# Patient Record
Sex: MALE | Race: BLACK OR AFRICAN AMERICAN | NOT HISPANIC OR LATINO | Employment: OTHER | URBAN - METROPOLITAN AREA
[De-identification: names, ages, dates, MRNs, and addresses within clinical notes are randomized per-mention and may not be internally consistent; named-entity substitution may affect disease eponyms.]

---

## 2017-06-30 ENCOUNTER — HISTORICAL (OUTPATIENT)
Dept: INTERNAL MEDICINE | Facility: CLINIC | Age: 72
End: 2017-06-30

## 2017-10-09 ENCOUNTER — HISTORICAL (OUTPATIENT)
Dept: WOUND CARE | Facility: HOSPITAL | Age: 72
End: 2017-10-09

## 2017-12-19 ENCOUNTER — HISTORICAL (OUTPATIENT)
Dept: INTERNAL MEDICINE | Facility: CLINIC | Age: 72
End: 2017-12-19

## 2017-12-19 LAB
ABS NEUT (OLG): 3.37 X10(3)/MCL (ref 2.1–9.2)
ALBUMIN SERPL-MCNC: 3.6 GM/DL (ref 3.4–5)
ALBUMIN/GLOB SERPL: 1 RATIO (ref 1–2)
ALP SERPL-CCNC: 125 UNIT/L (ref 45–117)
ALT SERPL-CCNC: 37 UNIT/L (ref 12–78)
AST SERPL-CCNC: 27 UNIT/L (ref 15–37)
BASOPHILS # BLD AUTO: 0.07 X10(3)/MCL
BASOPHILS NFR BLD AUTO: 1 % (ref 0–1)
BILIRUB SERPL-MCNC: 0.4 MG/DL (ref 0.2–1)
BILIRUBIN DIRECT+TOT PNL SERPL-MCNC: 0.1 MG/DL
BILIRUBIN DIRECT+TOT PNL SERPL-MCNC: 0.3 MG/DL
BUN SERPL-MCNC: 34 MG/DL (ref 7–18)
CALCIUM SERPL-MCNC: 9.1 MG/DL (ref 8.5–10.1)
CHLORIDE SERPL-SCNC: 105 MMOL/L (ref 98–107)
CHOLEST SERPL-MCNC: 111 MG/DL
CHOLEST/HDLC SERPL: 3.5 {RATIO} (ref 0–5)
CO2 SERPL-SCNC: 28 MMOL/L (ref 21–32)
CREAT SERPL-MCNC: 1.7 MG/DL (ref 0.6–1.3)
EOSINOPHIL # BLD AUTO: 0.51 X10(3)/MCL
EOSINOPHIL NFR BLD AUTO: 7 % (ref 0–5)
ERYTHROCYTE [DISTWIDTH] IN BLOOD BY AUTOMATED COUNT: 12.8 % (ref 11.5–14.5)
EST. AVERAGE GLUCOSE BLD GHB EST-MCNC: 134 MG/DL
GLOBULIN SER-MCNC: 3.4 GM/ML (ref 2.3–3.5)
GLUCOSE SERPL-MCNC: 114 MG/DL (ref 74–106)
HBA1C MFR BLD: 6.3 % (ref 4.2–6.3)
HCT VFR BLD AUTO: 36.5 % (ref 40–51)
HDLC SERPL-MCNC: 32 MG/DL
HGB BLD-MCNC: 12.6 GM/DL (ref 13.5–17.5)
IMM GRANULOCYTES # BLD AUTO: 0.01 10*3/UL
IMM GRANULOCYTES NFR BLD AUTO: 0 %
LDLC SERPL CALC-MCNC: 64 MG/DL (ref 0–130)
LYMPHOCYTES # BLD AUTO: 2.43 X10(3)/MCL
LYMPHOCYTES NFR BLD AUTO: 34 % (ref 15–40)
MCH RBC QN AUTO: 31.4 PG (ref 26–34)
MCHC RBC AUTO-ENTMCNC: 34.5 GM/DL (ref 31–37)
MCV RBC AUTO: 91 FL (ref 80–100)
MONOCYTES # BLD AUTO: 0.68 X10(3)/MCL
MONOCYTES NFR BLD AUTO: 10 % (ref 4–12)
NEUTROPHILS # BLD AUTO: 3.37 X10(3)/MCL
NEUTROPHILS NFR BLD AUTO: 48 X10(3)/MCL
PLATELET # BLD AUTO: 257 X10(3)/MCL (ref 130–400)
PMV BLD AUTO: 9.4 FL (ref 7.4–10.4)
POTASSIUM SERPL-SCNC: 3.6 MMOL/L (ref 3.5–5.1)
PROT SERPL-MCNC: 7 GM/DL (ref 6.4–8.2)
RBC # BLD AUTO: 4.01 X10(6)/MCL (ref 4.5–5.9)
SODIUM SERPL-SCNC: 141 MMOL/L (ref 136–145)
TRIGL SERPL-MCNC: 77 MG/DL
TSH SERPL-ACNC: 0.79 MIU/L (ref 0.36–3.74)
VLDLC SERPL CALC-MCNC: 15 MG/DL
WBC # SPEC AUTO: 7.1 X10(3)/MCL (ref 4.5–11)

## 2018-06-20 ENCOUNTER — HISTORICAL (OUTPATIENT)
Dept: INTERNAL MEDICINE | Facility: CLINIC | Age: 73
End: 2018-06-20

## 2018-06-20 LAB
ABS NEUT (OLG): 4.11 X10(3)/MCL (ref 2.1–9.2)
ALBUMIN SERPL-MCNC: 3.6 GM/DL (ref 3.4–5)
ALBUMIN/GLOB SERPL: 1 RATIO (ref 1–2)
ALP SERPL-CCNC: 107 UNIT/L (ref 45–117)
ALT SERPL-CCNC: 23 UNIT/L (ref 12–78)
AST SERPL-CCNC: 24 UNIT/L (ref 15–37)
BASOPHILS # BLD AUTO: 0.08 X10(3)/MCL
BASOPHILS NFR BLD AUTO: 1 %
BILIRUB SERPL-MCNC: 0.4 MG/DL (ref 0.2–1)
BILIRUBIN DIRECT+TOT PNL SERPL-MCNC: 0.2 MG/DL
BILIRUBIN DIRECT+TOT PNL SERPL-MCNC: 0.2 MG/DL
BUN SERPL-MCNC: 28 MG/DL (ref 7–18)
CALCIUM SERPL-MCNC: 9.3 MG/DL (ref 8.5–10.1)
CHLORIDE SERPL-SCNC: 106 MMOL/L (ref 98–107)
CHOLEST SERPL-MCNC: 120 MG/DL
CHOLEST/HDLC SERPL: 3.6 {RATIO} (ref 0–5)
CO2 SERPL-SCNC: 25 MMOL/L (ref 21–32)
CREAT SERPL-MCNC: 1.6 MG/DL (ref 0.6–1.3)
EOSINOPHIL # BLD AUTO: 0.45 10*3/UL
EOSINOPHIL NFR BLD AUTO: 5 %
ERYTHROCYTE [DISTWIDTH] IN BLOOD BY AUTOMATED COUNT: 13 % (ref 11.5–14.5)
EST. AVERAGE GLUCOSE BLD GHB EST-MCNC: 143 MG/DL
GLOBULIN SER-MCNC: 3.7 GM/ML (ref 2.3–3.5)
GLUCOSE SERPL-MCNC: 111 MG/DL (ref 74–106)
HBA1C MFR BLD: 6.6 % (ref 4.2–6.3)
HCT VFR BLD AUTO: 35.9 % (ref 40–51)
HDLC SERPL-MCNC: 33 MG/DL
HGB BLD-MCNC: 12.2 GM/DL (ref 13.5–17.5)
IMM GRANULOCYTES # BLD AUTO: 0.01 10*3/UL
IMM GRANULOCYTES NFR BLD AUTO: 0 %
LDLC SERPL CALC-MCNC: 70 MG/DL (ref 0–130)
LYMPHOCYTES # BLD AUTO: 2.85 X10(3)/MCL
LYMPHOCYTES NFR BLD AUTO: 34 % (ref 13–40)
MCH RBC QN AUTO: 31.4 PG (ref 26–34)
MCHC RBC AUTO-ENTMCNC: 34 GM/DL (ref 31–37)
MCV RBC AUTO: 92.3 FL (ref 80–100)
MONOCYTES # BLD AUTO: 0.78 X10(3)/MCL
MONOCYTES NFR BLD AUTO: 9 % (ref 4–12)
NEUTROPHILS # BLD AUTO: 4.11 X10(3)/MCL
NEUTROPHILS NFR BLD AUTO: 50 X10(3)/MCL
PLATELET # BLD AUTO: 257 X10(3)/MCL (ref 130–400)
PMV BLD AUTO: 9.7 FL (ref 7.4–10.4)
POTASSIUM SERPL-SCNC: 3.8 MMOL/L (ref 3.5–5.1)
PROT SERPL-MCNC: 7.3 GM/DL (ref 6.4–8.2)
PSA SERPL-MCNC: 4.5 NG/ML
RBC # BLD AUTO: 3.89 X10(6)/MCL (ref 4.5–5.9)
SODIUM SERPL-SCNC: 141 MMOL/L (ref 136–145)
TRIGL SERPL-MCNC: 85 MG/DL
TSH SERPL-ACNC: 0.65 MIU/L (ref 0.36–3.74)
VLDLC SERPL CALC-MCNC: 17 MG/DL
WBC # SPEC AUTO: 8.3 X10(3)/MCL (ref 4.5–11)

## 2018-07-06 ENCOUNTER — HISTORICAL (OUTPATIENT)
Dept: ADMINISTRATIVE | Facility: HOSPITAL | Age: 73
End: 2018-07-06

## 2018-09-10 ENCOUNTER — HISTORICAL (OUTPATIENT)
Dept: CARDIOLOGY | Facility: HOSPITAL | Age: 73
End: 2018-09-10

## 2018-11-28 ENCOUNTER — HISTORICAL (OUTPATIENT)
Dept: RADIOLOGY | Facility: HOSPITAL | Age: 73
End: 2018-11-28

## 2018-12-24 ENCOUNTER — HISTORICAL (OUTPATIENT)
Dept: INTERNAL MEDICINE | Facility: CLINIC | Age: 73
End: 2018-12-24

## 2018-12-24 LAB
ABS NEUT (OLG): 4.76 X10(3)/MCL (ref 2.1–9.2)
ALBUMIN SERPL-MCNC: 3.4 GM/DL (ref 3.4–5)
ALBUMIN/GLOB SERPL: 1 RATIO (ref 1–2)
ALP SERPL-CCNC: 119 UNIT/L (ref 45–117)
ALT SERPL-CCNC: 20 UNIT/L (ref 12–78)
AST SERPL-CCNC: 29 UNIT/L (ref 15–37)
BASOPHILS # BLD AUTO: 0.08 X10(3)/MCL
BASOPHILS NFR BLD AUTO: 1 %
BILIRUB SERPL-MCNC: 0.3 MG/DL (ref 0.2–1)
BILIRUBIN DIRECT+TOT PNL SERPL-MCNC: 0.1 MG/DL
BILIRUBIN DIRECT+TOT PNL SERPL-MCNC: 0.2 MG/DL
BUN SERPL-MCNC: 30 MG/DL (ref 7–18)
CALCIUM SERPL-MCNC: 9.2 MG/DL (ref 8.5–10.1)
CHLORIDE SERPL-SCNC: 105 MMOL/L (ref 98–107)
CHOLEST SERPL-MCNC: 117 MG/DL
CHOLEST/HDLC SERPL: 3.4 {RATIO} (ref 0–5)
CO2 SERPL-SCNC: 29 MMOL/L (ref 21–32)
CREAT SERPL-MCNC: 1.6 MG/DL (ref 0.6–1.3)
CREAT UR-MCNC: 163 MG/DL
EOSINOPHIL # BLD AUTO: 0.54 X10(3)/MCL
EOSINOPHIL NFR BLD AUTO: 6 %
ERYTHROCYTE [DISTWIDTH] IN BLOOD BY AUTOMATED COUNT: 13.1 % (ref 11.5–14.5)
EST. AVERAGE GLUCOSE BLD GHB EST-MCNC: 123 MG/DL
GLOBULIN SER-MCNC: 3.8 GM/ML (ref 2.3–3.5)
GLUCOSE SERPL-MCNC: 106 MG/DL (ref 74–106)
HBA1C MFR BLD: 5.9 % (ref 4.2–6.3)
HCT VFR BLD AUTO: 34.2 % (ref 40–51)
HDLC SERPL-MCNC: 34 MG/DL
HGB BLD-MCNC: 11.4 GM/DL (ref 13.5–17.5)
IMM GRANULOCYTES # BLD AUTO: 0.02 10*3/UL
IMM GRANULOCYTES NFR BLD AUTO: 0 %
LDLC SERPL CALC-MCNC: 68 MG/DL (ref 0–130)
LYMPHOCYTES # BLD AUTO: 2.39 X10(3)/MCL
LYMPHOCYTES NFR BLD AUTO: 28 % (ref 13–40)
MCH RBC QN AUTO: 30.4 PG (ref 26–34)
MCHC RBC AUTO-ENTMCNC: 33.3 GM/DL (ref 31–37)
MCV RBC AUTO: 91.2 FL (ref 80–100)
MICROALBUMIN UR-MCNC: 9.2 MG/L (ref 0–19)
MICROALBUMIN/CREAT RATIO PNL UR: 5.6 MCG/MG CR (ref 0–29)
MONOCYTES # BLD AUTO: 0.74 X10(3)/MCL
MONOCYTES NFR BLD AUTO: 9 % (ref 4–12)
NEUTROPHILS # BLD AUTO: 4.76 X10(3)/MCL
NEUTROPHILS NFR BLD AUTO: 56 X10(3)/MCL
PLATELET # BLD AUTO: 270 X10(3)/MCL (ref 130–400)
PMV BLD AUTO: 9.1 FL (ref 7.4–10.4)
POTASSIUM SERPL-SCNC: 3.9 MMOL/L (ref 3.5–5.1)
PROT SERPL-MCNC: 7.2 GM/DL (ref 6.4–8.2)
RBC # BLD AUTO: 3.75 X10(6)/MCL (ref 4.5–5.9)
SODIUM SERPL-SCNC: 139 MMOL/L (ref 136–145)
TRIGL SERPL-MCNC: 77 MG/DL
TSH SERPL-ACNC: 0.66 MIU/L (ref 0.36–3.74)
VLDLC SERPL CALC-MCNC: 15 MG/DL
WBC # SPEC AUTO: 8.5 X10(3)/MCL (ref 4.5–11)

## 2019-05-21 ENCOUNTER — HISTORICAL (OUTPATIENT)
Dept: ADMINISTRATIVE | Facility: HOSPITAL | Age: 74
End: 2019-05-21

## 2019-05-28 ENCOUNTER — HISTORICAL (OUTPATIENT)
Dept: SURGERY | Facility: HOSPITAL | Age: 74
End: 2019-05-28

## 2019-07-05 ENCOUNTER — HISTORICAL (OUTPATIENT)
Dept: INTERNAL MEDICINE | Facility: CLINIC | Age: 74
End: 2019-07-05

## 2019-09-27 ENCOUNTER — HISTORICAL (OUTPATIENT)
Dept: RADIOLOGY | Facility: HOSPITAL | Age: 74
End: 2019-09-27

## 2019-11-11 ENCOUNTER — HISTORICAL (OUTPATIENT)
Dept: RADIOLOGY | Facility: HOSPITAL | Age: 74
End: 2019-11-11

## 2020-01-06 ENCOUNTER — HISTORICAL (OUTPATIENT)
Dept: INTERNAL MEDICINE | Facility: CLINIC | Age: 75
End: 2020-01-06

## 2020-07-06 ENCOUNTER — HISTORICAL (OUTPATIENT)
Dept: INTERNAL MEDICINE | Facility: CLINIC | Age: 75
End: 2020-07-06

## 2020-08-07 ENCOUNTER — HISTORICAL (OUTPATIENT)
Dept: ADMINISTRATIVE | Facility: HOSPITAL | Age: 75
End: 2020-08-07

## 2020-08-13 ENCOUNTER — HISTORICAL (OUTPATIENT)
Dept: RADIOLOGY | Facility: HOSPITAL | Age: 75
End: 2020-08-13

## 2020-09-11 ENCOUNTER — HISTORICAL (OUTPATIENT)
Dept: GASTROENTEROLOGY | Facility: CLINIC | Age: 75
End: 2020-09-11

## 2020-09-16 ENCOUNTER — HISTORICAL (OUTPATIENT)
Dept: ENDOSCOPY | Facility: HOSPITAL | Age: 75
End: 2020-09-16

## 2020-09-25 ENCOUNTER — HISTORICAL (OUTPATIENT)
Dept: ADMINISTRATIVE | Facility: HOSPITAL | Age: 75
End: 2020-09-25

## 2020-11-09 ENCOUNTER — HISTORICAL (OUTPATIENT)
Dept: RADIOLOGY | Facility: HOSPITAL | Age: 75
End: 2020-11-09

## 2020-11-11 ENCOUNTER — HISTORICAL (OUTPATIENT)
Dept: ADMINISTRATIVE | Facility: HOSPITAL | Age: 75
End: 2020-11-11

## 2020-11-13 ENCOUNTER — HISTORICAL (OUTPATIENT)
Dept: SURGERY | Facility: HOSPITAL | Age: 75
End: 2020-11-13

## 2020-12-03 ENCOUNTER — HISTORICAL (OUTPATIENT)
Dept: INFUSION THERAPY | Facility: HOSPITAL | Age: 75
End: 2020-12-03

## 2020-12-11 ENCOUNTER — HISTORICAL (OUTPATIENT)
Dept: INFUSION THERAPY | Facility: HOSPITAL | Age: 75
End: 2020-12-11

## 2020-12-14 ENCOUNTER — HISTORICAL (OUTPATIENT)
Dept: HEMATOLOGY/ONCOLOGY | Facility: CLINIC | Age: 75
End: 2020-12-14

## 2021-01-07 ENCOUNTER — HISTORICAL (OUTPATIENT)
Dept: HEMATOLOGY/ONCOLOGY | Facility: CLINIC | Age: 76
End: 2021-01-07

## 2021-02-04 ENCOUNTER — HISTORICAL (OUTPATIENT)
Dept: INFUSION THERAPY | Facility: HOSPITAL | Age: 76
End: 2021-02-04

## 2021-02-26 ENCOUNTER — HISTORICAL (OUTPATIENT)
Dept: RADIOLOGY | Facility: HOSPITAL | Age: 76
End: 2021-02-26

## 2021-04-15 ENCOUNTER — HISTORICAL (OUTPATIENT)
Dept: INFUSION THERAPY | Facility: HOSPITAL | Age: 76
End: 2021-04-15

## 2021-04-15 ENCOUNTER — HISTORICAL (OUTPATIENT)
Dept: RADIOLOGY | Facility: HOSPITAL | Age: 76
End: 2021-04-15

## 2021-05-06 ENCOUNTER — HISTORICAL (OUTPATIENT)
Dept: ADMINISTRATIVE | Facility: HOSPITAL | Age: 76
End: 2021-05-06

## 2021-06-03 ENCOUNTER — HISTORICAL (OUTPATIENT)
Dept: RADIOLOGY | Facility: HOSPITAL | Age: 76
End: 2021-06-03

## 2021-06-04 ENCOUNTER — HISTORICAL (OUTPATIENT)
Dept: RADIOLOGY | Facility: HOSPITAL | Age: 76
End: 2021-06-04

## 2021-06-08 ENCOUNTER — HISTORICAL (OUTPATIENT)
Dept: RADIOLOGY | Facility: HOSPITAL | Age: 76
End: 2021-06-08

## 2021-06-11 ENCOUNTER — HISTORICAL (OUTPATIENT)
Dept: HEMATOLOGY/ONCOLOGY | Facility: CLINIC | Age: 76
End: 2021-06-11

## 2021-06-14 ENCOUNTER — HISTORICAL (OUTPATIENT)
Dept: ADMINISTRATIVE | Facility: HOSPITAL | Age: 76
End: 2021-06-14

## 2021-08-03 ENCOUNTER — HISTORICAL (OUTPATIENT)
Dept: CARDIOLOGY | Facility: HOSPITAL | Age: 76
End: 2021-08-03

## 2021-08-06 ENCOUNTER — HISTORICAL (OUTPATIENT)
Dept: INFUSION THERAPY | Facility: HOSPITAL | Age: 76
End: 2021-08-06

## 2021-08-09 ENCOUNTER — HISTORICAL (OUTPATIENT)
Dept: INTERNAL MEDICINE | Facility: CLINIC | Age: 76
End: 2021-08-09

## 2021-09-20 ENCOUNTER — HISTORICAL (OUTPATIENT)
Dept: ADMINISTRATIVE | Facility: HOSPITAL | Age: 76
End: 2021-09-20

## 2021-10-01 ENCOUNTER — HISTORICAL (OUTPATIENT)
Dept: INFUSION THERAPY | Facility: HOSPITAL | Age: 76
End: 2021-10-01

## 2021-12-21 ENCOUNTER — HISTORICAL (OUTPATIENT)
Dept: INFUSION THERAPY | Facility: HOSPITAL | Age: 76
End: 2021-12-21

## 2022-01-25 ENCOUNTER — HISTORICAL (OUTPATIENT)
Dept: INTERNAL MEDICINE | Facility: CLINIC | Age: 77
End: 2022-01-25

## 2022-02-09 ENCOUNTER — HISTORICAL (OUTPATIENT)
Dept: INFUSION THERAPY | Facility: HOSPITAL | Age: 77
End: 2022-02-09

## 2022-02-23 ENCOUNTER — HISTORICAL (OUTPATIENT)
Dept: INTERNAL MEDICINE | Facility: CLINIC | Age: 77
End: 2022-02-23

## 2022-02-23 LAB
ABS NEUT (OLG): 4.52 (ref 2.1–9.2)
ALBUMIN SERPL-MCNC: 3.3 G/DL (ref 3.4–4.8)
ALBUMIN/GLOB SERPL: 0.8 {RATIO} (ref 1.1–2)
ALP SERPL-CCNC: 119 U/L (ref 40–150)
ALT SERPL-CCNC: 22 U/L (ref 0–55)
AST SERPL-CCNC: 24 U/L (ref 5–34)
BASOPHILS # BLD AUTO: 0.1 10*3/UL (ref 0–0.2)
BASOPHILS NFR BLD AUTO: 1 %
BILIRUB SERPL-MCNC: 0.4 MG/DL
BILIRUBIN DIRECT+TOT PNL SERPL-MCNC: 0.2 (ref 0–0.5)
BILIRUBIN DIRECT+TOT PNL SERPL-MCNC: 0.2 (ref 0–0.8)
BUN SERPL-MCNC: 13.3 MG/DL (ref 8.4–25.7)
CALCIUM SERPL-MCNC: 9.8 MG/DL (ref 8.7–10.5)
CHLORIDE SERPL-SCNC: 98 MMOL/L (ref 98–107)
CHOLEST SERPL-MCNC: 117 MG/DL
CHOLEST/HDLC SERPL: 4 {RATIO} (ref 0–5)
CO2 SERPL-SCNC: 29 MMOL/L (ref 23–31)
CREAT SERPL-MCNC: 1.25 MG/DL (ref 0.73–1.18)
DEPRECATED CALCIDIOL+CALCIFEROL SERPL-MC: 45.7 NG/ML (ref 30–80)
EOSINOPHIL # BLD AUTO: 0.5 10*3/UL (ref 0–0.9)
EOSINOPHIL NFR BLD AUTO: 5 %
ERYTHROCYTE [DISTWIDTH] IN BLOOD BY AUTOMATED COUNT: 13.7 % (ref 11.5–14.5)
EST. AVERAGE GLUCOSE BLD GHB EST-MCNC: 134.1 MG/DL
FLAG2 (OHS): 70
FLAG3 (OHS): 80
FLAGS (OHS): 70
GLOBULIN SER-MCNC: 4.3 G/DL (ref 2.4–3.5)
GLUCOSE SERPL-MCNC: 142 MG/DL (ref 82–115)
HBA1C MFR BLD: 6.3 %
HCT VFR BLD AUTO: 39.8 % (ref 40–51)
HDLC SERPL-MCNC: 29 MG/DL (ref 35–60)
HEMOLYSIS INTERF INDEX SERPL-ACNC: 5
HGB BLD-MCNC: 13.2 G/DL (ref 13.5–17.5)
ICTERIC INTERF INDEX SERPL-ACNC: 0
IMM GRANULOCYTES # BLD AUTO: 0.02 10*3/UL
IMM GRANULOCYTES NFR BLD AUTO: 0 %
LDLC SERPL CALC-MCNC: 68 MG/DL (ref 50–140)
LIPEMIC INTERF INDEX SERPL-ACNC: 2
LOW EVENT # SUSPECT FLAG (OHS): 70
LYMPHOCYTES # BLD AUTO: 3.6 10*3/UL (ref 0.6–4.6)
LYMPHOCYTES NFR BLD AUTO: 38 %
MANUAL DIFF? (OHS): NO
MCH RBC QN AUTO: 31.9 PG (ref 26–34)
MCHC RBC AUTO-ENTMCNC: 33.2 G/DL (ref 31–37)
MCV RBC AUTO: 96.1 FL (ref 80–100)
MO BLASTS SUSPECT FLAG (OHS): 60
MONOCYTES # BLD AUTO: 0.8 10*3/UL (ref 0.1–1.3)
MONOCYTES NFR BLD AUTO: 8 %
NEUTROPHILS # BLD AUTO: 4.52 10*3/UL (ref 2.1–9.2)
NEUTROPHILS NFR BLD AUTO: 48 %
NRBC BLD AUTO-RTO: 0 % (ref 0–0.2)
PLATELET # BLD AUTO: 304 10*3/UL (ref 130–400)
PLATELET CLUMPS SUSPECT FLAG (OHS): 30
PMV BLD AUTO: 9.6 FL (ref 7.4–10.4)
POTASSIUM SERPL-SCNC: 3.8 MMOL/L (ref 3.5–5.1)
PROT SERPL-MCNC: 7.6 G/DL (ref 5.8–7.6)
RBC # BLD AUTO: 4.14 10*6/UL (ref 4.5–5.9)
SODIUM SERPL-SCNC: 137 MMOL/L (ref 136–145)
TRIGL SERPL-MCNC: 98 MG/DL (ref 34–140)
TSH SERPL-ACNC: 1.1 M[IU]/L (ref 0.35–4.94)
VLDLC SERPL CALC-MCNC: 20 MG/DL
WBC # SPEC AUTO: 9.5 10*3/UL (ref 4.5–11)

## 2022-04-06 ENCOUNTER — HISTORICAL (OUTPATIENT)
Dept: INFUSION THERAPY | Facility: HOSPITAL | Age: 77
End: 2022-04-06

## 2022-04-06 LAB
ABS NEUT (OLG): 6.16 (ref 2.1–9.2)
ALBUMIN SERPL-MCNC: 3.1 G/DL (ref 3.4–4.8)
ALBUMIN/GLOB SERPL: 0.7 {RATIO} (ref 1.1–2)
ALP SERPL-CCNC: 99 U/L (ref 40–150)
ALT SERPL-CCNC: 17 U/L (ref 0–55)
AST SERPL-CCNC: 25 U/L (ref 5–34)
BASOPHILS # BLD AUTO: 0.1 10*3/UL (ref 0–0.2)
BASOPHILS NFR BLD AUTO: 1 %
BILIRUB SERPL-MCNC: 0.3 MG/DL
BILIRUBIN DIRECT+TOT PNL SERPL-MCNC: 0.1 (ref 0–0.5)
BILIRUBIN DIRECT+TOT PNL SERPL-MCNC: 0.2 (ref 0–0.8)
BUN SERPL-MCNC: 22.9 MG/DL (ref 8.4–25.7)
CALCIUM SERPL-MCNC: 9.7 MG/DL (ref 8.7–10.5)
CEA SERPL-MCNC: 1.74 NG/ML (ref 0–3)
CHLORIDE SERPL-SCNC: 103 MMOL/L (ref 98–107)
CO2 SERPL-SCNC: 23 MMOL/L (ref 23–31)
CREAT SERPL-MCNC: 1.48 MG/DL (ref 0.73–1.18)
EOSINOPHIL # BLD AUTO: 0.7 10*3/UL (ref 0–0.9)
EOSINOPHIL NFR BLD AUTO: 7 %
ERYTHROCYTE [DISTWIDTH] IN BLOOD BY AUTOMATED COUNT: 13.4 % (ref 11.5–14.5)
FLAG2 (OHS): 70
FLAG3 (OHS): 80
FLAGS (OHS): 70
GLOBULIN SER-MCNC: 4.4 G/DL (ref 2.4–3.5)
GLUCOSE SERPL-MCNC: 158 MG/DL (ref 82–115)
HCT VFR BLD AUTO: 37.9 % (ref 40–51)
HEMOLYSIS INTERF INDEX SERPL-ACNC: 38
HGB BLD-MCNC: 12.4 G/DL (ref 13.5–17.5)
ICTERIC INTERF INDEX SERPL-ACNC: 0
IMM GRANULOCYTES # BLD AUTO: 0.03 10*3/UL
IMM GRANULOCYTES NFR BLD AUTO: 0 %
LIPEMIC INTERF INDEX SERPL-ACNC: 5
LOW EVENT # SUSPECT FLAG (OHS): 70
LYMPHOCYTES # BLD AUTO: 2.4 10*3/UL (ref 0.6–4.6)
LYMPHOCYTES NFR BLD AUTO: 24 %
MANUAL DIFF? (OHS): NO
MCH RBC QN AUTO: 30.8 PG (ref 26–34)
MCHC RBC AUTO-ENTMCNC: 32.7 G/DL (ref 31–37)
MCV RBC AUTO: 94 FL (ref 80–100)
MO BLASTS SUSPECT FLAG (OHS): 40
MONOCYTES # BLD AUTO: 0.7 10*3/UL (ref 0.1–1.3)
MONOCYTES NFR BLD AUTO: 7 %
NEUTROPHILS # BLD AUTO: 6.16 10*3/UL (ref 2.1–9.2)
NEUTROPHILS NFR BLD AUTO: 61 %
NRBC BLD AUTO-RTO: 0 % (ref 0–0.2)
PLATELET # BLD AUTO: 342 10*3/UL (ref 130–400)
PLATELET CLUMPS SUSPECT FLAG (OHS): 10
PMV BLD AUTO: 9.8 FL (ref 7.4–10.4)
POTASSIUM SERPL-SCNC: 3.5 MMOL/L (ref 3.5–5.1)
PROT SERPL-MCNC: 7.5 G/DL (ref 5.8–7.6)
RBC # BLD AUTO: 4.03 10*6/UL (ref 4.5–5.9)
SODIUM SERPL-SCNC: 135 MMOL/L (ref 136–145)
WBC # SPEC AUTO: 10.1 10*3/UL (ref 4.5–11)

## 2022-04-07 ENCOUNTER — HISTORICAL (OUTPATIENT)
Dept: ADMINISTRATIVE | Facility: HOSPITAL | Age: 77
End: 2022-04-07
Payer: MEDICARE

## 2022-04-23 VITALS
DIASTOLIC BLOOD PRESSURE: 76 MMHG | WEIGHT: 176.56 LBS | SYSTOLIC BLOOD PRESSURE: 130 MMHG | OXYGEN SATURATION: 98 % | HEIGHT: 70 IN | BODY MASS INDEX: 25.28 KG/M2

## 2022-05-01 NOTE — HISTORICAL [facility] CERNER
This is a historical note converted from Tara. Formatting and pictures may have been removed.  Please reference Tara for original formatting and attached multimedia. 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 Mr. Hamilton has returned home from the [facility] now being seen by home health twice a week. His wife manages his medications and ostomy bag. He reports tolerating a regular diet with his ostomy bag requiring changing twice a day. His ostomy bag collects semi formed brown stool without blood or pain. He denies F/C, N/V, CP, SOB.?He and his wife would like to get a prescription for Ensure. They are aware of his cancer staging and need for further work up and possible chemotherapy.  ?   11/13/2020:  NPO since midnight. No recent hospitalizations or acute changes since last clinic visit. Patient does not have preference as to side. He does not think he has had central lines in neck before. He does report having blockages bilaterally that were removed in the past.  ?   Review of Systems  10 point ROS negative unless otherwise noted above  Physical Exam  ??  Gen: NAD, alert  HEENT: NC/AT  Cardio: RRR, no murmurs rubs or gallops  Pulm: CTABL, symmetric chest rise  Abdomen: Soft, ND/NT, Ostomy is pink and well perfused. Ostomy bag contains semi formed brown stool.  Neuro: CN II-XII grossly intact  ?   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 His?ostomy is well maintained with adequate output?and he is aware of the need for further work up of metastatic disease and Mediport placement for chemotherapy.  ?  -?Mediport Placement?today?  ?  Adin Ibarra MD  [facility] Surgery, PGY-1   Pt known to me and now here for his port placement for chemo.  Pt is appropriately prepared for Mediport.

## 2022-05-01 NOTE — HISTORICAL [facility] CERNER
This is a historical note converted from [employer]. Formatting and pictures may have been removed.  Please reference [employer]ner for original formatting and attached multimedia. History of Present Illness  Riccardo Hamilton is a 75 year old male who was referred for colonoscopy to biopsy a cecal mass. The patient states that for the last 4 months he has had 37 lbs of unintentional weight loss, he was feeling more fatigued and had dark stools. The work up resulted in a CT which noted the mass on 8/13/20.. He denies symptoms of dysphagia, nausea, vomiting, or reflux. He averages 1-2 bowel movements per day which are well formed. He denies abdominal pain, constipation, diarrhea or bright red blood in the stool. He states the dark stools have since resolved. His last colonoscopy was ~10 years ago which he notes was negative. He has no family history of colon cancer. He denies smoking, drinking or drug use.  Review of Systems  negative except as per HPI  Physical Exam  Gen: NAD  Cardio: RR  Pulm: normal effort  Abd: S, NT  MSK: moving extremities  Neuro: alert  Assessment/Plan  Orders:  Discharge, 09/16/20 7:49:00 CDT, Home, When Yanelis score is 100% of pre-op score.  Vital Signs, 09/16/20 7:49:00 CDT, q5min, during procedure, then release from procedure room when post-sedation Yanelis is 80% of pre-op.  Vital Signs, 09/16/20 7:49:00 CDT, q15min for 1 hr, Stop date 09/16/20 8:59:00 CDT, For PACU  75 year old male with cecal mass  -consent obtained  -patient will proceed with colonoscopy   Problem List/Past Medical History  Ongoing  Allergic rhinitis  Anemia(  Confirmed  )  Aphakia(  Confirmed  )  Asthma  Cataract(  Confirmed  )  Chronic renal impairment(  Confirmed  )  Diabetic retinopathy(  Confirmed  )  DMII (diabetes mellitus, type 2)(  Confirmed  )  HTN (hypertension)  HTN - Hypertension  Hypercholesterolemia  Hyperlipidemia(  Confirmed  )  Ptosis of eyelid(  Confirmed  )  Syncope  Visual field defect(   Confirmed  )  Historical  Diabetes  Procedure/Surgical History  Cataract Extraction Phacoemulsification (Left) (05/28/2019)  Extracapsular cataract removal with insertion of intraocular lens prosthesis (1 stage procedure), manual or mechanical technique (eg, irrigation and aspiration or phacoemulsification) (05/28/2019)  IOL Placement (Left) (05/28/2019)  Replacement of Left Lens with Synthetic Substitute, Percutaneous Approach (05/28/2019)  Carotid endarterectomy (02/12/2014)  Carotid endarterectomy (01/06/2014)  blockage  Bypass graft, with other than vein, transcervical retropharyngeal carotid-carotid, performed in conjunction with endovascular repair of descending thoracic aorta, by neck incision  Extracapsular cataract removal with insertion of intraocular lens prosthesis (1 stage procedure), manual or mechanical technique (eg, irrigation and aspiration or phacoemulsification)  Phacoemulsification of cataract with intraocular lens implantation   Medications  Inpatient  No active inpatient medications  Home  Advair Diskus 250 mcg-50 mcg inhalation powder, 1 puff(s), INH, BID, 11 refills  amLODIPine 10 mg oral tablet, 10 mg= 1 tab(s), Oral, Daily,? ?Not taking: Last Dose Date/Time unknown  aspirin 81 mg oral tablet, CHEWABLE, 81 mg= 1 tab(s), Oral, Daily  atorvastatin 40 mg oral tablet, 40 mg= 1 tab(s), Oral, Daily, 3 refills  cetirizine 10 mg oral tablet, 10 mg= 1 tab(s), Oral, Daily, 3 refills  fluticasone 50 mcg/inh nasal spray, 50 mcg= 1 spray(s), Nasal, BID, 3 refills  hydrALAZINE 25 mg oral tablet, 25 mg= 1 tab(s), Oral, BID, 3 refills  hydrochlorothiazide 25 mg oral tablet, 25 mg= 1 tab(s), Oral, Daily, 3 refills  losartan 100 mg oral tablet, 100 mg= 1 tab(s), Oral, Daily,? ?Not taking: Last Dose Date/Time unknown  losartan 50 mg oral tablet, 50 mg= 1 tab(s), Oral, Daily, 3 refills  metoprolol succinate 50 mg oral tablet, extended release, 50 mg= 1 tab(s), Oral, Daily, 3 refills  Norvasc 2.5 mg oral  tablet, 2.5 mg= 1 tab(s), Oral, Daily, 3 refills  One A Day Men 50 Plus oral tablet, 1 tab(s), Oral, Daily  Allergies  No Known Allergies  Social History  Abuse/Neglect  No, No, Yes, 09/14/2020  Alcohol - Denies Alcohol Use, 10/02/2014  Never, 07/06/2016  Employment/School  Retired, 07/06/2018  Retired, Highest education level: None. Operates hazardous equipment: No., 07/06/2016  Exercise  Exercise duration: 30. Exercise frequency: Daily. Exercise type: Walking., 01/20/2020  Exercise frequency: 5-6 times/week. Self assessment: Fair condition. Exercise type: Walking., 04/06/2015  Home/Environment  Lives with Spouse., 07/06/2018  Home equipment: blood pressure cuff., 01/09/2017  Lives with Spouse. Living situation: Home/Independent. Alcohol abuse in household: No. Substance abuse in household: No. Smoker in household: No. Injuries/Abuse/Neglect in household: No. Feels unsafe at home: No. Safe place to go: Yes. Agency(s)/Others notified: No. Family/Friends available for support: Yes. Concern for family members at home: No. Major illness in household: No. Financial concerns: No. TV/Computer concerns: No., 07/06/2016  Nutrition/Health  Regular, Wants to lose weight: No. Sleeping concerns: No. Feels highly stressed: No., 04/06/2015  Sexual  Gender Identity Identifies as male., 08/01/2019  Spiritual/Cultural  Caodaism, 01/20/2020  Substance Use - Denies Substance Abuse, 10/02/2014  Never, 07/06/2016  Tobacco - Denies Tobacco Use, 10/02/2014  Former smoker, quit more than 30 days ago, N/A, 09/14/2020  Family History  Cancer: Sister.  Mother: History is unknown  Sister: History is unknown  Sister: History is unknown  Immunizations  Vaccine Date Status   pneumococcal 13-valent conjugate vaccine 09/04/2020 Given   influenza virus vaccine, inactivated 09/12/2019 Recorded   influenza virus vaccine, inactivated 10/02/2017 Recorded   pneumococcal 23-polyvalent vaccine 10/02/2015 Recorded   zoster vaccine live 09/22/2015 Recorded        He reports a 3 month history of weight loss of 30 lbs.? He has been having regular stools but for past 2 months stools have been dark.? Denies any abdominal pain.? Appetite is fair but states he was never a big eater.? As work-up for weight loss was done, CT scan was performed that showed large cecal mass.? Last hgb was 8 g/dL.

## 2022-05-01 NOTE — HISTORICAL [facility] CERNER
This is a historical note converted from [employer]. Formatting and pictures may have been removed.  Please reference [employer]laura for original formatting and attached multimedia. Chief Complaint  Test results. Right hand and wriet pain and swelling.  History of Present Illness  72 yo bm with htn, chol, sinus, cri, diabetes  Review of Systems  neg cv, neg pulm, neg gi  Physical Exam  Vitals & Measurements  T:?36.5? ?C (Oral)? HR:?54(Peripheral)? RR:?18? BP:?105/62?  HT:?175?cm? HT:?175?cm? WT:?76.5?kg? WT:?76.5?kg? BMI:?24.98?  aax4 nad  mirta eomi  cv rrr s1s2 no mgr  lungs cta no cr or whz  abd nt soft  ext no edema  neuro intact  Assessment/Plan  1.?Allergic rhinitis  ? stable  Ordered:  CBC w/ Auto Diff, Routine collect, *Est. 01/06/19 3:00:00 CST, Blood, Order for future visit, *Est. Stop date 01/06/19 3:00:00 CST, Lab Collect, Allergic rhinitis  DMII (diabetes mellitus, type 2)(  Confirmed  )  Anemia(  Confirmed  )  Hypercholesterolemia, 07/0...  Clinic Follow up, *Est. 01/06/19 3:00:00 CST, Order for future visit, Allergic rhinitis  DMII (diabetes mellitus, type 2)(  Confirmed  )  Anemia(  Confirmed  )  Hypercholesterolemia, [facility]  Comprehensive Metabolic Panel, Routine collect, *Est. 01/06/19 3:00:00 CST, Blood, Order for future visit, *Est. Stop date 01/06/19 3:00:00 CST, Lab Collect, Allergic rhinitis  DMII (diabetes mellitus, type 2)(  Confirmed  )  Anemia(  Confirmed  )  Hypercholesterolemia, 07/0...  Hemoglobin A1C [facility], Routine collect, *Est. 01/06/19 3:00:00 CST, Blood, Order for future visit, *Est. Stop date 01/06/19 3:00:00 CST, Lab Collect, Allergic rhinitis  DMII (diabetes mellitus, type 2)(  Confirmed  )  Anemia(  Confirmed  )  Hypercholesterolemia, 07/0...  Lipid Panel, Routine collect, *Est. 01/06/19 3:00:00 CST, Blood, Order for future visit, *Est. Stop date 01/06/19 3:00:00 CST, Lab Collect, Allergic rhinitis  DMII (diabetes mellitus, type 2)(  Confirmed  )   Anemia(  Confirmed  )  Hypercholesterolemia, 07/0...  Microalbum/Creatinine Ratio Urine (Microalb/Creat), Routine collect, Urine, Order for future visit, *Est. 01/06/19 3:00:00 CST, *Est. Stop date 01/06/19 3:00:00 CST, Nurse collect, Allergic rhinitis  DMII (diabetes mellitus, type 2)(  Confirmed  )  Anemia(  Confirmed  )  Hypercholesterolemia  Office/Outpatient Visit Level 4 Established [id number] PC, Allergic rhinitis  DMII (diabetes mellitus, type 2)(  Confirmed  )  Anemia(  Confirmed  )  Hypercholesterolemia, [facility], 07/06/18 10:14:00 CDT  Thyroid Stimulating Hormone, Routine collect, *Est. 01/06/19 3:00:00 CST, Blood, Order for future visit, *Est. Stop date 01/06/19 3:00:00 CST, Lab Collect, Allergic rhinitis  DMII (diabetes mellitus, type 2)(  Confirmed  )  Anemia(  Confirmed  )  Hypercholesterolemia, 07/0...  ?  2.?DMII (diabetes mellitus, type 2)(  Confirmed  )  ? follow low glucose diet  Ordered:  CBC w/ Auto Diff, Routine collect, *Est. 01/06/19 3:00:00 CST, Blood, Order for future visit, *Est. Stop date 01/06/19 3:00:00 CST, Lab Collect, Allergic rhinitis  DMII (diabetes mellitus, type 2)(  Confirmed  )  Anemia(  Confirmed  )  Hypercholesterolemia, 07/0...  Clinic Follow up, *Est. 01/06/19 3:00:00 CST, Order for future visit, Allergic rhinitis  DMII (diabetes mellitus, type 2)(  Confirmed  )  Anemia(  Confirmed  )  Hypercholesterolemia, [facility]  Comprehensive Metabolic Panel, Routine collect, *Est. 01/06/19 3:00:00 CST, Blood, Order for future visit, *Est. Stop date 01/06/19 3:00:00 CST, Lab Collect, Allergic rhinitis  DMII (diabetes mellitus, type 2)(  Confirmed  )  Anemia(  Confirmed  )  Hypercholesterolemia, 07/0...  Hemoglobin A1C [facility], Routine collect, *Est. 01/06/19 3:00:00 CST, Blood, Order for future visit, *Est. Stop date 01/06/19 3:00:00 CST, Lab Collect, Allergic rhinitis  DMII (diabetes mellitus, type 2)(  Confirmed  )  Anemia(   Confirmed  )  Hypercholesterolemia, 07/0...  Lipid Panel, Routine collect, *Est. 01/06/19 3:00:00 CST, Blood, Order for future visit, *Est. Stop date 01/06/19 3:00:00 CST, Lab Collect, Allergic rhinitis  DMII (diabetes mellitus, type 2)(  Confirmed  )  Anemia(  Confirmed  )  Hypercholesterolemia, 07/0...  Microalbum/Creatinine Ratio Urine (Microalb/Creat), Routine collect, Urine, Order for future visit, *Est. 01/06/19 3:00:00 CST, *Est. Stop date 01/06/19 3:00:00 CST, Nurse collect, Allergic rhinitis  DMII (diabetes mellitus, type 2)(  Confirmed  )  Anemia(  Confirmed  )  Hypercholesterolemia  Office/Outpatient Visit Level 4 Established [id number] PC, Allergic rhinitis  DMII (diabetes mellitus, type 2)(  Confirmed  )  Anemia(  Confirmed  )  Hypercholesterolemia, [facility], 07/06/18 10:14:00 CDT  Thyroid Stimulating Hormone, Routine collect, *Est. 01/06/19 3:00:00 CST, Blood, Order for future visit, *Est. Stop date 01/06/19 3:00:00 CST, Lab Collect, Allergic rhinitis  DMII (diabetes mellitus, type 2)(  Confirmed  )  Anemia(  Confirmed  )  Hypercholesterolemia, 07/0...  ?  3.?Anemia(  Confirmed  )  ? chronic  Ordered:  CBC w/ Auto Diff, Routine collect, *Est. 01/06/19 3:00:00 CST, Blood, Order for future visit, *Est. Stop date 01/06/19 3:00:00 CST, Lab Collect, Allergic rhinitis  DMII (diabetes mellitus, type 2)(  Confirmed  )  Anemia(  Confirmed  )  Hypercholesterolemia, 07/0...  Clinic Follow up, *Est. 01/06/19 3:00:00 CST, Order for future visit, Allergic rhinitis  DMII (diabetes mellitus, type 2)(  Confirmed  )  Anemia(  Confirmed  )  Hypercholesterolemia, [facility]  Comprehensive Metabolic Panel, Routine collect, *Est. 01/06/19 3:00:00 CST, Blood, Order for future visit, *Est. Stop date 01/06/19 3:00:00 CST, Lab Collect, Allergic rhinitis  DMII (diabetes mellitus, type 2)(  Confirmed  )  Anemia(  Confirmed  )  Hypercholesterolemia, 07/0...  Hemoglobin A1C  [facility], Routine collect, *Est. 01/06/19 3:00:00 CST, Blood, Order for future visit, *Est. Stop date 01/06/19 3:00:00 CST, Lab Collect, Allergic rhinitis  DMII (diabetes mellitus, type 2)(  Confirmed  )  Anemia(  Confirmed  )  Hypercholesterolemia, 07/0...  Lipid Panel, Routine collect, *Est. 01/06/19 3:00:00 CST, Blood, Order for future visit, *Est. Stop date 01/06/19 3:00:00 CST, Lab Collect, Allergic rhinitis  DMII (diabetes mellitus, type 2)(  Confirmed  )  Anemia(  Confirmed  )  Hypercholesterolemia, 07/0...  Microalbum/Creatinine Ratio Urine (Microalb/Creat), Routine collect, Urine, Order for future visit, *Est. 01/06/19 3:00:00 CST, *Est. Stop date 01/06/19 3:00:00 CST, Nurse collect, Allergic rhinitis  DMII (diabetes mellitus, type 2)(  Confirmed  )  Anemia(  Confirmed  )  Hypercholesterolemia  Office/Outpatient Visit Level 4 Established [id number] PC, Allergic rhinitis  DMII (diabetes mellitus, type 2)(  Confirmed  )  Anemia(  Confirmed  )  Hypercholesterolemia, [facility], 07/06/18 10:14:00 CDT  Thyroid Stimulating Hormone, Routine collect, *Est. 01/06/19 3:00:00 CST, Blood, Order for future visit, *Est. Stop date 01/06/19 3:00:00 CST, Lab Collect, Allergic rhinitis  DMII (diabetes mellitus, type 2)(  Confirmed  )  Anemia(  Confirmed  )  Hypercholesterolemia, 07/0...  ?  4.?Hypercholesterolemia  ? flp  Ordered:  CBC w/ Auto Diff, Routine collect, *Est. 01/06/19 3:00:00 CST, Blood, Order for future visit, *Est. Stop date 01/06/19 3:00:00 CST, Lab Collect, Allergic rhinitis  DMII (diabetes mellitus, type 2)(  Confirmed  )  Anemia(  Confirmed  )  Hypercholesterolemia, 07/0...  Clinic Follow up, *Est. 01/06/19 3:00:00 CST, Order for future visit, Allergic rhinitis  DMII (diabetes mellitus, type 2)(  Confirmed  )  Anemia(  Confirmed  )  Hypercholesterolemia, [facility]  Comprehensive Metabolic Panel, Routine collect, *Est. 01/06/19 3:00:00 CST, Blood, Order for  future visit, *Est. Stop date 01/06/19 3:00:00 CST, Lab Collect, Allergic rhinitis  DMII (diabetes mellitus, type 2)(  Confirmed  )  Anemia(  Confirmed  )  Hypercholesterolemia, 07/0...  Hemoglobin A1C [facility], Routine collect, *Est. 01/06/19 3:00:00 CST, Blood, Order for future visit, *Est. Stop date 01/06/19 3:00:00 CST, Lab Collect, Allergic rhinitis  DMII (diabetes mellitus, type 2)(  Confirmed  )  Anemia(  Confirmed  )  Hypercholesterolemia, 07/0...  Lipid Panel, Routine collect, *Est. 01/06/19 3:00:00 CST, Blood, Order for future visit, *Est. Stop date 01/06/19 3:00:00 CST, Lab Collect, Allergic rhinitis  DMII (diabetes mellitus, type 2)(  Confirmed  )  Anemia(  Confirmed  )  Hypercholesterolemia, 07/0...  Microalbum/Creatinine Ratio Urine (Microalb/Creat), Routine collect, Urine, Order for future visit, *Est. 01/06/19 3:00:00 CST, *Est. Stop date 01/06/19 3:00:00 CST, Nurse collect, Allergic rhinitis  DMII (diabetes mellitus, type 2)(  Confirmed  )  Anemia(  Confirmed  )  Hypercholesterolemia  Office/Outpatient Visit Level 4 Established [id number] PC, Allergic rhinitis  DMII (diabetes mellitus, type 2)(  Confirmed  )  Anemia(  Confirmed  )  Hypercholesterolemia, [facility], 07/06/18 10:14:00 CDT  Thyroid Stimulating Hormone, Routine collect, *Est. 01/06/19 3:00:00 CST, Blood, Order for future visit, *Est. Stop date 01/06/19 3:00:00 CST, Lab Collect, Allergic rhinitis  DMII (diabetes mellitus, type 2)(  Confirmed  )  Anemia(  Confirmed  )  Hypercholesterolemia, 07/0...  ?   Problem List/Past Medical History  Ongoing  Allergic rhinitis  Anemia(  Confirmed  )  Aphakia(  Confirmed  )  Asthma  Cataract(  Confirmed  )  Chronic renal impairment(  Confirmed  )  Diabetic retinopathy(  Confirmed  )  DMII (diabetes mellitus, type 2)(  Confirmed  )  HTN (hypertension)  HTN - Hypertension  Hypercholesterolemia  Hyperlipidemia(  Confirmed  )  Ptosis of eyelid(  Confirmed   )  Visual field defect(  Confirmed  )  Historical  Diabetes  Procedure/Surgical History  Carotid endarterectomy (02/12/2014), Carotid endarterectomy (01/06/2014), blockage, Bypass graft, with other than vein, transcervical retropharyngeal carotid-carotid, performed in conjunction with endovascular repair of descending thoracic aorta, by neck incision., Extracapsular cataract removal with insertion of intraocular lens prosthesis (1 stage procedure), manual or mechanical technique (eg, irrigation and aspiration or phacoemulsification)., Phacoemulsification of cataract with intraocular lens implantation.  Medications  Advair Diskus 250 mcg-50 mcg inhalation powder, 1 puff(s), INH, BID, 11 refills  aspirin 81 mg oral tablet, CHEWABLE, 81 mg= 1 tab(s), Oral, Daily  atorvastatin 40 mg oral tablet, 40 mg= 1 tab(s), Oral, Daily, 3 refills  cetirizine 10 mg oral tablet, 10 mg= 1 tab(s), Oral, Daily, 3 refills  fluticasone 50 mcg/inh nasal spray, 50 mcg= 1 spray(s), Nasal, BID, 3 refills  hydrALAZINE 25 mg oral tablet, 75 mg= 3 tab(s), Oral, Daily, 3 refills  hydrALAZINE 25 mg oral tablet, 75 mg= 3 tab(s), Oral, Daily, 3 refills,? ?Not taking: duplicate  hydrochlorothiazide 25 mg oral tablet, 25 mg= 1 tab(s), Oral, Daily, 3 refills  losartan 100 mg oral tablet, 100 mg= 1 tab(s), Oral, Daily, 3 refills  metoprolol succinate 50 mg oral tablet, extended release, 50 mg= 1 tab(s), Oral, Daily, 3 refills  Norvasc 10 mg oral tablet, 10 mg= 1 tab(s), Oral, Daily, 3 refills  One A Day Men 50 Plus oral tablet, 1 tab(s), Oral, Daily  Allergies  No Known Allergies  Social History  Alcohol - Denies Alcohol Use, 10/02/2014  Never, 07/06/2016  Employment/School  Retired, 07/06/2018  Retired, Highest education level: None. Operates hazardous equipment: No., 07/06/2016  Exercise  Exercise frequency: 5-6 times/week. Self assessment: Fair condition. Exercise type: Walking., 04/06/2015  Home/Environment  Lives with Spouse., 07/06/2018  Home  equipment: blood pressure cuff., 01/09/2017  Lives with Spouse. Living situation: Home/Independent. Alcohol abuse in household: No. Substance abuse in household: No. Smoker in household: No. Injuries/Abuse/Neglect in household: No. Feels unsafe at home: No. Safe place to go: Yes. Agency(s)/Others notified: No. Family/Friends available for support: Yes. Concern for family members at home: No. Major illness in household: No. Financial concerns: No. TV/Computer concerns: No., 07/06/2016  Nutrition/Health  Regular, Wants to lose weight: No. Sleeping concerns: No. Feels highly stressed: No., 04/06/2015  Substance Abuse - Denies Substance Abuse, 10/02/2014  Never, 07/06/2018  Never, 07/06/2016  Tobacco - Denies Tobacco Use, 10/02/2014  Never smoker Use:., 07/06/2018  Never smoker, 07/06/2016  Family History  Cancer: Sister.  Immunizations  Vaccine Date Status   influenza virus vaccine, inactivated 10/02/2017 Recorded   pneumococcal 23-polyvalent vaccine 10/02/2015 Recorded   Health Maintenance  Health Maintenance  ???Pending?(in the next year)  ??? ??OverDue  ??? ? ? ?Aspirin Therapy for CVD Prevention due??10/02/15??and every 1??year(s)  ??? ? ? ?Diabetes Maintenance-Urine Dipstick due??05/28/16??and every 1??year(s)  ??? ? ? ?Smoking Cessation due??11/12/16??and every 1??year(s)  ??? ??Due?  ??? ? ? ?Colorectal Screening due??06/30/18??and every 1??year(s)  ??? ? ? ?Alcohol Misuse Screening due??07/06/18??and every 1??year(s)  ??? ? ? ?Diabetes Maintenance-Microalbumin due??07/06/18??Variable frequency  ??? ? ? ?Diabetes Maintenance-Foot Exam due??07/06/18??Variable frequency  ??? ? ? ?Diabetes Maintenance-Medication Prescribed due??07/06/18??and every 1??year(s)  ??? ? ? ?Functional Assessment due??07/06/18??and every 1??year(s)  ??? ? ? ?Hypertension Management-Education due??07/06/18??and every 1??year(s)  ??? ? ? ?Tetanus Vaccine due??07/06/18??and every 10??year(s)  ??? ? ? ?Zoster Vaccine due??07/06/18??and every  100??year(s)  ??? ??Due In Future?  ??? ? ? ?Influenza Vaccine not due until??10/02/18??and every 1??year(s)  ??? ? ? ?Diabetes Maintenance-HgbA1c not due until??12/20/18??and every 6??month(s)  ??? ? ? ?Hypertension Management-Blood Pressure not due until??01/06/19??and every 6??month(s)  ??? ? ? ?Diabetes Maintenance-Eye Exam not due until??05/14/19??and every 1??year(s)  ??? ? ? ?Diabetes Maintenance-Fasting Lipid Profile not due until??06/20/19??and every 1??year(s)  ??? ? ? ?Diabetes Maintenance-Serum Creatinine not due until??06/20/19??and every 1??year(s)  ??? ? ? ?Hypertension Management-BMP not due until??06/20/19??and every 1??year(s)  ??? ? ? ?Lipid Screening not due until??06/20/19??and every 1??year(s)  ???Satisfied?(in the past 1 year)  ??? ??Satisfied?  ??? ? ? ?Blood Pressure Screening on??07/06/18.??Satisfied by Jess Howe LPN  ??? ? ? ?Body Mass Index Check on??07/06/18.??Satisfied by Jess Howe LPN  ??? ? ? ?Coronary Artery Disease Maintenance-Lipid Lowering Therapy on??05/22/18.??Satisfied by Beckie Flores MD  ??? ? ? ?Depression Screening on??07/06/18.??Satisfied by Jess Howe LPN Yolie  ??? ? ? ?Diabetes Maintenance-Eye Exam on??05/14/18.??Satisfied by Randell Valencia  ??? ? ? ?Diabetes Maintenance-Fasting Lipid Profile on??06/20/18.??Satisfied by Mart Watts Jr.  ??? ? ? ?Diabetes Maintenance-HgbA1c on??06/20/18.??Satisfied by Mart Watts Jr.  ??? ? ? ?Diabetes Maintenance-Serum Creatinine on??06/20/18.??Satisfied by Mart Watts Jr.  ??? ? ? ?Fall Risk Assessment on??07/06/18.??Satisfied by Jess Howe LPN  ??? ? ? ?Hypertension Management-Blood Pressure on??07/06/18.??Satisfied by Jess Howe LPN  ??? ? ? ?Hypertension Management-BMP on??06/20/18.??Satisfied by Mart Watts Jr.  ??? ? ? ?Influenza Vaccine on??10/02/17.??Satisfied by Javy Hayden LPN  ??? ? ? ?Lipid Screening  on??06/20/18.??Satisfied by Mart Watts Jr.  ??? ? ? ?Obesity Screening on??07/06/18.??Satisfied by Jess Howe LPN  ??? ? ? ?Tobacco Use Screening on??07/06/18.??Satisfied by Jess Howe LPN  ?  ?

## 2022-05-01 NOTE — HISTORICAL [facility] CERNER
This is a historical note converted from Tara. Formatting and pictures may have been removed.  Please reference Tara for original formatting and attached multimedia. Chief Complaint  Adenocarcinoma of the colon  History of Present Illness  Problem List:  pT3 pN2a M0, stage IIIB?adenocarcinoma of cecum diagnosed 2020  ?  Current Treatment:  Surveillance  ?  Treatment History:  status post right hemicolectomy (10/04/2020)  Adjuvant capecitabine started 01/11/2021. Stopped after 1st cycle d/t intolerance  ?  ?  Past medical history: Hypertension.? NIDDM.? CKD.? History of retropharyngeal carotid-carotid bypass (2014).? Bronchial asthma.? Cataract.? Diabetic neuropathy.? Hypertension.? Dyslipidemia.? Ptosis.? Visual field defect.? Cataract surgery.? Carotid endarterectomy (01/06/2014; 02/12/2014).? Intraocular lens, (05/28/2019).  Social history:?. ?Lives in [place], [place]. ?Has 4 children.? Retired.? Used to ride horses.? Small?third a pack of cigarettes daily?for 10 years; quit 50 years back.? No history of alcohol or illicit drug abuse.  Family history:?Sister experienced breast cancer at age 58 years.? No family history of colorectal cancer.  Health maintenance:?Screening colonoscopy 10 years back, apparently?unremarkable.? Prior to that,?history of colon polyps.  ?  ?   History of present illness:  75-year-old gentleman referred by surgery with adenocarcinoma of cecum.  ?   For a full, detailed history, please see Dr. Shetty note dated 3/12/2021.  ?   Interval History  6/11/2021: Patient presents for follow up on surveillance. He presents with his wife and has no complaints. He reports eating 3 meals a day now. CMP stable. CEA stable at 2.47. CBC stable with improved H&H, now normal. Reviewed recent CT scan results with them and gave them a copy. Explained surveillance plans to them. Instructed him to stop Eliquis as he was instructed to take for 3 months, and it has been 3 months. Will have  him follow up in 3 months with labs. He is amenable to this plan.  Review of Systems  A complete 12-point?ROS was performed in full with pertinent positives as described in interval history. Remainder of ROS done in full and are negative.  Physical Exam  Vitals & Measurements  T:?36.4? ?C (Oral)? HR:?66(Peripheral)? RR:?18? BP:?155/64?  HT:?180?cm? WT:?69.3?kg? BMI:?21.48?  General: ?Alert and oriented, No acute distress.??  Appearance: Well-groomed; wearing face mask.  HEENT: ?Normocephalic, Oral mucosa is moist.?Pupils are equal, round and reactive to light, Extraocular movements are intact, Normal conjunctiva.?Glasses.  Neck: ?Supple, Non-tender, No lymphadenopathy, No thyromegaly.??  Respiratory: ?Lungs are clear to auscultation, Respirations are non-labored, Breath sounds are equal, Symmetrical chest wall expansion.??  Cardiovascular: ?Normal rate, Regular rhythm, No edema.??  Breast:??Breast exam not performed on todays visit.??  Gastrointestinal: Rounded,?Soft, Non-tender, Non-distended, Normal bowel sounds.??Ostomy with beefy red stoma.  Musculoskeletal: ?Normal strength.??  Integumentary: ?Warm, dry, intact.??  Neurologic: ?Alert, Oriented, No focal deficits, Cranial Nerves II-XII are grossly intact.??  Cognition and Speech: ?Oriented, Speech clear and coherent.??  Psychiatric: ?Cooperative, Appropriate mood & affect. ?  ECOG Performance Scale: 2 - Capable of all self-care but unable to carry out any work activities. Up and about greater than 50 percent of waking hours.?  Assessment/Plan  1.?Adenocarcinoma of cecum?C18.0  #Adenocarcinoma of cecum, 10 cm, grade 2-3, moderately to poorly differentiated, lymphovascular invasion positive,  pT3, 6/20 lymph nodes positive, status post right hemicolectomy (10/04/2020)  -Presentation: Hematochezia and anemia  -CT A/P with contrast (08/13/2020): No metastasis  -Colonoscopy (09/16/2020): Large adenocarcinoma of cecum  -Open right hemicolectomy (09/30/2020)  -CT chest  with contrast (11/09/2020): No metastasis  -CEA level 7.8, elevated (09/16/2020) (preop)  -->pT3 pN2a M0, stage IIIB,?adenocarcinoma of cecum; 6 lymph nodes positive  -12/11/2020: CT A/P with contrast (restaging prior to adjuvant chemotherapy) (comparison: 10/04/2020): Borderline enlarged right midabdominal mesenteric lymph node is indeterminate  -Adjuvant capecitabine started 01/11/2021 (1800 mg p.o. twice daily)  -Second cycle was due to start 02/01/2021 (but he had to be hospitalized with nausea, vomiting, dehydration,?poor oral intake, protein calorie malnutrition, ANGELINE, diarrhea, etc.?)  -Hospitalized 02/01/2021-02/02/2021: ANGELINE, postural dizziness with near syncope, orthostasis, vomiting, protein calorie malnutrition; 3-day history of nausea and vomiting; unable to keep anything down; weak, lightheaded, orthostatic, near syncope; had just finished first cycle of adjuvant chemotherapy?(capecitabine)  -Hospitalized 02/09/2021-02/22/2021:?ANGELINE, dehydration,?high output ostomy, protein calorie malnutrition, weakness, fatigue, nausea, vomiting;?high output from ileostomy controlled with Lomotil and?Imodium; ANGELINE resolved  -6/3/2021: Surveillance CTs: No new suspicious findings in the chest, abdomen or pelvis.?  ?  ?   #Pulmonary embolism, incidental diagnosis (02/26/2021):  -02/26/2021:?CT chest with contrast: New?subacute/chronic?small PE segmental right lower lung lobe, new since 11/09/2020  ?  ?   #Right lower extremity DVT, proximal:  -03/03/2021: Bilateral lower extremity venous Doppler: Right femoral DVT, nearly occlusive to occlusive (age indeterminate); right popliteal DVT, partially occlusive (age indeterminate)  -Eliquis started 03/03/2021 (10 mg p.o. twice daily for 7 days, then 5 mg twice daily); continue for 3 months  ?  ?   #No evidence of Santos syndrome  -MSI-H by PCR  MLH1: Loss of expression in a subset of tumor cells  MSH2: Preserved (intact) expression in tumor cells  MSH6: Preserved (intact)  expression in tumor cells  PMS2: Loss of expression in a subset of tumor cells  (dMMR)  -V600 BRAF mutation negative  -IHC: MLH1 preserved (intact) expression in tumor cells (no deficiency of mismatch repair protein MLH1)  -->No evidence of Santos syndrome  ?  ?  #Chronic normocytic anemia, starting around 06/2017, subsequently worsening  PRBC x1 (10/04/2020)  PRBC x1 (10/02/2020)  PRBC x1 (09/30/2020)  08/07/2020: Serum iron 27, low; TIBC 175, low; transferrin saturation 15.4%, borderline; ferritin 295.1, normal  -Feraheme 510 mg IV x2 (12/03/2020, 12/11/2020)?(for relative iron deficiency)  (01/07/2021: Transferrin saturation 33%, up from 18%; ferritin level 1162.37, up from 594.74; hemoglobin 11.9, up from 10.9)  ?  ?  Plan:  -Adjuvant capecitabine started 01/11/2021  (No benefit for addition of oxaliplatin in patients 70 years and older)  -Did not tolerate capecitabine  -Hospitalized twice?with high output ileostomy, nausea, vomiting, ANGELINE  -Capecitabine discontinued after first cycle  ?  -Continue surveillance?appropriate for?stage III colon cancer  -Check CBC, CMP, CEA level at this time  -Surveillance CT C/A/P with contrast?in June 21  -Surveillance colonoscopy 1 year (09/2021) after surgery (09/2020)  (However, already performed in 02/19/2021)  -Next surveillance colonoscopy in 3 years?(02/2024)  ?  -Incidental diagnosis of?a subacute/chronic small PE?in a segmental right lower lung lobe?pulmonary artery?(CT chest: 02/06/2021),?new since 11/2020?  -Completely asymptomatic  -Found to have right lower extremity?proximal DVT,?age?indeterminate, on venous Doppler study (03/03/2021)  -Eliquis started 03/03/2021?(to continue for 3 months)  ?  No evidence of disease recurrence at this time. Continue surveillance.  Follow up in 3 months (F2F) with CBC, CMP, CEA.  Surveillance CT C/A/P with contrast?in?December 21; ordered today.  Next surveillance colonoscopy in 3 years?(02/2024)  ?  Surveillance:  History and  physical every 3-6 months for 2 years?(09/2020-09/2022), then  every 6 months for a total of 5 years?(09/2022-09/2025);  ?  CEA every 3-6 months for 2 years, then  every 6 months for a total of 5 years;  ?  chest/abdominal/pelvic CT scan every 6-12 months (category 2B for frequency less than 12 months) for a total of 5 years?(09/2020-09/2025)?  (CT should be with IV and oral contrast; if either CT of abdomen/pelvis is inadequate, or if patient has a contraindication to CT with IV contrast, then consider abdominal/pelvic MRI with MRI contrast plus a noncontrast chest CT);  PET CT scan is not recommended;  ?  colonoscopy in 1 year after surgery except if no preoperative colonoscopy due to obstructing lesion, then colonoscopy in 3-6 months:  if advanced adenoma then repeat in 1 year,  if no advanced adenoma then repeat in 3 years, and then  every 5 years.  Ordered:  ?   Problem List/Past Medical History  Ongoing  Adenocarcinoma of cecum  Adenocarcinoma of colon  Allergic rhinitis  Anemia(  Confirmed  )  Aphakia(  Confirmed  )  Asthma  Cataract(  Confirmed  )  Chronic renal impairment(  Confirmed  )  Colon adenocarcinoma  HTN (hypertension)  HTN - Hypertension  Hypercholesterolemia  Hyperlipidemia(  Confirmed  )  Ileostomy in place  Impaired mobility  Ptosis of eyelid(  Confirmed  )  Syncope  Visual field defect(  Confirmed  )  Historical  Diabetes  Diabetic retinopathy(  Confirmed  )  DMII (diabetes mellitus, type 2)(  Confirmed  )  Procedure/Surgical History  Biopsy Gastrointestinal (02/19/2021)  Colonoscopy (None) (02/19/2021)  Excision of Large Intestine, Via Natural or Artificial Opening Endoscopic, Diagnostic (02/19/2021)  Inspection of Lower Intestinal Tract, Via Natural or Artificial Opening Endoscopic (02/19/2021)  Small Bowel Endoscopy, Diagnostic (02/19/2021)  Catheter Insertion Mediport (None) (11/13/2020)  Insertion of Totally Implantable Vascular Access Device into Chest Subcutaneous  Tissue and Fascia, Open Approach (11/13/2020)  Insertion of tunneled centrally inserted central venous access device, with subcutaneous port; age 5 years or older (11/13/2020)  Port, indwelling (implantable) (11/13/2020)  Bypass Ileum to Cutaneous, Open Approach (10/04/2020)  Exploration Laparotomy (10/04/2020)  Inspection of Peritoneum, Open Approach (10/04/2020)  Colectomy (Right) (09/30/2020)  Resection of Right Large Intestine, Open Approach (09/30/2020)  Biopsy Gastrointestinal (None) (09/16/2020)  Colonoscopy (None) (09/16/2020)  Colonoscopy, flexible; with biopsy, single or multiple (09/16/2020)  Colonoscopy, flexible; with removal of tumor(s), polyp(s), or other lesion(s) by snare technique (09/16/2020)  Excision of Ascending Colon, Via Natural or Artificial Opening Endoscopic, Diagnostic (09/16/2020)  Excision of Ascending Colon, Via Natural or Artificial Opening Endoscopic, Diagnostic (09/16/2020)  Polypectomy (None) (09/16/2020)  Cataract Extraction Phacoemulsification (Left) (05/28/2019)  Extracapsular cataract removal with insertion of intraocular lens prosthesis (1 stage procedure), manual or mechanical technique (eg, irrigation and aspiration or phacoemulsification) (05/28/2019)  IOL Placement (Left) (05/28/2019)  Replacement of Left Lens with Synthetic Substitute, Percutaneous Approach (05/28/2019)  Carotid endarterectomy (02/12/2014)  Carotid endarterectomy (01/06/2014)  blockage  Bypass graft, with other than vein, transcervical retropharyngeal carotid-carotid, performed in conjunction with endovascular repair of descending thoracic aorta, by neck incision  Extracapsular cataract removal with insertion of intraocular lens prosthesis (1 stage procedure), manual or mechanical technique (eg, irrigation and aspiration or phacoemulsification)  Phacoemulsification of cataract with intraocular lens implantation   Medications  acetaminophen-oxycodone 325 mg-5 mg oral tablet, 1 tab(s), Oral, q4hr  Advair  Diskus 250 mcg-50 mcg inhalation powder, 1 puff(s), INH, BID, 11 refills  apixaban 5 mg oral tablet, 5 mg= 1 tab(s), Oral, BID, 6 refills  aspirin 81 mg oral tablet, CHEWABLE, 81 mg= 1 tab(s), Oral, Daily,? ?Not taking  atorvastatin 40 mg oral tablet, 40 mg= 1 tab(s), Oral, Daily, 3 refills  cetirizine 10 mg oral tablet, 10 mg= 1 tab(s), Oral, Daily, 3 refills  dextromethorphan-promethazine 15 mg-6.25 mg/5 mL oral syrup, 5 mL, Oral, q6hr,? ?Not taking  Eliquis 5 mg oral tablet, 10 mg= 2 tab(s), Oral, BID,? ?Not taking: duplicate  Ensure supplements, See Instructions, 3 refills,? ?Not taking: due to diarrhea  fluticasone 50 mcg/inh nasal spray, 50 mcg= 1 spray(s), Nasal, BID, 3 refills  gabapentin 100 mg oral capsule, 200 mg= 2 cap(s), Oral, QID  hydrochlorothiazide 25 mg oral tablet, 25 mg= 1 tab(s), Oral, Daily,? ?Not taking  Lomotil 2.5 mg-0.025 mg oral tablet, 1 tab(s), Oral, QID, 3 refills,? ?Not taking  loperamide 2 mg oral capsule, 4 mg= 2 cap(s), Oral, QIDACHS, 1 refills,? ?Not taking  magnesium oxide 400 mg oral tablet, 400 mg= 1 tab(s), Oral, Daily  metoprolol succinate 50 mg oral tablet, extended release, 50 mg= 1 tab(s), Oral, Daily,? ?Not taking  One A Day Men 50 Plus oral tablet, 1 tab(s), Oral, Daily  Pantoprazole 40 mg ORAL EC-Tablet, 40 mg= 1 tab(s), Oral, Daily, 6 refills  Phenergan 12.5 mg Tab, 12.5 mg= 1 tab(s), Oral, q4hr, PRN  potassium chloride 20 mEq oral TABLET extended release, 20 mEq= 1 tab(s), Oral, Daily,? ?Not taking  Questran 4 g/9 g oral powder for reconstitution, 1 packet(s), Oral, TID, 3 refills  triamcinolone 0.1% topical cream, TOP, BID,? ?Not taking  Vancomycin 125mg Caps, 125 mg= 1 cap(s), Oral, q6hr,? ?Not Taking, Completed Rx  Xeloda 150 mg oral tablet, 300 mg= 2 tab(s), Oral, BID,? ?Not taking  Xeloda 500 mg oral tablet, 1500 mg= 3 tab(s), Oral, BID,? ?Not taking  Allergies  No Known Allergies  Social History  Abuse/Neglect  No, No, Yes, 06/11/2021  Alcohol - Denies Alcohol  Use, 10/02/2014  Past, 06/11/2021  Employment/School  Retired, 07/06/2018  Retired, Highest education level: None. Operates hazardous equipment: No., 07/06/2016  Exercise  Exercise duration: 30. Exercise frequency: Daily. Exercise type: Walking., 01/20/2020  Exercise frequency: 5-6 times/week. Self assessment: Fair condition. Exercise type: Walking., 04/06/2015  Home/Environment  Lives with Spouse., 07/06/2018  Home equipment: blood pressure cuff., 01/09/2017  Lives with Spouse. Living situation: Home/Independent. Alcohol abuse in household: No. Substance abuse in household: No. Smoker in household: No. Injuries/Abuse/Neglect in household: No. Feels unsafe at home: No. Safe place to go: Yes. Agency(s)/Others notified: No. Family/Friends available for support: Yes. Concern for family members at home: No. Major illness in household: No. Financial concerns: No. TV/Computer concerns: No., 07/06/2016  Nutrition/Health  Regular, Wants to lose weight: No. Sleeping concerns: No. Feels highly stressed: No., 04/06/2015  Sexual  Gender Identity Identifies as male., 08/01/2019  Spiritual/Cultural  Orthodoxy, 01/20/2020  Substance Use - Denies Substance Abuse, 10/02/2014  Never, 06/11/2021  Tobacco - Denies Tobacco Use, 10/02/2014  Never (less than 100 in lifetime), N/A, 06/11/2021  Family History  Cancer: Sister.  Mother: History is unknown  Sister: History is unknown  Sister: History is unknown  Immunizations  Vaccine Date Status   COVID-19 mRNA, LNP-S, PF - Moderna 02/26/2021 Recorded   COVID-19 mRNA, LNP-S, PF - Moderna 01/12/2021 Recorded   pneumococcal 13-valent conjugate vaccine 09/04/2020 Given   influenza virus vaccine, inactivated 09/12/2019 Recorded   influenza virus vaccine, inactivated 09/24/2018 Recorded   influenza virus vaccine, inactivated 10/02/2017 Recorded   influenza virus vaccine, inactivated 10/06/2016 Recorded   pneumococcal 23-polyvalent vaccine 10/02/2015 Recorded   influenza virus vaccine, inactivated  09/22/2015 Recorded   zoster vaccine live 09/22/2015 Recorded   Health Maintenance  Health Maintenance  ???Pending?(in the next year)  ??? ??OverDue  ??? ? ? ?Influenza Vaccine due??10/01/20??and every 1??day(s)  ??? ??Due?  ??? ? ? ?Hypertension Management-Education due??06/11/21??and every 1??year(s)  ??? ? ? ?Medicare Annual Wellness Exam due??06/11/21??and every 1??year(s)  ??? ? ? ?Zoster Vaccine due??06/11/21??Unknown Frequency  ??? ??Refused?  ??? ? ? ?Tetanus Vaccine due??06/11/21??and every 10??year(s)  ??? ??Due In Future?  ??? ? ? ?Obesity Screening not due until??01/01/22??and every 1??year(s)  ??? ? ? ?Advance Directive not due until??01/02/22??and every 1??year(s)  ??? ? ? ?Cognitive Screening not due until??01/02/22??and every 1??year(s)  ??? ? ? ?Fall Risk Assessment not due until??01/02/22??and every 1??year(s)  ??? ? ? ?Functional Assessment not due until??01/02/22??and every 1??year(s)  ??? ? ? ?Aspirin Therapy for CVD Prevention not due until??02/22/22??and every 1??year(s)  ??? ? ? ?ADL Screening not due until??03/04/22??and every 1??year(s)  ???Satisfied?(in the past 1 year)  ??? ??Satisfied?  ??? ? ? ?ADL Screening on??03/04/21.??Satisfied by Javy Hayden LPN  ??? ? ? ?Advance Directive on??06/11/21.??Satisfied by Lizeth Horn LPN  ??? ? ? ?Aspirin Therapy for CVD Prevention on??02/22/21.??Satisfied by Todd Mathew DO  ??? ? ? ?Blood Pressure Screening on??06/11/21.??Satisfied by Lizeth Horn LPN  ??? ? ? ?Body Mass Index Check on??06/11/21.??Satisfied by Lizeth Horn LPN  ??? ? ? ?Cognitive Screening on??03/04/21.??Satisfied by Javy Hayden LPN  ??? ? ? ?Colorectal Screening on??08/12/20.??Satisfied by Delores Burt  ??? ? ? ?Coronary Artery Disease Maintenance-Lipid Lowering Therapy on??05/25/21.??Satisfied by Beckie Flores MD  ??? ? ? ?Depression Screening on??06/11/21.??Satisfied by Lizeth Horn LPN  ??? ? ? ?Diabetes Maintenance-Foot Exam  on??03/04/21.??Satisfied by Beckie Flores MD  ??? ? ? ?Diabetes Screening on??06/11/21.??Satisfied by Tamra Ahn  ??? ? ? ?Fall Risk Assessment on??06/11/21.??Satisfied by Lizeth Horn LPN  ??? ? ? ?Functional Assessment on??06/11/21.??Satisfied by Lizeth Horn LPN  ??? ? ? ?Hypertension Management-Blood Pressure on??06/11/21.??Satisfied by Lizeth Horn LPN  ??? ? ? ?Influenza Vaccine on??02/08/21.??Satisfied by Kenneth Prater RN  ??? ? ? ?Lipid Screening on??08/07/20.??Satisfied by Freda Barkley  ??? ? ? ?Obesity Screening on??06/11/21.??Satisfied by Lizeth Horn LPN  ??? ? ? ?Pneumococcal Vaccine on??09/04/20.??Satisfied by Jess Howe LPN  ??? ??Refused?  ??? ? ? ?Tetanus Vaccine on??11/04/20.??Recorded by Jess Howe LPN??Reason: Patient Refuses  ??? ? ? ?Zoster Vaccine on??11/04/20.??Recorded by Jess Howe LPN??Reason: Patient Refuses  ?  Lab Results  Test Name Test Result Date/Time Comments   Sodium Lvl 138 mmol/L 06/11/2021 06:51 CDT    Potassium Lvl 3.7 mmol/L 06/11/2021 06:51 CDT    Chloride 110 mmol/L (High) 06/11/2021 06:51 CDT    CO2 20 mmol/L (Low) 06/11/2021 06:51 CDT    Calcium Lvl 9.9 mg/dL 06/11/2021 06:51 CDT    Glucose Lvl 112 mg/dL 06/11/2021 06:51 CDT    BUN 14.2 mg/dL 06/11/2021 06:51 CDT    Creatinine 1.23 mg/dL (High) 06/11/2021 06:51 CDT    eGFR-AA 73 (Low) 06/11/2021 06:51 CDT    eGFR-IVAN 60 mL/min/1.73 m2 (Low) 06/11/2021 06:51 CDT    Bili Total 0.3 mg/dL 06/11/2021 06:51 CDT    Bili Direct 0.1 mg/dL 06/11/2021 06:51 CDT    Bili Indirect 0.20 mg/dL 06/11/2021 06:51 CDT    AST 42 unit/L (High) 06/11/2021 06:51 CDT    ALT 41 unit/L 06/11/2021 06:51 CDT    Alk Phos 137 unit/L 06/11/2021 06:51 CDT    Total Protein 7.0 gm/dL 06/11/2021 06:51 CDT    Albumin Lvl 3.4 gm/dL 06/11/2021 06:51 CDT    Globulin 3.6 gm/dL (High) 06/11/2021 06:51 CDT    A/G Ratio 0.9 ratio (Low) 06/11/2021 06:51 CDT    CEA 2.47 ng/mL 06/11/2021 06:51 CDT 95.4% of healthy,  non-smoking subjects may be expected to have CEA titers of less than 3.0 ng/mL   WBC 7.0 x10(3)/mcL 06/11/2021 06:51 CDT    RBC 4.14 x10(6)/mcL (Low) 06/11/2021 06:51 CDT    Hgb 13.5 gm/dL 06/11/2021 06:51 CDT    Hct 40.3 % 06/11/2021 06:51 CDT    Platelet 217 x10(3)/mcL 06/11/2021 06:51 CDT    MCV 97.3 fL 06/11/2021 06:51 CDT    MCH 32.6 pg 06/11/2021 06:51 CDT    MCHC 33.5 gm/dL 06/11/2021 06:51 CDT    RDW 12.2 % 06/11/2021 06:51 CDT    MPV 9.5 fL 06/11/2021 06:51 CDT    Abs Neut 3.92 x10(3)/[facility] 06/11/2021 06:51 CDT    Neutro Auto 56 % 06/11/2021 06:51 CDT    Lymph Auto 31 % 06/11/2021 06:51 CDT    Mono Auto 9 % 06/11/2021 06:51 CDT    Eos Auto 3 % 06/11/2021 06:51 CDT    Abs Eos 0.2 x10(3)/[facility] 06/11/2021 06:51 CDT    Basophil Auto 1 % 06/11/2021 06:51 CDT    Abs Neutro 3.92 x10(3)/[facility] 06/11/2021 06:51 CDT    Abs Lymph 2.2 x10(3)/[facility] 06/11/2021 06:51 CDT    Abs Mono 0.6 x10(3)/[facility] 06/11/2021 06:51 CDT    Abs Baso 0.1 x10(3)/[facility] 06/11/2021 06:51 CDT    NRBC% 0.0 % 06/11/2021 06:51 CDT    IG% 0 % 06/11/2021 06:51 CDT    IG# 0.010 06/11/2021 06:51 CDT

## 2022-05-01 NOTE — HISTORICAL [facility] CERNER
This is a historical note converted from Tara. Formatting and pictures may have been removed.  Please reference Tara for original formatting and attached multimedia. Chief Complaint  ileostomy appliance leakage  History of Present Illness  76 Y/O male referred to ostomy clinic for eval and management of complications with Ileostomy. Patient underwent surgery for R?colon and terminal ileum resection with creation of Ileostomy. Patient was followed by inpatient ostomy nurse, discharged to care of  for management of ostomy. Patient reports he was discharged from  yesterday. He has no supplies and presents with Duct tape to periwound with macerated skin.  Patient is unable to afford 20% co-pay for ostomy supplies. States he is currently getting some supplies from [facility]  Review of Systems  Except as stated in HPI, all other 10 body systems normal  ?  Physical Exam  Vitals & Measurements  T:?36.7? ?C (Oral)? HR:?79(Peripheral)? RR:?19? BP:?155/66?  BMI:?20.83?  Constitutional:? VSS; afebrile.? Well-nourished; in no acute distress.? Non-toxic appearing.  Cardiology:? Regular rhythm and rate.? .?  Respiratory:? Breathing even, quiet, and unlabored.? No coughing.  Skin:?see ostomy note  Psychiatric:?? Calm, cooperative.? Mood normal, affect normal.? No symptoms of depression or anxiety.? Responses appropriate.?  Co-factors impeding wound healing:? knowledge deficits, and financial constraints.??  ?  ?   Ostomy location: RLQ  Stoma size: 2 roiund  Stoma Condition: viable, functioning. red, moist  Skin around stoma- Maceration with weeping skin extending approx 5  to L side.  Pouch Type: 2 peice drainable [place]  ?   Pouch system function: Patient reports changed appliance today and?used last barrier. States he used 5 barriers over weekend.?Wafer softening noted from 6-9oclock from effluent.?Barrier cut to large resulting in peristomal maceration.  Care Provided: Assisted by EBONIE Lugo, complete  system changed. Hair around stoma shaved with electric?shaver. Skin cleansed with baby shampoo and warm water, rinsed well and dried.  No sting skin prep applied to peristomal area.  Equipment/Supplies:  Creswell barrier  Creswell Drainable lock and roll pouch  Ostomy stool volume- approx 2/3rd full with brown liquid effluent.  ?   Education for ostomy care provided  RTC on Monday next week.  ?  Assessment/Plan  1.?Adenocarcinoma of colon?C18.0,?  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 05/13/21 3:00:00 CDT, *Est. Stop date 05/13/21 3:00:00 CDT, [facility], FU with Physician in 1 week  ?  3.?Ileostomy in place?Z93.2  Ordered:  Wound Care Outpatient, *Est. 05/13/21 3:00:00 CDT, *Est. Stop date 05/13/21 3:00:00 CDT, [facility], FU with Physician in 1 week  ?  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 05/13/21 3:00:00 CDT, *Est. Stop date 05/13/21 3:00:00 CDT, [facility], FU with Physician in 1 week  ?  Orders:  Wound Care Outpatient, 05/06/21 13:00:00 CDT, Stop date 05/06/21 13:00:00 CDT  Wound Care Outpatient, 05/10/21 13:00:00 CDT, Stop date 05/10/21 13:00:00 CDT   Problem List/Past Medical History  Ongoing  Adenocarcinoma of cecum  Adenocarcinoma of colon  Allergic rhinitis  Anemia(  Confirmed  )  Aphakia(  Confirmed  )  Asthma  Cataract(  Confirmed  )  Chronic renal impairment(  Confirmed  )  Colon adenocarcinoma  HTN (hypertension)  HTN - Hypertension  Hypercholesterolemia  Hyperlipidemia(  Confirmed  )  Ileostomy in place  Impaired mobility  Ptosis of eyelid(  Confirmed  )  Syncope  Visual field defect(  Confirmed  )  Historical  Diabetes  Diabetic retinopathy(  Confirmed  )  DMII (diabetes mellitus, type 2)(  Confirmed  )  Procedure/Surgical History  Biopsy Gastrointestinal (02/19/2021)  Colonoscopy (None)  (02/19/2021)  Excision of Large Intestine, Via Natural or Artificial Opening Endoscopic, Diagnostic (02/19/2021)  Inspection of Lower Intestinal Tract, Via Natural or Artificial Opening Endoscopic (02/19/2021)  Small Bowel Endoscopy, Diagnostic (02/19/2021)  Catheter Insertion Mediport (None) (11/13/2020)  Insertion of Totally Implantable Vascular Access Device into Chest Subcutaneous Tissue and Fascia, Open Approach (11/13/2020)  Insertion of tunneled centrally inserted central venous access device, with subcutaneous port; age 5 years or older (11/13/2020)  Port, indwelling (implantable) (11/13/2020)  Bypass Ileum to Cutaneous, Open Approach (10/04/2020)  Exploration Laparotomy (10/04/2020)  Inspection of Peritoneum, Open Approach (10/04/2020)  Colectomy (Right) (09/30/2020)  Resection of Right Large Intestine, Open Approach (09/30/2020)  Biopsy Gastrointestinal (None) (09/16/2020)  Colonoscopy (None) (09/16/2020)  Colonoscopy, flexible; with biopsy, single or multiple (09/16/2020)  Colonoscopy, flexible; with removal of tumor(s), polyp(s), or other lesion(s) by snare technique (09/16/2020)  Excision of Ascending Colon, Via Natural or Artificial Opening Endoscopic, Diagnostic (09/16/2020)  Excision of Ascending Colon, Via Natural or Artificial Opening Endoscopic, Diagnostic (09/16/2020)  Polypectomy (None) (09/16/2020)  Cataract Extraction Phacoemulsification (Left) (05/28/2019)  Extracapsular cataract removal with insertion of intraocular lens prosthesis (1 stage procedure), manual or mechanical technique (eg, irrigation and aspiration or phacoemulsification) (05/28/2019)  IOL Placement (Left) (05/28/2019)  Replacement of Left Lens with Synthetic Substitute, Percutaneous Approach (05/28/2019)  Carotid endarterectomy (02/12/2014)  Carotid endarterectomy (01/06/2014)  blockage  Bypass graft, with other than vein, transcervical retropharyngeal carotid-carotid, performed in conjunction with endovascular repair of  descending thoracic aorta, by neck incision  Extracapsular cataract removal with insertion of intraocular lens prosthesis (1 stage procedure), manual or mechanical technique (eg, irrigation and aspiration or phacoemulsification)  Phacoemulsification of cataract with intraocular lens implantation   Medications  acetaminophen-oxycodone 325 mg-5 mg oral tablet, 1 tab(s), Oral, q4hr  Advair Diskus 250 mcg-50 mcg inhalation powder, 1 puff(s), INH, BID, 11 refills  aspirin 81 mg oral tablet, CHEWABLE, 81 mg= 1 tab(s), Oral, Daily  atorvastatin 40 mg oral tablet, 40 mg= 1 tab(s), Oral, Daily, 3 refills  cetirizine 10 mg oral tablet, 10 mg= 1 tab(s), Oral, Daily, 3 refills  dextromethorphan-promethazine 15 mg-6.25 mg/5 mL oral syrup, 5 mL, Oral, q6hr,? ?Not taking  Eliquis 5 mg oral tablet, 10 mg= 2 tab(s), Oral, BID  Ensure supplements, See Instructions, 3 refills,? ?Not taking: due to diarrhea  fluticasone 50 mcg/inh nasal spray, 50 mcg= 1 spray(s), Nasal, BID, 3 refills  gabapentin 100 mg oral capsule, 200 mg= 2 cap(s), Oral, QID  hydrochlorothiazide 25 mg oral tablet, 25 mg= 1 tab(s), Oral, Daily  Lomotil 2.5 mg-0.025 mg oral tablet, 1 tab(s), Oral, QID, 3 refills  loperamide 2 mg oral capsule, 4 mg= 2 cap(s), Oral, QIDACHS, 1 refills,? ?Not taking  magnesium oxide 400 mg oral tablet, 400 mg= 1 tab(s), Oral, Daily  metoprolol succinate 50 mg oral tablet, extended release, 50 mg= 1 tab(s), Oral, Daily  One A Day Men 50 Plus oral tablet, 1 tab(s), Oral, Daily  Pantoprazole 40 mg ORAL EC-Tablet, 40 mg= 1 tab(s), Oral, Daily, 6 refills  Phenergan 12.5 mg Tab, 12.5 mg= 1 tab(s), Oral, q4hr, PRN  potassium chloride 20 mEq oral TABLET extended release, 20 mEq= 1 tab(s), Oral, Daily  Questran 4 g/9 g oral powder for reconstitution, 1 packet(s), Oral, TID, 11 refills  triamcinolone 0.1% topical cream, TOP, BID  Vancomycin 125mg Caps, 125 mg= 1 cap(s), Oral, q6hr,? ?Not Taking, Completed Rx  Xeloda 150 mg oral tablet, 300 mg= 2  tab(s), Oral, BID,? ?Investigating  Xeloda 500 mg oral tablet, 1500 mg= 3 tab(s), Oral, BID,? ?Investigating  Allergies  No Known Allergies  Social History  Abuse/Neglect  No, 05/06/2021  Alcohol - Denies Alcohol Use, 10/02/2014  Past, 03/03/2021  Employment/School  Retired, 07/06/2018  Retired, Highest education level: None. Operates hazardous equipment: No., 07/06/2016  Exercise  Exercise duration: 30. Exercise frequency: Daily. Exercise type: Walking., 01/20/2020  Exercise frequency: 5-6 times/week. Self assessment: Fair condition. Exercise type: Walking., 04/06/2015  Home/Environment  Lives with Spouse., 07/06/2018  Home equipment: blood pressure cuff., 01/09/2017  Lives with Spouse. Living situation: Home/Independent. Alcohol abuse in household: No. Substance abuse in household: No. Smoker in household: No. Injuries/Abuse/Neglect in household: No. Feels unsafe at home: No. Safe place to go: Yes. Agency(s)/Others notified: No. Family/Friends available for support: Yes. Concern for family members at home: No. Major illness in household: No. Financial concerns: No. TV/Computer concerns: No., 07/06/2016  Nutrition/Health  Regular, Wants to lose weight: No. Sleeping concerns: No. Feels highly stressed: No., 04/06/2015  Sexual  Gender Identity Identifies as male., 08/01/2019  Spiritual/Cultural  [place], 01/20/2020  Substance Use - Denies Substance Abuse, 10/02/2014  Never, 03/03/2021  Tobacco - Denies Tobacco Use, 10/02/2014  Never (less than 100 in lifetime), N/A, 05/06/2021  Family History  Cancer: Sister.  Mother: History is unknown  Sister: History is unknown  Sister: History is unknown  Immunizations  Vaccine Date Status   COVID-19 mRNA, LNP-S, PF - Moderna 02/26/2021 Recorded   COVID-19 mRNA, LNP-S, PF - Moderna 01/12/2021 Recorded   pneumococcal 13-valent conjugate vaccine 09/04/2020 Given   influenza virus vaccine, inactivated 09/12/2019 Recorded   influenza virus vaccine, inactivated 09/24/2018 Recorded    influenza virus vaccine, inactivated 10/02/2017 Recorded   influenza virus vaccine, inactivated 10/06/2016 Recorded   pneumococcal 23-polyvalent vaccine 10/02/2015 Recorded   influenza virus vaccine, inactivated 09/22/2015 Recorded   zoster vaccine live 09/22/2015 Recorded   Health Maintenance  Health Maintenance  ???Pending?(in the next year)  ??? ??OverDue  ??? ? ? ?Influenza Vaccine due??10/01/20??and every 1??day(s)  ??? ??Due?  ??? ? ? ?Hypertension Management-Education due??05/06/21??and every 1??year(s)  ??? ? ? ?Medicare Annual Wellness Exam due??05/06/21??and every 1??year(s)  ??? ? ? ?Zoster Vaccine due??05/06/21??Unknown Frequency  ??? ??Refused?  ??? ? ? ?Tetanus Vaccine due??05/06/21??and every 10??year(s)  ??? ??Due In Future?  ??? ? ? ?Obesity Screening not due until??01/01/22??and every 1??year(s)  ??? ? ? ?Advance Directive not due until??01/02/22??and every 1??year(s)  ??? ? ? ?Cognitive Screening not due until??01/02/22??and every 1??year(s)  ??? ? ? ?Fall Risk Assessment not due until??01/02/22??and every 1??year(s)  ??? ? ? ?Functional Assessment not due until??01/02/22??and every 1??year(s)  ??? ? ? ?Aspirin Therapy for CVD Prevention not due until??02/22/22??and every 1??year(s)  ??? ? ? ?ADL Screening not due until??03/04/22??and every 1??year(s)  ??? ? ? ?Body Mass Index Check not due until??03/12/22??and every 1??year(s)  ??? ? ? ?Depression Screening not due until??03/12/22??and every 1??year(s)  ??? ? ? ?Hypertension Management-BMP not due until??03/12/22??and every 1??year(s)  ??? ? ? ?Blood Pressure Screening not due until??04/15/22??and every 1??year(s)  ??? ? ? ?Hypertension Management-Blood Pressure not due until??04/15/22??and every 1??year(s)  ???Satisfied?(in the past 1 year)  ??? ??Satisfied?  ??? ? ? ?ADL Screening on??03/04/21.??Satisfied by Javy Hayden LPN  ??? ? ? ?Advance Directive on??03/12/21.??Satisfied by Bindu Pelaez  ??? ? ? ?Aspirin Therapy for CVD  Prevention on??02/22/21.??Satisfied by Todd Mathew DO  ??? ? ? ?Blood Pressure Screening on??05/06/21.??Satisfied by Brittney López LPN  ??? ? ? ?Body Mass Index Check on??05/06/21.??Satisfied by Brittney López LPN  ??? ? ? ?Cognitive Screening on??03/04/21.??Satisfied by Javy Hayden LPN  ??? ? ? ?Colorectal Screening on??08/12/20.??Satisfied by Delores Burt  ??? ? ? ?Coronary Artery Disease Maintenance-Lipid Lowering Therapy on??03/30/21.??Satisfied by Beckie Flores MD  ??? ? ? ?Depression Screening on??03/12/21.??Satisfied by Bindu Pelaez  ??? ? ? ?Diabetes Maintenance-Foot Exam on??03/04/21.??Satisfied by Beckie Flores MD  ??? ? ? ?Diabetes Screening on??03/12/21.??Satisfied by Everardo Ames  ??? ? ? ?Fall Risk Assessment on??05/06/21.??Satisfied by Brittney López LPN  ??? ? ? ?Functional Assessment on??04/15/21.??Satisfied by Nalini Vazquez RN  ??? ? ? ?Hypertension Management-Blood Pressure on??05/06/21.??Satisfied by Brittney López LPN  ??? ? ? ?Influenza Vaccine on??02/08/21.??Satisfied by Kenneth Prater RN  ??? ? ? ?Lipid Screening on??08/07/20.??Satisfied by Freda Barkley  ??? ? ? ?Obesity Screening on??05/06/21.??Satisfied by Brittney López LPN  ??? ? ? ?Pneumococcal Vaccine on??09/04/20.??Satisfied by Jess Howe LPN  ??? ??Refused?  ??? ? ? ?Tetanus Vaccine on??11/04/20.??Recorded by Howe LPN, Jess Yolie??Reason: Patient Refuses  ??? ? ? ?Zoster Vaccine on??11/04/20.??Recorded by Shyam LOOMIS, Jess Urbano??Reason: Patient Refuses  ?

## 2022-05-01 NOTE — HISTORICAL [facility] CERNER
This is a historical note converted from Tara. Formatting and pictures may have been removed.  Please reference Tara for original formatting and attached multimedia. Chief Complaint  ileostomy appliance leakage  History of Present Illness  management of complications with Ileostomy. Patient underwent surgery for R?colon and terminal ileum resection with creation of Ileostomy. Patient was followed by inpatient ostomy nurse, discharged to care of  for management of ostomy. Patient reports he was discharged from  yesterday. He has no supplies and presents with Duct tape to periwound with macerated skin.  Patient is unable to afford 20% co-pay for ostomy supplies. States he is currently getting some supplies from [employer]  ?   5/17/21 Patient presents to clinic for attention to ostomy. Patient reports did not change pouching system since last visit. Reports minimal irritation around stoma. Received supplies from [employer].  Review of Systems  Except as stated in HPI, all other 10 body systems normal  ?  Physical Exam  Vitals & Measurements  T:?36.6? ?C (Oral)? HR:?76(Peripheral)? RR:?18? BP:?156/76?  BMI:?20.83?  Constitutional:? VSS; afebrile.? Well-nourished; in no acute distress.? Non-toxic appearing.  Cardiology:? Regular rhythm and rate.? .?  Respiratory:? Breathing even, quiet, and unlabored.? No coughing.  Skin:?see ostomy note  Psychiatric:?? Calm, cooperative.? Mood normal, affect normal.? No symptoms of depression or anxiety.? Responses appropriate.?  Co-factors impeding wound healing:? knowledge deficits, and financial constraints.??  ?   Ostomy location: RLQ  Stoma size: 2 round  Stoma Condition: viable, functioning. red, moist  Skin around stoma- intact, area from 5-8:00 extending approx 1.5cm maceration  Pouch Type: 2 piece drainable Andi  ?   Pouch system function: pouching system in place x 7 days.  Care Provided: Assisted by EBONIE Lugo, complete system changed. Hair around  stoma shaved with electric?shaver. Skin cleansed with baby shampoo and warm water, rinsed well and dried.  No sting skin prep applied to peristomal area.  Equipment/Supplies: 2 ostomy barrier rings  Andi barrier  Clements Drainable lock and roll pouch  Ostomy stool volume- approx 2/3rd full with brown liquid effluent.  ?  ?  Assessment/Plan  1.?Adenocarcinoma of colon?C18.0,?Adenocarcinoma of cecum?C18.0  Ordered:  Wound Care Outpatient, *Est. 05/31/21 3:00:00 CDT, *Est. Stop date 05/31/21 3:00:00 CDT, [facility], Return to Clinic 2 weeks  ?  3.?Ileostomy in place?Z93.2  Ordered:  Wound Care Outpatient, *Est. 05/31/21 3:00:00 CDT, *Est. Stop date 05/31/21 3:00:00 CDT, [facility], Return to Clinic 2 weeks  ?  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 05/31/21 3:00:00 CDT, *Est. Stop date 05/31/21 3:00:00 CDT, [facility], Return to Clinic 2 weeks  ?  Orders:  Wound Care Outpatient, 06/01/21 8:30:00 CDT, Stop date 06/01/21 8:30:00 CDT   Problem List/Past Medical History  Ongoing  Adenocarcinoma of cecum  Adenocarcinoma of colon  Allergic rhinitis  Anemia(  Confirmed  )  Aphakia(  Confirmed  )  Asthma  Cataract(  Confirmed  )  Chronic renal impairment(  Confirmed  )  Colon adenocarcinoma  HTN (hypertension)  HTN - Hypertension  Hypercholesterolemia  Hyperlipidemia(  Confirmed  )  Ileostomy in place  Impaired mobility  Ptosis of eyelid(  Confirmed  )  Syncope  Visual field defect(  Confirmed  )  Historical  Diabetes  Diabetic retinopathy(  Confirmed  )  DMII (diabetes mellitus, type 2)(  Confirmed  )  Procedure/Surgical History  Biopsy Gastrointestinal (02/19/2021)  Colonoscopy (None) (02/19/2021)  Excision of Large Intestine, Via Natural or Artificial Opening Endoscopic, Diagnostic (02/19/2021)  Inspection of Lower Intestinal Tract, Via Natural or Artificial Opening Endoscopic  (02/19/2021)  Small Bowel Endoscopy, Diagnostic (02/19/2021)  Catheter Insertion Mediport (None) (11/13/2020)  Insertion of Totally Implantable Vascular Access Device into Chest Subcutaneous Tissue and Fascia, Open Approach (11/13/2020)  Insertion of tunneled centrally inserted central venous access device, with subcutaneous port; age 5 years or older (11/13/2020)  Port, indwelling (implantable) (11/13/2020)  Bypass Ileum to Cutaneous, Open Approach (10/04/2020)  Exploration Laparotomy (10/04/2020)  Inspection of Peritoneum, Open Approach (10/04/2020)  Colectomy (Right) (09/30/2020)  Resection of Right Large Intestine, Open Approach (09/30/2020)  Biopsy Gastrointestinal (None) (09/16/2020)  Colonoscopy (None) (09/16/2020)  Colonoscopy, flexible; with biopsy, single or multiple (09/16/2020)  Colonoscopy, flexible; with removal of tumor(s), polyp(s), or other lesion(s) by snare technique (09/16/2020)  Excision of Ascending Colon, Via Natural or Artificial Opening Endoscopic, Diagnostic (09/16/2020)  Excision of Ascending Colon, Via Natural or Artificial Opening Endoscopic, Diagnostic (09/16/2020)  Polypectomy (None) (09/16/2020)  Cataract Extraction Phacoemulsification (Left) (05/28/2019)  Extracapsular cataract removal with insertion of intraocular lens prosthesis (1 stage procedure), manual or mechanical technique (eg, irrigation and aspiration or phacoemulsification) (05/28/2019)  IOL Placement (Left) (05/28/2019)  Replacement of Left Lens with Synthetic Substitute, Percutaneous Approach (05/28/2019)  Carotid endarterectomy (02/12/2014)  Carotid endarterectomy (01/06/2014)  blockage  Bypass graft, with other than vein, transcervical retropharyngeal carotid-carotid, performed in conjunction with endovascular repair of descending thoracic aorta, by neck incision  Extracapsular cataract removal with insertion of intraocular lens prosthesis (1 stage procedure), manual or mechanical technique (eg, irrigation and  aspiration or phacoemulsification)  Phacoemulsification of cataract with intraocular lens implantation   Medications  acetaminophen-oxycodone 325 mg-5 mg oral tablet, 1 tab(s), Oral, q4hr  Advair Diskus 250 mcg-50 mcg inhalation powder, 1 puff(s), INH, BID, 11 refills  apixaban 5 mg oral tablet, 5 mg= 1 tab(s), Oral, BID, 6 refills  aspirin 81 mg oral tablet, CHEWABLE, 81 mg= 1 tab(s), Oral, Daily  atorvastatin 40 mg oral tablet, 40 mg= 1 tab(s), Oral, Daily, 3 refills  cetirizine 10 mg oral tablet, 10 mg= 1 tab(s), Oral, Daily, 3 refills  dextromethorphan-promethazine 15 mg-6.25 mg/5 mL oral syrup, 5 mL, Oral, q6hr,? ?Not taking  Eliquis 5 mg oral tablet, 10 mg= 2 tab(s), Oral, BID  Ensure supplements, See Instructions, 3 refills,? ?Not taking: due to diarrhea  fluticasone 50 mcg/inh nasal spray, 50 mcg= 1 spray(s), Nasal, BID, 3 refills  gabapentin 100 mg oral capsule, 200 mg= 2 cap(s), Oral, QID  hydrochlorothiazide 25 mg oral tablet, 25 mg= 1 tab(s), Oral, Daily  Lomotil 2.5 mg-0.025 mg oral tablet, 1 tab(s), Oral, QID, 3 refills  loperamide 2 mg oral capsule, 4 mg= 2 cap(s), Oral, QIDACHS, 1 refills,? ?Not taking  magnesium oxide 400 mg oral tablet, 400 mg= 1 tab(s), Oral, Daily  metoprolol succinate 50 mg oral tablet, extended release, 50 mg= 1 tab(s), Oral, Daily  One A Day Men 50 Plus oral tablet, 1 tab(s), Oral, Daily  Pantoprazole 40 mg ORAL EC-Tablet, 40 mg= 1 tab(s), Oral, Daily, 6 refills  Phenergan 12.5 mg Tab, 12.5 mg= 1 tab(s), Oral, q4hr, PRN  potassium chloride 20 mEq oral TABLET extended release, 20 mEq= 1 tab(s), Oral, Daily  Questran 4 g/9 g oral powder for reconstitution, 1 packet(s), Oral, TID, 11 refills  triamcinolone 0.1% topical cream, TOP, BID  Vancomycin 125mg Caps, 125 mg= 1 cap(s), Oral, q6hr,? ?Not Taking, Completed Rx  Xeloda 150 mg oral tablet, 300 mg= 2 tab(s), Oral, BID,? ?Investigating  Xeloda 500 mg oral tablet, 1500 mg= 3 tab(s), Oral, BID  Allergies  No Known Allergies  Social  History  Abuse/Neglect  No, 05/17/2021  Alcohol - Denies Alcohol Use, 10/02/2014  Past, 03/03/2021  Employment/School  Retired, 07/06/2018  Retired, Highest education level: None. Operates hazardous equipment: No., 07/06/2016  Exercise  Exercise duration: 30. Exercise frequency: Daily. Exercise type: Walking., 01/20/2020  Exercise frequency: 5-6 times/week. Self assessment: Fair condition. Exercise type: Walking., 04/06/2015  Home/Environment  Lives with Spouse., 07/06/2018  Home equipment: blood pressure cuff., 01/09/2017  Lives with Spouse. Living situation: Home/Independent. Alcohol abuse in household: No. Substance abuse in household: No. Smoker in household: No. Injuries/Abuse/Neglect in household: No. Feels unsafe at home: No. Safe place to go: Yes. Agency(s)/Others notified: No. Family/Friends available for support: Yes. Concern for family members at home: No. Major illness in household: No. Financial concerns: No. TV/Computer concerns: No., 07/06/2016  Nutrition/Health  Regular, Wants to lose weight: No. Sleeping concerns: No. Feels highly stressed: No., 04/06/2015  Sexual  Gender Identity Identifies as male., 08/01/2019  Spiritual/Cultural  Yazdanism, 01/20/2020  Substance Use - Denies Substance Abuse, 10/02/2014  Never, 03/03/2021  Tobacco - Denies Tobacco Use, 10/02/2014  Never (less than 100 in lifetime), N/A, 05/17/2021  Family History  Cancer: Sister.  Mother: History is unknown  Sister: History is unknown  Sister: History is unknown  Immunizations  Vaccine Date Status   COVID-19 mRNA, LNP-S, PF - Moderna 02/26/2021 Recorded   COVID-19 mRNA, LNP-S, PF - Moderna 01/12/2021 Recorded   pneumococcal 13-valent conjugate vaccine 09/04/2020 Given   influenza virus vaccine, inactivated 09/12/2019 Recorded   influenza virus vaccine, inactivated 09/24/2018 Recorded   influenza virus vaccine, inactivated 10/02/2017 Recorded   influenza virus vaccine, inactivated 10/06/2016 Recorded   pneumococcal 23-polyvalent  vaccine 10/02/2015 Recorded   influenza virus vaccine, inactivated 09/22/2015 Recorded   zoster vaccine live 09/22/2015 Recorded   Health Maintenance  Health Maintenance  ???Pending?(in the next year)  ??? ??OverDue  ??? ? ? ?Influenza Vaccine due??10/01/20??and every 1??day(s)  ??? ??Due?  ??? ? ? ?Hypertension Management-Education due??05/17/21??and every 1??year(s)  ??? ? ? ?Medicare Annual Wellness Exam due??05/17/21??and every 1??year(s)  ??? ? ? ?Zoster Vaccine due??05/17/21??Unknown Frequency  ??? ??Refused?  ??? ? ? ?Tetanus Vaccine due??05/17/21??and every 10??year(s)  ??? ??Due In Future?  ??? ? ? ?Obesity Screening not due until??01/01/22??and every 1??year(s)  ??? ? ? ?Advance Directive not due until??01/02/22??and every 1??year(s)  ??? ? ? ?Cognitive Screening not due until??01/02/22??and every 1??year(s)  ??? ? ? ?Fall Risk Assessment not due until??01/02/22??and every 1??year(s)  ??? ? ? ?Functional Assessment not due until??01/02/22??and every 1??year(s)  ??? ? ? ?Aspirin Therapy for CVD Prevention not due until??02/22/22??and every 1??year(s)  ??? ? ? ?ADL Screening not due until??03/04/22??and every 1??year(s)  ??? ? ? ?Depression Screening not due until??03/12/22??and every 1??year(s)  ??? ? ? ?Hypertension Management-BMP not due until??03/12/22??and every 1??year(s)  ??? ? ? ?Blood Pressure Screening not due until??05/10/22??and every 1??year(s)  ??? ? ? ?Body Mass Index Check not due until??05/10/22??and every 1??year(s)  ??? ? ? ?Hypertension Management-Blood Pressure not due until??05/10/22??and every 1??year(s)  ???Satisfied?(in the past 1 year)  ??? ??Satisfied?  ??? ? ? ?ADL Screening on??03/04/21.??Satisfied by Javy Hayden LPN  ??? ? ? ?Advance Directive on??03/12/21.??Satisfied by Bindu Pelaez  ??? ? ? ?Aspirin Therapy for CVD Prevention on??02/22/21.??Satisfied by Todd Mathew DO  ??? ? ? ?Blood Pressure Screening on??05/17/21.??Satisfied by Harman Sinha RN  ??? ? ?  ?Body Mass Index Check on??05/10/21.??Satisfied by Brittney López LPN  ??? ? ? ?Cognitive Screening on??03/04/21.??Satisfied by Javy Hayden LPN  ??? ? ? ?Colorectal Screening on??08/12/20.??Satisfied by Delores Burt  ??? ? ? ?Coronary Artery Disease Maintenance-Lipid Lowering Therapy on??03/30/21.??Satisfied by Beckie Flores MD  ??? ? ? ?Depression Screening on??03/12/21.??Satisfied by Bnidu Pelaez  ??? ? ? ?Diabetes Maintenance-Foot Exam on??03/04/21.??Satisfied by Beckie Flores MD  ??? ? ? ?Diabetes Screening on??03/12/21.??Satisfied by Everardo Ames  ??? ? ? ?Fall Risk Assessment on??05/10/21.??Satisfied by Brittney Lóepz LPN  ??? ? ? ?Functional Assessment on??04/15/21.??Satisfied by Nalini Vazqeuz RN  ??? ? ? ?Hypertension Management-Blood Pressure on??05/17/21.??Satisfied by Harman Sinha RN  ??? ? ? ?Influenza Vaccine on??02/08/21.??Satisfied by Kenneth Prater RN  ??? ? ? ?Lipid Screening on??08/07/20.??Satisfied by Freda Barkley  ??? ? ? ?Obesity Screening on??05/10/21.??Satisfied by Brittney López LPN  ??? ? ? ?Pneumococcal Vaccine on??09/04/20.??Satisfied by Jess Howe LPN  ??? ??Refused?  ??? ? ? ?Tetanus Vaccine on??11/04/20.??Recorded by Jess Howe LPN??Reason: Patient Refuses  ??? ? ? ?Zoster Vaccine on??11/04/20.??Recorded by Jess Howe LPN??Reason: Patient Refuses  ?

## 2022-05-01 NOTE — HISTORICAL [facility] CERNER
This is a historical note converted from [place]. Formatting and pictures may have been removed.  Please reference Tara for original formatting and attached multimedia. Chief Complaint  [place] [facility] [place] bp has been running low. ?Has medication adjustment ?but still low. C/o weight loss and appetite has decreased.TtREMORS NOTED.  History of Present Illness  74 yo bm with low bp wife co wt loss poor appetite no tob no alcohol pt did not go to urology after high psa found anemia  Review of Systems  neg cv, neg pulm, neg gi gu  Physical Exam  Vitals & Measurements  T:?37.1? ?C (Oral)? HR:?76(Peripheral)? RR:?18? BP:?109/63?  HT:?176.00?cm? WT:?69.200?kg? BMI:?22.34?  aax4 nad  mirta eomi  cv rrr s1s2 no mgr  lungs cta no cr or whz  abd nt soft 6 cm mass in ruq  ext no edema  neuro intact  Assessment/Plan  Orders:  CBC w/ Auto Diff, Routine collect, *Est. 08/07/20 3:00:00 CDT, Blood, Order for future visit, *Est. Stop date 08/07/20 3:00:00 CDT, Lab Collect, Mass of abdomen, 08/07/20 13:04:00 CDT  Clinic Follow up, *Est. 09/04/20 3:00:00 CDT, Order for future visit, Mass of abdomen, [facility]  Comprehensive Metabolic Panel, Routine collect, *Est. 08/07/20 3:00:00 CDT, Blood, Order for future visit, *Est. Stop date 08/07/20 3:00:00 CDT, Lab Collect, Mass of abdomen, 08/07/20 13:04:00 CDT  CT Abdomen and Pelvis W W/O Contrast, Routine, *Est. 08/14/20 3:00:00 CDT, Other (please specify), Neoplasm: liver or bile duct, None, Ambulatory, Creatinine if needed per protocol, Rad Type, Oral Contrast, Order for future visit, Mass of abdomen, Schedule this test, [facility]...  Ferritin, Routine collect, *Est. 08/07/20 3:00:00 CDT, Blood, Order for future visit, *Est. Stop date 08/07/20 3:00:00 CDT, Lab Collect, Mass of abdomen, 08/07/20 13:05:00 CDT  Free T4, Routine collect, *Est. 08/07/20 3:00:00 CDT, Blood, Order for future visit, *Est. Stop date 08/07/20 3:00:00 CDT, Lab Collect, Mass of  abdomen, 08/07/20 13:04:00 CDT  Hemoglobin A1C [facility], Routine collect, *Est. 08/07/20 3:00:00 CDT, Blood, Order for future visit, *Est. Stop date 08/07/20 3:00:00 CDT, Lab Collect, Mass of abdomen, 08/07/20 13:04:00 CDT  Iron Binding Capacity Total - Iron Profile, Routine collect, *Est. 08/07/20 3:00:00 CDT, Blood, Order for future visit, *Est. Stop date 08/07/20 3:00:00 CDT, Lab Collect, Mass of abdomen, 08/07/20 13:04:00 CDT  Lipid Panel, Routine collect, *Est. 08/07/20 3:00:00 CDT, Blood, Order for future visit, *Est. Stop date 08/07/20 3:00:00 CDT, Lab Collect, Mass of abdomen, 08/07/20 13:04:00 CDT  Occult Blood Fecal Immunoassay, Routine collect, Stool, Order for future visit, *Est. 08/14/20 3:00:00 CDT, *Est. Stop date 08/14/20 3:00:00 CDT, Nurse collect, Mass of abdomen  Prostate Specific Antigen, Routine collect, *Est. 08/07/20 3:00:00 CDT, Blood, Order for future visit, *Est. Stop date 08/07/20 3:00:00 CDT, Lab Collect, Mass of abdomen, 08/07/20 13:03:00 CDT  Thyroid Stimulating Hormone, Routine collect, *Est. 08/07/20 3:00:00 CDT, Blood, Order for future visit, *Est. Stop date 08/07/20 3:00:00 CDT, Lab Collect, Mass of abdomen, 08/07/20 13:04:00 CDT  Urinalysis with Microscopic if Indicated, Routine collect, Urine, Order for future visit, *Est. 08/07/20 3:00:00 CDT, *Est. Stop date 08/07/20 3:00:00 CDT, Nurse collect, Mass of abdomen  XR Chest 2 Views, Routine, 08/07/20 13:06:00 CDT, None, Ambulatory, Rad Type, Order for future visit, Weight loss, Not Scheduled, 08/07/20 13:06:00 CDT  Referrals  Clinic Follow up, *Est. 09/04/20 3:00:00 CDT, Order for future visit, Mass of abdomen, [facility]   Problem List/Past Medical History  Ongoing  Allergic rhinitis  Anemia(  Confirmed  )  Aphakia(  Confirmed  )  Asthma  Cataract(  Confirmed  )  Chronic renal impairment(  Confirmed  )  Diabetic retinopathy(  Confirmed  )  DMII (diabetes mellitus, type 2)(  Confirmed  )  HTN (hypertension)  HTN -  Hypertension  Hypercholesterolemia  Hyperlipidemia(  Confirmed  )  Ptosis of eyelid(  Confirmed  )  Syncope  Visual field defect(  Confirmed  )  Historical  Diabetes  Procedure/Surgical History  Cataract Extraction Phacoemulsification (Left) (05/28/2019)  Extracapsular cataract removal with insertion of intraocular lens prosthesis (1 stage procedure), manual or mechanical technique (eg, irrigation and aspiration or phacoemulsification) (05/28/2019)  IOL Placement (Left) (05/28/2019)  Replacement of Left Lens with Synthetic Substitute, Percutaneous Approach (05/28/2019)  Carotid endarterectomy (02/12/2014)  Carotid endarterectomy (01/06/2014)  blockage  Bypass graft, with other than vein, transcervical retropharyngeal carotid-carotid, performed in conjunction with endovascular repair of descending thoracic aorta, by neck incision  Extracapsular cataract removal with insertion of intraocular lens prosthesis (1 stage procedure), manual or mechanical technique (eg, irrigation and aspiration or phacoemulsification)  Phacoemulsification of cataract with intraocular lens implantation   Medications  Advair Diskus 250 mcg-50 mcg inhalation powder, 1 puff(s), INH, BID, 11 refills  amLODIPine 10 mg oral tablet, 10 mg= 1 tab(s), Oral, Daily,? ?Not taking: [id number] Dose Date/Time unknown  aspirin 81 mg oral tablet, CHEWABLE, 81 mg= 1 tab(s), Oral, Daily  atorvastatin 40 mg oral tablet, 40 mg= 1 tab(s), Oral, Daily, 3 refills  cetirizine 10 mg oral tablet, 10 mg= 1 tab(s), Oral, Daily, 3 refills  Ferate, 256 mg, Oral, Daily  fluticasone 50 mcg/inh nasal spray, 50 mcg= 1 spray(s), Nasal, BID, 3 refills  hydrALAZINE 25 mg oral tablet, 75 mg= 3 tab(s), Oral, Daily, 3 refills  hydrochlorothiazide 25 mg oral tablet, 25 mg= 1 tab(s), Oral, Daily, 3 refills  losartan 100 mg oral tablet, 100 mg= 1 tab(s), Oral, Daily,? ?Not taking: takes 50mgLast Dose Date/Time unknown  losartan 50 mg oral tablet, 50 mg= 1 tab(s), Oral, Daily,  3 refills  metoprolol succinate 50 mg oral tablet, extended release, 50 mg= 1 tab(s), Oral, Daily, 3 refills  Norvasc 5 mg oral tablet, 5 mg= 1 tab(s), Oral, Daily  One A Day Men 50 Plus oral tablet, 1 tab(s), Oral, Daily  Allergies  No Known Allergies  Social History  Abuse/Neglect  No, No, Yes, 08/07/2020  No, No, Yes, 07/16/2020  No, No, Yes, 01/20/2020  Alcohol - Denies Alcohol Use, 10/02/2014  Never, Alcohol use interferes with work or home: No. Others hurt by drinking: No. Household alcohol concerns: No., 08/07/2020  Never, 01/20/2020  Never, 10/22/2019  Never, 07/06/2016  Employment/School  Retired, 07/06/2018  Retired, Highest education level: None. Operates hazardous equipment: No., 07/06/2016  Exercise  Exercise duration: 30. Exercise frequency: Daily. Exercise type: Walking., 01/20/2020  Exercise frequency: 5-6 times/week. Self assessment: Fair condition. Exercise type: Walking., 04/06/2015  Home/Environment  Lives with Spouse., 07/06/2018  Home equipment: blood pressure cuff., 01/09/2017  Lives with Spouse. Living situation: Home/Independent. Alcohol abuse in household: No. Substance abuse in household: No. Smoker in household: No. Injuries/Abuse/Neglect in household: No. Feels unsafe at home: No. Safe place to go: Yes. Agency(s)/Others notified: No. Family/Friends available for support: Yes. Concern for family members at home: No. Major illness in household: No. Financial concerns: No. TV/Computer concerns: No., 07/06/2016  Nutrition/Health  Regular, Wants to lose weight: No. Sleeping concerns: No. Feels highly stressed: No., 04/06/2015  Sexual  Gender Identity Identifies as male., 08/01/2019  Spiritual/Cultural  Taoism, 01/20/2020  Substance Use - Denies Substance Abuse, 10/02/2014  Never, 08/07/2020  Never, 07/06/2018  Never, 07/06/2016  Tobacco - Denies Tobacco Use, 10/02/2014  Former smoker, quit more than 30 days ago, N/A, 08/07/2020  Former smoker, quit more than 30 days ago, No,  07/16/2020  Family History  Cancer: Sister.  Mother: History is unknown  Sister: History is unknown  Sister: History is unknown  Immunizations  Vaccine Date Status   influenza virus vaccine, inactivated 09/12/2019 Recorded   influenza virus vaccine, inactivated 10/02/2017 Recorded   pneumococcal 23-polyvalent vaccine 10/02/2015 Recorded   zoster vaccine live 09/22/2015 Recorded   Health Maintenance  Health Maintenance  ???Pending?(in the next year)  ??? ??OverDue  ??? ? ? ?Coronary Artery Disease Maintenance-Antiplatelet Agent Prescribed due??and every?  ??? ? ? ?Diabetes Maintenance-Microalbumin due??and every?  ??? ? ? ?Pneumococcal Vaccine due??and every?  ??? ? ? ?Aspirin Therapy for CVD Prevention due??10/02/15??and every 1??year(s)  ??? ? ? ?Functional Assessment due??01/02/20??and every 1??year(s)  ??? ? ? ?Diabetes Maintenance-Foot Exam due??07/17/20??and every 1??year(s)  ??? ??Due?  ??? ? ? ?Diabetes Maintenance-Eye Exam due??08/07/20??and every 2??year(s)  ??? ? ? ?Diabetes Maintenance-Medication Prescribed due??08/07/20??and every 1??year(s)  ??? ? ? ?Hypertension Management-Education due??08/07/20??and every 1??year(s)  ??? ? ? ?Influenza Vaccine due??08/07/20??and every?  ??? ? ? ?Medicare Annual Wellness Exam due??08/07/20??and every 1??year(s)  ??? ? ? ?Zoster Vaccine due??08/07/20??and every?  ??? ??Refused?  ??? ? ? ?Tetanus Vaccine due??08/07/20??and every 10??year(s)  ??? ??Due In Future?  ??? ? ? ?Depression Screening not due until??10/21/20??and every 1??year(s)  ??? ? ? ?Obesity Screening not due until??01/01/21??and every 1??year(s)  ??? ? ? ?Advance Directive not due until??01/02/21??and every 1??year(s)  ??? ? ? ?Cognitive Screening not due until??01/02/21??and every 1??year(s)  ??? ? ? ?Fall Risk Assessment not due until??01/02/21??and every 1??year(s)  ??? ? ? ?Diabetes Maintenance-HgbA1c not due until??07/06/21??and every 1??year(s)  ??? ? ? ?Hypertension Management-BMP not due  until??07/06/21??and every 1??year(s)  ??? ? ? ?Diabetes Maintenance-Serum Creatinine not due until??07/06/21??and every 1??year(s)  ??? ? ? ?Diabetes Maintenance-Fasting Lipid Profile not due until??07/06/21??and every 1??year(s)  ???Satisfied?(in the past 1 year)  ??? ??Satisfied?  ??? ? ? ?ADL Screening on??08/07/20.??Satisfied by Shyam LOOMIS Jess Yolie  ??? ? ? ?Advance Directive on??08/07/20.??Satisfied by Shyam LOOMIS Jess Yolie  ??? ? ? ?Blood Pressure Screening on??08/07/20.??Satisfied by Shyam LOOMIS Jess Yolie  ??? ? ? ?Body Mass Index Check on??08/07/20.??Satisfied by Sarah Howe LPNyl Yolie  ??? ? ? ?Cognitive Screening on??08/07/20.??Satisfied by Shyam LOOMIS Jess Yolie  ??? ? ? ?Coronary Artery Disease Maintenance-Lipid Lowering Therapy on??09/18/19.??Satisfied by Beckie Flores MD  ??? ? ? ?Depression Screening on??10/22/19.??Satisfied by Irene Rawls LPN  ??? ? ? ?Diabetes Maintenance-Serum Creatinine on??07/06/20.??Satisfied by Mart Watts Jr.  ??? ? ? ?Diabetes Screening on??07/06/20.??Satisfied by Mart Watts Jr.  ??? ? ? ?Fall Risk Assessment on??08/07/20.??Satisfied by Jess Howe LPN  ??? ? ? ?Functional Assessment on??10/22/19.??Satisfied by Irene Rawls LPN  ??? ? ? ?Hypertension Management-Blood Pressure on??08/07/20.??Satisfied by Shyam LOOMIS Jess Yolie  ??? ? ? ?Influenza Vaccine on??09/12/19.??Satisfied by Sarah Howe LPNyl Yolie  ??? ? ? ?Lipid Screening on??07/06/20.??Satisfied by Mart Watts Jr.  ??? ? ? ?Obesity Screening on??08/07/20.??Satisfied by Jess Howe LPN  ??? ??Refused?  ??? ? ? ?Tetanus Vaccine on??08/07/20.??Recorded by Jess Howe LPN??Reason: Patient Refuses  ??? ? ? ?Zoster Vaccine on??08/07/20.??Recorded by Jess Howe LPN??Reason: Patient Refuses  ?

## 2022-05-01 NOTE — HISTORICAL [facility] CERNER
This is a historical note converted from [place]. Formatting and pictures may have been removed.  Please reference Tara for original formatting and attached multimedia. Chief Complaint  [place] [facility] [place] bp has been running low. ?Has medication adjustment ?but still low. C/o weight loss and appetite has decreased.TtREMORS NOTED.  Physical Exam  Vitals & Measurements  T:?37.1? ?C (Oral)? HR:?76(Peripheral)? RR:?18? BP:?109/63?  HT:?176.00?cm? WT:?69.200?kg? BMI:?22.34?  Assessment/Plan  1.?Weight loss?R63.4  ?ct abdome anemia work up psa? cxr  Ordered:  XR Chest 2 Views, Routine, 08/07/20 13:06:00 CDT, None, Ambulatory, Rad Type, Order for future visit, Weight loss, Not Scheduled, 08/07/20 13:06:00 CDT  ?  2.?Mass of right lobe of liver?R16.0  ?ct  ?  Orders:  CBC w/ Auto Diff, Routine collect, *Est. 08/07/20 3:00:00 CDT, Blood, Order for future visit, *Est. Stop date 08/07/20 3:00:00 CDT, Lab Collect, Mass of abdomen, 08/07/20 13:04:00 CDT  Clinic Follow up, *Est. 09/04/20 3:00:00 CDT, Order for future visit, Mass of abdomen, [facility]  Comprehensive Metabolic Panel, Routine collect, *Est. 08/07/20 3:00:00 CDT, Blood, Order for future visit, *Est. Stop date 08/07/20 3:00:00 CDT, Lab Collect, Mass of abdomen, 08/07/20 13:04:00 CDT  CT Abdomen and Pelvis W W/O Contrast, Routine, *Est. 08/14/20 3:00:00 CDT, Other (please specify), Neoplasm: liver or bile duct, None, Ambulatory, Creatinine if needed per protocol, Rad Type, Oral Contrast, Order for future visit, Mass of abdomen, Schedule this test, [facility]...  Ferritin, Routine collect, *Est. 08/07/20 3:00:00 CDT, Blood, Order for future visit, *Est. Stop date 08/07/20 3:00:00 CDT, Lab Collect, Mass of abdomen, 08/07/20 13:05:00 CDT  Free T4, Routine collect, *Est. 08/07/20 3:00:00 CDT, Blood, Order for future visit, *Est. Stop date 08/07/20 3:00:00 CDT, Lab Collect, Mass of abdomen, 08/07/20 13:04:00 CDT  Hemoglobin A1C [facility], Routine  collect, *Est. 08/07/20 3:00:00 CDT, Blood, Order for future visit, *Est. Stop date 08/07/20 3:00:00 CDT, Lab Collect, Mass of abdomen, 08/07/20 13:04:00 CDT  Iron Binding Capacity Total - Iron Profile, Routine collect, *Est. 08/07/20 3:00:00 CDT, Blood, Order for future visit, *Est. Stop date 08/07/20 3:00:00 CDT, Lab Collect, Mass of abdomen, 08/07/20 13:04:00 CDT  Lipid Panel, Routine collect, *Est. 08/07/20 3:00:00 CDT, Blood, Order for future visit, *Est. Stop date 08/07/20 3:00:00 CDT, Lab Collect, Mass of abdomen, 08/07/20 13:04:00 CDT  Occult Blood Fecal Immunoassay, Routine collect, Stool, Order for future visit, *Est. 08/14/20 3:00:00 CDT, *Est. Stop date 08/14/20 3:00:00 CDT, Nurse collect, Mass of abdomen  Prostate Specific Antigen, Routine collect, *Est. 08/07/20 3:00:00 CDT, Blood, Order for future visit, *Est. Stop date 08/07/20 3:00:00 CDT, Lab Collect, Mass of abdomen, 08/07/20 13:03:00 CDT  Thyroid Stimulating Hormone, Routine collect, *Est. 08/07/20 3:00:00 CDT, Blood, Order for future visit, *Est. Stop date 08/07/20 3:00:00 CDT, Lab Collect, Mass of abdomen, 08/07/20 13:04:00 CDT  Urinalysis with Microscopic if Indicated, Routine collect, Urine, Order for future visit, *Est. 08/07/20 3:00:00 CDT, *Est. Stop date 08/07/20 3:00:00 CDT, Nurse collect, Mass of abdomen  Referrals  Clinic Follow up, *Est. 09/04/20 3:00:00 CDT, Order for future visit, Mass of abdomen, [facility]   Problem List/Past Medical History  Ongoing  Allergic rhinitis  Anemia(  Confirmed  )  Aphakia(  Confirmed  )  Asthma  Cataract(  Confirmed  )  Chronic renal impairment(  Confirmed  )  Diabetic retinopathy(  Confirmed  )  DMII (diabetes mellitus, type 2)(  Confirmed  )  HTN (hypertension)  HTN - Hypertension  Hypercholesterolemia  Hyperlipidemia(  Confirmed  )  Ptosis of eyelid(  Confirmed  )  Syncope  Visual field defect(  Confirmed  )  Historical  Diabetes  Procedure/Surgical History  Cataract Extraction  Phacoemulsification (Left) (05/28/2019)  Extracapsular cataract removal with insertion of intraocular lens prosthesis (1 stage procedure), manual or mechanical technique (eg, irrigation and aspiration or phacoemulsification) (05/28/2019)  IOL Placement (Left) (05/28/2019)  Replacement of Left Lens with Synthetic Substitute, Percutaneous Approach (05/28/2019)  Carotid endarterectomy (02/12/2014)  Carotid endarterectomy (01/06/2014)  blockage  Bypass graft, with other than vein, transcervical retropharyngeal carotid-carotid, performed in conjunction with endovascular repair of descending thoracic aorta, by neck incision  Extracapsular cataract removal with insertion of intraocular lens prosthesis (1 stage procedure), manual or mechanical technique (eg, irrigation and aspiration or phacoemulsification)  Phacoemulsification of cataract with intraocular lens implantation   Medications  Advair Diskus 250 mcg-50 mcg inhalation powder, 1 puff(s), INH, BID, 11 refills  amLODIPine 10 mg oral tablet, 10 mg= 1 tab(s), Oral, Daily,? ?Not taking: [id number] Dose Date/Time unknown  aspirin 81 mg oral tablet, CHEWABLE, 81 mg= 1 tab(s), Oral, Daily  atorvastatin 40 mg oral tablet, 40 mg= 1 tab(s), Oral, Daily, 3 refills  cetirizine 10 mg oral tablet, 10 mg= 1 tab(s), Oral, Daily, 3 refills  Ferate, 256 mg, Oral, Daily  fluticasone 50 mcg/inh nasal spray, 50 mcg= 1 spray(s), Nasal, BID, 3 refills  hydrALAZINE 25 mg oral tablet, 75 mg= 3 tab(s), Oral, Daily, 3 refills  hydrochlorothiazide 25 mg oral tablet, 25 mg= 1 tab(s), Oral, Daily, 3 refills  losartan 100 mg oral tablet, 100 mg= 1 tab(s), Oral, Daily,? ?Not taking: takes 50mgLast Dose Date/Time unknown  losartan 50 mg oral tablet, 50 mg= 1 tab(s), Oral, Daily, 3 refills  metoprolol succinate 50 mg oral tablet, extended release, 50 mg= 1 tab(s), Oral, Daily, 3 refills  Norvasc 5 mg oral tablet, 5 mg= 1 tab(s), Oral, Daily  One A Day Men 50 Plus oral tablet, 1 tab(s), Oral,  Daily  Allergies  No Known Allergies  Social History  Abuse/Neglect  No, No, Yes, 08/07/2020  No, No, Yes, 07/16/2020  No, No, Yes, 01/20/2020  Alcohol - Denies Alcohol Use, 10/02/2014  Never, Alcohol use interferes with work or home: No. Others hurt by drinking: No. Household alcohol concerns: No., 08/07/2020  Never, 01/20/2020  Never, 10/22/2019  Never, 07/06/2016  Employment/School  Retired, 07/06/2018  Retired, Highest education level: None. Operates hazardous equipment: No., 07/06/2016  Exercise  Exercise duration: 30. Exercise frequency: Daily. Exercise type: Walking., 01/20/2020  Exercise frequency: 5-6 times/week. Self assessment: Fair condition. Exercise type: Walking., 04/06/2015  Home/Environment  Lives with Spouse., 07/06/2018  Home equipment: blood pressure cuff., 01/09/2017  Lives with Spouse. Living situation: Home/Independent. Alcohol abuse in household: No. Substance abuse in household: No. Smoker in household: No. Injuries/Abuse/Neglect in household: No. Feels unsafe at home: No. Safe place to go: Yes. Agency(s)/Others notified: No. Family/Friends available for support: Yes. Concern for family members at home: No. Major illness in household: No. Financial concerns: No. TV/Computer concerns: No., 07/06/2016  Nutrition/Health  Regular, Wants to lose weight: No. Sleeping concerns: No. Feels highly stressed: No., 04/06/2015  Sexual  Gender Identity Identifies as male., 08/01/2019  Spiritual/Cultural  Amish, 01/20/2020  Substance Use - Denies Substance Abuse, 10/02/2014  Never, 08/07/2020  Never, 07/06/2018  Never, 07/06/2016  Tobacco - Denies Tobacco Use, 10/02/2014  Former smoker, quit more than 30 days ago, N/A, 08/07/2020  Former smoker, quit more than 30 days ago, No, 07/16/2020  Family History  Cancer: Sister.  Mother: History is unknown  Sister: History is unknown  Sister: History is unknown  Immunizations  Vaccine Date Status   influenza virus vaccine, inactivated 09/12/2019 Recorded    influenza virus vaccine, inactivated 10/02/2017 Recorded   pneumococcal 23-polyvalent vaccine 10/02/2015 Recorded   zoster vaccine live 09/22/2015 Recorded   Health Maintenance  Health Maintenance  ???Pending?(in the next year)  ??? ??OverDue  ??? ? ? ?Coronary Artery Disease Maintenance-Antiplatelet Agent Prescribed due??and every?  ??? ? ? ?Diabetes Maintenance-Microalbumin due??and every?  ??? ? ? ?Pneumococcal Vaccine due??and every?  ??? ? ? ?Aspirin Therapy for CVD Prevention due??10/02/15??and every 1??year(s)  ??? ? ? ?Functional Assessment due??01/02/20??and every 1??year(s)  ??? ? ? ?Diabetes Maintenance-Foot Exam due??07/17/20??and every 1??year(s)  ??? ??Due?  ??? ? ? ?Diabetes Maintenance-Eye Exam due??08/07/20??and every 2??year(s)  ??? ? ? ?Diabetes Maintenance-Medication Prescribed due??08/07/20??and every 1??year(s)  ??? ? ? ?Hypertension Management-Education due??08/07/20??and every 1??year(s)  ??? ? ? ?Influenza Vaccine due??08/07/20??and every?  ??? ? ? ?Medicare Annual Wellness Exam due??08/07/20??and every 1??year(s)  ??? ? ? ?Zoster Vaccine due??08/07/20??and every?  ??? ??Refused?  ??? ? ? ?Tetanus Vaccine due??08/07/20??and every 10??year(s)  ??? ??Due In Future?  ??? ? ? ?Depression Screening not due until??10/21/20??and every 1??year(s)  ??? ? ? ?Obesity Screening not due until??01/01/21??and every 1??year(s)  ??? ? ? ?Advance Directive not due until??01/02/21??and every 1??year(s)  ??? ? ? ?Cognitive Screening not due until??01/02/21??and every 1??year(s)  ??? ? ? ?Fall Risk Assessment not due until??01/02/21??and every 1??year(s)  ??? ? ? ?Diabetes Maintenance-HgbA1c not due until??07/06/21??and every 1??year(s)  ??? ? ? ?Hypertension Management-BMP not due until??07/06/21??and every 1??year(s)  ??? ? ? ?Diabetes Maintenance-Serum Creatinine not due until??07/06/21??and every 1??year(s)  ??? ? ? ?Diabetes Maintenance-Fasting Lipid Profile not due until??07/06/21??and every  1??year(s)  ???Satisfied?(in the past 1 year)  ??? ??Satisfied?  ??? ? ? ?ADL Screening on??08/07/20.??Satisfied by Jess Howe LPN  ??? ? ? ?Advance Directive on??08/07/20.??Satisfied by Shyam LOOMIS Ejss Yolie  ??? ? ? ?Blood Pressure Screening on??08/07/20.??Satisfied by Sarah Howe LPNyl Yolie  ??? ? ? ?Body Mass Index Check on??08/07/20.??Satisfied by Shyam LOOMIS Jess Yolie  ??? ? ? ?Cognitive Screening on??08/07/20.??Satisfied by Sarah Howe LPNyl Yolie  ??? ? ? ?Coronary Artery Disease Maintenance-Lipid Lowering Therapy on??09/18/19.??Satisfied by Beckie Flores MD  ??? ? ? ?Depression Screening on??10/22/19.??Satisfied by Irene Rawls LPN  ??? ? ? ?Diabetes Maintenance-Serum Creatinine on??07/06/20.??Satisfied by Mart Watts Jr.  ??? ? ? ?Diabetes Screening on??07/06/20.??Satisfied by Mart Watts Jr.  ??? ? ? ?Fall Risk Assessment on??08/07/20.??Satisfied by Jess Howe LPN  ??? ? ? ?Functional Assessment on??10/22/19.??Satisfied by Irene Rawls LPN  ??? ? ? ?Hypertension Management-Blood Pressure on??08/07/20.??Satisfied by Jess Howe LPN  ??? ? ? ?Influenza Vaccine on??09/12/19.??Satisfied by Sarah Howe LPNyl Yolie  ??? ? ? ?Lipid Screening on??07/06/20.??Satisfied by Mart Watts Jr.  ??? ? ? ?Obesity Screening on??08/07/20.??Satisfied by Sarah oHwe LPNyl Yolie  ??? ??Refused?  ??? ? ? ?Tetanus Vaccine on??08/07/20.??Recorded by Jess Howe LPN??Reason: Patient Refuses  ??? ? ? ?Zoster Vaccine on??08/07/20.??Recorded by Jess Howe LPN??Reason: Patient Refuses  ?

## 2022-05-01 NOTE — HISTORICAL [facility] CERNER
This is a historical note converted from [employer]. Formatting and pictures may have been removed.  Please reference [employer] for original formatting and attached multimedia. Indication for Surgery  Need for chemotherapy access  Preoperative Diagnosis  Cecal Adenocarcinoma  Postoperative Diagnosis  Cecal Adenocarcinoma  Operation  Mediport Placement in R IJ vein under fluoroscopic guidance  Surgeon(s)  Dr. Jarek Fregoso, HO3  Adin Ibarra, HO1  Assistant  Jami Berg  Anesthesia  MAC  Estimated Blood Loss  4 cc  Specimen(s)  none  Complications  none  Technique  After adequate anesthesia?was achieved, the patient was appropriately prepped and draped in the standard sterile fashion. A timeout was called to confirm the proper patient, proper site,?proper operation, and proper preoperative medications. The right internal jugular vein was accessed on the first attempt using Seldinger technique. Guidewire was inserted into the needle into the internal jugular vein. Proper placement was confirmed with fluoroscopy. 15 blade scalpel?was used to make an incision over the left chest. This?was deepened with Bovie electrocautery. A small subcutaneous pocket was created using blunt dissection. The Mediport was connected to the?catheter and the catheter was tunnelled from the subcutaneous?pocket to the venous access site. Mediport was placed into the subcutaneous pocket.?The catheter was then measured under fluoroscopic guidance and cut to the appropriate length. The dilator with?peel away sheath was?inserted over the wire under fluoroscopic guidance. The dilator was removed and the catheter was inserted?into the peel away sheath. Peel-away sheath was peeled away in the standard fashion. Fluoroscopic guidance was used to confirm that the catheter tip was at the?right?atriocaval junction. The Mediport?flushed and withdrew easily. Mediport was locked with Heparin. Incision closed with 3-0 Vicryl interrupted suture.  4-0 PDS subcuticular stitch. Dermabond was placed over the incision. At the end of the procedure, all laps, sponges, and instrument counts were correct. Patient tolerated the procedure well without any obvious complications?and was?awoken and transferred to the recovery room.  ?  Adin Ibarra MD  [facility], PGY-1??  ?   This certifies I was present during the entire period between opening and closing of the surgical field.

## 2022-05-01 NOTE — HISTORICAL [facility] CERNER
This is a historical note converted from [employer]. Formatting and pictures may have been removed.  Please reference Tara for original formatting and attached multimedia. Chief Complaint  ileostomy appliance leakage  History of Present Illness  Mr Hamilton referred to ostomy clinic for?management of complications with Ileostomy. Patient underwent surgery for R?colon and terminal ileum resection with creation of Ileostomy. Patient was followed by inpatient ostomy nurse, discharged to care of  for management of ostomy. Patient reports he was discharged from  yesterday. He has no supplies and presents with Duct tape to periwound with macerated skin.  Patient is unable to afford 20% co-pay for ostomy supplies. States he is currently getting some supplies from [employer]  ?   6/1/21 Patient returns to clinic for continued management of Ileostomy. Patient is accompanied by spouse who?performs ostomy care as needed. Patient?has a Surgery appt today to discuss ostomy takedown.  ?  Review of Systems  Except as stated in HPI, all other 10 body systems normal  ?  Physical Exam  Vitals & Measurements  T:?36.8? ?C (Oral)? HR:?58(Peripheral)? RR:?18? BP:?146/79?  BMI:?20.83?  Constitutional:? VSS; afebrile.? Well-nourished; in no acute distress.? Non-toxic appearing.  Cardiology:? Regular rhythm and rate.? .?  Respiratory:? Breathing even, quiet, and unlabored.? No coughing.  Skin:?see ostomy note  Psychiatric:?? Calm, cooperative.? Mood normal, affect normal.? No symptoms of depression or anxiety.? Responses appropriate.?  Co-factors impeding wound healing:? knowledge deficits, and financial constraints.??  ?   Ostomy location: RLQ  Stoma size: 2 round  Stoma Condition: viable, functioning. red, moist  Skin around stoma- intact  Pouch Type: 2 piece drainable Andi  ?   Pouch system function: pouching system in place  Care Provided: Assisted by EBONIE Lugo, complete system changed. Hair around stoma shaved with  electric?shaver. Skin cleansed with baby shampoo and warm water, rinsed well and dried.  No sting skin prep applied to peristomal area.  Equipment/Supplies: 2 ostomy barrier rings  Andi barrier  [place] Drainable lock and roll pouch  Ostomy stool volume- approx 2/3rd full with brown liquid effluent.  ?  Assessment/Plan  1.?Adenocarcinoma of colon?C18.0,?Adenocarcinoma of cecum?C18.0  ?Followed by [facility]  Ordered:  Wound Care Outpatient, *Est. 06/29/21 3:00:00 CDT, *Est. Stop date 06/29/21 3:00:00 CDT, [facility], Return to Clinic 4 weeks  ?  3.?Ileostomy in place?Z93.2  Ordered:  Wound Care Outpatient, *Est. 06/29/21 3:00:00 CDT, *Est. Stop date 06/29/21 3:00:00 CDT, [facility], Return to Clinic 4 weeks  ?  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 06/29/21 3:00:00 CDT, *Est. Stop date 06/29/21 3:00:00 CDT, [facility], Return to Clinic 4 weeks  ?   Problem List/Past Medical History  Ongoing  Adenocarcinoma of cecum  Adenocarcinoma of colon  Allergic rhinitis  Anemia(  Confirmed  )  Aphakia(  Confirmed  )  Asthma  Cataract(  Confirmed  )  Chronic renal impairment(  Confirmed  )  Colon adenocarcinoma  HTN (hypertension)  HTN - Hypertension  Hypercholesterolemia  Hyperlipidemia(  Confirmed  )  Ileostomy in place  Impaired mobility  Ptosis of eyelid(  Confirmed  )  Syncope  Visual field defect(  Confirmed  )  Historical  Diabetes  Diabetic retinopathy(  Confirmed  )  DMII (diabetes mellitus, type 2)(  Confirmed  )  Procedure/Surgical History  Biopsy Gastrointestinal (02/19/2021)  Colonoscopy (None) (02/19/2021)  Excision of Large Intestine, Via Natural or Artificial Opening Endoscopic, Diagnostic (02/19/2021)  Inspection of Lower Intestinal Tract, Via Natural or Artificial Opening Endoscopic (02/19/2021)  Small Bowel Endoscopy, Diagnostic (02/19/2021)  Catheter Insertion  Mediport (None) (11/13/2020)  Insertion of Totally Implantable Vascular Access Device into Chest Subcutaneous Tissue and Fascia, Open Approach (11/13/2020)  Insertion of tunneled centrally inserted central venous access device, with subcutaneous port; age 5 years or older (11/13/2020)  Port, indwelling (implantable) (11/13/2020)  Bypass Ileum to Cutaneous, Open Approach (10/04/2020)  Exploration Laparotomy (10/04/2020)  Inspection of Peritoneum, Open Approach (10/04/2020)  Colectomy (Right) (09/30/2020)  Resection of Right Large Intestine, Open Approach (09/30/2020)  Biopsy Gastrointestinal (None) (09/16/2020)  Colonoscopy (None) (09/16/2020)  Colonoscopy, flexible; with biopsy, single or multiple (09/16/2020)  Colonoscopy, flexible; with removal of tumor(s), polyp(s), or other lesion(s) by snare technique (09/16/2020)  Excision of Ascending Colon, Via Natural or Artificial Opening Endoscopic, Diagnostic (09/16/2020)  Excision of Ascending Colon, Via Natural or Artificial Opening Endoscopic, Diagnostic (09/16/2020)  Polypectomy (None) (09/16/2020)  Cataract Extraction Phacoemulsification (Left) (05/28/2019)  Extracapsular cataract removal with insertion of intraocular lens prosthesis (1 stage procedure), manual or mechanical technique (eg, irrigation and aspiration or phacoemulsification) (05/28/2019)  IOL Placement (Left) (05/28/2019)  Replacement of Left Lens with Synthetic Substitute, Percutaneous Approach (05/28/2019)  Carotid endarterectomy (02/12/2014)  Carotid endarterectomy (01/06/2014)  blockage  Bypass graft, with other than vein, transcervical retropharyngeal carotid-carotid, performed in conjunction with endovascular repair of descending thoracic aorta, by neck incision  Extracapsular cataract removal with insertion of intraocular lens prosthesis (1 stage procedure), manual or mechanical technique (eg, irrigation and aspiration or phacoemulsification)  Phacoemulsification of cataract with intraocular  lens implantation   Medications  acetaminophen-oxycodone 325 mg-5 mg oral tablet, 1 tab(s), Oral, q4hr  Advair Diskus 250 mcg-50 mcg inhalation powder, 1 puff(s), INH, BID, 11 refills  apixaban 5 mg oral tablet, 5 mg= 1 tab(s), Oral, BID, 6 refills  aspirin 81 mg oral tablet, CHEWABLE, 81 mg= 1 tab(s), Oral, Daily  atorvastatin 40 mg oral tablet, 40 mg= 1 tab(s), Oral, Daily, 3 refills  cetirizine 10 mg oral tablet, 10 mg= 1 tab(s), Oral, Daily, 3 refills  dextromethorphan-promethazine 15 mg-6.25 mg/5 mL oral syrup, 5 mL, Oral, q6hr,? ?Not taking  Eliquis 5 mg oral tablet, 10 mg= 2 tab(s), Oral, BID,? ?Not taking: duplicate  Ensure supplements, See Instructions, 3 refills,? ?Not taking: due to diarrhea  fluticasone 50 mcg/inh nasal spray, 50 mcg= 1 spray(s), Nasal, BID, 3 refills  gabapentin 100 mg oral capsule, 200 mg= 2 cap(s), Oral, QID  hydrochlorothiazide 25 mg oral tablet, 25 mg= 1 tab(s), Oral, Daily  Lomotil 2.5 mg-0.025 mg oral tablet, 1 tab(s), Oral, QID, 3 refills  loperamide 2 mg oral capsule, 4 mg= 2 cap(s), Oral, QIDACHS, 1 refills,? ?Not taking  magnesium oxide 400 mg oral tablet, 400 mg= 1 tab(s), Oral, Daily  metoprolol succinate 50 mg oral tablet, extended release, 50 mg= 1 tab(s), Oral, Daily  One A Day Men 50 Plus oral tablet, 1 tab(s), Oral, Daily  Pantoprazole 40 mg ORAL EC-Tablet, 40 mg= 1 tab(s), Oral, Daily, 6 refills  Phenergan 12.5 mg Tab, 12.5 mg= 1 tab(s), Oral, q4hr, PRN  potassium chloride 20 mEq oral TABLET extended release, 20 mEq= 1 tab(s), Oral, Daily  Questran 4 g/9 g oral powder for reconstitution, 1 packet(s), Oral, TID, 3 refills  triamcinolone 0.1% topical cream, TOP, BID  Vancomycin 125mg Caps, 125 mg= 1 cap(s), Oral, q6hr,? ?Not Taking, Completed Rx  Xeloda 150 mg oral tablet, 300 mg= 2 tab(s), Oral, BID,? ?Investigating  Xeloda 500 mg oral tablet, 1500 mg= 3 tab(s), Oral, BID  Allergies  No Known Allergies  Social History  Abuse/Neglect  No, 06/01/2021  Alcohol - Denies  Alcohol Use, 10/02/2014  Past, 03/03/2021  Employment/School  Retired, 07/06/2018  Retired, Highest education level: None. Operates hazardous equipment: No., 07/06/2016  Exercise  Exercise duration: 30. Exercise frequency: Daily. Exercise type: Walking., 01/20/2020  Exercise frequency: 5-6 times/week. Self assessment: Fair condition. Exercise type: Walking., 04/06/2015  Home/Environment  Lives with Spouse., 07/06/2018  Home equipment: blood pressure cuff., 01/09/2017  Lives with Spouse. Living situation: Home/Independent. Alcohol abuse in household: No. Substance abuse in household: No. Smoker in household: No. Injuries/Abuse/Neglect in household: No. Feels unsafe at home: No. Safe place to go: Yes. Agency(s)/Others notified: No. Family/Friends available for support: Yes. Concern for family members at home: No. Major illness in household: No. Financial concerns: No. TV/Computer concerns: No., 07/06/2016  Nutrition/Health  Regular, Wants to lose weight: No. Sleeping concerns: No. Feels highly stressed: No., 04/06/2015  Sexual  Gender Identity Identifies as male., 08/01/2019  Spiritual/Cultural  Buddhism, 01/20/2020  Substance Use - Denies Substance Abuse, 10/02/2014  Never, 03/03/2021  Tobacco - Denies Tobacco Use, 10/02/2014  Never (less than 100 in lifetime), N/A, 06/01/2021  Family History  Cancer: Sister.  Mother: History is unknown  Sister: History is unknown  Sister: History is unknown  Immunizations  Vaccine Date Status   COVID-19 mRNA, LNP-S, PF - Moderna 02/26/2021 Recorded   COVID-19 mRNA, LNP-S, PF - Moderna 01/12/2021 Recorded   pneumococcal 13-valent conjugate vaccine 09/04/2020 Given   influenza virus vaccine, inactivated 09/12/2019 Recorded   influenza virus vaccine, inactivated 09/24/2018 Recorded   influenza virus vaccine, inactivated 10/02/2017 Recorded   influenza virus vaccine, inactivated 10/06/2016 Recorded   pneumococcal 23-polyvalent vaccine 10/02/2015 Recorded   influenza virus vaccine,  inactivated 09/22/2015 Recorded   zoster vaccine live 09/22/2015 Recorded   Health Maintenance  Health Maintenance  ???Pending?(in the next year)  ??? ??OverDue  ??? ? ? ?Influenza Vaccine due??10/01/20??and every 1??day(s)  ??? ??Due?  ??? ? ? ?Hypertension Management-Education due??06/01/21??and every 1??year(s)  ??? ? ? ?Medicare Annual Wellness Exam due??06/01/21??and every 1??year(s)  ??? ? ? ?Zoster Vaccine due??06/01/21??Unknown Frequency  ??? ??Refused?  ??? ? ? ?Tetanus Vaccine due??06/01/21??and every 10??year(s)  ??? ??Due In Future?  ??? ? ? ?Obesity Screening not due until??01/01/22??and every 1??year(s)  ??? ? ? ?Advance Directive not due until??01/02/22??and every 1??year(s)  ??? ? ? ?Cognitive Screening not due until??01/02/22??and every 1??year(s)  ??? ? ? ?Fall Risk Assessment not due until??01/02/22??and every 1??year(s)  ??? ? ? ?Functional Assessment not due until??01/02/22??and every 1??year(s)  ??? ? ? ?Aspirin Therapy for CVD Prevention not due until??02/22/22??and every 1??year(s)  ??? ? ? ?ADL Screening not due until??03/04/22??and every 1??year(s)  ??? ? ? ?Depression Screening not due until??03/12/22??and every 1??year(s)  ??? ? ? ?Hypertension Management-BMP not due until??03/12/22??and every 1??year(s)  ???Satisfied?(in the past 1 year)  ??? ??Satisfied?  ??? ? ? ?ADL Screening on??03/04/21.??Satisfied by Javy Hayden LPN  ??? ? ? ?Advance Directive on??03/12/21.??Satisfied by Bindu Pelaez  ??? ? ? ?Aspirin Therapy for CVD Prevention on??02/22/21.??Satisfied by Todd Mathew DO  ??? ? ? ?Blood Pressure Screening on??06/01/21.??Satisfied by Brittney López LPN  ??? ? ? ?Body Mass Index Check on??06/01/21.??Satisfied by Brittney López LPN  ??? ? ? ?Cognitive Screening on??03/04/21.??Satisfied by Javy Hayden LPN  ??? ? ? ?Colorectal Screening on??08/12/20.??Satisfied by Delores Burt  ??? ? ? ?Coronary Artery Disease Maintenance-Lipid  Lowering Therapy on??05/25/21.??Satisfied by Beckie Flores MD  ??? ? ? ?Depression Screening on??03/12/21.??Satisfied by Bindu Pelaez  ??? ? ? ?Diabetes Maintenance-Foot Exam on??03/04/21.??Satisfied by Beckie Flores MD  ??? ? ? ?Diabetes Screening on??03/12/21.??Satisfied by Everardo Ames  ??? ? ? ?Fall Risk Assessment on??06/01/21.??Satisfied by Brittney López LPN  ??? ? ? ?Functional Assessment on??04/15/21.??Satisfied by Nalini Vazquez RN  ??? ? ? ?Hypertension Management-Blood Pressure on??06/01/21.??Satisfied by Brittney López LPN  ??? ? ? ?Influenza Vaccine on??02/08/21.??Satisfied by Kenneth Prater RN  ??? ? ? ?Lipid Screening on??08/07/20.??Satisfied by Freda Barkley  ??? ? ? ?Obesity Screening on??06/01/21.??Satisfied by Brittney López LPN  ??? ? ? ?Pneumococcal Vaccine on??09/04/20.??Satisfied by Jess Howe LPN  ??? ??Refused?  ??? ? ? ?Tetanus Vaccine on??11/04/20.??Recorded by Jess Howe LPN??Reason: Patient Refuses  ??? ? ? ?Zoster Vaccine on??11/04/20.??Recorded by Jess Howe LPN??Reason: Patient Refuses  ?

## 2022-05-02 NOTE — HISTORICAL [facility]ER
This is a historical note converted from [place]. Formatting and pictures may have been removed.  Please reference [facility] for original formatting and attached multimedia. Chief Complaint  bilat foot pain , ?rt foot worse, x 1day  History of Present Illness  76-year-old male with chronic bilateral foot pain, right greater than left.? Has been having an exacerbation over the last several days.? No swelling.? No fever.? No ankle or knee pain.? No rash.? No previous imaging.  Chart reviewed  Review of Systems  Constitutional: negative except as stated in HPI  Eye: negative except as stated in HPI  ENT: negative except as stated in HPI  Respiratory: negative except as stated in HPI  Cardiovascular: negative except as stated in HPI  Gastrointestinal: negative except as stated in HPI  Genitourinary: negative except as stated in HPI  ?  ?  Physical Exam  Vitals & Measurements  T:?36.9? ?C (Oral)? HR:?71(Peripheral)? RR:?18? BP:?169/76? SpO2:?99%?  HT:?178.00?cm? WT:?75.000?kg? BMI:?23.67?  Msk exam  VITAL SIGNS: ?Reviewed. ? ?  GENERAL:?In no apparent distress  HEAD: ?No signs of head trauma ?  MUSCULOSKELETAL:  BLE:?Left lower extremity is within normal limits. ?RLE:?Foot without soft tissue swelling. ?No tenderness.? No skin changes.? Right lower extremity?is without edema. ?There is a small amount of soft tissue swelling about the ankle.? The ankle has full range of motion, no tenderness, no erythema. ?There is mild tenderness along the dorsal midfoot?but no other soft tissue changes, no skin changes.? All digits have full range of motion. ?No findings suggestive of cellulitis  NEUROLOGIC EXAM:?Alert and oriented x 3. ?No focal sensory or strength deficits. ? Speech normal. ?Follows commands  ?  ?  ?(09/20/2021 12:26 CDT XR Foot Right Minimum 3 Views)  Reason For Exam  ?  Radiology Report  EXAM: XR Foot Right Minimum 3 Views  DATE: 9/20/2021 12:26 PM CDT  INDICATION: Pain  ?  COMPARISON: None available.  ?  TECHNIQUE: AP,  oblique, and lateral views of the right foot.  ?  ?  FINDINGS:?  No acute fracture or malalignment is identified of the right foot.  Mild degenerative changes of the proximal and distal interphalangeal  joints. The Lisfranc interval and joint spaces of the right foot are  otherwise maintained. Mild posterior calcaneal Achilles enthesopathy.  There is no aggressive-appearing bone lesion or abnormal periosteal  reaction. No soft tissue gas. Scattered vascular calcifications. Bone  mineralization is maintained.  ?  ?  IMPRESSION:  No acute osseous abnormality of the right foot.  ?  ?  Signature Line  Electronically Signed By: Bunny COELHO, Adebayo Brennan  Date/Time Signed: 09/20/2021 12:42 [1]  Assessment/Plan  1.?Right foot pain?M79.491  Discussed arthritic findings on foot x-ray.? Gave a prescription for diclofenac gel. ?If this is not covered, he will get the over-the-counter version.? Continue Tylenol as needed.? I encouraged PCP follow-up as scheduled.? Return to urgent care if any need.   Problem List/Past Medical History  Ongoing  Adenocarcinoma of cecum  Adenocarcinoma of colon  Allergic rhinitis  Anemia(  Confirmed  )  Aphakia(  Confirmed  )  Asthma  Cataract(  Confirmed  )  Chronic renal impairment(  Confirmed  )  Colon adenocarcinoma  HTN (hypertension)  HTN - Hypertension  Hypercholesterolemia  Hyperlipidemia(  Confirmed  )  Ileostomy in place  Ptosis of eyelid(  Confirmed  )  Syncope  Visual field defect(  Confirmed  )  Historical  Diabetes  Diabetic retinopathy(  Confirmed  )  DMII (diabetes mellitus, type 2)(  Confirmed  )  Procedure/Surgical History  Excision of Ileum, Open Approach (06/16/2021)  Ileostomy Reversal (None) (06/16/2021)  Biopsy Gastrointestinal (02/19/2021)  Colonoscopy (None) (02/19/2021)  Excision of Large Intestine, Via Natural or Artificial Opening Endoscopic, Diagnostic (02/19/2021)  Inspection of Lower Intestinal Tract, Via Natural or Artificial Opening Endoscopic  (02/19/2021)  Small Bowel Endoscopy, Diagnostic (02/19/2021)  Catheter Insertion Mediport (None) (11/13/2020)  Insertion of Totally Implantable Vascular Access Device into Chest Subcutaneous Tissue and Fascia, Open Approach (11/13/2020)  Insertion of tunneled centrally inserted central venous access device, with subcutaneous port; age 5 years or older (11/13/2020)  Port, indwelling, imp (11/13/2020)  Bypass Ileum to Cutaneous, Open Approach (10/04/2020)  Exploration Laparotomy (10/04/2020)  Inspection of Peritoneum, Open Approach (10/04/2020)  Colectomy (Right) (09/30/2020)  Resection of Right Large Intestine, Open Approach (09/30/2020)  Biopsy Gastrointestinal (None) (09/16/2020)  Colonoscopy (None) (09/16/2020)  Colonoscopy, flexible; with biopsy, single or multiple (09/16/2020)  Colonoscopy, flexible; with removal of tumor(s), polyp(s), or other lesion(s) by snare technique (09/16/2020)  Excision of Ascending Colon, Via Natural or Artificial Opening Endoscopic, Diagnostic (09/16/2020)  Excision of Ascending Colon, Via Natural or Artificial Opening Endoscopic, Diagnostic (09/16/2020)  Polypectomy (None) (09/16/2020)  Cataract Extraction Phacoemulsification (Left) (05/28/2019)  Extracapsular cataract removal with insertion of intraocular lens prosthesis (1 stage procedure), manual or mechanical technique (eg, irrigation and aspiration or phacoemulsification) (05/28/2019)  IOL Placement (Left) (05/28/2019)  Replacement of Left Lens with Synthetic Substitute, Percutaneous Approach (05/28/2019)  Carotid endarterectomy (02/12/2014)  Carotid endarterectomy (01/06/2014)  blockage  Bypass graft, with other than vein, transcervical retropharyngeal carotid-carotid, performed in conjunction with endovascular repair of descending thoracic aorta, by neck incision  Extracapsular cataract removal with insertion of intraocular lens prosthesis (1 stage procedure), manual or mechanical technique (eg, irrigation and aspiration or  phacoemulsification)  Phacoemulsification of cataract with intraocular lens implantation   Medications  acetaminophen-oxycodone 325 mg-5 mg oral tablet, 1 tab(s), Oral, q6hr,? ?Not Taking, Completed Rx  Advair Diskus 250 mcg-50 mcg inhalation powder, 1 puff(s), INH, BID, 11 refills  aspirin 81 mg oral tablet, CHEWABLE, 81 mg= 1 tab(s), Oral, Daily,? ?Not taking  atorvastatin 40 mg oral tablet, 40 mg= 1 tab(s), Oral, Daily, 3 refills  cetirizine 10 mg oral tablet, 10 mg= 1 tab(s), Oral, Daily, 3 refills  diclofenac 1% topical gel, 1 lukas, TOP, QID, PRN  Eliquis Starter Pack for Treatment of DVT and PE 5 mg oral tablet, 5 mg= 1 tab(s), Oral, BID,? ?Not Taking per Prescriber  Ensure supplements, See Instructions, 3 refills,? ?Not taking: due to diarrhea  fluticasone 50 mcg/inh nasal spray, 50 mcg= 1 spray(s), Nasal, BID, 3 refills  gabapentin 100 mg oral capsule, 200 mg= 2 cap(s), Oral, QID  hydrALAZINE 25 mg oral tablet, 25 mg= 1 tab(s), Oral, BID, 6 refills  hydrochlorothiazide 25 mg oral tablet, 25 mg= 1 tab(s), Oral, Daily, 1 refills  losartan 25 mg oral tablet, 25 mg= 1 tab(s), Oral, Daily,? ?Not Taking per Prescriber  magnesium oxide 400 mg oral tablet, 400 mg= 1 tab(s), Oral, Daily  metoprolol succinate 25 mg oral tablet extended release, 25 mg= 1 tab(s), Oral, Daily, 1 refills  One A Day Men 50 Plus oral tablet, 1 tab(s), Oral, Daily  Pantoprazole 40 mg ORAL EC-Tablet, 40 mg= 1 tab(s), Oral, Daily, 6 refills  Phenergan 12.5 mg Tab, 12.5 mg= 1 tab(s), Oral, q4hr, PRN,? ?Not taking: PRN Last Dose Date/Time Unknown  Questran 4 g/9 g oral powder for reconstitution, 1 packet(s), Oral, TID, 3 refills,? ?Not taking  Allergies  No Known Allergies  Social History  Abuse/Neglect  No, No, Yes, 09/20/2021  Alcohol - Denies Alcohol Use, 10/02/2014  Past, 08/10/2021  Past, 06/11/2021  Employment/School  Retired, 07/06/2018  Retired, Highest education level: None. Operates hazardous equipment: No.,  07/06/2016  Exercise  Exercise duration: 30. Exercise frequency: Daily. Exercise type: Walking., 01/20/2020  Exercise frequency: 5-6 times/week. Self assessment: Fair condition. Exercise type: Walking., 04/06/2015  Home/Environment  Lives with Spouse., 07/06/2018  Home equipment: blood pressure cuff., 01/09/2017  Lives with Spouse. Living situation: Home/Independent. Alcohol abuse in household: No. Substance abuse in household: No. Smoker in household: No. Injuries/Abuse/Neglect in household: No. Feels unsafe at home: No. Safe place to go: Yes. Agency(s)/Others notified: No. Family/Friends available for support: Yes. Concern for family members at home: No. Major illness in household: No. Financial concerns: No. TV/Computer concerns: No., 07/06/2016    Never in , 08/10/2021  Nutrition/Health  Regular, Wants to lose weight: No. Sleeping concerns: No. Feels highly stressed: No., 04/06/2015  Sexual  Gender Identity Identifies as male., 08/01/2019  Spiritual/Cultural  Gnosticist, 01/20/2020  Substance Use - Denies Substance Abuse, 10/02/2014  Never, 08/10/2021  Never, 06/11/2021  Tobacco - Denies Tobacco Use, 10/02/2014  Former smoker, quit more than 30 days ago, N/A, 09/20/2021  Family History  Cancer: Sister.  Mother: History is unknown  Sister: History is unknown  Sister: History is unknown  Immunizations  Vaccine Date Status   COVID-19 MRNA, LNP-S, PF- Pfizer 02/26/2021 Recorded   COVID-19 mRNA, LNP-S, PF - Moderna 02/26/2021 Recorded   COVID-19 MRNA, LNP-S, PF- Pfizer 01/12/2021 Recorded   COVID-19 mRNA, LNP-S, PF - Moderna 01/12/2021 Recorded   pneumococcal 13-valent conjugate vaccine 09/04/2020 Given   influenza virus vaccine, inactivated 09/12/2019 Recorded   influenza virus vaccine, inactivated 09/24/2018 Recorded   influenza virus vaccine, inactivated 10/02/2017 Recorded   influenza virus vaccine, inactivated 10/06/2016 Recorded   pneumococcal 23-polyvalent vaccine 10/02/2015 Recorded   influenza  virus vaccine, inactivated 09/22/2015 Recorded   zoster vaccine live 09/22/2015 Recorded   Health Maintenance  Health Maintenance  ???Pending?(in the next year)  ??? ??Due?  ??? ? ? ?Hypertension Management-Education due??09/20/21??and every 1??year(s)  ??? ? ? ?Medicare Annual Wellness Exam due??09/20/21??and every 1??year(s)  ??? ? ? ?Zoster Vaccine due??09/20/21??Unknown Frequency  ??? ??Refused?  ??? ? ? ?Tetanus Vaccine due??09/20/21??and every 10??year(s)  ??? ??Due In Future?  ??? ? ? ?Obesity Screening not due until??01/01/22??and every 1??year(s)  ??? ? ? ?Advance Directive not due until??01/02/22??and every 1??year(s)  ??? ? ? ?Cognitive Screening not due until??01/02/22??and every 1??year(s)  ??? ? ? ?Fall Risk Assessment not due until??01/02/22??and every 1??year(s)  ??? ? ? ?Functional Assessment not due until??01/02/22??and every 1??year(s)  ??? ? ? ?Aspirin Therapy for CVD Prevention not due until??02/22/22??and every 1??year(s)  ??? ? ? ?ADL Screening not due until??08/10/22??and every 1??year(s)  ??? ? ? ?Depression Screening not due until??09/10/22??and every 1??year(s)  ??? ? ? ?Hypertension Management-BMP not due until??09/10/22??and every 1??year(s)  ???Satisfied?(in the past 1 year)  ??? ??Satisfied?  ??? ? ? ?ADL Screening on??08/10/21.??Satisfied by Jess Howe LPN  ??? ? ? ?Advance Directive on??06/16/21.??Satisfied by OREN Mcgarry, Alayna  ??? ? ? ?Aspirin Therapy for CVD Prevention on??02/22/21.??Satisfied by Todd Mathew DO  ??? ? ? ?Blood Pressure Screening on??09/20/21.??Satisfied by Destiny Masters LPN.  ??? ? ? ?Body Mass Index Check on??09/20/21.??Satisfied by Destiny Masters LPN.  ??? ? ? ?Cognitive Screening on??03/04/21.??Satisfied by Javy Hayden LPN  ??? ? ? ?Coronary Artery Disease Maintenance-Lipid Lowering Therapy on??06/17/21.??Satisfied by Lala Tanner LPN  ??? ? ? ?Depression Screening on??09/10/21.??Satisfied by Lynn Aldrich LPN  ??? ?  ? ?Diabetes Maintenance-HgbA1c on??08/09/21.??Satisfied by Raul Garcia.  ??? ? ? ?Diabetes Screening on??09/10/21.??Satisfied by Crissy Short  ??? ? ? ?Fall Risk Assessment on??09/20/21.??Satisfied by Destiny Masters LPN P.  ??? ? ? ?Functional Assessment on??08/06/21.??Satisfied by Tamra Dyer RN  ??? ? ? ?Hypertension Management-Blood Pressure on??09/20/21.??Satisfied by Destiny Masters LPN P.  ??? ? ? ?Influenza Vaccine on??09/20/21.??Satisfied by Destiny Masters LPN P.  ??? ? ? ?Lipid Screening on??08/09/21.??Satisfied by Raul Garcia.  ??? ? ? ?Obesity Screening on??09/20/21.??Satisfied by Destiny Masters LPN P.  ??? ??Refused?  ??? ? ? ?Tetanus Vaccine on??11/04/20.??Recorded by Jess Howe LPN??Reason: Patient Refuses  ??? ? ? ?Zoster Vaccine on??11/04/20.??Recorded by Jess Howe LPN??Reason: Patient Refuses  ?     [1]?XR Foot Right Minimum 3 Views; Adebayo Hollis MD 09/20/2021 12:26 CDT

## 2022-05-21 NOTE — HISTORICAL [facility] CERNER
This is a historical note converted from Tara. Formatting and pictures may have been removed.  Please reference Tara for original formatting and attached multimedia. Chief Complaint  Colon Cancer  History of Present Illness  Problem List:  pT3 pN2a M0, stage IIIB?adenocarcinoma of cecum diagnosed 2020  ?   Current Treatment:  Surveillance  ?  Treatment History:  status post right hemicolectomy (10/04/2020)  Adjuvant capecitabine started 01/11/2021. Stopped after 1st cycle d/t intolerance  ?  ?   Past medical history: Hypertension.? NIDDM.? CKD.? History of retropharyngeal carotid-carotid bypass (2014).? Bronchial asthma.? Cataract.? Diabetic neuropathy.? Hypertension.? Dyslipidemia.? Ptosis.? Visual field defect.? Cataract surgery.? Carotid endarterectomy (01/06/2014; 02/12/2014).? Intraocular lens, (05/28/2019).  Social history:?. ?Lives in [place], [place]. ?Has 4 children.? Retired.? Used to ride horses.? Small?third a pack of cigarettes daily?for 10 years; quit 50 years back.? No history of alcohol or illicit drug abuse.  Family history:?Sister experienced breast cancer at age 58 years.? No family history of colorectal cancer.  Health maintenance:?Screening colonoscopy 10 years back, apparently?unremarkable.? Prior to that,?history of colon polyps.  ?  ?   History of present illness:  75-year-old gentleman referred by surgery with adenocarcinoma of cecum.  ?   For a full, detailed history, please see Dr. Shetty note dated 3/12/2021.  ?   Interval History  4/6/2021: Patient presents along with wife for scheduled follow up. he is currently under surveillance for adeno. carcinoma of colon. He reports feeling well, denies any N/V/D/C unintentional weight loss, blood in urine/stool, abdominal pain. Lab work reviewed with patient, stable. Discussed follow up appointments with patient, patient aware of upcoming scans also. Denies any questions/concerns.  Review of Systems  A complete 12-point ROS  was performed in full with pertinent positives as described in interval history. Remainder of ROS done in full and are negative.  ?  Physical Exam  Vitals & Measurements  T:?36.9? ?C (Oral)? HR:?79(Peripheral)? RR:?18? BP:?146/73? SpO2:?98%?  HT:?178.00?cm? WT:?79.190?kg? BMI:?24.99?  Physical Exam:  General: Alert and oriented, No acute distress.?  Appearance: Well-groomed wearing mask  HEENT: Normocephalic, Oral mucosa is moist. Pupils are equal, round and reactive to light, Extraocular movements are intact, Normal conjunctiva.?  Neck: Supple, Non-tender, No lymphadenopathy, No thyromegaly.?  Respiratory: Lungs are clear to auscultation, Respirations are non-labored, Breath sounds are equal, Symmetrical chest wall expansion.?  Cardiovascular: Normal rate, Regular rhythm, No edema.?  Breast: Breast exam not performed on todays visit.?  Gastrointestinal: Rounded, Soft, Non-tender, Non-distended, Normal bowel sounds.?  Musculoskeletal: Normal strength. Ambulates without assistance  Integumentary: Warm, dry, intact.?  Neurologic: Alert, Oriented, No focal deficits, Cranial Nerves II-XII are grossly intact.?  Cognition and Speech: Oriented, Speech clear and coherent.?  Psychiatric: Cooperative, Appropriate mood & affect.?  ECOG Performance Scale:?0 - Capable of all self-care no restrictions  Assessment/Plan  1.?Adenocarcinoma of cecum?C18.0  #Adenocarcinoma of cecum, 10 cm, grade 2-3, moderately to poorly differentiated, lymphovascular invasion positive,  pT3, 6/20 lymph nodes positive, status post right hemicolectomy?(09/30/2020)  -Presentation: Hematochezia and anemia  -CT A/P with contrast (08/13/2020): No metastasis  -Colonoscopy (09/16/2020): Large adenocarcinoma of cecum  -Open right hemicolectomy (09/30/2020)  -CT chest with contrast (11/09/2020): No metastasis  -CEA level 7.8, elevated (09/16/2020) (preop)  -->pT3 pN2a M0, stage IIIB,?adenocarcinoma of cecum; 6 lymph nodes positive  -12/11/2020: CT A/P with  contrast (restaging prior to adjuvant chemotherapy) (comparison: 10/04/2020): Borderline enlarged right midabdominal mesenteric lymph node is indeterminate  -Adjuvant capecitabine started 01/11/2021 (1800 mg p.o. twice daily)  -Second cycle was due to start 02/01/2021 (but he had to be hospitalized with nausea, vomiting, dehydration,?poor oral intake, protein calorie malnutrition, ANGELINE, diarrhea, etc.?)  -Hospitalized 02/01/2021-02/02/2021: ANGELINE, postural dizziness with near syncope, orthostasis, vomiting, protein calorie malnutrition; 3-day history of nausea and vomiting; unable to keep anything down; weak, lightheaded, orthostatic, near syncope; had just finished first cycle of adjuvant chemotherapy?(capecitabine)  -Hospitalized 02/09/2021-02/22/2021:?ANGELINE, dehydration,?high output ostomy, protein calorie malnutrition, weakness, fatigue, nausea, vomiting;?high output from ileostomy controlled with Lomotil and?Imodium; ANGELINE resolved  -06/03/2021: Surveillance CT C/A/P with contrast (comparison: 02/26/2021): MARIBEL  -S/p?elective loop ileostomy reversal (06/16/2021)  -12/21/2021: Surveillance CT C/A/P with contrast (comparison: 06/03/2021): MARIBEL  ?  ?   #Pulmonary embolism, incidental diagnosis (02/26/2021):  -02/26/2021:?CT chest with contrast: New?subacute/chronic?small PE segmental right lower lung lobe, new since 11/09/2020  -Completely asymptomatic  ?  ?  #Right lower extremity DVT, proximal:  -03/03/2021: Bilateral lower extremity venous Doppler: Right femoral DVT, nearly occlusive to occlusive (age indeterminate); right popliteal DVT, partially occlusive (age indeterminate)  -Eliquis started 03/03/2021 (10 mg p.o. twice daily for 7 days, then 5 mg twice daily); continue for 3 months  -S/p anticoagulation with Eliquis for 3 months, starting?03/03/2021?(, stopped)  ?  #No evidence of Santos syndrome  -MSI-H by PCR  MLH1: Loss of expression in a subset of tumor cells  MSH2: Preserved (intact) expression in tumor  cells  MSH6: Preserved (intact) expression in tumor cells  PMS2: Loss of expression in a subset of tumor cells  (dMMR)  -V600 BRAF mutation negative  -IHC: MLH1 preserved (intact) expression in tumor cells (no deficiency of mismatch repair protein MLH1)  -->No evidence of Santos syndrome  ?  ?   #Chronic normocytic anemia, starting around 06/2017, subsequently worsening  PRBC x1 (10/04/2020)  PRBC x1 (10/02/2020)  PRBC x1 (09/30/2020)  08/07/2020: Serum iron 27, low; TIBC 175, low; transferrin saturation 15.4%, borderline; ferritin 295.1, normal  -Feraheme 510 mg IV x2 (12/03/2020, 12/11/2020)?(for relative iron deficiency)  (01/07/2021: Transferrin saturation 33%, up from 18%; ferritin level 1162.37, up from 594.74; hemoglobin 11.9, up from 10.9)  ?  -S/p surveillance colonoscopy (02/19/2021) (ileoscopy; indication: High output ileostomy, etc.)  -MARIBEL on surveillance CTs (12/21/2021)  >>>  ?  Plan:  ?   -Continue surveillance?appropriate for?stage III colon cancer  -History and physical?and CEA every 3-6 months for 2 years (09/2020-09/2022),?then every 6 months for total of 5 years (09/2022-09/2025)  -Surveillance CT C/A/P with contrast?every 6-12 months for 5 years?  ?  -Follow-up in 3 months?with CBC, CMP, and CEA level;?to visit with  Review scans  -Surveillance CT C/A/P with contrast?in 6 months (06/2022)-Ordered today  -Next surveillance colonoscopy in 3 years?(02/2024)  -Continue Mag Oxide 400mg bid  ?  ?   Above discussed at length?with him and his wife. ?All questions answered.  Discussed labs and gave him copies of?relevant reports.  He and his wife understand?and agrees this plan.  --------------------------  Surveillance:  History and physical every 3-6 months for 2 years?(09/2020-09/2022), then  every 6 months for a total of 5 years?(09/2022-09/2025);  ?   CEA every 3-6 months for 2 years, then  every 6 months for a total of 5 years;  ?   chest/abdominal/pelvic CT scan every 6-12 months  (category 2B for frequency less than 12 months) for a total of 5 years?(09/2020-09/2025)?  (CT should be with IV and oral contrast; if either CT of abdomen/pelvis is inadequate, or if patient has a contraindication to CT with IV contrast, then consider abdominal/pelvic MRI with MRI contrast plus a noncontrast chest CT);  PET CT scan is not recommended;  ?   colonoscopy in 1 year after surgery except if no preoperative colonoscopy due to obstructing lesion, then colonoscopy in 3-6 months:  if advanced adenoma then repeat in 1 year,  if no advanced adenoma then repeat in 3 years, and then  every 5 years.  Ordered:  ?   Problem List/Past Medical History  Ongoing  Adenocarcinoma of cecum  Adenocarcinoma of colon  Allergic rhinitis  Anemia(  Confirmed  )  Aphakia(  Confirmed  )  Asthma  Cataract(  Confirmed  )  Chronic renal impairment(  Confirmed  )  Colon adenocarcinoma  HTN (hypertension)  HTN - Hypertension  Hypercholesterolemia  Hyperlipidemia(  Confirmed  )  Ileostomy in place  Ptosis of eyelid(  Confirmed  )  Syncope  Visual field defect(  Confirmed  )  Historical  Diabetes  Diabetic retinopathy(  Confirmed  )  DMII (diabetes mellitus, type 2)(  Confirmed  )  Procedure/Surgical History  Excision of Ileum, Open Approach (06/16/2021)  Ileostomy Reversal (None) (06/16/2021)  Biopsy Gastrointestinal (02/19/2021)  Colonoscopy (None) (02/19/2021)  Excision of Large Intestine, Via Natural or Artificial Opening Endoscopic, Diagnostic (02/19/2021)  Inspection of Lower Intestinal Tract, Via Natural or Artificial Opening Endoscopic (02/19/2021)  Small Bowel Endoscopy, Diagnostic (02/19/2021)  Catheter Insertion Mediport (None) (11/13/2020)  Insertion of Totally Implantable Vascular Access Device into Chest Subcutaneous Tissue and Fascia, Open Approach (11/13/2020)  Insertion of tunneled centrally inserted central venous access device, with subcutaneous port; age 5 years or older (11/13/2020)  Port,  indwelling, imp (11/13/2020)  Bypass Ileum to Cutaneous, Open Approach (10/04/2020)  Exploration Laparotomy (10/04/2020)  Inspection of Peritoneum, Open Approach (10/04/2020)  Colectomy (Right) (09/30/2020)  Resection of Right Large Intestine, Open Approach (09/30/2020)  Biopsy Gastrointestinal (None) (09/16/2020)  Colonoscopy (None) (09/16/2020)  Colonoscopy, flexible; with biopsy, single or multiple (09/16/2020)  Colonoscopy, flexible; with removal of tumor(s), polyp(s), or other lesion(s) by snare technique (09/16/2020)  Excision of Ascending Colon, Via Natural or Artificial Opening Endoscopic, Diagnostic (09/16/2020)  Excision of Ascending Colon, Via Natural or Artificial Opening Endoscopic, Diagnostic (09/16/2020)  Polypectomy (None) (09/16/2020)  Cataract Extraction Phacoemulsification (Left) (05/28/2019)  Extracapsular cataract removal with insertion of intraocular lens prosthesis (1 stage procedure), manual or mechanical technique (eg, irrigation and aspiration or phacoemulsification) (05/28/2019)  IOL Placement (Left) (05/28/2019)  Replacement of Left Lens with Synthetic Substitute, Percutaneous Approach (05/28/2019)  Carotid endarterectomy (02/12/2014)  Carotid endarterectomy (01/06/2014)  blockage  Bypass graft, with other than vein, transcervical retropharyngeal carotid-carotid, performed in conjunction with endovascular repair of descending thoracic aorta, by neck incision  Extracapsular cataract removal with insertion of intraocular lens prosthesis (1 stage procedure), manual or mechanical technique (eg, irrigation and aspiration or phacoemulsification)  Phacoemulsification of cataract with intraocular lens implantation   Medications  Advair Diskus 250 mcg-50 mcg inhalation powder, 1 puff(s), INH, BID, 11 refills  aspirin 81 mg oral tablet, CHEWABLE, 81 mg= 1 tab(s), Oral, Daily  atorvastatin 40 mg oral tablet, 40 mg= 1 tab(s), Oral, Daily, 1 refills  fluticasone 50 mcg/inh nasal spray, 50 mcg= 1  spray(s), Nasal, BID, 3 refills  heparin flush 100 units/mL (500 Unit Flush), 500 units= 5 mL, IV Push, Once-chemo  heparin flush 100 units/mL (500 Unit Flush), 500 units= 5 mL, IV Push, Once-chemo  hydrALAZINE 25 mg oral tablet, 25 mg= 1 tab(s), Oral, BID, 6 refills  hydrochlorothiazide 25 mg oral tablet, 25 mg= 1 tab(s), Oral, Daily, 1 refills  lisinopril 10 mg oral tablet, 10 mg= 1 tab(s), Oral, Daily, 3 refills  magnesium oxide 400 mg oral tablet, 400 mg= 1 tab(s), Oral, Daily  metoprolol succinate 25 mg oral tablet extended release, 25 mg= 1 tab(s), Oral, Daily, 1 refills  One A Day Men 50 Plus oral tablet, 1 tab(s), Oral, Daily  Pantoprazole 40 mg ORAL EC-Tablet, 40 mg= 1 tab(s), Oral, Daily, 6 refills  Allergies  No Known Allergies  Social History  Abuse/Neglect  No, No, Yes, 04/06/2022  Alcohol - Denies Alcohol Use, 10/02/2014  Never, 12/23/2021  Employment/School  Retired, 07/06/2018  Exercise  Exercise duration: 0., 02/09/2022  Home/Environment  Lives with Spouse., 07/06/2018  Home equipment: blood pressure cuff., 01/09/2017  Lives with Spouse. Living situation: Home/Independent. Alcohol abuse in household: No. Substance abuse in household: No. Smoker in household: No. Injuries/Abuse/Neglect in household: No. Feels unsafe at home: No. Safe place to go: Yes. Agency(s)/Others notified: No. Family/Friends available for support: Yes. Concern for family members at home: No. Major illness in household: No. Financial concerns: No. TV/Computer concerns: No., 07/06/2016    Never in , 08/10/2021  Nutrition/Health  Regular, Good, 02/09/2022  Sexual  Gender Identity Identifies as male., 08/01/2019  Spiritual/Cultural  Restoration, 01/20/2020  Substance Use - Denies Substance Abuse, 10/02/2014  Never, 12/23/2021  Tobacco - Denies Tobacco Use, 10/02/2014  Former smoker, quit more than 30 days ago, N/A, 04/06/2022  Family History  Cancer: Sister.  Mother: History is unknown  Sister: History is  unknown  Sister: History is unknown  Immunizations  Vaccine Date Status   tetanus/diphtheria/pertussis, acel(Tdap) 02/09/2022 Given   COVID-19 mRNA, LNP-S, PF - Moderna 10/22/2021 Recorded   COVID-19 MRNA, LNP-S, PF- Pfizer 02/26/2021 Recorded   COVID-19 mRNA, LNP-S, PF - Moderna 02/26/2021 Recorded   COVID-19 MRNA, LNP-S, PF- Pfizer 01/12/2021 Recorded   COVID-19 mRNA, LNP-S, PF - Moderna 01/12/2021 Recorded   pneumococcal 13-valent conjugate vaccine 09/04/2020 Given   influenza virus vaccine, inactivated 09/12/2019 Recorded   influenza virus vaccine, inactivated 09/24/2018 Recorded   influenza virus vaccine, inactivated 10/02/2017 Recorded   influenza virus vaccine, inactivated 10/06/2016 Recorded   pneumococcal 23-polyvalent vaccine 10/02/2015 Recorded   influenza virus vaccine, inactivated 09/22/2015 Recorded   zoster vaccine live 09/22/2015 Recorded   Health Maintenance  Health Maintenance  ???Pending?(in the next year)  ??? ??OverDue  ??? ? ? ?Advance Directive due??01/02/22??and every 1??year(s)  ??? ? ? ?Cognitive Screening due??01/02/22??and every 1??year(s)  ??? ??Due?  ??? ? ? ?Hypertension Management-Education due??04/06/22??and every 1??year(s)  ??? ? ? ?Medicare Annual Wellness Exam due??04/06/22??and every 1??year(s)  ??? ? ? ?Zoster Vaccine due??04/06/22??Unknown Frequency  ??? ??Due In Future?  ??? ? ? ?Obesity Screening not due until??01/01/23??and every 1??year(s)  ??? ? ? ?Fall Risk Assessment not due until??01/02/23??and every 1??year(s)  ??? ? ? ?Functional Assessment not due until??01/02/23??and every 1??year(s)  ??? ? ? ?ADL Screening not due until??02/09/23??and every 1??year(s)  ??? ? ? ?Aspirin Therapy for CVD Prevention not due until??02/09/23??and every 1??year(s)  ???Satisfied?(in the past 1 year)  ??? ??Satisfied?  ??? ? ? ?ADL Screening on??02/09/22.??Satisfied by EBONIE Beaulieu Emily  ??? ? ? ?Advance Directive on??06/16/21.??Satisfied by OREN Mcgarry, Alayna  ??? ? ? ?Aspirin Therapy  for CVD Prevention on??02/09/22.??Satisfied by Ancelmo Richard MD  ??? ? ? ?Blood Pressure Screening on??04/06/22.??Satisfied by EBONIE Sena Ericka  ??? ? ? ?Body Mass Index Check on??04/06/22.??Satisfied by EBONIE Sena Ericka  ??? ? ? ?Coronary Artery Disease Maintenance-Antiplatelet Agent Prescribed on??02/09/22.??Satisfied by Ancelmo Richard MD  ??? ? ? ?Depression Screening on??04/06/22.??Satisfied by EBONIE Sena Ericka  ??? ? ? ?Diabetes Maintenance-HgbA1c on??02/23/22.??Satisfied by Ina Rosas MT  ??? ? ? ?Diabetes Screening on??04/06/22.??Satisfied by Ina Rosas MT  ??? ? ? ?Fall Risk Assessment on??04/06/22.??Satisfied by EBONIE Sena Ericka  ??? ? ? ?Functional Assessment on??02/09/22.??Satisfied by Tamra Dyer RN  ??? ? ? ?Hypertension Management-Blood Pressure on??04/06/22.??Satisfied by EBONIE Sena Ericka  ??? ? ? ?Influenza Vaccine on??02/09/22.??Satisfied by Tamra Dyer RN  ??? ? ? ?Lipid Screening on??02/23/22.??Satisfied by Ina Rosas MT  ??? ? ? ?Obesity Screening on??04/06/22.??Satisfied by EBNOIE Sena Ericka  ??? ? ? ?Tetanus Vaccine on??02/09/22.??Satisfied by EBONIE Beaulieu Emily  ?  Lab Results  Test Name Test Result Date/Time Comments   Sodium Lvl 135 mmol/L (Low) 04/06/2022 08:27 CDT    Potassium Lvl 3.5 mmol/L 04/06/2022 08:27 CDT    Chloride 103 mmol/L 04/06/2022 08:27 CDT    CO2 23 mmol/L 04/06/2022 08:27 CDT    Calcium Lvl 9.7 mg/dL 04/06/2022 08:27 CDT    Glucose Lvl 158 mg/dL (High) 04/06/2022 08:27 CDT    BUN 22.9 mg/dL 04/06/2022 08:27 CDT    Creatinine 1.48 mg/dL (High) 04/06/2022 08:27 CDT    Est Creat Clearance Ser 42.58 mL/min 04/06/2022 10:04 CDT    eGFR-AA 59 (Low) 04/06/2022 08:27 CDT    eGFR-IVAN 49 mL/min/1.73 m2 (Low) 04/06/2022 08:27 CDT    Bili Total 0.3 mg/dL 04/06/2022 08:27 CDT    Bili Direct 0.1 mg/dL 04/06/2022 08:27 CDT    Bili Indirect 0.20 mg/dL 04/06/2022 08:27 CDT    AST 25 unit/L 04/06/2022 08:27 CDT    ALT 17 unit/L  04/06/2022 08:27 CDT    Alk Phos 99 unit/L 04/06/2022 08:27 CDT    Total Protein 7.5 gm/dL 04/06/2022 08:27 CDT    Albumin Lvl 3.1 gm/dL (Low) 04/06/2022 08:27 CDT    Globulin 4.4 gm/dL (High) 04/06/2022 08:27 CDT    A/G Ratio 0.7 ratio (Low) 04/06/2022 08:27 CDT    CEA 1.74 ng/mL 04/06/2022 08:27 CDT 95.4% of healthy, non-smoking subjects may be expected to have CEA titers of less than 3.0 ng/mL   WBC 10.1 x10(3)/mcL 04/06/2022 08:27 CDT    RBC 4.03 x10(6)/mcL (Low) 04/06/2022 08:27 CDT    Hgb 12.4 gm/dL (Low) 04/06/2022 08:27 CDT    Hct 37.9 % (Low) 04/06/2022 08:27 CDT    Platelet 342 x10(3)/mcL 04/06/2022 08:27 CDT    MCV 94.0 fL 04/06/2022 08:27 CDT    MCH 30.8 pg 04/06/2022 08:27 CDT    MCHC 32.7 gm/dL 04/06/2022 08:27 CDT    RDW 13.4 % 04/06/2022 08:27 CDT    MPV 9.8 fL 04/06/2022 08:27 CDT    Abs Neut 6.16 x10(3)/mcL 04/06/2022 08:27 CDT    Neutro Auto 61 % 04/06/2022 08:27 CDT    Lymph Auto 24 % 04/06/2022 08:27 CDT    Mono Auto 7 % 04/06/2022 08:27 CDT    Eos Auto 7 % 04/06/2022 08:27 CDT    Abs Eos 0.7 x10(3)/mcL 04/06/2022 08:27 CDT    Basophil Auto 1 % 04/06/2022 08:27 CDT    Abs Neutro 6.16 x10(3)/mcL 04/06/2022 08:27 CDT    Abs Lymph 2.4 x10(3)/mcL 04/06/2022 08:27 CDT    Abs Mono 0.7 x10(3)/mcL 04/06/2022 08:27 CDT    Abs Baso 0.1 x10(3)/mcL 04/06/2022 08:27 CDT    NRBC% 0.0 % 04/06/2022 08:27 CDT    IG% 0 % 04/06/2022 08:27 CDT    IG# 0.030 04/06/2022 08:27 CDT

## 2022-05-31 ENCOUNTER — OFFICE VISIT (OUTPATIENT)
Dept: INTERNAL MEDICINE | Facility: CLINIC | Age: 77
End: 2022-05-31
Payer: MEDICARE

## 2022-05-31 VITALS
BODY MASS INDEX: 24.94 KG/M2 | TEMPERATURE: 98 F | WEIGHT: 174.19 LBS | SYSTOLIC BLOOD PRESSURE: 113 MMHG | DIASTOLIC BLOOD PRESSURE: 63 MMHG | RESPIRATION RATE: 18 BRPM | HEIGHT: 70 IN | HEART RATE: 77 BPM

## 2022-05-31 DIAGNOSIS — Z98.890 HISTORY OF BILATERAL CAROTID ENDARTERECTOMY: ICD-10-CM

## 2022-05-31 DIAGNOSIS — Z86.718 HISTORY OF DVT (DEEP VEIN THROMBOSIS): ICD-10-CM

## 2022-05-31 DIAGNOSIS — C18.0 ADENOCARCINOMA OF CECUM: ICD-10-CM

## 2022-05-31 DIAGNOSIS — J45.909 ASTHMA, UNSPECIFIED ASTHMA SEVERITY, UNSPECIFIED WHETHER COMPLICATED, UNSPECIFIED WHETHER PERSISTENT: ICD-10-CM

## 2022-05-31 DIAGNOSIS — K21.9 GASTROESOPHAGEAL REFLUX DISEASE, UNSPECIFIED WHETHER ESOPHAGITIS PRESENT: ICD-10-CM

## 2022-05-31 DIAGNOSIS — I10 HYPERTENSION, UNSPECIFIED TYPE: ICD-10-CM

## 2022-05-31 PROBLEM — E78.5 HYPERLIPIDEMIA: Status: ACTIVE | Noted: 2022-05-31

## 2022-05-31 PROCEDURE — 99214 OFFICE O/P EST MOD 30 MIN: CPT | Mod: PBBFAC

## 2022-05-31 PROCEDURE — 90750 HZV VACC RECOMBINANT IM: CPT | Mod: PBBFAC

## 2022-05-31 RX ORDER — NAPROXEN SODIUM 220 MG/1
81 TABLET, FILM COATED ORAL DAILY
COMMUNITY
Start: 2022-02-09 | End: 2022-09-21 | Stop reason: SDUPTHER

## 2022-05-31 RX ORDER — HYDROCHLOROTHIAZIDE 25 MG/1
25 TABLET ORAL DAILY
COMMUNITY
Start: 2022-03-21 | End: 2022-09-21 | Stop reason: SDUPTHER

## 2022-05-31 RX ORDER — ALBUTEROL SULFATE 90 UG/1
2 AEROSOL, METERED RESPIRATORY (INHALATION) EVERY 6 HOURS PRN
Qty: 18 G | Refills: 2 | Status: SHIPPED | OUTPATIENT
Start: 2022-05-31 | End: 2022-08-23 | Stop reason: SDUPTHER

## 2022-05-31 RX ORDER — HYDRALAZINE HYDROCHLORIDE 25 MG/1
25 TABLET, FILM COATED ORAL 2 TIMES DAILY
COMMUNITY
Start: 2022-05-03 | End: 2022-09-21 | Stop reason: SDUPTHER

## 2022-05-31 RX ORDER — FLUTICASONE PROPIONATE AND SALMETEROL 50; 250 UG/1; UG/1
1 POWDER RESPIRATORY (INHALATION) 2 TIMES DAILY
COMMUNITY
Start: 2022-05-13 | End: 2022-09-21 | Stop reason: SDUPTHER

## 2022-05-31 RX ORDER — CETIRIZINE HYDROCHLORIDE 5 MG/1
5 TABLET ORAL DAILY
Qty: 30 TABLET | Refills: 1 | Status: SHIPPED | OUTPATIENT
Start: 2022-05-31 | End: 2022-09-21 | Stop reason: SDUPTHER

## 2022-05-31 RX ORDER — PANTOPRAZOLE SODIUM 40 MG/1
40 TABLET, DELAYED RELEASE ORAL DAILY
COMMUNITY
Start: 2022-05-03 | End: 2022-09-21 | Stop reason: SDUPTHER

## 2022-05-31 RX ORDER — POTASSIUM CHLORIDE 20 MEQ/1
20 TABLET, EXTENDED RELEASE ORAL 2 TIMES DAILY
COMMUNITY
Start: 2022-01-12 | End: 2022-07-08 | Stop reason: SDUPTHER

## 2022-05-31 RX ORDER — ATORVASTATIN CALCIUM 40 MG/1
40 TABLET, FILM COATED ORAL DAILY
COMMUNITY
Start: 2022-05-16 | End: 2022-09-21 | Stop reason: SDUPTHER

## 2022-05-31 RX ORDER — FLUTICASONE PROPIONATE 50 MCG
1 SPRAY, SUSPENSION (ML) NASAL 2 TIMES DAILY
COMMUNITY
Start: 2022-05-13 | End: 2022-07-15 | Stop reason: SDUPTHER

## 2022-05-31 RX ORDER — METOPROLOL SUCCINATE 25 MG/1
25 TABLET, EXTENDED RELEASE ORAL DAILY
COMMUNITY
Start: 2022-03-28 | End: 2022-09-21 | Stop reason: SDUPTHER

## 2022-05-31 RX ORDER — LISINOPRIL 10 MG/1
10 TABLET ORAL DAILY
COMMUNITY
Start: 2022-05-25 | End: 2022-07-06 | Stop reason: SDUPTHER

## 2022-05-31 NOTE — PROGRESS NOTES
"[facility] INTERNAL MEDICINE  OUTPATIENT OFFICE VISIT NOTE    SUBJECTIVE:      HPI: Riccardo Hamilton is a 76 y.o. yo male w/ PMH of PMH of colonic adenocarcinoma (followed by Oncology; s/p hemicolectomy and adjuvant chemo), HTN, HLD, T2DM, Asthma, Retropharyngeal Carotid Bypass (2014), Carotid Endarterectomy (1/2014; 2/2014), and previous RLE DVT w/ subacute small PE of R. Lung (s/p x3 month Eliquis txt) presents to [facility] IM clinic for follow up visit. Today, he reports feeling well overall other than x2 months of dry cough and allergy related symptoms. He reports this was resolved in past with anti-histamines and is requesting a prescription today. He denies any GERD or Asthma related symptoms. In regards to his BP, last visit he presented with systolics of 180s in which Lisinopril 10 mg was prescribed. Today in clinic BP is well controlled (113/63). He reports compliance with all prescribed meds without any adverse side effects. He currently denies any headaches, changes in vision, chest pain, palpitations, shortness of breath, N/V, or lightheadedness/dizziness. Pt also denies any abdominal pain, pain/difficulty with urination/stools, or blood in urine/BMs.       OBJECTIVE:     Vital signs:   /63 (BP Location: Right arm, Patient Position: Sitting, BP Method: Medium (Automatic))   Pulse 77   Temp 98.2 °F (36.8 °C) (Oral)   Resp 18   Ht 5' 10" (1.778 m)   Wt 79 kg (174 lb 3.2 oz)   BMI 25.00 kg/m²      Physical Examination:  Gen: well-nourished, well-developed gentleman, in no acute distress   HEENT: Normocephalic, atraumatic. PERRL.   Neck: No thyromegaly. No lymphadenopathy.   Heart: RRR, no murmurs, gallops, clicks or rubs.   Lungs: CTAB without rales, wheezes or rhonchi. Normal work of breathing.   Abd: Soft, non-tender, non-distended and without guarding. No organomegaly. No obvious masses. Bowel sounds present.   Extremities: Radial and pedal pulses 2+ bilaterally, 1+ B/L LE edema.   MSK: No obvious " deformities. Moves all extremities purposefully.   Neuro: Responds well to commands. CN III-XII grossly intact. No focal neurological deficits noted   Skin: Warm, dry, intact.    ASSESSMENT & PLAN:   Hypertension - well controlled  - Today in clinic, (113/63)  - Continue HCTZ 25 daily, Toprol 25 daily, Hydralazine 25 BID, Lisinopril 10 daily  - Counseled on DASH diet, exercise as tolerated    Seasonal Allergies  - x2 months of dry cough and eye irritiation  - Relieved with anti-histamines previously  - Prescribed Cetirizine 5mg daily; patient is agreeable with plan     Hyperlipidemia  - Latest lipid profile WNL  - Continue Lipitor 40 daily     Stage IIIB Adenocarcinoma of Cecum  - s/p hemicolectomy and adjuvant chemotherapy  - At this time denies any GI-related symptoms  - patient is followed by oncology  - continue to f/u w/ oncology as scheduled    Hx of B/L Carotid Endarterectomy   - Continue Aspirin 81, Lipitor 40 daily    GERD   - Continue Protonix 40 daily     Asthma  - Continue Advair Discus daily  - Continue Albuterol inhaler as needed     Hx of DVT/PE  - Occurred in 2021, treated with x3 months Eliquis  - Denies any recurrence  - Continue Aspirin 81 daily      Health Maintenance:  Immunization History   Administered Date(s) Administered    COVID-19, MRNA, LN-S, PF (MODERNA FULL 0.5 ML DOSE) 01/12/2021, 02/26/2021, 10/22/2021, 05/24/2022    COVID-19, MRNA, LN-S, PF (Pfizer) (Purple Cap) 01/12/2021, 02/26/2021    Influenza - High Dose - PF (65 years and older) 09/22/2015, 10/06/2016, 10/02/2017, 09/24/2018    Influenza - Quadrivalent - PF *Preferred* (6 months and older) 09/12/2019    Influenza - Trivalent - PF (ADULT) 09/22/2015, 10/06/2016, 10/02/2017, 09/24/2018, 09/12/2019    Influenza A (H1N1) 2009 Monovalent - IM 01/21/2010    Pneumococcal Conjugate - 13 Valent 09/04/2020    Pneumococcal Polysaccharide - 23 Valent 10/02/2015    Tdap 02/09/2022    Zoster 09/22/2015       Return to clinic in 4  months with labs.     Ancelmo Richard MD  [facility] Internal Medicine, PGY-1

## 2022-06-01 ENCOUNTER — INFUSION (OUTPATIENT)
Dept: INFUSION THERAPY | Facility: HOSPITAL | Age: 77
End: 2022-06-01
Attending: INTERNAL MEDICINE
Payer: MEDICARE

## 2022-06-01 VITALS
SYSTOLIC BLOOD PRESSURE: 138 MMHG | HEART RATE: 75 BPM | BODY MASS INDEX: 24.86 KG/M2 | OXYGEN SATURATION: 98 % | WEIGHT: 173.31 LBS | DIASTOLIC BLOOD PRESSURE: 67 MMHG | TEMPERATURE: 98 F | RESPIRATION RATE: 18 BRPM

## 2022-06-01 DIAGNOSIS — C18.0 ADENOCARCINOMA OF CECUM: Primary | ICD-10-CM

## 2022-06-01 PROCEDURE — A4216 STERILE WATER/SALINE, 10 ML: HCPCS | Performed by: INTERNAL MEDICINE

## 2022-06-01 PROCEDURE — 25000003 PHARM REV CODE 250: Performed by: INTERNAL MEDICINE

## 2022-06-01 PROCEDURE — 96523 IRRIG DRUG DELIVERY DEVICE: CPT

## 2022-06-01 PROCEDURE — 63600175 PHARM REV CODE 636 W HCPCS: Performed by: INTERNAL MEDICINE

## 2022-06-01 RX ORDER — SODIUM CHLORIDE 0.9 % (FLUSH) 0.9 %
10 SYRINGE (ML) INJECTION
Status: DISCONTINUED | OUTPATIENT
Start: 2022-06-01 | End: 2022-06-01 | Stop reason: HOSPADM

## 2022-06-01 RX ORDER — HEPARIN SODIUM (PORCINE) LOCK FLUSH IV SOLN 100 UNIT/ML 100 UNIT/ML
100 SOLUTION INTRAVENOUS
Status: DISCONTINUED | OUTPATIENT
Start: 2022-06-01 | End: 2022-06-01 | Stop reason: HOSPADM

## 2022-06-01 RX ORDER — HEPARIN 100 UNIT/ML
500 SYRINGE INTRAVENOUS
Status: CANCELLED | OUTPATIENT
Start: 2022-06-01

## 2022-06-01 RX ORDER — SODIUM CHLORIDE 0.9 % (FLUSH) 0.9 %
10 SYRINGE (ML) INJECTION
Status: CANCELLED | OUTPATIENT
Start: 2022-06-01

## 2022-06-01 RX ADMIN — SODIUM CHLORIDE, PRESERVATIVE FREE 10 ML: 5 INJECTION INTRAVENOUS at 10:06

## 2022-06-01 RX ADMIN — HEPARIN SODIUM (PORCINE) LOCK FLUSH IV SOLN 100 UNIT/ML 100 UNITS: 100 SOLUTION at 10:06

## 2022-06-22 ENCOUNTER — OFFICE VISIT (OUTPATIENT)
Dept: CARDIOLOGY | Facility: CLINIC | Age: 77
End: 2022-06-22
Payer: MEDICARE

## 2022-06-22 VITALS
HEART RATE: 74 BPM | OXYGEN SATURATION: 99 % | TEMPERATURE: 98 F | BODY MASS INDEX: 25.96 KG/M2 | WEIGHT: 175.25 LBS | RESPIRATION RATE: 20 BRPM | SYSTOLIC BLOOD PRESSURE: 128 MMHG | DIASTOLIC BLOOD PRESSURE: 60 MMHG | HEIGHT: 69 IN

## 2022-06-22 DIAGNOSIS — Z98.890 HISTORY OF BILATERAL CAROTID ENDARTERECTOMY: ICD-10-CM

## 2022-06-22 DIAGNOSIS — C18.0 ADENOCARCINOMA OF CECUM: ICD-10-CM

## 2022-06-22 DIAGNOSIS — E78.5 HYPERLIPIDEMIA, UNSPECIFIED HYPERLIPIDEMIA TYPE: ICD-10-CM

## 2022-06-22 DIAGNOSIS — Z86.711 HISTORY OF PULMONARY EMBOLISM: ICD-10-CM

## 2022-06-22 DIAGNOSIS — J45.909 ASTHMA, UNSPECIFIED ASTHMA SEVERITY, UNSPECIFIED WHETHER COMPLICATED, UNSPECIFIED WHETHER PERSISTENT: ICD-10-CM

## 2022-06-22 DIAGNOSIS — I10 HYPERTENSION, UNSPECIFIED TYPE: Primary | ICD-10-CM

## 2022-06-22 DIAGNOSIS — Z86.718 HISTORY OF DVT (DEEP VEIN THROMBOSIS): ICD-10-CM

## 2022-06-22 PROCEDURE — 99215 OFFICE O/P EST HI 40 MIN: CPT | Mod: PBBFAC | Performed by: INTERNAL MEDICINE

## 2022-06-22 RX ORDER — LANOLIN ALCOHOL/MO/W.PET/CERES
400 CREAM (GRAM) TOPICAL DAILY
COMMUNITY
End: 2022-08-01 | Stop reason: SDUPTHER

## 2022-06-22 NOTE — PROGRESS NOTES
Chief Complaint   Patient presents with    f/u visit, denies chest pains or sob     HPI  Mr. Hamilton is a 77 y/o M with HTN, HLD, CKD, diet controlled prediabetes, ADRIÁN s/p CEA bilaterally, Asthma and stage III Cecal adenoCa s/p hemicolectomy and adjuvant chemo, prox RLE DVT and subacute small PE Rlung s/p Eliquis for 3 months here for follow up.   Pt has been doing well with no cardiovascular complaints. He has been fairly active but has not been going for walks due to arthritis in his feet. He denies any exertional chest pain, shortness of breath, lower ext edema, dizziness, lightheadedness.  He has been having a cough related to seasonal allergies and was started on albuterol. No bleeding on aspirin.      Review of Systems  Constitutional: negative for fever,chills, sweats, weakness, fatigue, decreased activity   Eye: negative for blurry vision, vision change  ENMT: negative for sore throat, nasal congestion  Respiratory: negative for shortness of breath, cough, wheezing  Cardiovascular: negative for chest pain, dyspnea on exertion, orthopnea, PND, lower extremity edema, palpitations, claudication  Gastrointestinal: negative for nausea, vomiting, abdominal pain, constipation, diarrhea, blood in stool  Genitourinary: negative for hematuria, nocturia, dysuria  Endocrine: negative for abnormal thirst, temperature  Musculoskeletal: negative for back pain, joint pain  Integumentary: negative for abnormal mole  Neurologic: negative for weakness, numbness, headaches, shooting pain  Hematology: negative for easy bruising, easy bleeding  Allergy: seasonal allergies  Psychiatric: negative for loss of interest, anxiety, stress      Physical Exam Vitals & Measurements  Vitals:    06/22/22 1058   BP: 128/60   Pulse: 74   Resp: 20   Temp: 98.2 °F (36.8 °C)       General: alert and oriented/no acute distress  Eye: EOMI/normal conjunctiva/no xanthelasma  HENT: normocephalic/moist oral mucosa  Neck: supple/nontender/no carotid  bruit  Respiratory: lungs CTA/nonlabored respirations/BS equal/symmetrical expansion/no  chest wall tenderness  Cardiovascular: normal rate/normal rhythm/no murmur/normal peripheral perfusion/no  edema/no JVD  Gastrointestinal: soft/nontender  Musculoskeletal: normal ROM  Integumentary: warm/dry/pink/intact  Neurologic: alert/oriented/normal sensory/no focal deficits  Psychiatric: cooperative/appropriate mood and affect/normal judgment    Current Outpatient Medications:     ADVAIR DISKUS 250-50 mcg/dose diskus inhaler, Inhale 1 puff into the lungs 2 (two) times daily., Disp: , Rfl:     albuterol (VENTOLIN HFA) 90 mcg/actuation inhaler, Inhale 2 puffs into the lungs every 6 (six) hours as needed for Wheezing. Rescue, Disp: 18 g, Rfl: 2    aspirin 81 MG Chew, Take 81 mg by mouth., Disp: , Rfl:     atorvastatin (LIPITOR) 40 MG tablet, Take 40 mg by mouth once daily., Disp: , Rfl:     cetirizine (ZYRTEC) 5 MG tablet, Take 1 tablet (5 mg total) by mouth once daily., Disp: 30 tablet, Rfl: 1    fluticasone propionate (FLONASE) 50 mcg/actuation nasal spray, 1 spray by Each Nostril route 2 (two) times daily., Disp: , Rfl:     hydrALAZINE (APRESOLINE) 25 MG tablet, Take 25 mg by mouth 2 (two) times daily., Disp: , Rfl:     hydroCHLOROthiazide (HYDRODIURIL) 25 MG tablet, Take 25 mg by mouth once daily., Disp: , Rfl:     lisinopriL 10 MG tablet, Take 10 mg by mouth once daily., Disp: , Rfl:     magnesium oxide (MAG-OX) 400 mg (241.3 mg magnesium) tablet, Take 400 mg by mouth once daily., Disp: , Rfl:     metoprolol succinate (TOPROL-XL) 25 MG 24 hr tablet, Take 25 mg by mouth once daily., Disp: , Rfl:     pantoprazole (PROTONIX) 40 MG tablet, Take 40 mg by mouth once daily., Disp: , Rfl:     potassium chloride SA (K-DUR,KLOR-CON) 20 MEQ tablet, Take 20 mEq by mouth 2 (two) times daily., Disp: , Rfl:       Assessment/Plan    HTN  BP well controlled at 128/60  Continue regimen of lisinopril 10mg,  HCTZ 25mg daily,  Toprol 25mg daily and Hydralazine 25mg BID  Recent BMP reviewed    Hypercholesterolemia  LDL at goal 68 in 2/2022  Continue Lipitor 40mg daily    ADRIÁN s/p CEA bilaterally  Last carotid US in 4/2021 that showed < 50% stenosis bilaterally  Followed by vascular surgery  Continue aspirin and statin, LDL at goal  Management per vasc surgery    prediabetes  Diet controlled    CKD stage 3  Management per PCP    Cecal adenocarcinoma stage 3  s/p hemicolectomy and adjuvant chemo  Scheduled for surveillance CT this week  Management per onc    prox RLE DVT and subacute small PE Rlung   s/p Eliquis for 3 months    1 year F/U

## 2022-06-23 ENCOUNTER — HOSPITAL ENCOUNTER (OUTPATIENT)
Dept: RADIOLOGY | Facility: HOSPITAL | Age: 77
Discharge: HOME | End: 2022-06-23
Attending: INTERNAL MEDICINE
Payer: MEDICARE

## 2022-06-23 DIAGNOSIS — C18.9 COLON CANCER: ICD-10-CM

## 2022-06-23 LAB — CREAT SERPL-MCNC: 1.41 MG/DL (ref 0.73–1.18)

## 2022-06-23 PROCEDURE — 25500020 PHARM REV CODE 255

## 2022-06-23 PROCEDURE — 74177 CT ABD & PELVIS W/CONTRAST: CPT | Mod: TC

## 2022-06-23 PROCEDURE — 71260 CT THORAX DX C+: CPT | Mod: TC

## 2022-06-23 PROCEDURE — A9698 NON-RAD CONTRAST MATERIALNOC: HCPCS

## 2022-06-23 PROCEDURE — 36415 COLL VENOUS BLD VENIPUNCTURE: CPT | Performed by: INTERNAL MEDICINE

## 2022-06-23 PROCEDURE — 82565 ASSAY OF CREATININE: CPT | Performed by: INTERNAL MEDICINE

## 2022-06-23 RX ADMIN — IOPAMIDOL 140 ML: 755 INJECTION, SOLUTION INTRAVENOUS at 09:06

## 2022-06-23 RX ADMIN — BARIUM SULFATE 450 ML: 20 SUSPENSION ORAL at 07:06

## 2022-07-01 DIAGNOSIS — I10 HYPERTENSION, UNSPECIFIED TYPE: Primary | ICD-10-CM

## 2022-07-01 RX ORDER — LISINOPRIL 10 MG/1
10 TABLET ORAL DAILY
Qty: 90 TABLET | Refills: 1 | Status: CANCELLED | OUTPATIENT
Start: 2022-07-01

## 2022-07-06 ENCOUNTER — APPOINTMENT (OUTPATIENT)
Dept: HEMATOLOGY/ONCOLOGY | Facility: CLINIC | Age: 77
End: 2022-07-06
Payer: MEDICARE

## 2022-07-06 ENCOUNTER — OFFICE VISIT (OUTPATIENT)
Dept: HEMATOLOGY/ONCOLOGY | Facility: CLINIC | Age: 77
End: 2022-07-06
Payer: MEDICARE

## 2022-07-06 VITALS
OXYGEN SATURATION: 98 % | WEIGHT: 175.38 LBS | RESPIRATION RATE: 18 BRPM | BODY MASS INDEX: 25.11 KG/M2 | HEART RATE: 49 BPM | TEMPERATURE: 98 F | HEIGHT: 70 IN | DIASTOLIC BLOOD PRESSURE: 79 MMHG | SYSTOLIC BLOOD PRESSURE: 145 MMHG

## 2022-07-06 DIAGNOSIS — Z98.890 HISTORY OF BILATERAL CAROTID ENDARTERECTOMY: Primary | ICD-10-CM

## 2022-07-06 DIAGNOSIS — Z85.038 HISTORY OF COLON CANCER: Primary | ICD-10-CM

## 2022-07-06 DIAGNOSIS — C18.0 ADENOCARCINOMA OF CECUM: ICD-10-CM

## 2022-07-06 LAB
ALBUMIN SERPL-MCNC: 3.2 GM/DL (ref 3.4–4.8)
ALBUMIN/GLOB SERPL: 0.8 RATIO (ref 1.1–2)
ALP SERPL-CCNC: 107 UNIT/L (ref 40–150)
ALT SERPL-CCNC: 25 UNIT/L (ref 0–55)
AST SERPL-CCNC: 25 UNIT/L (ref 5–34)
BASOPHILS # BLD AUTO: 0.06 X10(3)/MCL (ref 0–0.2)
BASOPHILS NFR BLD AUTO: 0.7 %
BILIRUBIN DIRECT+TOT PNL SERPL-MCNC: 0.3 MG/DL
BUN SERPL-MCNC: 21.9 MG/DL (ref 8.4–25.7)
CALCIUM SERPL-MCNC: 9.6 MG/DL (ref 8.8–10)
CEA SERPL-MCNC: 2.62 NG/ML (ref 0–3)
CHLORIDE SERPL-SCNC: 101 MMOL/L (ref 98–107)
CO2 SERPL-SCNC: 28 MMOL/L (ref 23–31)
CREAT SERPL-MCNC: 1.33 MG/DL (ref 0.73–1.18)
EOSINOPHIL # BLD AUTO: 0.35 X10(3)/MCL (ref 0–0.9)
EOSINOPHIL NFR BLD AUTO: 3.9 %
ERYTHROCYTE [DISTWIDTH] IN BLOOD BY AUTOMATED COUNT: 13.3 % (ref 11.5–17)
GLOBULIN SER-MCNC: 3.8 GM/DL (ref 2.4–3.5)
GLUCOSE SERPL-MCNC: 144 MG/DL (ref 82–115)
HCT VFR BLD AUTO: 37.8 % (ref 42–52)
HGB BLD-MCNC: 12.4 GM/DL (ref 14–18)
IMM GRANULOCYTES # BLD AUTO: 0.02 X10(3)/MCL (ref 0–0.04)
IMM GRANULOCYTES NFR BLD AUTO: 0.2 %
LYMPHOCYTES # BLD AUTO: 2.17 X10(3)/MCL (ref 0.6–4.6)
LYMPHOCYTES NFR BLD AUTO: 23.9 %
MCH RBC QN AUTO: 30.8 PG (ref 27–31)
MCHC RBC AUTO-ENTMCNC: 32.8 MG/DL (ref 33–36)
MCV RBC AUTO: 93.8 FL (ref 80–94)
MONOCYTES # BLD AUTO: 0.76 X10(3)/MCL (ref 0.1–1.3)
MONOCYTES NFR BLD AUTO: 8.4 %
NEUTROPHILS # BLD AUTO: 5.7 X10(3)/MCL (ref 2.1–9.2)
NEUTROPHILS NFR BLD AUTO: 62.9 %
NRBC BLD AUTO-RTO: 0 %
PLATELET # BLD AUTO: 241 X10(3)/MCL (ref 130–400)
PMV BLD AUTO: 10.9 FL (ref 7.4–10.4)
POTASSIUM SERPL-SCNC: 3.3 MMOL/L (ref 3.5–5.1)
PROT SERPL-MCNC: 7 GM/DL (ref 5.8–7.6)
RBC # BLD AUTO: 4.03 X10(6)/MCL (ref 4.7–6.1)
SODIUM SERPL-SCNC: 137 MMOL/L (ref 136–145)
WBC # SPEC AUTO: 9.1 X10(3)/MCL (ref 4.5–11.5)

## 2022-07-06 PROCEDURE — 99214 OFFICE O/P EST MOD 30 MIN: CPT | Mod: PBBFAC | Performed by: NURSE PRACTITIONER

## 2022-07-06 PROCEDURE — 99214 PR OFFICE/OUTPT VISIT, EST, LEVL IV, 30-39 MIN: ICD-10-PCS | Mod: S$PBB,,, | Performed by: NURSE PRACTITIONER

## 2022-07-06 PROCEDURE — 36415 COLL VENOUS BLD VENIPUNCTURE: CPT

## 2022-07-06 PROCEDURE — 99214 OFFICE O/P EST MOD 30 MIN: CPT | Mod: S$PBB,,, | Performed by: NURSE PRACTITIONER

## 2022-07-06 RX ORDER — LISINOPRIL 10 MG/1
10 TABLET ORAL DAILY
Qty: 90 TABLET | Refills: 3 | Status: SHIPPED | OUTPATIENT
Start: 2022-07-06 | End: 2022-09-21 | Stop reason: SDUPTHER

## 2022-07-06 NOTE — PROGRESS NOTES
Problem List:  pT3 pN2a M0, stage IIIB adenocarcinoma of cecum   diagnosed 2020    Current Treatment:  Surveillance    Treatment History:  status post right hemicolectomy (10/04/2020)  Adjuvant capecitabine started 01/11/2021. Stopped after 1st cycle d/t intolerance      Past medical history: Hypertension. NIDDM. CKD. History of retropharyngeal carotid-carotid bypass (2014). Bronchial asthma. Cataract. Diabetic neuropathy. Hypertension. Dyslipidemia. Ptosis. Visual field defect. Cataract surgery. Carotid endarterectomy (01/06/2014; 02/12/2014). Intraocular lens, (05/28/2019).  Social history: . Lives in [place], [place]. Has 4 children. Retired. Used to ride horses. Small third a pack of cigarettes daily for 10 years; quit 50 years back. No history of alcohol or illicit drug abuse.  Family history: Sister experienced breast cancer at age 58 years. No family history of colorectal cancer.  Health maintenance: Screening colonoscopy 10 years back, apparently unremarkable. Prior to that, history of colon polyps.    History of Present Illness:  75-year-old gentleman referred by surgery with adenocarcinoma of cecum.      Interval History 7/6/2022: Patient presents today for scheduled follow up surveillance visit for colon cancer. He reports doing well without any complaints. He denies any N/V/D/C, blood in stool, difficulty with bowel movements, unintentional weight loss, abdomen pain. He recently completed CT scan which recommended colonoscopy due to questionable soft tissue density in the hepatic flexure of the colon, cannot rule out a lesion. Will refer to GI for colonoscopy today. Patient amenable. Lab work reviewed with patient, stable. No further questions needs or concerns voiced.       Review of Systems: A complete 12-point ROS was performed in full with pertinent positives as described in interval history. Remainder of ROS done in full and are negative.    Lab Results   Component Value Date    WBC  9.1 07/06/2022    HGB 12.4 (L) 07/06/2022    HCT 37.8 (L) 07/06/2022    MCV 93.8 07/06/2022     07/06/2022       CMP  Sodium Level   Date Value Ref Range Status   07/06/2022 137 136 - 145 mmol/L Final     Potassium Level   Date Value Ref Range Status   07/06/2022 3.3 (L) 3.5 - 5.1 mmol/L Final     Carbon Dioxide   Date Value Ref Range Status   07/06/2022 28 23 - 31 mmol/L Final     Blood Urea Nitrogen   Date Value Ref Range Status   07/06/2022 21.9 8.4 - 25.7 mg/dL Final     Creatinine   Date Value Ref Range Status   07/06/2022 1.33 (H) 0.73 - 1.18 mg/dL Final     Calcium Level Total   Date Value Ref Range Status   07/06/2022 9.6 8.8 - 10.0 mg/dL Final     Albumin Level   Date Value Ref Range Status   07/06/2022 3.2 (L) 3.4 - 4.8 gm/dL Final     Bilirubin Total   Date Value Ref Range Status   07/06/2022 0.3 <=1.5 mg/dL Final     Alkaline Phosphatase   Date Value Ref Range Status   07/06/2022 107 40 - 150 unit/L Final     Aspartate Aminotransferase   Date Value Ref Range Status   07/06/2022 25 5 - 34 unit/L Final     Alanine Aminotransferase   Date Value Ref Range Status   07/06/2022 25 0 - 55 unit/L Final     Estimated GFR-Non    Date Value Ref Range Status   04/06/2022 49 >=90        Physical Exam    Vitals & Measurements  Vitals:    07/06/22 1029   BP: (!) 145/79   Pulse: (!) 49   Resp: 18   Temp: 97.9 °F (36.6 °C)       General: Alert and oriented. NAD  Eye: Pupils are equal, round and reactive to light, Extraocular movements are intact. Normal conjunctiva  HENT: Normocephalic. Oropharynx exam deferred; mask in place due to coronavirus  Neck: Supple, Non-tender  Respiratory: Respirations are non-labored, Symmetrical chest wall expansion. Breath sounds CTA bilaterally  Cardiovascular: Regular rate, rhythm, Normal peripheral perfusion, No bilateral lower extremity edema  Gastrointestinal: Non-distended, Present bowel sounds   Genitourinary: Exam deferred  Lymphatics: No lymphadenopathy  appreciated  Musculoskeletal: Moves all extremities  Integumentary: Intact. Warm, dry. No rashes, or lesions to visible skin  Neurologic: No focal deficits  Psychiatric: Cooperative. Appropriate mood and affect   ECOG Performance Scale: 0 - Capable of all self-care but unable to carry out any work activities. Up and about greater than 50 percent of waking hours.       Assessment:  1. Adenocarcinoma of cecum C18.0 #Adenocarcinoma of cecum, 10 cm, grade 2-3, moderately to poorly differentiated, lymphovascular invasion positive,  pT3, 6/20 lymph nodes positive, status post right hemicolectomy (09/30/2020)  -Presentation: Hematochezia and anemia  -CT A/P with contrast (08/13/2020): No metastasis  -Colonoscopy (09/16/2020): Large adenocarcinoma of cecum  -Open right hemicolectomy (09/30/2020)  -CT chest with contrast (11/09/2020): No metastasis  -CEA level 7.8, elevated (09/16/2020) (preop)  -->pT3 pN2a M0, stage IIIB, adenocarcinoma of cecum; 6 lymph nodes positive  -12/11/2020: CT A/P with contrast (restaging prior to adjuvant chemotherapy) (comparison: 10/04/2020): Borderline enlarged right midabdominal mesenteric lymph node is indeterminate  -Adjuvant capecitabine started 01/11/2021 (1800 mg p.o. twice daily)  -Second cycle was due to start 02/01/2021 (but he had to be hospitalized with nausea, vomiting, dehydration, poor oral intake, protein calorie malnutrition, ANGELINE, diarrhea, etc. )  -Hospitalized 02/01/2021-02/02/2021: ANGELINE, postural dizziness with near syncope, orthostasis, vomiting, protein calorie malnutrition; 3-day history of nausea and vomiting; unable to keep anything down; weak, lightheaded, orthostatic, near syncope; had just finished first cycle of adjuvant chemotherapy (capecitabine)  -Hospitalized 02/09/2021-02/22/2021: ANGELINE, dehydration, high output ostomy, protein calorie malnutrition, weakness, fatigue, nausea, vomiting; high output from ileostomy controlled with Lomotil and Imodium; ANGELINE  resolved  -06/03/2021: Surveillance CT C/A/P with contrast (comparison: 02/26/2021): MARIBEL  -S/p elective loop ileostomy reversal (06/16/2021)  -12/21/2021: Surveillance CT C/A/P with contrast (comparison: 06/03/2021): MARIBEL      #Pulmonary embolism, incidental diagnosis (02/26/2021):  -02/26/2021: CT chest with contrast: New subacute/chronic small PE segmental right lower lung lobe, new since 11/09/2020  -Completely asymptomatic      #Right lower extremity DVT, proximal:  -03/03/2021: Bilateral lower extremity venous Doppler: Right femoral DVT, nearly occlusive to occlusive (age indeterminate); right popliteal DVT, partially occlusive (age indeterminate)  -Eliquis started 03/03/2021 (10 mg p.o. twice daily for 7 days, then 5 mg twice daily); continue for 3 months  -S/p anticoagulation with Eliquis for 3 months, starting 03/03/2021 (, stopped)    #No evidence of Santos syndrome  -MSI-H by PCR  MLH1: Loss of expression in a subset of tumor cells  MSH2: Preserved (intact) expression in tumor cells  MSH6: Preserved (intact) expression in tumor cells  PMS2: Loss of expression in a subset of tumor cells  (dMMR)  -V600 BRAF mutation negative  -IHC: MLH1 preserved (intact) expression in tumor cells (no deficiency of mismatch repair protein MLH1)  -->No evidence of Santos syndrome      #Chronic normocytic anemia, starting around 06/2017, subsequently worsening  PRBC x1 (10/04/2020)  PRBC x1 (10/02/2020)  PRBC x1 (09/30/2020)  08/07/2020: Serum iron 27, low; TIBC 175, low; transferrin saturation 15.4%, borderline; ferritin 295.1, normal  -Feraheme 510 mg IV x2 (12/03/2020, 12/11/2020) (for relative iron deficiency)  (01/07/2021: Transferrin saturation 33%, up from 18%; ferritin level 1162.37, up from 594.74; hemoglobin 11.9, up from 10.9)    -S/p surveillance colonoscopy (02/19/2021) (ileoscopy; indication: High output ileostomy, etc.)  -MARIBEL on surveillance CTs (12/21/2021)    Plan:  -Continue surveillance appropriate for stage  III colon cancer  -History and physical and CEA every 3-6 months for 2 years (09/2020-09/2022), then every 6 months for total of 5 years (09/2022-09/2025)  -Surveillance CT C/A/P with contrast every 6-12 months for 5 years     -Follow-up in 1 months with lab work; to visit with  Review scans and discuss GI results  -Surveillance CT C/A/P with contrast in 6 months (12/2022)  -Next surveillance colonoscopy in 3 years (02/2024-However due to findings from CT will complete sooner.    Above discussed at length with him and his wife. All questions answered.  Discussed labs and gave him copies of relevant reports.  He and his wife understand and agrees this plan.  --------------------------  Surveillance:  History and physical every 3-6 months for 2 years (09/2020-09/2022), then  every 6 months for a total of 5 years (09/2022-09/2025);    CEA every 3-6 months for 2 years, then  every 6 months for a total of 5 years;    chest/abdominal/pelvic CT scan every 6-12 months (category 2B for frequency less than 12 months) for a total of 5 years (09/2020-09/2025)   (CT should be with IV and oral contrast; if either CT of abdomen/pelvis is inadequate, or if patient has a contraindication to CT with IV contrast, then consider abdominal/pelvic MRI with MRI contrast plus a noncontrast chest CT);  PET CT scan is not recommended;    colonoscopy in 1 year after surgery except if no preoperative colonoscopy due to obstructing lesion, then colonoscopy in 3-6 months:  if advanced adenoma then repeat in 1 year,  if no advanced adenoma then repeat in 3 years, and then  every 5 years.

## 2022-07-07 NOTE — PROGRESS NOTES
Potassium 3.3, low.    Check magnesium level.  If not on potassium supplement already, then, start potassium chloride 20 mEq p.o. q.day.  In 10 days, recheck CMP and magnesium level; NP to follow-up.

## 2022-07-08 DIAGNOSIS — C18.0 ADENOCARCINOMA OF CECUM: Primary | ICD-10-CM

## 2022-07-08 RX ORDER — POTASSIUM CHLORIDE 20 MEQ/1
20 TABLET, EXTENDED RELEASE ORAL 2 TIMES DAILY
Qty: 28 TABLET | Refills: 0 | Status: SHIPPED | OUTPATIENT
Start: 2022-07-08 | End: 2022-07-22

## 2022-07-15 NOTE — TELEPHONE ENCOUNTER
----- Message from Viviana Shepherd sent at 7/15/2022 11:03 AM CDT -----  Regarding: Dr. Richard  Pt request refills on medication:  1. fluticasone propionate (FLONASE) 50 mcg/actuation nasal spray  [address] in [place].  Please Advise.    Thanks

## 2022-07-19 RX ORDER — FLUTICASONE PROPIONATE 50 MCG
1 SPRAY, SUSPENSION (ML) NASAL 2 TIMES DAILY
Qty: 5 ML | Refills: 2 | Status: SHIPPED | OUTPATIENT
Start: 2022-07-19 | End: 2022-09-21 | Stop reason: SDUPTHER

## 2022-07-27 ENCOUNTER — INFUSION (OUTPATIENT)
Dept: INFUSION THERAPY | Facility: HOSPITAL | Age: 77
End: 2022-07-27
Attending: INTERNAL MEDICINE
Payer: MEDICARE

## 2022-07-27 ENCOUNTER — CLINICAL SUPPORT (OUTPATIENT)
Dept: HEMATOLOGY/ONCOLOGY | Facility: CLINIC | Age: 77
End: 2022-07-27
Payer: MEDICARE

## 2022-07-27 VITALS
BODY MASS INDEX: 25.5 KG/M2 | WEIGHT: 178.13 LBS | OXYGEN SATURATION: 99 % | DIASTOLIC BLOOD PRESSURE: 62 MMHG | RESPIRATION RATE: 20 BRPM | HEART RATE: 59 BPM | SYSTOLIC BLOOD PRESSURE: 148 MMHG | HEIGHT: 70 IN | TEMPERATURE: 98 F

## 2022-07-27 DIAGNOSIS — C18.0 ADENOCARCINOMA OF CECUM: Primary | ICD-10-CM

## 2022-07-27 DIAGNOSIS — C18.0 ADENOCARCINOMA OF CECUM: ICD-10-CM

## 2022-07-27 LAB
ALBUMIN SERPL-MCNC: 3.2 GM/DL (ref 3.4–4.8)
ALBUMIN/GLOB SERPL: 0.8 RATIO (ref 1.1–2)
ALP SERPL-CCNC: 112 UNIT/L (ref 40–150)
ALT SERPL-CCNC: 23 UNIT/L (ref 0–55)
AST SERPL-CCNC: 26 UNIT/L (ref 5–34)
BASOPHILS # BLD AUTO: 0.07 X10(3)/MCL (ref 0–0.2)
BASOPHILS NFR BLD AUTO: 0.8 %
BILIRUBIN DIRECT+TOT PNL SERPL-MCNC: 0.3 MG/DL
BUN SERPL-MCNC: 15.3 MG/DL (ref 8.4–25.7)
CALCIUM SERPL-MCNC: 10 MG/DL (ref 8.8–10)
CEA SERPL-MCNC: 2.62 NG/ML (ref 0–3)
CHLORIDE SERPL-SCNC: 101 MMOL/L (ref 98–107)
CO2 SERPL-SCNC: 27 MMOL/L (ref 23–31)
CREAT SERPL-MCNC: 1.51 MG/DL (ref 0.73–1.18)
EOSINOPHIL # BLD AUTO: 0.5 X10(3)/MCL (ref 0–0.9)
EOSINOPHIL NFR BLD AUTO: 5.6 %
ERYTHROCYTE [DISTWIDTH] IN BLOOD BY AUTOMATED COUNT: 13.2 % (ref 11.5–17)
GLOBULIN SER-MCNC: 4 GM/DL (ref 2.4–3.5)
GLUCOSE SERPL-MCNC: 128 MG/DL (ref 82–115)
HCT VFR BLD AUTO: 39.4 % (ref 42–52)
HGB BLD-MCNC: 12.8 GM/DL (ref 14–18)
IMM GRANULOCYTES # BLD AUTO: 0.02 X10(3)/MCL (ref 0–0.04)
IMM GRANULOCYTES NFR BLD AUTO: 0.2 %
LYMPHOCYTES # BLD AUTO: 2.38 X10(3)/MCL (ref 0.6–4.6)
LYMPHOCYTES NFR BLD AUTO: 26.6 %
MAGNESIUM SERPL-MCNC: 1.5 MG/DL (ref 1.6–2.6)
MCH RBC QN AUTO: 30.8 PG (ref 27–31)
MCHC RBC AUTO-ENTMCNC: 32.5 MG/DL (ref 33–36)
MCV RBC AUTO: 94.9 FL (ref 80–94)
MONOCYTES # BLD AUTO: 0.71 X10(3)/MCL (ref 0.1–1.3)
MONOCYTES NFR BLD AUTO: 7.9 %
NEUTROPHILS # BLD AUTO: 5.3 X10(3)/MCL (ref 2.1–9.2)
NEUTROPHILS NFR BLD AUTO: 58.9 %
NRBC BLD AUTO-RTO: 0 %
PLATELET # BLD AUTO: 289 X10(3)/MCL (ref 130–400)
PMV BLD AUTO: 9.6 FL (ref 7.4–10.4)
POTASSIUM SERPL-SCNC: 3.7 MMOL/L (ref 3.5–5.1)
PROT SERPL-MCNC: 7.2 GM/DL (ref 5.8–7.6)
RBC # BLD AUTO: 4.15 X10(6)/MCL (ref 4.7–6.1)
SODIUM SERPL-SCNC: 136 MMOL/L (ref 136–145)
WBC # SPEC AUTO: 9 X10(3)/MCL (ref 4.5–11.5)

## 2022-07-27 PROCEDURE — 25000003 PHARM REV CODE 250: Performed by: INTERNAL MEDICINE

## 2022-07-27 PROCEDURE — A4216 STERILE WATER/SALINE, 10 ML: HCPCS | Performed by: INTERNAL MEDICINE

## 2022-07-27 PROCEDURE — 83735 ASSAY OF MAGNESIUM: CPT

## 2022-07-27 PROCEDURE — 82378 CARCINOEMBRYONIC ANTIGEN: CPT

## 2022-07-27 PROCEDURE — 36415 COLL VENOUS BLD VENIPUNCTURE: CPT

## 2022-07-27 PROCEDURE — 63600175 PHARM REV CODE 636 W HCPCS: Performed by: INTERNAL MEDICINE

## 2022-07-27 PROCEDURE — 80053 COMPREHEN METABOLIC PANEL: CPT

## 2022-07-27 RX ORDER — HEPARIN 100 UNIT/ML
500 SYRINGE INTRAVENOUS
Status: CANCELLED | OUTPATIENT
Start: 2022-07-27

## 2022-07-27 RX ORDER — HEPARIN 100 UNIT/ML
500 SYRINGE INTRAVENOUS
Status: ACTIVE | OUTPATIENT
Start: 2022-07-27

## 2022-07-27 RX ORDER — SODIUM CHLORIDE 0.9 % (FLUSH) 0.9 %
10 SYRINGE (ML) INJECTION
Status: ACTIVE | OUTPATIENT
Start: 2022-07-27

## 2022-07-27 RX ORDER — SODIUM CHLORIDE 0.9 % (FLUSH) 0.9 %
10 SYRINGE (ML) INJECTION
Status: CANCELLED | OUTPATIENT
Start: 2022-07-27

## 2022-07-27 RX ADMIN — SODIUM CHLORIDE, PRESERVATIVE FREE 10 ML: 5 INJECTION INTRAVENOUS at 07:07

## 2022-07-27 RX ADMIN — Medication 500 UNITS: at 07:07

## 2022-07-27 NOTE — PROGRESS NOTES
Magnesium 1.5, low.    Start magnesium oxide 400 mg p.o. b.i.d..  In 10 days, recheck CMP and magnesium level.

## 2022-08-01 DIAGNOSIS — C18.0 ADENOCARCINOMA OF CECUM: Primary | ICD-10-CM

## 2022-08-01 RX ORDER — LANOLIN ALCOHOL/MO/W.PET/CERES
400 CREAM (GRAM) TOPICAL 2 TIMES DAILY
Qty: 60 TABLET | Refills: 0 | Status: SHIPPED | OUTPATIENT
Start: 2022-08-01 | End: 2022-08-31

## 2022-08-07 PROBLEM — Z86.2 HISTORY OF IRON DEFICIENCY ANEMIA: Status: ACTIVE | Noted: 2022-08-07

## 2022-08-08 ENCOUNTER — DOCUMENTATION ONLY (OUTPATIENT)
Dept: HEMATOLOGY/ONCOLOGY | Facility: CLINIC | Age: 77
End: 2022-08-08
Payer: MEDICARE

## 2022-08-08 NOTE — PROGRESS NOTES
No showed 08/08/2022        Past medical history: Hypertension.  NIDDM.  CKD.  History of retropharyngeal carotid-carotid bypass (2014).  Bronchial asthma.  Cataract.  Diabetic neuropathy.  Hypertension.  Dyslipidemia.  Ptosis.  Visual field defect.  Cataract surgery.  Carotid endarterectomy (01/06/2014; 02/12/2014).  Intraocular lens, (05/28/2019).  Social history: .  Lives in [place], [place].  Has 4 children.  Retired.  Used to ride horses.  Small third a pack of cigarettes daily for 10 years; quit 50 years back.  No history of alcohol or illicit drug abuse.  Family history: Sister experienced breast cancer at age 58 years.  No family history of colorectal cancer.  Health maintenance: Screening colonoscopy 10 years back, apparently unremarkable.  Prior to that, history of colon polyps.      Reason for follow-up:   -Adenocarcinoma of cecum, stage IIIB   -Intolerance of adjuvant capecitabine   -Incidental PE   -Right lower extremity DVT   -Iron deficiency anemia      History of present illness:   75-year-old gentleman referred by surgery with adenocarcinoma of cecum.     He was evaluated for hematochezia and anemia.  CT scan showed a nonobstructing cecal mass.  Underwent colonoscopy by GI, revealing adenocarcinoma of cecum.  Reported loss of appetite and unintentional weight loss (unspecified) over several months.  Denied nausea, vomiting, or oral intolerance.  Reported normal and regular bowel movements.  Denied fevers, chills, night sweats, headaches, vision changes, seizures, strokes, chest pain, dyspnea, cough, nausea, vomiting, diarrhea, constipation, melena, or hematemesis.  Previous colonoscopy was approximately 10 years ago, apparently negative for malignancy.  Reported unintentional 37 pound weight loss over 4 months.  Ultimately, underwent hemicolectomy.     #Adenocarcinoma of cecum, 10 cm, grade 2-3, moderately to poorly differentiated, lymphovascular invasion positive:   pT3, 6/20 lymph  nodes positive, status post right hemicolectomy (09/30/2020)   -Presentation: Hematochezia and anemia   -CT A/P with contrast (08/13/2020): No metastasis   -Colonoscopy (09/16/2020): Large adenocarcinoma of cecum   -Open right hemicolectomy (09/30/2020)   -CT chest with contrast (11/09/2020): No metastasis   -CEA level 7.8, elevated (09/16/2020) (preop)   >>pT3 pN2a M0, stage IIIB, adenocarcinoma of cecum; 6 lymph nodes positive  -12/11/2020: CT A/P with contrast (restaging prior to adjuvant chemotherapy) (comparison: 10/04/2020): Borderline enlarged right midabdominal mesenteric lymph node is indeterminate  -Adjuvant capecitabine started 01/11/2021 (1800 mg p.o. twice daily)   -Second cycle was due to start 02/01/2021 (but he had to be hospitalized with nausea, vomiting, dehydration, poor oral intake, protein calorie malnutrition, ANGELINE, diarrhea, etc. )   -Hospitalized 02/01/2021-02/02/2021: ANGELINE, postural dizziness with near syncope, orthostasis, vomiting, protein calorie malnutrition; 3-day history of nausea and vomiting; unable to keep anything down; weak, lightheaded, orthostatic, near syncope; had just finished first cycle of adjuvant chemotherapy (capecitabine)   -Hospitalized 02/09/2021-02/22/2021: ANGELINE, dehydration, high output ostomy, protein calorie malnutrition, weakness, fatigue, nausea, vomiting; high output from ileostomy controlled with Lomotil and Imodium; ANGELINE resolved   -06/03/2021: Surveillance CT C/A/P with contrast (comparison: 02/26/2021): MARIBEL   -S/p elective loop ileostomy reversal (06/16/2021)   -12/21/2021: Surveillance CT C/A/P with contrast (comparison: 06/03/2021): MARIBEL  -04/06/2022:  CEA level 1.74, normal  -06/23/2022:  Surveillance CTs C/A/P with contrast (comparison:  12/21/2021): Questionable soft tissue density in the hepatic flexure of the colon, cannot rule out a lesion.  Would recommend colonoscopy unless recently performed.  -07/27/2022:  Labs reviewed.  Hemoglobin 12.8, stable.   Rest of CBC unremarkable.  Creatinine 1.51, elevated but stable.  Rest of CMP unremarkable.  CEA level 2.62, normal.    11/27/2020:   Presents for initial medical oncology consultation, accompanied by his wife.   Looks and feels healthy.  Endorses no symptoms of concern whatsoever.   No weakness, fatigue, malaise, anorexia, unintentional weight loss, abdominal pain, nausea, vomiting, GI bleeding, dysphagia, odynophagia, fevers, chills, any urinary problems, chest pain, cough, dyspnea, unusual headaches, focal neurological symptoms, etc.  ECOG 0-1.    12/23/2021:   -06/03/2021: Surveillance CT C/A/P with contrast (comparison: 02/26/2021): MARIBEL   -Hospitalized 06/16/2021-06/18/2021: Elective loop ileostomy reversal (06/16/2021)   -12/21/2021: Surveillance CT C/A/P with contrast (comparison: 06/03/2021): MARIBEL   -12/23/2021: Labs reviewed; creatinine 1.26, chronically elevated; rest of CMP essentially unremarkable; magnesium 1.5, low; CEA level 2.28, normal; potassium level 3.8, normal; CBC unremarkable   Presents for follow-up visit.  Doing well.  Feels great.  Good appetite.  Good energy.  Very happy after reversal of ileostomy.  Bowels moving normally.  No abdominal pain, nausea, vomiting, or bloating.  No blood in stool.  His magnesium level is 1.5, low.  He denies diarrhea.  Potassium level is normal.  He has been taking magnesium oxide 400 mg p.o. daily under the direction of his PCP at [facility].  We will increase the dose to 400 mg p.o. twice daily and inform his PCP electronically.  He denies reflux or muscle cramps.    08/08/2022:  -04/06/2022:  CEA level 1.74, normal  -06/23/2022:  Surveillance CTs C/A/P with contrast (comparison:  12/21/2021): Questionable soft tissue density in the hepatic flexure of the colon, cannot rule out a lesion.  Would recommend colonoscopy unless recently performed.  -07/27/2022:  Labs reviewed.  Hemoglobin 12.8, stable.  Rest of CBC unremarkable.  Creatinine 1.51, elevated but stable.   Rest of CMP unremarkable.  CEA level 2.62, normal.      Review of systems   All systems reviewed, and found to be negative except for the symptoms detailed above.      Physical examination:   VITAL SIGNS:  Reviewed.      GENERAL:  In no apparent distress.    HEAD:  No signs of head trauma.   EYES:  Pupils are equal.  Extraocular motions intact.    EARS:  Hearing grossly intact.   MOUTH:  Oropharynx is normal.   NECK:  No adenopathy, no JVD.      CHEST:  Chest with clear breath sounds bilaterally.  No wheezes, rales, or rhonchi.    CARDIAC:  Regular rate and rhythm.  S1 and S2, without murmurs, gallops, or rubs.   VASCULAR:  No Edema.  Peripheral pulses normal and equal in all extremities.   ABDOMEN:  Soft, without detectable tenderness.  No sign of distention.  No   rebound or guarding, and no masses palpated.   Bowel Sounds normal.   MUSCULOSKELETAL:  Good range of motion of all major joints. Extremities without clubbing, cyanosis or edema.    NEUROLOGIC EXAM:  Alert and oriented x 3.  No focal sensory or strength deficits.   Speech normal.  Follows commands.   PSYCHIATRIC:  Mood normal.   SKIN:  No rash or lesions.  11/27/2020: In no acute discomfort.  Looks healthy.  Right chest wall Mediport is noted, without any sign of infection.  Ileostomy is noted.  No abdominal tenderness.  02/08/2021: In a wheelchair.  In no acute discomfort.  Fully alert.  Tachycardic.  Hypotensive.  Denies dizziness.  Conversing normally.  03/03/2021: In a wheelchair.  In no acute discomfort.  Looks well and healthy.  No pitting edema in lower extremities.  No calf tenderness Homans' sign.  Abdomen soft and nontender.  Ileostomy intact.  Lungs clear to auscultation.  No palpable lymphadenopathy.  03/12/2021: In no acute discomfort.  Abdomen nontender and benign.      Assessment:  # adenocarcinoma of cecum:  To summarize:  -presented with hematochezia and anemia  -colonoscopy 09/16/2020  -right hemicolectomy 09/30/2020  -pT3 pN2a M0,  stage IIIB; 6 lymph nodes +  -adjuvant capecitabine started 01/11/2021  -capecitabine discontinued after 1 cycle because of side effects requiring hospitalization  -S/P elective loop ileostomy reversal 06/16/2021  -MARIBEL on surveillance CTs 06/23/2022 except for questionable soft tissue density in the hepatic flexure of colon     #No evidence of Santos syndrome:   -MSI-H by PCR   MLH1: Loss of expression in a subset of tumor cells   MSH2: Preserved (intact) expression in tumor cells   MSH6: Preserved (intact) expression in tumor cells   PMS2: Loss of expression in a subset of tumor cells  (dMMR)   -V600 BRAF mutation negative   -IHC: MLH1 preserved (intact) expression in tumor cells (no deficiency of mismatch repair protein MLH1)   -->No evidence of Santos syndrome    #Pulmonary embolism, incidental diagnosis (02/26/2021):   -02/26/2021: CT chest with contrast: New subacute/chronic small PE segmental right lower lung lobe, new since 11/09/2020     #Right lower extremity DVT, proximal:   -03/03/2021: Bilateral lower extremity venous Doppler: Right femoral DVT, nearly occlusive to occlusive (age indeterminate); right popliteal DVT, partially occlusive (age indeterminate)   -anticoagulated with Eliquis x3 months (started 03/03/2021)     #Chronic normocytic anemia, starting around 06/2017, subsequently worsening   PRBC x1 (10/04/2020)   PRBC x1 (10/02/2020)   PRBC x1 (09/30/2020)   08/07/2020: Serum iron 27, low; TIBC 175, low; transferrin saturation 15.4%, borderline; ferritin 295.1, normal  -Feraheme 510 mg IV x2 (12/03/2020, 12/11/2020) (for relative iron deficiency)   (01/07/2021: Transferrin saturation 33%, up from 18%; ferritin level 1162.37, up from 594.74; hemoglobin 11.9, up from 10.9)      Plan:   -Continue surveillance appropriate for stage III colon cancer   -S/p right hemicolectomy 09/30/2020   -History and physical and CEA every 3-6 months for 2 years (09/2020-09/2022), then every 6 months for total of 5 years  (09/2022-09/2025)   -Surveillance CT C/A/P with contrast every 6-12 months for 5 years    -S/p surveillance colonoscopy (02/19/2021) (ileoscopy; indication: High output ileostomy, etc.)   -MARIBEL on surveillance CTs 06/23/2022 except for a questionable soft tissue density hepatic flexure of colon   >>>  -has been referred to GI for colonoscopy for evaluation of questionable soft tissue density hepatic flexure of colon noted on surveillance CTs 06/23/2022  -if colonoscopy negative, then, repeat in 3 years  -if no evidence of recurrence/new malignancy, then, repeat surveillance CT scans of C/A/P with contrast in 6 months (December 2022)  -repeat CBC, CMP, and CEA level in 3 months (October)   --------------------------    Surveillance:   History and physical every 3-6 months for 2 years (09/2020-09/2022), then   every 6 months for a total of 5 years (09/2022-09/2025);     CEA every 3-6 months for 2 years, then   every 6 months for a total of 5 years;     chest/abdominal/pelvic CT scan every 6-12 months (category 2B for frequency less than 12 months) for a total of 5 years (09/2020-09/2025)    (CT should be with IV and oral contrast; if either CT of abdomen/pelvis is inadequate, or if patient has a contraindication to CT with IV contrast, then consider abdominal/pelvic MRI with MRI contrast plus a noncontrast chest CT);   PET CT scan is not recommended;     colonoscopy in 1 year after surgery except if no preoperative colonoscopy due to obstructing lesion, then colonoscopy in 3-6 months:   if advanced adenoma then repeat in 1 year,   if no advanced adenoma then repeat in 3 years, and then   every 5 years.

## 2022-08-23 RX ORDER — ALBUTEROL SULFATE 90 UG/1
2 AEROSOL, METERED RESPIRATORY (INHALATION) EVERY 6 HOURS PRN
Qty: 18 G | Refills: 2 | Status: SHIPPED | OUTPATIENT
Start: 2022-08-23 | End: 2022-09-21 | Stop reason: SDUPTHER

## 2022-08-25 ENCOUNTER — HOSPITAL ENCOUNTER (EMERGENCY)
Facility: HOSPITAL | Age: 77
Discharge: HOME | End: 2022-08-25
Attending: INTERNAL MEDICINE
Payer: MEDICARE

## 2022-08-25 VITALS
DIASTOLIC BLOOD PRESSURE: 87 MMHG | WEIGHT: 179 LBS | SYSTOLIC BLOOD PRESSURE: 197 MMHG | BODY MASS INDEX: 26.51 KG/M2 | HEART RATE: 81 BPM | TEMPERATURE: 98 F | HEIGHT: 69 IN | OXYGEN SATURATION: 98 % | RESPIRATION RATE: 18 BRPM

## 2022-08-25 DIAGNOSIS — R10.9 ABDOMINAL DISCOMFORT: ICD-10-CM

## 2022-08-25 DIAGNOSIS — R07.9 CHEST PAIN: ICD-10-CM

## 2022-08-25 DIAGNOSIS — R07.89 ATYPICAL CHEST PAIN: Primary | ICD-10-CM

## 2022-08-25 DIAGNOSIS — K21.9 GASTROESOPHAGEAL REFLUX DISEASE, UNSPECIFIED WHETHER ESOPHAGITIS PRESENT: ICD-10-CM

## 2022-08-25 LAB
ALBUMIN SERPL-MCNC: 3.5 GM/DL (ref 3.4–4.8)
ALBUMIN/GLOB SERPL: 0.8 RATIO (ref 1.1–2)
ALP SERPL-CCNC: 151 UNIT/L (ref 40–150)
ALT SERPL-CCNC: 51 UNIT/L (ref 0–55)
AST SERPL-CCNC: 69 UNIT/L (ref 5–34)
BASOPHILS # BLD AUTO: 0.07 X10(3)/MCL (ref 0–0.2)
BASOPHILS NFR BLD AUTO: 0.7 %
BILIRUBIN DIRECT+TOT PNL SERPL-MCNC: 1.1 MG/DL
BUN SERPL-MCNC: 21.6 MG/DL (ref 8.4–25.7)
CALCIUM SERPL-MCNC: 10.6 MG/DL (ref 8.8–10)
CHLORIDE SERPL-SCNC: 99 MMOL/L (ref 98–107)
CO2 SERPL-SCNC: 30 MMOL/L (ref 23–31)
CREAT SERPL-MCNC: 1.3 MG/DL (ref 0.73–1.18)
EOSINOPHIL # BLD AUTO: 0.1 X10(3)/MCL (ref 0–0.9)
EOSINOPHIL NFR BLD AUTO: 1.1 %
ERYTHROCYTE [DISTWIDTH] IN BLOOD BY AUTOMATED COUNT: 13.3 % (ref 11.5–17)
GFR SERPLBLD CREATININE-BSD FMLA CKD-EPI: 56 MLS/MIN/1.73/M2
GLOBULIN SER-MCNC: 4.2 GM/DL (ref 2.4–3.5)
GLUCOSE SERPL-MCNC: 135 MG/DL (ref 82–115)
HCT VFR BLD AUTO: 40.7 % (ref 42–52)
HGB BLD-MCNC: 13.7 GM/DL (ref 14–18)
IMM GRANULOCYTES # BLD AUTO: 0.02 X10(3)/MCL (ref 0–0.04)
IMM GRANULOCYTES NFR BLD AUTO: 0.2 %
LIPASE SERPL-CCNC: 41 U/L
LYMPHOCYTES # BLD AUTO: 1.76 X10(3)/MCL (ref 0.6–4.6)
LYMPHOCYTES NFR BLD AUTO: 18.6 %
MCH RBC QN AUTO: 31.7 PG (ref 27–31)
MCHC RBC AUTO-ENTMCNC: 33.7 MG/DL (ref 33–36)
MCV RBC AUTO: 94.2 FL (ref 80–94)
MONOCYTES # BLD AUTO: 0.5 X10(3)/MCL (ref 0.1–1.3)
MONOCYTES NFR BLD AUTO: 5.3 %
NEUTROPHILS # BLD AUTO: 7 X10(3)/MCL (ref 2.1–9.2)
NEUTROPHILS NFR BLD AUTO: 74.1 %
NRBC BLD AUTO-RTO: 0 %
PLATELET # BLD AUTO: 294 X10(3)/MCL (ref 130–400)
PMV BLD AUTO: 9.9 FL (ref 7.4–10.4)
POTASSIUM SERPL-SCNC: 3.5 MMOL/L (ref 3.5–5.1)
PROT SERPL-MCNC: 7.7 GM/DL (ref 5.8–7.6)
RBC # BLD AUTO: 4.32 X10(6)/MCL (ref 4.7–6.1)
SODIUM SERPL-SCNC: 138 MMOL/L (ref 136–145)
TROPONIN I SERPL-MCNC: 0.02 NG/ML (ref 0–0.04)
WBC # SPEC AUTO: 9.5 X10(3)/MCL (ref 4.5–11.5)

## 2022-08-25 PROCEDURE — 84484 ASSAY OF TROPONIN QUANT: CPT | Performed by: INTERNAL MEDICINE

## 2022-08-25 PROCEDURE — 25000003 PHARM REV CODE 250: Performed by: INTERNAL MEDICINE

## 2022-08-25 PROCEDURE — 80053 COMPREHEN METABOLIC PANEL: CPT | Performed by: INTERNAL MEDICINE

## 2022-08-25 PROCEDURE — 99285 EMERGENCY DEPT VISIT HI MDM: CPT | Mod: 25

## 2022-08-25 PROCEDURE — 83690 ASSAY OF LIPASE: CPT | Performed by: INTERNAL MEDICINE

## 2022-08-25 PROCEDURE — 93005 ELECTROCARDIOGRAM TRACING: CPT

## 2022-08-25 PROCEDURE — 85025 COMPLETE CBC W/AUTO DIFF WBC: CPT | Performed by: INTERNAL MEDICINE

## 2022-08-25 PROCEDURE — 36415 COLL VENOUS BLD VENIPUNCTURE: CPT | Performed by: INTERNAL MEDICINE

## 2022-08-25 RX ORDER — LIDOCAINE HYDROCHLORIDE 20 MG/ML
15 SOLUTION OROPHARYNGEAL ONCE
Status: COMPLETED | OUTPATIENT
Start: 2022-08-25 | End: 2022-08-25

## 2022-08-25 RX ORDER — MAG HYDROX/ALUMINUM HYD/SIMETH 200-200-20
30 SUSPENSION, ORAL (FINAL DOSE FORM) ORAL ONCE
Status: COMPLETED | OUTPATIENT
Start: 2022-08-25 | End: 2022-08-25

## 2022-08-25 RX ADMIN — ALUMINUM HYDROXIDE, MAGNESIUM HYDROXIDE, AND SIMETHICONE 30 ML: 200; 200; 20 SUSPENSION ORAL at 09:08

## 2022-08-25 RX ADMIN — LIDOCAINE HYDROCHLORIDE 15 ML: 20 SOLUTION ORAL; TOPICAL at 09:08

## 2022-08-25 NOTE — ED PROVIDER NOTES
Encounter Date: 8/25/2022       History     Chief Complaint   Patient presents with    Abdominal Pain     Pt reports RUQ and epigastric pain x2 days. Reports 2 loose Bms yesterday AM but normal this AM. Reports N/V this morning. Hx colon CA. Pain somewhat relieved by Tums. Denies CP or SOB.     Nausea    Vomiting     Riccardo Hamilton is a 76 yo male who presents to the ED due to abdominal pain that started a couple of days ago. He says the pain is primarily in his epigastric region. Of note, the patient has a history of colonic adenocarcinoma s/p hemicolectomy with chemotherapy which was stopped due to intolerance. Patient says he is schedule for a repeat colonoscopy in October to monitor his status. Patient says the abdominal pain was slightly relieved with tums. Nothing really makes the pain worse. He does report having 2 episodes of diarrhea recently but that his stool today was normal in quality but less than his usual amount. He reports taking a stool softener as needed but only took one today. He reports feeling nauseated with an episode of vomiting without blood. He denies any fever, hemoptysis, blood in his urine, or blood in his stool.    The history is provided by the patient and the spouse.   Abdominal Pain  The current episode started two days ago. The onset of the illness was gradual. The problem has not changed since onset.The abdominal pain is located in the epigastric region. The abdominal pain does not radiate. The severity of the abdominal pain is 8/10. The abdominal pain is relieved by belching and passing flatus. The other symptoms of the illness include nausea and vomiting. The other symptoms of the illness do not include fever, fatigue, melena, shortness of breath, diarrhea, dysuria or hematemesis.   Risk factors for an acute abdominal problem include being elderly and a history of abdominal surgery. Symptoms associated with the illness do not include chills, constipation or hematuria.      Review of patient's allergies indicates:  No Known Allergies  Past Medical History:   Diagnosis Date    Cancer     Hyperlipidemia     Hypertension      Past Surgical History:   Procedure Laterality Date    MEDIPORT INSERTION, SINGLE       Family History   Problem Relation Age of Onset    Stroke Sister     Hypertension Sister     Cancer Sister     Stroke Brother     Hypertension Brother      Social History     Tobacco Use    Smoking status: Former Smoker    Smokeless tobacco: Never Used   Substance Use Topics    Alcohol use: Not Currently    Drug use: Never     Review of Systems   Constitutional: Negative for appetite change, chills, fatigue and fever.   HENT: Negative for congestion, rhinorrhea, sinus pain and sore throat.    Eyes: Negative for photophobia and visual disturbance.   Respiratory: Positive for cough. Negative for chest tightness and shortness of breath.    Cardiovascular: Negative for chest pain, palpitations and leg swelling.   Gastrointestinal: Positive for abdominal pain, nausea and vomiting. Negative for abdominal distention, blood in stool, constipation, diarrhea, hematemesis and melena.   Genitourinary: Negative for dysuria and hematuria.   Musculoskeletal: Negative for myalgias.   Neurological: Negative for dizziness, syncope, weakness and light-headedness.   All other systems reviewed and are negative.      Physical Exam     Initial Vitals [08/25/22 0823]   BP Pulse Resp Temp SpO2   (!) 186/66 71 18 98.4 °F (36.9 °C) 98 %      MAP       --         Physical Exam    Nursing note and vitals reviewed.  Constitutional: He appears well-developed and well-nourished.   HENT:   Head: Normocephalic and atraumatic.   Eyes: Conjunctivae and EOM are normal. Pupils are equal, round, and reactive to light. No scleral icterus.   Neck: Neck supple.   Normal range of motion.  Cardiovascular: Normal rate, regular rhythm and normal heart sounds.   No murmur heard.  Pulmonary/Chest: Breath sounds  normal. No respiratory distress. He exhibits no tenderness.   Abdominal: Abdomen is soft. Bowel sounds are normal. He exhibits no distension. There is no abdominal tenderness.   Midline scar on abdomen from hemicolectomy; small scar to right abdomen from mediport insertion   Musculoskeletal:         General: Normal range of motion.      Cervical back: Normal range of motion and neck supple.     Neurological: He is alert and oriented to person, place, and time. He has normal strength.   Skin: Skin is warm.         ED Course   Procedures  Labs Reviewed   COMPREHENSIVE METABOLIC PANEL - Abnormal; Notable for the following components:       Result Value    Glucose Level 135 (*)     Creatinine 1.30 (*)     Calcium Level Total 10.6 (*)     Protein Total 7.7 (*)     Globulin 4.2 (*)     Albumin/Globulin Ratio 0.8 (*)     Alkaline Phosphatase 151 (*)     Aspartate Aminotransferase 69 (*)     All other components within normal limits   CBC WITH DIFFERENTIAL - Abnormal; Notable for the following components:    RBC 4.32 (*)     Hgb 13.7 (*)     Hct 40.7 (*)     MCV 94.2 (*)     MCH 31.7 (*)     All other components within normal limits   LIPASE - Normal   TROPONIN I - Normal   CBC W/ AUTO DIFFERENTIAL    Narrative:     The following orders were created for panel order CBC auto differential.  Procedure                               Abnormality         Status                     ---------                               -----------         ------                     CBC with Differential[[id number]]        Abnormal            Final result                 Please view results for these tests on the individual orders.   EXTRA TUBES    Narrative:     The following orders were created for panel order EXTRA TUBES.  Procedure                               Abnormality         Status                     ---------                               -----------         ------                     Light Blue Top Hold[[id number]]                               In process                 Red Top Hold[[id number]]                                     In process                   Please view results for these tests on the individual orders.   LIGHT BLUE TOP HOLD   RED TOP HOLD        ECG Results          EKG 12-lead (In process)  Result time 08/25/22 09:13:07    In process by Interface, Lab In [facility] (08/25/22 09:13:07)                 Narrative:    Test Reason : R10.9,    Vent. Rate : 076 BPM     Atrial Rate : 076 BPM     P-R Int : 220 ms          QRS Dur : 094 ms      QT Int : 400 ms       P-R-T Axes : 058 008 046 degrees     QTc Int : 450 ms    Sinus rhythm with marked sinus arrythmia with 1st degree A-V block with  occasional Premature ventricular complexes  Otherwise normal ECG  No previous ECGs available    Referred By: [email or web address]   SELF           Confirmed By:                             Imaging Results          X-Ray Chest 1 View (Final result)  Result time 08/25/22 10:05:32    Final result by Jemal Cordero MD (08/25/22 10:05:32)                 Impression:      23 mm nodule overlying the lateral left lung base may be external artifact or true pulmonary nodule.  Follow-up radiographs recommended to determine if this finding persists.    Otherwise, no acute pulmonary process is identified.      Electronically signed by: Jemal Cordero  Date:    08/25/2022  Time:    10:05             Narrative:    EXAMINATION:  XR CHEST 1 VIEW    CLINICAL HISTORY:  Chest pain, unspecified    TECHNIQUE:  Frontal view(s) of the chest.    COMPARISON:  Radiography 02/08/2021    FINDINGS:  Stable positioning of the right-sided MediPort.  Normal cardiac silhouette.  The lungs are well-inflated.  No consolidation identified.  Round 23 mm nodular density overlying the left lateral lung base.  No pleural effusion or pneumothorax.                                 Medications   aluminum-magnesium hydroxide-simethicone 200-200-20 mg/5 mL suspension 30 mL (30 mLs Oral Given 8/25/22 0959)      And   LIDOcaine HCl 2% oral solution 15 mL (15 mLs Oral Given 8/25/22 0959)     Medical Decision Making:   Initial Assessment:   78 yo male who presents to ED due to abdominal pain that has been going on for a couple of days. Of note, patient has a history of colonic adenocarcinoma s/p hemicolectomy and chemotherapy. He says the abdominal pain is primarily epigastric. He has tried taking some tums and that has provided some relief. He denies any improvement or worsening with meals. He says he had 2 diarrhea-like stools yesterday but one more formed stool this morning. He denies any fever, chills, chest pain, dysuria, hematuria, or blood in his stool at this time.  ED Management:  Due to his abdominal pain and history of adenocarcinoma, CXR, CBC, CMP, troponin, and lipase were ordered. CXR was significant for a nodule in his left lower lobe suspicious for actual nodule vs artifact. CMP was significant for elevated calcium level and alkaline phosphatase. Pt was given a GI cocktail and abdominal pain improved.            Attending Attestation:   Physician Attestation Statement for Resident:  As the supervising MD   Physician Attestation Statement: I have personally seen and examined this patient.   I agree with the above history. -:   As the supervising MD I agree with the above PE.    As the supervising MD I agree with the above treatment, course, plan, and disposition.  I have reviewed and agree with the residents interpretation of the following: lab data, x-rays and EKG.  I have reviewed the following: old records at this facility and old EKGs.            Attending ED Notes:   I performed a face to face evaluation of the patient Riccardo Hamilton and my history reveal epigastric pain improved with Tums, hx of Colon CA the physical exam was remarkable.  I reviewed the history, physical exam and diagnostic studies performed by the resident Dr. NILESH Euceda and I concur with the diagnosis and assessment. This case was  initially evaluated by Dr. Euceda  under my supervision and I agree with the documentation and plan.    Total teaching time: 12 min                 Clinical Impression:   Final diagnoses:  [R10.9] Abdominal discomfort  [R07.9] Chest pain  [R07.89] Atypical chest pain (Primary)  [K21.9] Gastroesophageal reflux disease, unspecified whether esophagitis present          ED Disposition Condition    Discharge Stable        ED Prescriptions     None        Follow-up Information     Follow up With Specialties Details Why Contact Info    Ancelmo Richard MD Internal Medicine Schedule an appointment as soon as possible for a visit today  [address] [facility] [address]  [phone]      [facility] Emergency Medicine  As needed, If symptoms worsen [address] [facility] [id number] [phone]           Bernardo Collins MD  Resident  08/25/22 1110       Chi Osullivan MD  08/25/22 2472

## 2022-09-21 ENCOUNTER — INFUSION (OUTPATIENT)
Dept: INFUSION THERAPY | Facility: HOSPITAL | Age: 77
End: 2022-09-21
Attending: INTERNAL MEDICINE
Payer: MEDICARE

## 2022-09-21 ENCOUNTER — OFFICE VISIT (OUTPATIENT)
Dept: INTERNAL MEDICINE | Facility: CLINIC | Age: 77
End: 2022-09-21
Payer: MEDICARE

## 2022-09-21 VITALS
BODY MASS INDEX: 26.9 KG/M2 | RESPIRATION RATE: 18 BRPM | WEIGHT: 181.63 LBS | HEIGHT: 69 IN | SYSTOLIC BLOOD PRESSURE: 135 MMHG | DIASTOLIC BLOOD PRESSURE: 72 MMHG

## 2022-09-21 DIAGNOSIS — I10 HYPERTENSION, UNSPECIFIED TYPE: ICD-10-CM

## 2022-09-21 DIAGNOSIS — C18.0 ADENOCARCINOMA OF CECUM: Primary | ICD-10-CM

## 2022-09-21 DIAGNOSIS — J45.909 ASTHMA, UNSPECIFIED ASTHMA SEVERITY, UNSPECIFIED WHETHER COMPLICATED, UNSPECIFIED WHETHER PERSISTENT: ICD-10-CM

## 2022-09-21 DIAGNOSIS — E78.5 HYPERLIPIDEMIA, UNSPECIFIED HYPERLIPIDEMIA TYPE: ICD-10-CM

## 2022-09-21 DIAGNOSIS — R73.03 PRE-DIABETES: ICD-10-CM

## 2022-09-21 DIAGNOSIS — Z23 NEED FOR INFLUENZA VACCINATION: ICD-10-CM

## 2022-09-21 DIAGNOSIS — Z23 NEED FOR ZOSTER VACCINATION: Primary | ICD-10-CM

## 2022-09-21 PROCEDURE — A4216 STERILE WATER/SALINE, 10 ML: HCPCS | Performed by: INTERNAL MEDICINE

## 2022-09-21 PROCEDURE — 96523 IRRIG DRUG DELIVERY DEVICE: CPT

## 2022-09-21 PROCEDURE — 63600175 PHARM REV CODE 636 W HCPCS: Performed by: INTERNAL MEDICINE

## 2022-09-21 PROCEDURE — 99213 OFFICE O/P EST LOW 20 MIN: CPT | Mod: PBBFAC

## 2022-09-21 PROCEDURE — 25000003 PHARM REV CODE 250: Performed by: INTERNAL MEDICINE

## 2022-09-21 PROCEDURE — G0008 ADMIN INFLUENZA VIRUS VAC: HCPCS | Mod: PBBFAC

## 2022-09-21 PROCEDURE — 90694 VACC AIIV4 NO PRSRV 0.5ML IM: CPT | Mod: PBBFAC

## 2022-09-21 RX ORDER — HEPARIN 100 UNIT/ML
500 SYRINGE INTRAVENOUS
Status: CANCELLED | OUTPATIENT
Start: 2022-09-21

## 2022-09-21 RX ORDER — SODIUM CHLORIDE 0.9 % (FLUSH) 0.9 %
10 SYRINGE (ML) INJECTION
Status: CANCELLED | OUTPATIENT
Start: 2022-09-21

## 2022-09-21 RX ORDER — ATORVASTATIN CALCIUM 40 MG/1
40 TABLET, FILM COATED ORAL DAILY
Qty: 90 TABLET | Refills: 3 | Status: SHIPPED | OUTPATIENT
Start: 2022-09-21 | End: 2023-09-18 | Stop reason: SDUPTHER

## 2022-09-21 RX ORDER — LISINOPRIL 10 MG/1
10 TABLET ORAL DAILY
Qty: 90 TABLET | Refills: 3 | Status: SHIPPED | OUTPATIENT
Start: 2022-09-21 | End: 2023-02-06 | Stop reason: ALTCHOICE

## 2022-09-21 RX ORDER — HYDRALAZINE HYDROCHLORIDE 25 MG/1
25 TABLET, FILM COATED ORAL 2 TIMES DAILY
Qty: 120 TABLET | Refills: 3 | Status: SHIPPED | OUTPATIENT
Start: 2022-09-21 | End: 2023-09-18 | Stop reason: SDUPTHER

## 2022-09-21 RX ORDER — ALBUTEROL SULFATE 90 UG/1
2 AEROSOL, METERED RESPIRATORY (INHALATION) EVERY 6 HOURS PRN
Qty: 18 G | Refills: 2 | Status: SHIPPED | OUTPATIENT
Start: 2022-09-21 | End: 2023-02-06 | Stop reason: ALTCHOICE

## 2022-09-21 RX ORDER — CETIRIZINE HYDROCHLORIDE 5 MG/1
5 TABLET ORAL DAILY
Qty: 30 TABLET | Refills: 1 | Status: SHIPPED | OUTPATIENT
Start: 2022-09-21 | End: 2024-02-06

## 2022-09-21 RX ORDER — HEPARIN 100 UNIT/ML
500 SYRINGE INTRAVENOUS
Status: DISCONTINUED | OUTPATIENT
Start: 2022-09-21 | End: 2022-09-21 | Stop reason: HOSPADM

## 2022-09-21 RX ORDER — HYDROCHLOROTHIAZIDE 25 MG/1
25 TABLET ORAL DAILY
Qty: 90 TABLET | Refills: 3 | Status: SHIPPED | OUTPATIENT
Start: 2022-09-21 | End: 2023-09-18 | Stop reason: SDUPTHER

## 2022-09-21 RX ORDER — SODIUM CHLORIDE 0.9 % (FLUSH) 0.9 %
10 SYRINGE (ML) INJECTION
Status: DISCONTINUED | OUTPATIENT
Start: 2022-09-21 | End: 2022-09-21 | Stop reason: HOSPADM

## 2022-09-21 RX ORDER — METOPROLOL SUCCINATE 25 MG/1
25 TABLET, EXTENDED RELEASE ORAL DAILY
Qty: 90 TABLET | Refills: 3 | Status: SHIPPED | OUTPATIENT
Start: 2022-09-21 | End: 2023-09-18 | Stop reason: SDUPTHER

## 2022-09-21 RX ORDER — FLUTICASONE PROPIONATE 50 MCG
1 SPRAY, SUSPENSION (ML) NASAL 2 TIMES DAILY
Qty: 5 ML | Refills: 2 | Status: SHIPPED | OUTPATIENT
Start: 2022-09-21 | End: 2023-02-06 | Stop reason: SDUPTHER

## 2022-09-21 RX ORDER — NAPROXEN SODIUM 220 MG/1
81 TABLET, FILM COATED ORAL DAILY
Qty: 90 TABLET | Refills: 3 | Status: SHIPPED | OUTPATIENT
Start: 2022-09-21 | End: 2023-12-14 | Stop reason: SDUPTHER

## 2022-09-21 RX ORDER — PANTOPRAZOLE SODIUM 40 MG/1
40 TABLET, DELAYED RELEASE ORAL DAILY
Qty: 90 TABLET | Refills: 3 | Status: SHIPPED | OUTPATIENT
Start: 2022-09-21 | End: 2023-08-31 | Stop reason: SDUPTHER

## 2022-09-21 RX ORDER — FLUTICASONE PROPIONATE AND SALMETEROL 50; 250 UG/1; UG/1
1 POWDER RESPIRATORY (INHALATION) 2 TIMES DAILY
Qty: 120 EACH | Refills: 2 | Status: SHIPPED | OUTPATIENT
Start: 2022-09-21 | End: 2023-02-06 | Stop reason: ALTCHOICE

## 2022-09-21 RX ORDER — POLYETHYLENE GLYCOL 3350, SODIUM SULFATE, SODIUM CHLORIDE, POTASSIUM CHLORIDE, ASCORBIC ACID, SODIUM ASCORBATE 7.5-2.691G
KIT ORAL
COMMUNITY
Start: 2022-08-11

## 2022-09-21 RX ADMIN — Medication 500 UNITS: at 03:09

## 2022-09-21 RX ADMIN — INFLUENZA A VIRUS A/VICTORIA/2570/2019 IVR-215 (H1N1) ANTIGEN (FORMALDEHYDE INACTIVATED), INFLUENZA A VIRUS A/DARWIN/6/2021 IVR-227 (H3N2) ANTIGEN (FORMALDEHYDE INACTIVATED), INFLUENZA B VIRUS B/AUSTRIA/1359417/2021 BVR-26 ANTIGEN (FORMALDEHYDE INACTIVATED), INFLUENZA B VIRUS B/PHUKET/3073/2013 BVR-1B ANTIGEN (FORMALDEHYDE INACTIVATED) 0.5 ML: 15; 15; 15; 15 INJECTION, SUSPENSION INTRAMUSCULAR at 02:09

## 2022-09-21 RX ADMIN — Medication 10 ML: at 03:09

## 2022-09-21 NOTE — PROGRESS NOTES
Overall Care was reviewed and discussed with the resident in clinic.  Plan, as outlined below, is reasonable and appropriate.

## 2022-09-21 NOTE — PROGRESS NOTES
"[facility] INTERNAL MEDICINE  OUTPATIENT OFFICE VISIT NOTE    SUBJECTIVE:      HPI: Riccardo Hamilton is a 77 y.o. yo male w/ PMH of colonic adenocarcinoma (followed by Oncology; s/p hemicolectomy and adjuvant chemo), HTN, HLD, T2DM, Asthma, Retropharyngeal Carotid Bypass (2014), Carotid Endarterectomy (1/2014; 2/2014), and previous RLE DVT w/ subacute small PE of R. Lung (s/p x3 month Eliquis txt) presents to [facility] IM clinic for follow up visit. Today, he reports feeling well overall other than intermittently persistent cough for the past x2-3 years which he expects is allergy related. We discussed the possibility of recently prescribed lisinopril as the culprit but states he dealt with this problem many years prior to having this medication. He states the albuterol inhaler seems to help with this cough and is compliant with Advair B.I.D. He denies any GERD or Post Nasal Drip. Today in clinic BP is well controlled though was initially elevated s/t patient anxious/nervous regarding his additional Oncology appointment this afternoon. He reports compliance with all prescribed meds without any adverse side effects. He currently denies any headaches, changes in vision, chest pain, palpitations, shortness of breath, N/V, or lightheadedness/dizziness. Pt also denies any abdominal pain, pain/difficulty with urination/stools, or blood in urine/BMs.    ROS:  10 point ROS performed and negative other than stated above.       OBJECTIVE:     Vital signs:   /72 (BP Location: Left arm, Patient Position: Sitting, BP Method: Medium (Automatic))   Resp 18   Ht 5' 9" (1.753 m)   Wt 82.4 kg (181 lb 9.6 oz)   BMI 26.82 kg/m²      Physical Examination:  Gen: well-nourished, well-developed gentleman, in no acute distress   HEENT: Normocephalic, atraumatic. PERRL.   Neck: No thyromegaly. No lymphadenopathy.   Heart: RRR, no murmurs, gallops, clicks or rubs.   Lungs: CTAB without rales, wheezes or rhonchi. Normal work of breathing.   Abd: " Midline well-healed abdominal scar. Soft, non-tender, non-distended and without guarding. Hyperactive bowel sounds present.   Extremities: Radial and pedal pulses 2+ bilaterally, no appreciable LE edema.   MSK: No obvious deformities. Moves all extremities purposefully.   Neuro: Responds well to commands. CN III-XII grossly intact. No focal neurological deficits noted   Skin: Warm, dry, intact.     ASSESSMENT & PLAN:   Hypertension - well controlled  - Today in clinic, (135/72); mildly elevated from baseline likely s/t anxious from possibility of missing afternoon Oncology appointment  - Continue HCTZ 25 daily, Toprol 25 daily, Hydralazine 25 BID, Lisinopril 10 daily  - Counseled on DASH diet, exercise as tolerated     Seasonal Allergies - improved  - Intermittent-persistent dry cough and eye irritiation  - Relieved with anti-histamines & albuterol inhaler  - Continue Cetirizine 5mg daily    Asthma  - Continue Advair Discus daily  - Continue Albuterol inhaler as needed  - Completed PFTs in past, stating has been more than 10 years ago. With recent increased wheezing symptomology will refer to have updated PFTs completed; patient is agreeable.     Hyperlipidemia  - Latest lipid profile WNL  - Continue Lipitor 40 daily     Stage IIIB Adenocarcinoma of Cecum  - s/p hemicolectomy and adjuvant chemotherapy  - At this time denies any GI-related symptoms  - patient is followed by oncology; continue to follow as scheduled     Hx of B/L Carotid Endarterectomy   - Continue Aspirin 81, Lipitor 40 daily     GERD   - Continue Protonix 40 daily     Hx of DVT/PE  - Occurred in 2021, treated with x3 months Eliquis  - Denies any recurrence  - Continue Aspirin 81 daily      Health Maintenance:  Immunization History   Administered Date(s) Administered    COVID-19, MRNA, LN-S, PF (MODERNA FULL 0.5 ML DOSE) 01/12/2021, 02/26/2021, 10/22/2021, 05/24/2022    COVID-19, MRNA, LN-S, PF (Pfizer) (Purple Cap) 01/12/2021, 02/26/2021     Influenza - High Dose - PF (65 years and older) 09/22/2015, 10/06/2016, 10/02/2017, 09/24/2018    Influenza - Quadrivalent - PF *Preferred* (6 months and older) 09/12/2019    Influenza - Trivalent - PF (ADULT) 09/22/2015, 10/06/2016, 10/02/2017, 09/24/2018, 09/12/2019    Influenza A (H1N1) 2009 Monovalent - IM 01/21/2010    Pneumococcal Conjugate - 13 Valent 09/04/2020    Pneumococcal Polysaccharide - 23 Valent 10/02/2015    Tdap 02/09/2022    Zoster 09/22/2015    Zoster Recombinant 05/31/2022   Hepatitis C screening labs    Return to clinic in 4 months with labs.    Ancelmo Richard MD   [facility], PGY-2

## 2022-10-13 ENCOUNTER — HOSPITAL ENCOUNTER (OUTPATIENT)
Dept: PULMONOLOGY | Facility: HOSPITAL | Age: 77
Discharge: HOME | End: 2022-10-13
Attending: STUDENT IN AN ORGANIZED HEALTH CARE EDUCATION/TRAINING PROGRAM
Payer: MEDICARE

## 2022-10-13 DIAGNOSIS — J45.909 ASTHMA, UNSPECIFIED ASTHMA SEVERITY, UNSPECIFIED WHETHER COMPLICATED, UNSPECIFIED WHETHER PERSISTENT: ICD-10-CM

## 2022-10-13 LAB
DLCO SINGLE BREATH LLN: 16.8
DLCO SINGLE BREATH PRE REF: 63.2 %
DLCO SINGLE BREATH REF: 23.72
DLCOC SBVA LLN: 2.33
DLCOC SBVA REF: 3.53
DLCOC SINGLE BREATH LLN: 16.8
DLCOC SINGLE BREATH REF: 23.72
DLCOVA LLN: 2.33
DLCOVA PRE REF: 99.7 %
DLCOVA PRE: 3.52 ML/(MIN*MMHG*L) (ref 2.33–4.73)
DLCOVA REF: 3.53
ERV LLN: -16449.11
ERV PRE REF: 49.6 %
ERV REF: 0.89
FEF 25 75 LLN: 0.52
FEF 25 75 PRE REF: 123.3 %
FEF 25 75 REF: 1.7
FEV1 FVC LLN: 61
FEV1 FVC PRE REF: 107.2 %
FEV1 FVC REF: 76
FEV1 LLN: 1.52
FEV1 PRE REF: 89.2 %
FEV1 REF: 2.32
FRCPLETH LLN: 2.68
FRCPLETH PREREF: 81.4 %
FRCPLETH REF: 3.66
FVC LLN: 2.16
FVC PRE REF: 82.7 %
FVC REF: 3.08
IVC PRE: 2.96 L (ref 2.16–4.02)
IVC SINGLE BREATH LLN: 2.16
IVC SINGLE BREATH PRE REF: 95.9 %
IVC SINGLE BREATH REF: 3.08
MVV LLN: 90
MVV PRE REF: 38.9 %
MVV REF: 106
PEF LLN: 4.65
PEF PRE REF: 80.4 %
PEF REF: 7.15
PRE DLCO: 14.99 ML/(MIN*MMHG) (ref 16.8–30.65)
PRE ERV: 0.44 L (ref -16449.11–16450.89)
PRE FEF 25 75: 2.1 L/S (ref 0.52–3.57)
PRE FET 100: 6.55 SEC
PRE FEV1 FVC: 81.11 % (ref 61.4–88.62)
PRE FEV1: 2.07 L (ref 1.52–3.05)
PRE FRC PL: 2.98 L (ref 2.68–4.65)
PRE FVC: 2.55 L (ref 2.16–4.02)
PRE MVV: 41.13 L/MIN (ref 89.96–121.71)
PRE PEF: 5.75 L/S (ref 4.65–9.66)
PRE RV: 2.54 L (ref 2.1–3.45)
PRE TLC: 5.5 L (ref 5.57–7.87)
RAW LLN: 3.06
RAW PRE REF: 15.4 %
RAW PRE: 0.47 CMH2O*S/L (ref 3.06–3.06)
RAW REF: 3.06
RV LLN: 2.1
RV PRE REF: 91.6 %
RV REF: 2.77
RVTLC LLN: 36
RVTLC PRE REF: 103.2 %
RVTLC PRE: 46.21 % (ref 35.79–53.75)
RVTLC REF: 45
TLC LLN: 5.57
TLC PRE REF: 81.8 %
TLC REF: 6.72
VA PRE: 4.26 L (ref 6.57–6.57)
VA SINGLE BREATH LLN: 6.57
VA SINGLE BREATH PRE REF: 64.8 %
VA SINGLE BREATH REF: 6.57
VC LLN: 2.16
VC PRE REF: 95.9 %
VC PRE: 2.96 L (ref 2.16–4.02)
VC REF: 3.08

## 2022-10-13 PROCEDURE — 94010 BREATHING CAPACITY TEST: CPT

## 2022-10-13 PROCEDURE — 94726 PLETHYSMOGRAPHY LUNG VOLUMES: CPT

## 2022-10-13 PROCEDURE — 94729 DIFFUSING CAPACITY: CPT

## 2022-10-14 RX ORDER — FERROUS SULFATE 325(65) MG
325 TABLET ORAL DAILY
COMMUNITY

## 2022-10-14 RX ORDER — MULTIVITAMIN
1 TABLET ORAL DAILY
COMMUNITY

## 2022-10-14 RX ORDER — LANOLIN ALCOHOL/MO/W.PET/CERES
800 CREAM (GRAM) TOPICAL DAILY
COMMUNITY

## 2022-10-19 ENCOUNTER — ANESTHESIA EVENT (OUTPATIENT)
Dept: ENDOSCOPY | Facility: HOSPITAL | Age: 77
End: 2022-10-19
Payer: MEDICARE

## 2022-10-19 ENCOUNTER — ANESTHESIA (OUTPATIENT)
Dept: ENDOSCOPY | Facility: HOSPITAL | Age: 77
End: 2022-10-19
Payer: MEDICARE

## 2022-10-19 ENCOUNTER — HOSPITAL ENCOUNTER (OUTPATIENT)
Facility: HOSPITAL | Age: 77
Discharge: HOME | End: 2022-10-19
Attending: INTERNAL MEDICINE | Admitting: INTERNAL MEDICINE
Payer: MEDICARE

## 2022-10-19 VITALS
BODY MASS INDEX: 25.11 KG/M2 | SYSTOLIC BLOOD PRESSURE: 113 MMHG | OXYGEN SATURATION: 98 % | TEMPERATURE: 98 F | DIASTOLIC BLOOD PRESSURE: 56 MMHG | RESPIRATION RATE: 20 BRPM | HEART RATE: 82 BPM | HEIGHT: 70 IN | WEIGHT: 175.38 LBS

## 2022-10-19 DIAGNOSIS — C18.0 ADENOCARCINOMA OF CECUM: Primary | ICD-10-CM

## 2022-10-19 PROCEDURE — 37000008 HC ANESTHESIA 1ST 15 MINUTES: Performed by: INTERNAL MEDICINE

## 2022-10-19 PROCEDURE — 25000242 PHARM REV CODE 250 ALT 637 W/ HCPCS: Performed by: SPECIALIST

## 2022-10-19 PROCEDURE — 25000003 PHARM REV CODE 250: Performed by: NURSE ANESTHETIST, CERTIFIED REGISTERED

## 2022-10-19 PROCEDURE — 37000009 HC ANESTHESIA EA ADD 15 MINS: Performed by: INTERNAL MEDICINE

## 2022-10-19 PROCEDURE — 63600175 PHARM REV CODE 636 W HCPCS: Performed by: NURSE ANESTHETIST, CERTIFIED REGISTERED

## 2022-10-19 PROCEDURE — 63600175 PHARM REV CODE 636 W HCPCS: Performed by: SPECIALIST

## 2022-10-19 PROCEDURE — G0105 COLORECTAL SCRN; HI RISK IND: HCPCS | Performed by: INTERNAL MEDICINE

## 2022-10-19 RX ORDER — PROPOFOL 10 MG/ML
INJECTION, EMULSION INTRAVENOUS
Status: DISCONTINUED | OUTPATIENT
Start: 2022-10-19 | End: 2022-10-19

## 2022-10-19 RX ORDER — SODIUM CHLORIDE, SODIUM LACTATE, POTASSIUM CHLORIDE, CALCIUM CHLORIDE 600; 310; 30; 20 MG/100ML; MG/100ML; MG/100ML; MG/100ML
INJECTION, SOLUTION INTRAVENOUS CONTINUOUS
Status: DISCONTINUED | OUTPATIENT
Start: 2022-10-19 | End: 2022-10-19 | Stop reason: HOSPADM

## 2022-10-19 RX ORDER — LIDOCAINE HCL/PF 100 MG/5ML
SYRINGE (ML) INTRAVENOUS
Status: DISCONTINUED | OUTPATIENT
Start: 2022-10-19 | End: 2022-10-19

## 2022-10-19 RX ORDER — IPRATROPIUM BROMIDE AND ALBUTEROL SULFATE 2.5; .5 MG/3ML; MG/3ML
3 SOLUTION RESPIRATORY (INHALATION) ONCE
Status: COMPLETED | OUTPATIENT
Start: 2022-10-19 | End: 2022-10-19

## 2022-10-19 RX ADMIN — PROPOFOL 25 MG: 10 INJECTION, EMULSION INTRAVENOUS at 12:10

## 2022-10-19 RX ADMIN — PROPOFOL 75 MG: 10 INJECTION, EMULSION INTRAVENOUS at 12:10

## 2022-10-19 RX ADMIN — IPRATROPIUM BROMIDE AND ALBUTEROL SULFATE 3 ML: 2.5; .5 SOLUTION RESPIRATORY (INHALATION) at 11:10

## 2022-10-19 RX ADMIN — LIDOCAINE HYDROCHLORIDE 75 MG: 20 INJECTION, SOLUTION INTRAVENOUS at 12:10

## 2022-10-19 RX ADMIN — SODIUM CHLORIDE, POTASSIUM CHLORIDE, SODIUM LACTATE AND CALCIUM CHLORIDE: 600; 310; 30; 20 INJECTION, SOLUTION INTRAVENOUS at 11:10

## 2022-10-19 RX ADMIN — PROPOFOL 50 MG: 10 INJECTION, EMULSION INTRAVENOUS at 12:10

## 2022-10-19 NOTE — ANESTHESIA POSTPROCEDURE EVALUATION
Anesthesia Post Evaluation    Patient: Riccardo Hamilton    Procedure(s) Performed: Procedure(s) (LRB):  COLONOSCOPY (N/A)    Final Anesthesia Type: general      Patient location during evaluation: GI PACU  Patient participation: Yes- Able to Participate  Level of consciousness: awake and alert  Post-procedure vital signs: reviewed and stable  Pain management: adequate  Airway patency: patent    PONV status at discharge: No PONV  Anesthetic complications: no      Cardiovascular status: stable  Respiratory status: unassisted, spontaneous ventilation and room air  Hydration status: euvolemic  Follow-up not needed.          Vitals Value Taken Time   /99 10/19/22 1059   Temp 36.8 °C (98.2 °F) 10/19/22 1059   Pulse 66 10/19/22 1148   Resp 20 10/19/22 1148   SpO2 95 % 10/19/22 1148         No case tracking events are documented in the log.      Pain/Yanelis Score: Yanelis Score: 9 (10/19/2022 12:49 PM)

## 2022-10-19 NOTE — ANESTHESIA PREPROCEDURE EVALUATION
10/19/2022  Riccardo Hamilton is a 77 y.o., male. For CLN History of CLN [place]    Vitals:    10/19/22 1059   BP: (!) 159/99   Pulse: 83   Resp: 14   Temp: 36.8 °C (98.2 °F)         Lab Results   Component Value Date    WBC 9.5 08/25/2022    HGB 13.7 (L) 08/25/2022    HCT 40.7 (L) 08/25/2022     08/25/2022    CHOL 117 02/23/2022    TRIG 98 02/23/2022    HDL 29 02/23/2022    ALT 51 08/25/2022    AST 69 (H) 08/25/2022     08/25/2022    K 3.5 08/25/2022    CREATININE 1.30 (H) 08/25/2022    BUN 21.6 08/25/2022    CO2 30 08/25/2022    TSH 1.1025 02/23/2022    PSA 4.5 (H) 06/20/2018    INR 1.19 07/21/2018    HGBA1C 6.3 02/23/2022             Vent. Rate : 076 BPM     Atrial Rate : 076 BPM      P-R Int : 220 ms          QRS Dur : 094 ms       QT Int : 400 ms       P-R-T Axes : 058 008 046 degrees      QTc Int : 450 ms     Sinus rhythm with marked sinus arrythmia with 1st degree A-V block with   occasional Premature ventricular complexes   Otherwise normal ECG   No previous ECGs available   Confirmed by Jordan Holloway MD ([id number]) on 8/25/2022 3:33:51 PM     Referred By: [email or web address]    SELF           Confirmed By:Jordan Holloway MD      Specimen Collected: 08/25/22 08:46 Last Resulted: 08/25/22 15:33            Active Ambulatory Problems     Diagnosis Date Noted    Adenocarcinoma of cecum 05/31/2022    Hypertension 05/31/2022    Hyperlipidemia 05/31/2022    GERD (gastroesophageal reflux disease) 05/31/2022    Asthma 05/31/2022    History of DVT (deep vein thrombosis) 05/31/2022    History of bilateral carotid endarterectomy 05/31/2022    History of pulmonary embolism 06/22/2022    History of iron deficiency anemia 08/07/2022     Resolved Ambulatory Problems     Diagnosis Date Noted    No Resolved Ambulatory Problems     Past Medical History:   Diagnosis Date    Cancer        Past Surgical History:    Procedure Laterality Date    MEDIPORT INSERTION, SINGLE             Pre-op Assessment    I have reviewed the Patient Summary Reports.     I have reviewed the Nursing Notes. I have reviewed the NPO Status.   I have reviewed the Medications.     Review of Systems  Anesthesia Hx:  No problems with previous Anesthesia  History of prior surgery of interest to airway management or planning: Denies Family Hx of Anesthesia complications.   Denies Personal Hx of Anesthesia complications.   Hematology/Oncology:  Hematology Normal   Oncology Normal     EENT/Dental:EENT/Dental Normal   Cardiovascular:  Cardiovascular Normal     Pulmonary:  Pulmonary Normal    Renal/:  Renal/ Normal     Hepatic/GI:  Hepatic/GI Normal    Musculoskeletal:  Musculoskeletal Normal    Neurological:  Neurology Normal    Endocrine:  Endocrine Normal    Dermatological:  Skin Normal    Psych:  Psychiatric Normal           Physical Exam  General: Well nourished, Cooperative, Alert and Oriented    Airway:  Mallampati: I / I  Mouth Opening: Normal  TM Distance: Normal  Tongue: Normal  Neck ROM: Normal ROM    Dental:  Dentures        Anesthesia Plan  Type of Anesthesia, risks & benefits discussed:    Anesthesia Type: Gen Natural Airway  Intra-op Monitoring Plan: Standard ASA Monitors  Post Op Pain Control Plan: IV/PO Opioids PRN  (medical reason for not using multimodal pain management)  Induction:  IV  Informed Consent: Informed consent signed with the Patient and all parties understand the risks and agree with anesthesia plan.  All questions answered. Patient consented to blood products? No  ASA Score: 3  Day of Surgery Review of History & Physical: H&P Update referred to the surgeon/provider.    Ready For Surgery From Anesthesia Perspective.     .

## 2022-10-19 NOTE — PROVATION PATIENT INSTRUCTIONS
Discharge Summary/Instructions after an Endoscopic Procedure  Patient Name: Riccardo Hamilton  Patient MRN: [id number]  Patient YOB: 1945  Wednesday, October 19, 2022  Natalya Neely MD  Dear patient,  As a result of recent federal legislation (The Federal Cures Act), you may   receive lab or pathology results from your procedure in your [facility]   account before your physician is able to contact you. Your physician or   their representative will relay the results to you with their   recommendations at their soonest availability.  Thank you,  RESTRICTIONS:  During your procedure today, you received medications for sedation.  These   medications may affect your judgment, balance and coordination.  Therefore,   for 24 hours, you have the following restrictions:   - DO NOT drive a car, operate machinery, make legal/financial decisions,   sign important papers or drink alcohol.    ACTIVITY:  Today: no heavy lifting, straining or running due to procedural   sedation/anesthesia.  The following day: return to full activity including work.  DIET:  Eat and drink normally unless instructed otherwise.     TREATMENT FOR COMMON SIDE EFFECTS:  - Mild abdominal pain, nausea, belching, bloating or excessive gas:  rest,   eat lightly and use a heating pad.  - Sore Throat: treat with throat lozenges and/or gargle with warm salt   water.  - Because air was used during the procedure, expelling large amounts of air   from your rectum or belching is normal.  - If a bowel prep was taken, you may not have a bowel movement for 1-3 days.    This is normal.  SYMPTOMS TO WATCH FOR AND REPORT TO YOUR PHYSICIAN:  1. Abdominal pain or bloating, other than gas cramps.  2. Chest pain.  3. Back pain.  4. Signs of infection such as: chills or fever occurring within 24 hours   after the procedure.  5. Rectal bleeding, which would show as bright red, maroon, or black stools.   (A tablespoon of blood from the rectum is not serious,  especially if   hemorrhoids are present.)  6. Vomiting.  7. Weakness or dizziness.  GO DIRECTLY TO THE NEAREST EMERGENCY ROOM IF YOU HAVE ANY OF THE FOLLOWING:      Difficulty breathing              Chills and/or fever over 101 F   Persistent vomiting and/or vomiting blood   Severe abdominal pain   Severe chest pain   Black, tarry stools   Bleeding- more than one tablespoon   Any other symptom or condition that you feel may need urgent attention  Your doctor recommends these additional instructions:  If any biopsies were taken, your doctors clinic will contact you in 1 to 2   weeks with any results.  - Patient has a contact number available for emergencies.  The signs and   symptoms of potential delayed complications were discussed with the   patient.  Return to normal activities tomorrow.  Written discharge   instructions were provided to the patient.   - Discharge patient to home.   - Resume previous diet.   - Continue present medications.   - Repeat colonoscopy in 3 years for surveillance.  For questions, problems or results please call your physician - Natalya Neely MD at Work:  [phone].  [facility] , EMERGENCY ROOM PHONE NUMBER: [phone]  IF A COMPLICATION OR EMERGENCY SITUATION ARISES AND YOU ARE UNABLE TO REACH   YOUR PHYSICIAN - GO DIRECTLY TO THE EMERGENCY ROOM.  MD Natalya Santos MD  10/19/2022 12:50:51 PM  This report has been verified and signed electronically.  Dear patient,  As a result of recent federal legislation (The Federal Cures Act), you may   receive lab or pathology results from your procedure in your [facility]   account before your physician is able to contact you. Your physician or   their representative will relay the results to you with their   recommendations at their soonest availability.  Thank you,  PROVATION

## 2022-10-19 NOTE — H&P
Colonoscopy History and Physical    Patient Name: Riccardo Hamilton  MRN: [id number]  : 1945  Date of Procedure:  10/19/2022  Referring Physician: Natalya Neely MD  Primary Physician: Ancelmo Richard MD  Procedure Physician: Natalya Neely MD, MPH     Procedure - Colonoscopy  ASA - per anesthesia  Mallampati - per anesthesia  History of Anesthesia problems - no  Family history Anesthesia problems -  no   Plan of anesthesia - General    Diagnosis: previous colon cancer  Chief Complaint: Same as above    HPI: Patient is an 77 y.o. male is here for the above.     Mr. Gu is a 77 year old male with colon adenocarcinoma diagnosed 2020 s/p open right hemicolectomy on 2020 here for a colonoscopy.    His post operative course following his open right hemicolectomy was complicated by ileus and concern for anastomosis leak.  He was taken back to the OR which showed no evidence for leak and diverting ileostomy was performed at that time.  He had a few hospitalizations for high ostomy output and ANGELINE.  He was evaluated for this as an inpatient in 2021.  I performed an ileoscopy and colonoscopy on 2021 that showed normal small bowel, patent healthy appearing anastomosis with two nodules biopsied on the anastomosis (path: inflammatory nodules).  He underwent ileostomy reversal on 2021.      He was not able to complete adjuvant chemotherapy.  He is followed by Dr. Hair. Surveillance CT scans have been without any recurrent disease.  The last CT on 2022 described questionable soft tissue density in the hepatic flexure of the colon, cannot rule out a lesion    He is feeling well.  Since ileostomy reversal he reports regular bowel movements daily without any diarrhea, constipation, rectal bleeding or melena.  He denies any abdominal pain.  Weight is stable.  Appetite is good.          Last colonoscopy:    Family history: denies  Anticoagulation:  none    ROS:  Constitutional: No fevers, chills, No weight loss  CV: No chest pain  Pulm: No cough, No shortness of breath  GI: see HPI    Medical History:   Past Medical History:   Diagnosis Date    Cancer     Encounter for blood transfusion     Hyperlipidemia     Hypertension          Surgical History:   Past Surgical History:   Procedure Laterality Date    CAROTID ENDARTERECTOMY      MEDIPORT INSERTION, SINGLE         Family History:   Family History   Problem Relation Age of Onset    Stroke Sister     Hypertension Sister     Cancer Sister     Stroke Brother     Hypertension Brother    .    Social History:   Social History     Socioeconomic History    Marital status:      Spouse name: Ya Hamilton    Number of children: 4   Tobacco Use    Smoking status: Former    Smokeless tobacco: Never   Substance and Sexual Activity    Alcohol use: Not Currently    Drug use: Never    Sexual activity: Not Currently     Social Determinants of Health     Financial Resource Strain: Low Risk     Difficulty of Paying Living Expenses: Not hard at all   Food Insecurity: No Food Insecurity    Worried About Running Out of Food in the Last Year: Never true    Ran Out of Food in the Last Year: Never true   Transportation Needs: No Transportation Needs    Lack of Transportation (Medical): No    Lack of Transportation (Non-Medical): No   Physical Activity: Inactive    Days of Exercise per Week: 0 days    Minutes of Exercise per Session: 0 min   Stress: No Stress Concern Present    Feeling of Stress : Not at all   Social Connections: Moderately Isolated    Frequency of Communication with Friends and Family: Once a week    Frequency of Social Gatherings with Friends and Family: Once a week    Attends Holiness Services: More than 4 times per year    Active Member of Clubs or Organizations: No    Attends Club or Organization Meetings: Never    Marital Status:    Housing Stability: Low Risk     Unable to Pay for Housing in the  Last Year: No    Number of Places Lived in the Last Year: 1    Unstable Housing in the Last Year: No       Review of patient's allergies indicates:  No Known Allergies    Medications:   Medications Prior to Admission   Medication Sig Dispense Refill Last Dose    ADVAIR DISKUS 250-50 mcg/dose diskus inhaler Inhale 1 puff into the lungs 2 (two) times daily. 120 each 2 10/19/2022 at 0600    albuterol (VENTOLIN HFA) 90 mcg/actuation inhaler Inhale 2 puffs into the lungs every 6 (six) hours as needed for Wheezing. Rescue 18 g 2 10/19/2022 at 0600    aspirin 81 MG Chew Take 1 tablet (81 mg total) by mouth once daily. 90 tablet 3 10/18/2022    atorvastatin (LIPITOR) 40 MG tablet Take 1 tablet (40 mg total) by mouth once daily. 90 tablet 3 10/18/2022    cetirizine (ZYRTEC) 5 MG tablet Take 1 tablet (5 mg total) by mouth once daily. 30 tablet 1 10/18/2022    ferrous sulfate (FEOSOL) 325 mg (65 mg iron) Tab tablet Take 325 mg by mouth once daily.   10/18/2022    fluticasone propionate (FLONASE) 50 mcg/actuation nasal spray 1 spray (50 mcg total) by Each Nostril route 2 (two) times daily. 5 mL 2 10/19/2022    hydrALAZINE (APRESOLINE) 25 MG tablet Take 1 tablet (25 mg total) by mouth 2 (two) times daily. 120 tablet 3 10/18/2022    hydroCHLOROthiazide (HYDRODIURIL) 25 MG tablet Take 1 tablet (25 mg total) by mouth once daily. 90 tablet 3 10/18/2022    lisinopriL 10 MG tablet Take 1 tablet (10 mg total) by mouth once daily. 90 tablet 3 10/18/2022    magnesium oxide (MAG-OX) 400 mg (241.3 mg magnesium) tablet Take 400 mg by mouth 2 (two) times daily.   10/18/2022    metoprolol succinate (TOPROL-XL) 25 MG 24 hr tablet Take 1 tablet (25 mg total) by mouth once daily. 90 tablet 3 10/19/2022 at 0600    MOVIPREP 100-7.5-2.691 gram solution Take by mouth.   10/19/2022    multivitamin (THERAGRAN) per tablet Take 1 tablet by mouth once daily.   10/18/2022    pantoprazole (PROTONIX) 40 MG tablet Take 1 tablet (40 mg total) by mouth once  "daily. 90 tablet 3 10/18/2022         Physical Exam:    Vital Signs:   Vitals:    10/19/22 1059   BP: (!) 159/99   Pulse: 83   Resp: 14   Temp: 98.2 °F (36.8 °C)     BP (!) 159/99 (Patient Position: Lying)   Pulse 83   Temp 98.2 °F (36.8 °C) (Oral)   Resp 14   Ht 5' 10" (1.778 m)   Wt 79.6 kg (175 lb 6.4 oz)   SpO2 100%   BMI 25.17 kg/m²     General:          Well appearing in no acute distress  Lungs: Clear to auscultation bilaterally, respirations unlabored  Heart: Regular rate and rhythm, S1 and S2 normal, no obvious murmurs  Abdomen:         Soft, non-tender, bowel sounds normal, no masses, no organomegaly        Labs:  Lab Results   Component Value Date    WBC 9.5 08/25/2022    HGB 13.7 (L) 08/25/2022    HCT 40.7 (L) 08/25/2022    MCV 94.2 (H) 08/25/2022     08/25/2022     Lab Results   Component Value Date    INR 1.19 07/21/2018     Lab Results   Component Value Date     08/25/2022    K 3.5 08/25/2022    CO2 30 08/25/2022    BUN 21.6 08/25/2022    CREATININE 1.30 (H) 08/25/2022    LABPROT 7.7 (H) 08/25/2022    ALBUMIN 3.5 08/25/2022    BILITOT 1.1 08/25/2022    ALKPHOS 151 (H) 08/25/2022    ALT 51 08/25/2022    AST 69 (H) 08/25/2022         Assessment and Plan:    History reviewed, vital signs satisfactory, cardiopulmonary status satisfactory.  I have explained the sedation options, risks, benefits, and alternatives of this endoscopic procedure to the patient including but not limited to bleeding, inflammation, infection, perforation, and death.  All questions were answered and the patient consented to proceed with procedure as planned.   The patient is deemed an appropriate candidate for the sedation as planned.      Natalya Neely MD, MPH   of Clinical Medicine  Gastroenterology and Hepatology  [facility]    10/19/2022  11:24 AM     "

## 2022-10-19 NOTE — TRANSFER OF CARE
Anesthesia Transfer of Care Note    Patient: Riccardo Hamilton    Procedure(s) Performed: Procedure(s) (LRB):  COLONOSCOPY (N/A)    Patient location: GI    Anesthesia Type: general    Post pain: adequate analgesia    Post assessment: no apparent anesthetic complications and tolerated procedure well    Post vital signs: stable    Level of consciousness: awake and alert    Nausea/Vomiting: no nausea/vomiting    Complications: none    Transfer of care protocol was followedComments: Report to desormeaux rn      Last vitals:   120/52 p75 r19 sat 100 ra t 36

## 2022-11-29 ENCOUNTER — HOSPITAL ENCOUNTER (EMERGENCY)
Facility: HOSPITAL | Age: 77
Discharge: HOME | End: 2022-11-29
Attending: FAMILY MEDICINE
Payer: MEDICARE

## 2022-11-29 VITALS
HEIGHT: 68 IN | OXYGEN SATURATION: 98 % | SYSTOLIC BLOOD PRESSURE: 127 MMHG | BODY MASS INDEX: 27.06 KG/M2 | RESPIRATION RATE: 16 BRPM | HEART RATE: 73 BPM | TEMPERATURE: 98 F | DIASTOLIC BLOOD PRESSURE: 70 MMHG | WEIGHT: 178.56 LBS

## 2022-11-29 DIAGNOSIS — U07.1 COVID-19: Primary | ICD-10-CM

## 2022-11-29 PROCEDURE — 99282 EMERGENCY DEPT VISIT SF MDM: CPT

## 2022-11-29 NOTE — DISCHARGE INSTRUCTIONS
Report to Emergency Department if symptoms return or worsen; [facility] Medicine Clinic Within 1 to 2 days, It is important that you follow up with your primary care provider or specialist if indicated for further evaluation, workup, and treatment as necessary. The exam and treatment you received in Emergency Department was for an urgent problem and NOT INTENDED AS COMPLETE CARE. It is important that you FOLLOW UP with a doctor for ongoing care. If your symptoms become WORSE or you DO NOT IMPROVE and you are unable to reach your health care provider, you should RETURN to the Emergency Department. The Emergency Department provider has provided a PRELIMINARY INTERPRETATION of all your studies. A final interpretation may be done after you are discharged. If a change in your diagnosis or treatment is needed WE WILL CONTACT YOU. It is critical that we have a CURRENT PHONE NUMBER FOR YOU.

## 2022-11-29 NOTE — ED PROVIDER NOTES
"Encounter Date: 11/29/2022       History     Chief Complaint   Patient presents with    COVID-19 Concerns     Reports + home covid test, mild symptoms of slight HA and sneezing.  VSS.      76 yo M w/ PMHx significant for HTN, HLD, DM, asthma, VTE & CRC presents to ED c/o 2 day hx of mild HA, congestion, rhinorrhea & sneezing. Patient's wife has known COVID infection & he has been exposed to her. Patient took at-home COVID test this AM, which was positive. Patient reports he is here at ED "just to make sure." Denies SOB, sore throat, cough, wheezing, hemoptysis, F/C, neck stiffness, photophobia, AMS, confusion, body aches. VSS on arrival w/ no signs of respiratory or other distress.    Review of patient's allergies indicates:  No Known Allergies  Past Medical History:   Diagnosis Date    Cancer     Encounter for blood transfusion     Hyperlipidemia     Hypertension      Past Surgical History:   Procedure Laterality Date    CAROTID ENDARTERECTOMY      COLONOSCOPY N/A 10/19/2022    Procedure: COLONOSCOPY;  Surgeon: Natalya Neely MD;  Location: [facility] ENDOSCOPY;  Service: Gastroenterology;  Laterality: N/A;    MEDIPORT INSERTION, SINGLE       Family History   Problem Relation Age of Onset    Stroke Sister     Hypertension Sister     Cancer Sister     Stroke Brother     Hypertension Brother      Social History     Tobacco Use    Smoking status: Former    Smokeless tobacco: Never   Substance Use Topics    Alcohol use: Not Currently    Drug use: Never     Review of Systems   Constitutional:  Negative for activity change, appetite change and fatigue.   HENT:  Negative for ear discharge and ear pain.    Eyes:  Negative for visual disturbance.   Cardiovascular:  Negative for chest pain and palpitations.   Gastrointestinal:  Negative for abdominal pain, diarrhea, nausea and vomiting.   Musculoskeletal:  Negative for back pain and neck pain.   Skin:  Negative for pallor and rash.   Allergic/Immunologic: Negative for " immunocompromised state.   Neurological:  Negative for dizziness, syncope, facial asymmetry, weakness, light-headedness and numbness.   Hematological:  Negative for adenopathy.   All other systems reviewed and are negative.    Physical Exam     Initial Vitals [11/29/22 0716]   BP Pulse Resp Temp SpO2   127/70 73 16 97.5 °F (36.4 °C) 98 %      MAP       --         Physical Exam    Nursing note and vitals reviewed.  Constitutional: He appears well-developed and well-nourished. He is not diaphoretic. No distress.   HENT:   Head: Normocephalic and atraumatic.   Right Ear: Hearing, tympanic membrane, external ear and ear canal normal.   Left Ear: Hearing, tympanic membrane, external ear and ear canal normal.   Nose: No rhinorrhea. Right sinus exhibits no maxillary sinus tenderness and no frontal sinus tenderness. Left sinus exhibits no maxillary sinus tenderness and no frontal sinus tenderness.   Mouth/Throat: Oropharynx is clear and moist and mucous membranes are normal. No oropharyngeal exudate, posterior oropharyngeal edema, posterior oropharyngeal erythema or tonsillar abscesses.   Eyes: Conjunctivae and EOM are normal. Pupils are equal, round, and reactive to light.   Neck: Neck supple. No tracheal deviation present. No JVD present.   Normal range of motion.  Cardiovascular:  Normal rate, regular rhythm, normal heart sounds and intact distal pulses.     Exam reveals no gallop and no friction rub.       No murmur heard.  Pulmonary/Chest: Breath sounds normal. No stridor. No respiratory distress. He has no wheezes. He has no rhonchi. He has no rales.   Abdominal: Abdomen is soft. Bowel sounds are normal. He exhibits no distension. There is no abdominal tenderness. There is no rebound and no guarding.   Musculoskeletal:         General: No tenderness or edema. Normal range of motion.      Cervical back: Normal range of motion and neck supple.     Lymphadenopathy:     He has no cervical adenopathy.   Neurological: He is  alert and oriented to person, place, and time. No cranial nerve deficit or sensory deficit.   Skin: Skin is warm and dry. Capillary refill takes less than 2 seconds. No rash noted. No erythema. No pallor.   Psychiatric: He has a normal mood and affect.       ED Course   Procedures  Labs Reviewed - No data to display       Imaging Results    None          Medications - No data to display  Medical Decision Making:   Informed patient that there is no indication for repeat COVID testing in setting of known exposure w/ positive at-home test. Recommended patient take tylenol for pain and/or fever. Discussed EUA of paxlovid & risks/benefits & patient decided against this medicine at this time. Instructed to touch base w/ PCP via telephone later today. Strict ED precautions given.                        Clinical Impression:   Final diagnoses:  [U07.1] COVID-19 (Primary)      ED Disposition Condition    Discharge Stable          ED Prescriptions    None       Follow-up Information       Follow up With Specialties Details Why Contact Info    Ancelmo Richard MD Internal Medicine Call in 1 day  [address] [facility] [address]  [phone]      [facility] Emergency Medicine  If symptoms worsen [address] [facility] [id number] [phone]             CIPRIANO Velazquez  11/29/22 0744

## 2022-12-12 ENCOUNTER — INFUSION (OUTPATIENT)
Dept: INFUSION THERAPY | Facility: HOSPITAL | Age: 77
End: 2022-12-12
Attending: INTERNAL MEDICINE
Payer: MEDICARE

## 2022-12-12 VITALS
SYSTOLIC BLOOD PRESSURE: 151 MMHG | RESPIRATION RATE: 18 BRPM | DIASTOLIC BLOOD PRESSURE: 65 MMHG | WEIGHT: 181.63 LBS | HEIGHT: 68 IN | TEMPERATURE: 98 F | BODY MASS INDEX: 27.53 KG/M2 | HEART RATE: 56 BPM | OXYGEN SATURATION: 97 %

## 2022-12-12 DIAGNOSIS — C18.0 ADENOCARCINOMA OF CECUM: Primary | ICD-10-CM

## 2022-12-12 PROCEDURE — 25000003 PHARM REV CODE 250: Performed by: INTERNAL MEDICINE

## 2022-12-12 PROCEDURE — A4216 STERILE WATER/SALINE, 10 ML: HCPCS | Performed by: INTERNAL MEDICINE

## 2022-12-12 PROCEDURE — 63600175 PHARM REV CODE 636 W HCPCS: Performed by: INTERNAL MEDICINE

## 2022-12-12 PROCEDURE — 96523 IRRIG DRUG DELIVERY DEVICE: CPT

## 2022-12-12 RX ORDER — HEPARIN 100 UNIT/ML
500 SYRINGE INTRAVENOUS
Status: CANCELLED | OUTPATIENT
Start: 2022-12-12

## 2022-12-12 RX ORDER — SODIUM CHLORIDE 0.9 % (FLUSH) 0.9 %
10 SYRINGE (ML) INJECTION
Status: CANCELLED | OUTPATIENT
Start: 2022-12-12

## 2022-12-12 RX ORDER — SODIUM CHLORIDE 0.9 % (FLUSH) 0.9 %
10 SYRINGE (ML) INJECTION
Status: DISCONTINUED | OUTPATIENT
Start: 2022-12-12 | End: 2022-12-12 | Stop reason: HOSPADM

## 2022-12-12 RX ORDER — HEPARIN 100 UNIT/ML
500 SYRINGE INTRAVENOUS
Status: DISCONTINUED | OUTPATIENT
Start: 2022-12-12 | End: 2022-12-12 | Stop reason: HOSPADM

## 2022-12-12 RX ADMIN — SODIUM CHLORIDE, PRESERVATIVE FREE 10 ML: 5 INJECTION INTRAVENOUS at 08:12

## 2022-12-12 RX ADMIN — Medication 500 UNITS: at 08:12

## 2023-01-30 ENCOUNTER — LAB VISIT (OUTPATIENT)
Dept: LAB | Facility: HOSPITAL | Age: 78
End: 2023-01-30
Attending: STUDENT IN AN ORGANIZED HEALTH CARE EDUCATION/TRAINING PROGRAM
Payer: MEDICARE

## 2023-01-30 DIAGNOSIS — E78.5 HYPERLIPIDEMIA, UNSPECIFIED HYPERLIPIDEMIA TYPE: ICD-10-CM

## 2023-01-30 DIAGNOSIS — I10 HYPERTENSION, UNSPECIFIED TYPE: ICD-10-CM

## 2023-01-30 DIAGNOSIS — R73.03 PRE-DIABETES: ICD-10-CM

## 2023-01-30 LAB
ALBUMIN SERPL-MCNC: 3.4 G/DL (ref 3.4–4.8)
ALBUMIN/GLOB SERPL: 0.9 RATIO (ref 1.1–2)
ALP SERPL-CCNC: 103 UNIT/L (ref 40–150)
ALT SERPL-CCNC: 21 UNIT/L (ref 0–55)
AST SERPL-CCNC: 23 UNIT/L (ref 5–34)
BASOPHILS # BLD AUTO: 0.09 X10(3)/MCL (ref 0–0.2)
BASOPHILS NFR BLD AUTO: 1 %
BILIRUBIN DIRECT+TOT PNL SERPL-MCNC: 0.5 MG/DL
BUN SERPL-MCNC: 14.7 MG/DL (ref 8.4–25.7)
CALCIUM SERPL-MCNC: 9.7 MG/DL (ref 8.8–10)
CHLORIDE SERPL-SCNC: 104 MMOL/L (ref 98–107)
CHOLEST SERPL-MCNC: 142 MG/DL
CHOLEST/HDLC SERPL: 5 {RATIO} (ref 0–5)
CO2 SERPL-SCNC: 28 MMOL/L (ref 23–31)
CREAT SERPL-MCNC: 1.38 MG/DL (ref 0.73–1.18)
EOSINOPHIL # BLD AUTO: 0.58 X10(3)/MCL (ref 0–0.9)
EOSINOPHIL NFR BLD AUTO: 6.5 %
ERYTHROCYTE [DISTWIDTH] IN BLOOD BY AUTOMATED COUNT: 13.4 % (ref 11.5–17)
EST. AVERAGE GLUCOSE BLD GHB EST-MCNC: 131.2 MG/DL
GFR SERPLBLD CREATININE-BSD FMLA CKD-EPI: 52 MLS/MIN/1.73/M2
GLOBULIN SER-MCNC: 3.6 GM/DL (ref 2.4–3.5)
GLUCOSE SERPL-MCNC: 124 MG/DL (ref 82–115)
HBA1C MFR BLD: 6.2 %
HCT VFR BLD AUTO: 40.9 % (ref 42–52)
HCV AB SERPL QL IA: NONREACTIVE
HDLC SERPL-MCNC: 28 MG/DL (ref 35–60)
HGB BLD-MCNC: 13.5 GM/DL (ref 14–18)
IMM GRANULOCYTES # BLD AUTO: 0.03 X10(3)/MCL (ref 0–0.04)
IMM GRANULOCYTES NFR BLD AUTO: 0.3 %
LDLC SERPL CALC-MCNC: 80 MG/DL (ref 50–140)
LYMPHOCYTES # BLD AUTO: 2.76 X10(3)/MCL (ref 0.6–4.6)
LYMPHOCYTES NFR BLD AUTO: 30.9 %
MCH RBC QN AUTO: 31.5 PG
MCHC RBC AUTO-ENTMCNC: 33 MG/DL (ref 33–36)
MCV RBC AUTO: 95.3 FL (ref 80–94)
MONOCYTES # BLD AUTO: 0.76 X10(3)/MCL (ref 0.1–1.3)
MONOCYTES NFR BLD AUTO: 8.5 %
NEUTROPHILS # BLD AUTO: 4.7 X10(3)/MCL (ref 2.1–9.2)
NEUTROPHILS NFR BLD AUTO: 52.8 %
NRBC BLD AUTO-RTO: 0 %
PLATELET # BLD AUTO: 283 X10(3)/MCL (ref 130–400)
PMV BLD AUTO: 9.7 FL (ref 7.4–10.4)
POTASSIUM SERPL-SCNC: 3.6 MMOL/L (ref 3.5–5.1)
PROT SERPL-MCNC: 7 GM/DL (ref 5.8–7.6)
RBC # BLD AUTO: 4.29 X10(6)/MCL (ref 4.7–6.1)
SODIUM SERPL-SCNC: 139 MMOL/L (ref 136–145)
TRIGL SERPL-MCNC: 168 MG/DL (ref 34–140)
VLDLC SERPL CALC-MCNC: 34 MG/DL
WBC # SPEC AUTO: 8.9 X10(3)/MCL (ref 4.5–11.5)

## 2023-01-30 PROCEDURE — 86803 HEPATITIS C AB TEST: CPT

## 2023-01-30 PROCEDURE — 85025 COMPLETE CBC W/AUTO DIFF WBC: CPT

## 2023-01-30 PROCEDURE — 80061 LIPID PANEL: CPT

## 2023-01-30 PROCEDURE — 83036 HEMOGLOBIN GLYCOSYLATED A1C: CPT

## 2023-01-30 PROCEDURE — 80053 COMPREHEN METABOLIC PANEL: CPT

## 2023-01-30 PROCEDURE — 36415 COLL VENOUS BLD VENIPUNCTURE: CPT

## 2023-02-06 ENCOUNTER — OFFICE VISIT (OUTPATIENT)
Dept: INTERNAL MEDICINE | Facility: CLINIC | Age: 78
End: 2023-02-06
Payer: MEDICARE

## 2023-02-06 VITALS
BODY MASS INDEX: 27.49 KG/M2 | TEMPERATURE: 98 F | RESPIRATION RATE: 16 BRPM | SYSTOLIC BLOOD PRESSURE: 112 MMHG | DIASTOLIC BLOOD PRESSURE: 56 MMHG | HEIGHT: 69 IN | WEIGHT: 185.63 LBS | HEART RATE: 76 BPM

## 2023-02-06 DIAGNOSIS — Z87.09 HISTORY OF ASTHMA: ICD-10-CM

## 2023-02-06 DIAGNOSIS — I10 HYPERTENSION, UNSPECIFIED TYPE: Primary | ICD-10-CM

## 2023-02-06 PROCEDURE — 99213 OFFICE O/P EST LOW 20 MIN: CPT | Mod: PBBFAC

## 2023-02-06 RX ORDER — ALBUTEROL SULFATE 90 UG/1
2 AEROSOL, METERED RESPIRATORY (INHALATION) EVERY 6 HOURS PRN
Qty: 6.7 G | Refills: 2 | Status: SHIPPED | OUTPATIENT
Start: 2023-02-06 | End: 2023-05-30 | Stop reason: SDUPTHER

## 2023-02-06 RX ORDER — LOSARTAN POTASSIUM 25 MG/1
25 TABLET ORAL DAILY
Qty: 90 TABLET | Refills: 3 | Status: SHIPPED | OUTPATIENT
Start: 2023-02-06 | End: 2023-09-18 | Stop reason: SDUPTHER

## 2023-02-06 NOTE — PROGRESS NOTES
"[facility] INTERNAL MEDICINE  OUTPATIENT OFFICE VISIT NOTE    SUBJECTIVE:      HPI: Riccardo Hamilton is a 77 y.o. 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 Today, he reports feeling well overall other than persistent cough. We discussed the possibility of recently prescribed lisinopril as the culprit but states he dealt with this problem many years prior to having this medication. He states the albuterol inhaler seems to help with this cough and is compliant with Advair B.I.D. PFTs were obtained since last visit which were unremarkable for obstructive or restrictive disease. He denies any GERD or Post Nasal Drip.    ROS:  Denies any headaches, changes in vision, chest pain, palpitations, shortness of breath, N/V, or lightheadedness/dizziness, abdominal pain, pain/difficulty with urination/stools, or blood in urine/BMs.       OBJECTIVE:     Vital signs:   BP (!) 168/76 (BP Location: Right arm, Patient Position: Sitting, BP Method: Medium (Automatic))   Pulse 76   Temp 98.2 °F (36.8 °C) (Oral)   Resp 16   Ht 5' 9" (1.753 m)   Wt 84.2 kg (185 lb 9.6 oz)   BMI 27.41 kg/m²    Repeat BP (112/56), 2/6/23    Physical Examination:  Gen: well-nourished, well-developed gentleman, in no acute distress   HEENT: Normocephalic, atraumatic. PERRL.   Neck: No thyromegaly. No lymphadenopathy.   Heart: RRR, no murmurs, gallops, clicks or rubs.   Lungs: CTAB without rales, wheezes or rhonchi. Normal work of breathing.   Abd: Midline well-healed abdominal scar. Soft, non-tender, non-distended and without guarding. Hyperactive bowel sounds present.   Extremities: Radial and pedal pulses 2+ bilaterally, no appreciable LE edema.   MSK: No obvious " deformities. Moves all extremities purposefully.   Neuro: Responds well to commands. CN III-XII grossly intact. No focal neurological deficits noted   Skin: Warm, dry, intact.     ASSESSMENT & PLAN:   Hypertension - well controlled  - Well Controlled - 112/56 today in clinic  - Continue HCTZ 25 daily, Toprol 25 daily, Hydralazine 25 BID  - Discontinued Lisinopril 10 daily  - Plan to switch to Losartan  - Counseled on DASH diet, exercise as tolerated     Seasonal Allergies - improved  - Intermittent-persistent dry cough and eye irritiation  - Relieved with anti-histamines & albuterol inhaler    History of Asthma; possible reactive airway disease  - Discontinued Advair Discus daily with unremarkable recent PFTs  - Completed PFTs in past, stating has been more than 10 years ago though no resultes  - Continue Albuterol inhaler as needed     Hyperlipidemia  - Latest lipid profile WNL  - Continue Lipitor 40 daily     Stage IIIB Adenocarcinoma of Cecum  - s/p hemicolectomy and adjuvant chemotherapy  - At this time denies any GI-related symptoms  - patient is followed by oncology; continue to follow as scheduled     Hx of B/L Carotid Endarterectomy   - Continue Aspirin 81, Lipitor 40 daily     GERD   - Continue Protonix 40 daily     Hx of DVT/PE  - Occurred in 2021, treated with x3 months Eliquis  - Denies any recurrence  - Continue Aspirin 81 daily      Health Maintenance:  Immunization History   Administered Date(s) Administered    COVID-19, MRNA, LN-S, PF (MODERNA FULL 0.5 ML DOSE) 01/12/2021, 02/26/2021, 10/22/2021, 05/24/2022    COVID-19, MRNA, LN-S, PF (Pfizer) (Purple Cap) 01/12/2021, 02/26/2021    Influenza (FLUAD) - Quadrivalent - Adjuvanted - PF *Preferred* (65+) 09/21/2022    Influenza - High Dose - PF (65 years and older) 09/22/2015, 10/06/2016, 10/02/2017, 09/24/2018    Influenza - Quadrivalent - PF *Preferred* (6 months and older) 09/12/2019    Influenza - Trivalent - PF (ADULT) 09/22/2015, 10/06/2016,  10/02/2017, 09/24/2018, 09/12/2019    Influenza A (H1N1) 2009 Monovalent - IM 01/21/2010    Pneumococcal Conjugate - 13 Valent 09/04/2020    Pneumococcal Polysaccharide - 23 Valent 10/02/2015    Tdap 02/09/2022    Zoster 09/22/2015    Zoster Recombinant 05/31/2022, 09/21/2022       Return to clinic in 4 months with labs.    Ancelmo Richard MD   [employer] Internal Medicine, PGY-2

## 2023-02-06 NOTE — PROGRESS NOTES
I have reviewed and concur with the resident's history, physical, assessment, and plan.  I have discussed with him all issues related to the diagnosis, workup and treatment plan. Care provided as reasonable and necessary.    Fredrick Stephenson MD  [facility]

## 2023-02-07 RX ORDER — FLUTICASONE PROPIONATE 50 MCG
1 SPRAY, SUSPENSION (ML) NASAL 2 TIMES DAILY
Qty: 5 ML | Refills: 2 | Status: SHIPPED | OUTPATIENT
Start: 2023-02-07 | End: 2023-04-18 | Stop reason: SDUPTHER

## 2023-03-06 ENCOUNTER — INFUSION (OUTPATIENT)
Dept: INFUSION THERAPY | Facility: HOSPITAL | Age: 78
End: 2023-03-06
Attending: INTERNAL MEDICINE
Payer: MEDICARE

## 2023-03-06 VITALS
WEIGHT: 182.13 LBS | DIASTOLIC BLOOD PRESSURE: 69 MMHG | SYSTOLIC BLOOD PRESSURE: 151 MMHG | OXYGEN SATURATION: 94 % | HEART RATE: 68 BPM | HEIGHT: 69 IN | TEMPERATURE: 98 F | BODY MASS INDEX: 26.97 KG/M2 | RESPIRATION RATE: 20 BRPM

## 2023-03-06 DIAGNOSIS — C18.0 ADENOCARCINOMA OF CECUM: Primary | ICD-10-CM

## 2023-03-06 PROCEDURE — A4216 STERILE WATER/SALINE, 10 ML: HCPCS | Performed by: INTERNAL MEDICINE

## 2023-03-06 PROCEDURE — 25000003 PHARM REV CODE 250: Performed by: INTERNAL MEDICINE

## 2023-03-06 PROCEDURE — 96523 IRRIG DRUG DELIVERY DEVICE: CPT

## 2023-03-06 PROCEDURE — 63600175 PHARM REV CODE 636 W HCPCS: Performed by: INTERNAL MEDICINE

## 2023-03-06 RX ORDER — SODIUM CHLORIDE 0.9 % (FLUSH) 0.9 %
10 SYRINGE (ML) INJECTION
Status: CANCELLED | OUTPATIENT
Start: 2023-03-06

## 2023-03-06 RX ORDER — HEPARIN 100 UNIT/ML
500 SYRINGE INTRAVENOUS
Status: CANCELLED | OUTPATIENT
Start: 2023-03-06

## 2023-03-06 RX ORDER — HEPARIN 100 UNIT/ML
500 SYRINGE INTRAVENOUS
Status: DISCONTINUED | OUTPATIENT
Start: 2023-03-06 | End: 2023-03-06 | Stop reason: HOSPADM

## 2023-03-06 RX ORDER — SODIUM CHLORIDE 0.9 % (FLUSH) 0.9 %
10 SYRINGE (ML) INJECTION
Status: DISCONTINUED | OUTPATIENT
Start: 2023-03-06 | End: 2023-03-06 | Stop reason: HOSPADM

## 2023-03-06 RX ADMIN — HEPARIN 500 UNITS: 100 SYRINGE at 07:03

## 2023-03-06 RX ADMIN — Medication 10 ML: at 07:03

## 2023-03-06 NOTE — NURSING
0713 Patient is here for a mediport flush. Voices no c/o. Message sent to Ashley Reid because Patient needs a provider visit.

## 2023-03-21 ENCOUNTER — HOSPITAL ENCOUNTER (EMERGENCY)
Facility: HOSPITAL | Age: 78
Discharge: HOME | End: 2023-03-21
Attending: FAMILY MEDICINE
Payer: MEDICARE

## 2023-03-21 VITALS
BODY MASS INDEX: 26.77 KG/M2 | RESPIRATION RATE: 20 BRPM | HEIGHT: 69 IN | HEART RATE: 90 BPM | DIASTOLIC BLOOD PRESSURE: 78 MMHG | WEIGHT: 180.75 LBS | OXYGEN SATURATION: 93 % | TEMPERATURE: 98 F | SYSTOLIC BLOOD PRESSURE: 179 MMHG

## 2023-03-21 DIAGNOSIS — R05.9 COUGH: ICD-10-CM

## 2023-03-21 DIAGNOSIS — R07.9 CHEST PAIN: ICD-10-CM

## 2023-03-21 LAB
ALBUMIN SERPL-MCNC: 3.4 G/DL (ref 3.4–4.8)
ALBUMIN/GLOB SERPL: 0.9 RATIO (ref 1.1–2)
ALP SERPL-CCNC: 130 UNIT/L (ref 40–150)
ALT SERPL-CCNC: 23 UNIT/L (ref 0–55)
AST SERPL-CCNC: 26 UNIT/L (ref 5–34)
BASOPHILS # BLD AUTO: 0.08 X10(3)/MCL (ref 0–0.2)
BASOPHILS NFR BLD AUTO: 0.9 %
BILIRUBIN DIRECT+TOT PNL SERPL-MCNC: 0.6 MG/DL
BNP BLD-MCNC: 78.2 PG/ML
BUN SERPL-MCNC: 13.1 MG/DL (ref 8.4–25.7)
CALCIUM SERPL-MCNC: 9.9 MG/DL (ref 8.8–10)
CHLORIDE SERPL-SCNC: 100 MMOL/L (ref 98–107)
CO2 SERPL-SCNC: 29 MMOL/L (ref 23–31)
CREAT SERPL-MCNC: 1.29 MG/DL (ref 0.73–1.18)
EOSINOPHIL # BLD AUTO: 0.98 X10(3)/MCL (ref 0–0.9)
EOSINOPHIL NFR BLD AUTO: 10.6 %
ERYTHROCYTE [DISTWIDTH] IN BLOOD BY AUTOMATED COUNT: 12.8 % (ref 11.5–17)
FLUAV AG UPPER RESP QL IA.RAPID: NOT DETECTED
FLUBV AG UPPER RESP QL IA.RAPID: NOT DETECTED
GFR SERPLBLD CREATININE-BSD FMLA CKD-EPI: 56 MLS/MIN/1.73/M2
GLOBULIN SER-MCNC: 3.8 GM/DL (ref 2.4–3.5)
GLUCOSE SERPL-MCNC: 123 MG/DL (ref 82–115)
HCT VFR BLD AUTO: 41.9 % (ref 42–52)
HGB BLD-MCNC: 14 G/DL (ref 14–18)
IMM GRANULOCYTES # BLD AUTO: 0.03 X10(3)/MCL (ref 0–0.04)
IMM GRANULOCYTES NFR BLD AUTO: 0.3 %
LYMPHOCYTES # BLD AUTO: 2.28 X10(3)/MCL (ref 0.6–4.6)
LYMPHOCYTES NFR BLD AUTO: 24.7 %
MAGNESIUM SERPL-MCNC: 1.8 MG/DL (ref 1.6–2.6)
MCH RBC QN AUTO: 31.7 PG
MCHC RBC AUTO-ENTMCNC: 33.4 G/DL (ref 33–36)
MCV RBC AUTO: 94.8 FL (ref 80–94)
MONOCYTES # BLD AUTO: 0.64 X10(3)/MCL (ref 0.1–1.3)
MONOCYTES NFR BLD AUTO: 6.9 %
NEUTROPHILS # BLD AUTO: 5.22 X10(3)/MCL (ref 2.1–9.2)
NEUTROPHILS NFR BLD AUTO: 56.6 %
NRBC BLD AUTO-RTO: 0 %
PLATELET # BLD AUTO: 266 X10(3)/MCL (ref 130–400)
PMV BLD AUTO: 9.6 FL (ref 7.4–10.4)
POTASSIUM SERPL-SCNC: 4.1 MMOL/L (ref 3.5–5.1)
PROT SERPL-MCNC: 7.2 GM/DL (ref 5.8–7.6)
RBC # BLD AUTO: 4.42 X10(6)/MCL (ref 4.7–6.1)
RSV A 5' UTR RNA NPH QL NAA+PROBE: NOT DETECTED
SARS-COV-2 RNA RESP QL NAA+PROBE: NOT DETECTED
SODIUM SERPL-SCNC: 137 MMOL/L (ref 136–145)
TROPONIN I SERPL-MCNC: 0.02 NG/ML (ref 0–0.04)
WBC # SPEC AUTO: 9.2 X10(3)/MCL (ref 4.5–11.5)

## 2023-03-21 PROCEDURE — 83880 ASSAY OF NATRIURETIC PEPTIDE: CPT | Performed by: FAMILY MEDICINE

## 2023-03-21 PROCEDURE — 84484 ASSAY OF TROPONIN QUANT: CPT | Performed by: FAMILY MEDICINE

## 2023-03-21 PROCEDURE — 94760 N-INVAS EAR/PLS OXIMETRY 1: CPT

## 2023-03-21 PROCEDURE — 63600175 PHARM REV CODE 636 W HCPCS: Performed by: FAMILY MEDICINE

## 2023-03-21 PROCEDURE — 96365 THER/PROPH/DIAG IV INF INIT: CPT

## 2023-03-21 PROCEDURE — 99285 EMERGENCY DEPT VISIT HI MDM: CPT | Mod: 25

## 2023-03-21 PROCEDURE — 83735 ASSAY OF MAGNESIUM: CPT | Performed by: FAMILY MEDICINE

## 2023-03-21 PROCEDURE — 80053 COMPREHEN METABOLIC PANEL: CPT | Performed by: FAMILY MEDICINE

## 2023-03-21 PROCEDURE — 94640 AIRWAY INHALATION TREATMENT: CPT | Mod: XB

## 2023-03-21 PROCEDURE — 0241U COVID/RSV/FLU A&B PCR: CPT | Performed by: FAMILY MEDICINE

## 2023-03-21 PROCEDURE — 25000003 PHARM REV CODE 250: Performed by: FAMILY MEDICINE

## 2023-03-21 PROCEDURE — 93005 ELECTROCARDIOGRAM TRACING: CPT

## 2023-03-21 PROCEDURE — 85025 COMPLETE CBC W/AUTO DIFF WBC: CPT | Performed by: FAMILY MEDICINE

## 2023-03-21 PROCEDURE — 96372 THER/PROPH/DIAG INJ SC/IM: CPT | Mod: 59 | Performed by: FAMILY MEDICINE

## 2023-03-21 RX ORDER — MAGNESIUM SULFATE HEPTAHYDRATE 40 MG/ML
2 INJECTION, SOLUTION INTRAVENOUS
Status: COMPLETED | OUTPATIENT
Start: 2023-03-21 | End: 2023-03-21

## 2023-03-21 RX ORDER — IPRATROPIUM BROMIDE AND ALBUTEROL SULFATE 2.5; .5 MG/3ML; MG/3ML
9 SOLUTION RESPIRATORY (INHALATION)
Status: DISCONTINUED | OUTPATIENT
Start: 2023-03-21 | End: 2023-03-21 | Stop reason: HOSPADM

## 2023-03-21 RX ORDER — ACETAMINOPHEN AND CODEINE PHOSPHATE 300; 15 MG/1; MG/1
1 TABLET ORAL EVERY 6 HOURS PRN
Qty: 20 TABLET | Refills: 0 | Status: SHIPPED | OUTPATIENT
Start: 2023-03-21 | End: 2023-03-26

## 2023-03-21 RX ORDER — TERBUTALINE SULFATE 1 MG/ML
0.25 INJECTION SUBCUTANEOUS ONCE
Status: COMPLETED | OUTPATIENT
Start: 2023-03-21 | End: 2023-03-21

## 2023-03-21 RX ORDER — GUAIFENESIN/DEXTROMETHORPHAN 100-10MG/5
10 SYRUP ORAL
Status: COMPLETED | OUTPATIENT
Start: 2023-03-21 | End: 2023-03-21

## 2023-03-21 RX ADMIN — TERBUTALINE SULFATE 0.25 MG: 1 INJECTION, SOLUTION SUBCUTANEOUS at 09:03

## 2023-03-21 RX ADMIN — GUAIFENESIN AND DEXTROMETHORPHAN 10 ML: 100; 10 SYRUP ORAL at 09:03

## 2023-03-21 RX ADMIN — PREDNISONE 60 MG: 50 TABLET ORAL at 09:03

## 2023-03-21 RX ADMIN — MAGNESIUM SULFATE HEPTAHYDRATE 2 G: 40 INJECTION, SOLUTION INTRAVENOUS at 09:03

## 2023-03-21 NOTE — DISCHARGE INSTRUCTIONS
You came into the emergency department with chest pain/shortness of breath.    Your labs, EKGs, chest x-ray were all normal.    We gave you some breathing treatments, steroids after which you started to feel much better.    You get a prescription for a cough medicine.  Please do not drive or operate any type heavy machinery while taking this medicine.  You should follow-up with a primary care doctor for reevaluation.  They may recommend further cardiac testing such as an ultrasound of your heart or stress test.    Return to the emergency department if you have worsening chest pain, shortness of breath, feel lightheaded or dizzy to the point where you may pass out.

## 2023-03-21 NOTE — ED PROVIDER NOTES
"Name: Riccardo Hamilton   Age: 77 y.o.  Sex: male    Chief complaint:   Chief Complaint   Patient presents with    Pleurisy     PT W CO COUGH, "RATTLING" IN  CHEST AND WHEEZING AT NIGHT X 2 WKS.  DENIES SOB. EKG OBTAINED.     Cough      Patient arrived with: Private  History obtained from: Patient    Subjective:   77-year-old male with history of hypertension, hyperlipidemia, asthma, GERD, colon cancer status post colectomy that presents to emergency department for cough and wheezing.  Patient said his symptoms have been going on for approximately 2 weeks.  Complains of cough with minimal mucus production.  Has had some generalized wheezing for the past 2 weeks, has not had to increase his inhaler frequency.  Denies chest pain.  Intermittent shortness of breath but feels similar to normal asthma symptoms.  Denies fever, chills, sweats, cough, sore throat, lightheadedness, syncope, abdominal pain, nausea, vomiting, diarrhea, dysuria, hematuria.  Denies lower extremity edema.    Past Medical History:   Diagnosis Date    Cancer     Encounter for blood transfusion     Hyperlipidemia     Hypertension      Past Surgical History:   Procedure Laterality Date    CAROTID ENDARTERECTOMY      COLONOSCOPY N/A 10/19/2022    Procedure: COLONOSCOPY;  Surgeon: Natalya Neely MD;  Location: [facility] ENDOSCOPY;  Service: Gastroenterology;  Laterality: N/A;    MEDIPORT INSERTION, SINGLE       Social History     Socioeconomic History    Marital status:      Spouse name: Ya Hamilton    Number of children: 4   Tobacco Use    Smoking status: Former    Smokeless tobacco: Never   Substance and Sexual Activity    Alcohol use: Not Currently    Drug use: Never    Sexual activity: Not Currently     Social Determinants of Health     Financial Resource Strain: Low Risk     Difficulty of Paying Living Expenses: Not hard at all   Food Insecurity: No Food Insecurity    Worried About Running Out of Food in the Last Year: Never true    Ran Out " of Food in the Last Year: Never true   Transportation Needs: No Transportation Needs    Lack of Transportation (Medical): No    Lack of Transportation (Non-Medical): No   Physical Activity: Inactive    Days of Exercise per Week: 0 days    Minutes of Exercise per Session: 0 min   Stress: No Stress Concern Present    Feeling of Stress : Not at all   Social Connections: Moderately Isolated    Frequency of Communication with Friends and Family: Once a week    Frequency of Social Gatherings with Friends and Family: Once a week    Attends Yarsani Services: More than 4 times per year    Active Member of Clubs or Organizations: No    Attends Club or Organization Meetings: Never    Marital Status:    Housing Stability: Low Risk     Unable to Pay for Housing in the Last Year: No    Number of Places Lived in the Last Year: 1    Unstable Housing in the Last Year: No     Review of patient's allergies indicates:  No Known Allergies     Review of Systems   Constitutional:  Negative for diaphoresis and fever.   HENT:  Negative for congestion and sore throat.    Eyes:  Negative for pain and discharge.   Respiratory:  Positive for cough and shortness of breath.    Cardiovascular:  Negative for chest pain and palpitations.   Gastrointestinal:  Negative for diarrhea and vomiting.   Genitourinary:  Negative for dysuria and hematuria.   Musculoskeletal:  Negative for back pain and myalgias.   Skin:  Negative for itching and rash.   Neurological:  Negative for weakness and headaches.        Objective:     Vitals:    03/21/23 0915   BP: (!) 154/69   Pulse: 74   Resp:    Temp:         Physical Exam  Constitutional:       Appearance: He is not toxic-appearing.   HENT:      Head: Normocephalic and atraumatic.   Cardiovascular:      Rate and Rhythm: Regular rhythm.      Pulses: Normal pulses.   Pulmonary:      Effort: Pulmonary effort is normal.      Breath sounds: Wheezing present.   Abdominal:      General: Abdomen is flat. Bowel  sounds are normal.      Palpations: Abdomen is soft.      Tenderness: There is no right CVA tenderness or left CVA tenderness.   Musculoskeletal:         General: No deformity. Normal range of motion.      Cervical back: Normal range of motion and neck supple.      Right lower leg: No edema.      Left lower leg: No edema.   Skin:     General: Skin is warm and dry.   Neurological:      General: No focal deficit present.      Mental Status: He is alert and oriented to person, place, and time.   Psychiatric:         Mood and Affect: Mood normal.         Behavior: Behavior normal.        Records:  Nursing records and triage records reviewed  Prior records reviewed    Medical decision making:   Presents to emergency department for cough, wheezing, intermittent shortness of breath.  Patient is nontoxic appearing.  Vital signs are within normal limits.  Diffuse wheezing bilaterally, no signs of lower extremity edema.  Will get cardiac labs for further evaluation.  Will give DuoNeb, steroids, magnesium, terbutaline for symptomatic management.  Will get cough suppressant for cough    ED Course as of 03/21/23 1000   Tue Mar 21, 2023   0846 EKG is nonischemic [RK]   0847 My interpretation of chest x-ray shows no acute findings [RK]   0932 Troponin BNP within normal limits.  No leukocytosis, anemia, thrombocytopenia. [RK]   0932 CKD with creatinine 1.29 which is similar to baseline.  No hyperglycemia.  Liver enzymes are within normal limits.  COVID and flu and RSV within normal limits. [RK]   0958 On re-evaluation patient said he starting to feel much better.  His lungs were clear to auscultation.  He said he was still having a small cough.  Will receive a prescription for acetaminophen with codeine.  We discussed return precautions and I listed them of the discharge instructions.  Patient understands the plan, no further questions, felt safe for discharge. [RK]      ED Course User Index  [RK] Lizandro Li MD           EKG:  ECG Results              EKG 12-lead (Chest Pain) Age >30 (Final result)  Result time 03/21/23 09:56:32      Final result by Interface, Lab In [facility] (03/21/23 09:56:32)                   Narrative:    Test Reason : R07.9,    Vent. Rate : 080 BPM     Atrial Rate : 080 BPM     P-R Int : 220 ms          QRS Dur : 088 ms      QT Int : 390 ms       P-R-T Axes : 055 071 030 degrees     QTc Int : 449 ms    Sinus rhythm with 1st degree A-V block  Possible Anterior infarct ,age undetermined  Abnormal ECG  When compared with ECG of 25-AUG-2022 08:46,  Premature ventricular complexes are no longer Present    Confirmed by Jordan Holloway MD ([id number]) on 3/21/2023 9:56:20 AM    Referred By: [email or web address]   SELF           Confirmed By:Jordan Holloway MD                      Wet Read by Lizandro Li MD (03/21/23 08:46:36, [facility], Emergency Medicine)    Normal sinus rhythm at a rate of 80, no signs of ST elevation or depression, normal axis, first-degree AV block with a CA interval 220, otherwise normal intervals with a QTC of 449                                     Procedures       Diagnosis:  Final diagnoses:  [R07.9] Chest pain  [R05.9] Cough     ED Prescriptions       Medication Sig Dispense Start Date End Date Auth. Provider    acetaminophen-codeine 300-15mg (TYLENOL #2) 300-15 mg per tablet Take 1 tablet by mouth every 6 (six) hours as needed for Pain. 20 tablet 3/21/2023 3/26/2023 Lizandro Li MD          Follow-up Information       Follow up With Specialties Details Why Contact Info    PCP  Schedule an appointment as soon as possible for a visit in 1 week Follow up with your primary care doctor for re-evaluation Follow up with your primary care doctor for re-evaluation            Lizandro Li M.D.  Emergency Medicine Physician     (Please note that this chart was completed via voice to text dictation. There may be typographical errors or substitutions that are  unintentional, or uncorrected. Every attempt was made to proofread the chart prior to completion. If there are any questions, please contact the physician for final clarification).         Lizandro Li MD  03/21/23 1000

## 2023-04-03 ENCOUNTER — OFFICE VISIT (OUTPATIENT)
Dept: HEMATOLOGY/ONCOLOGY | Facility: CLINIC | Age: 78
End: 2023-04-03
Attending: INTERNAL MEDICINE
Payer: MEDICARE

## 2023-04-03 VITALS
OXYGEN SATURATION: 99 % | RESPIRATION RATE: 20 BRPM | HEART RATE: 48 BPM | TEMPERATURE: 98 F | WEIGHT: 177 LBS | HEIGHT: 69 IN | SYSTOLIC BLOOD PRESSURE: 201 MMHG | BODY MASS INDEX: 26.22 KG/M2 | DIASTOLIC BLOOD PRESSURE: 68 MMHG

## 2023-04-03 DIAGNOSIS — Z98.890 HISTORY OF BILATERAL CAROTID ENDARTERECTOMY: ICD-10-CM

## 2023-04-03 DIAGNOSIS — C18.0 ADENOCARCINOMA OF CECUM: Primary | ICD-10-CM

## 2023-04-03 DIAGNOSIS — Z86.2 HISTORY OF IRON DEFICIENCY ANEMIA: ICD-10-CM

## 2023-04-03 DIAGNOSIS — Z86.711 HISTORY OF PULMONARY EMBOLISM: ICD-10-CM

## 2023-04-03 PROCEDURE — 99213 OFFICE O/P EST LOW 20 MIN: CPT | Mod: PBBFAC | Performed by: INTERNAL MEDICINE

## 2023-04-03 PROCEDURE — 99214 OFFICE O/P EST MOD 30 MIN: CPT | Mod: S$PBB,,, | Performed by: INTERNAL MEDICINE

## 2023-04-03 PROCEDURE — 99214 PR OFFICE/OUTPT VISIT, EST, LEVL IV, 30-39 MIN: ICD-10-PCS | Mod: S$PBB,,, | Performed by: INTERNAL MEDICINE

## 2023-04-03 NOTE — Clinical Note
Orders for today:   Repeat colonoscopy in October 2025  Surveillance CT scans of C/A/P with contrast now  Follow-up in 2 weeks, with scans.

## 2023-04-03 NOTE — PROGRESS NOTES
History:  Past Medical History:   Diagnosis Date    Cancer     Encounter for blood transfusion     Hyperlipidemia     Hypertension    Past medical history: Hypertension.  NIDDM.  CKD.  History of retropharyngeal carotid-carotid bypass (2014).  Bronchial asthma.  Cataract.  Diabetic neuropathy.  Hypertension.  Dyslipidemia.  Ptosis.  Visual field defect.  Cataract surgery.  Carotid endarterectomy (01/06/2014; 02/12/2014).  Intraocular lens, (05/28/2019).  Social history: .  Lives in [place], [place].  Has 4 children.  Retired.  Used to ride horses.  Small third a pack of cigarettes daily for 10 years; quit 50 years back.  No history of alcohol or illicit drug abuse.  Family history: Sister experienced breast cancer at age 58 years.  No family history of colorectal cancer.  Health maintenance: Screening colonoscopy 10 years back, apparently unremarkable.  Prior to that, history of colon polyps.  Past Surgical History:   Procedure Laterality Date    CAROTID ENDARTERECTOMY      COLONOSCOPY N/A 10/19/2022    Procedure: COLONOSCOPY;  Surgeon: Natalya Neely MD;  Location: [facility] ENDOSCOPY;  Service: Gastroenterology;  Laterality: N/A;    MEDIPORT INSERTION, SINGLE        Social History     Socioeconomic History    Marital status:      Spouse name: Ya Hamilton    Number of children: 4   Tobacco Use    Smoking status: Former    Smokeless tobacco: Never   Substance and Sexual Activity    Alcohol use: Not Currently    Drug use: Never    Sexual activity: Not Currently     Social Determinants of Health     Financial Resource Strain: Low Risk     Difficulty of Paying Living Expenses: Not hard at all   Food Insecurity: No Food Insecurity    Worried About Running Out of Food in the Last Year: Never true    Ran Out of Food in the Last Year: Never true   Transportation Needs: No Transportation Needs    Lack of Transportation (Medical): No    Lack of Transportation (Non-Medical): No   Physical Activity:  Inactive    Days of Exercise per Week: 0 days    Minutes of Exercise per Session: 0 min   Stress: No Stress Concern Present    Feeling of Stress : Not at all   Social Connections: Moderately Isolated    Frequency of Communication with Friends and Family: Once a week    Frequency of Social Gatherings with Friends and Family: Once a week    Attends Nondenominational Services: More than 4 times per year    Active Member of Clubs or Organizations: No    Attends Club or Organization Meetings: Never    Marital Status:    Housing Stability: Low Risk     Unable to Pay for Housing in the Last Year: No    Number of Places Lived in the Last Year: 1    Unstable Housing in the Last Year: No      Family History   Problem Relation Age of Onset    Stroke Sister     Hypertension Sister     Cancer Sister     Stroke Brother     Hypertension Brother         Reason for Follow-up:   -Adenocarcinoma of cecum, stage IIIB   -Intolerant of adjuvant capecitabine   -Incidental PE   -Right lower extremity DVT   -Iron deficiency anemia      History of Present Illness:   No chief complaint on file.        Oncologic/Hematologic History:  Oncology History   Adenocarcinoma of cecum   9/30/2020 Cancer Staged    Staging form: Colon and Rectum, AJCC 8th Edition  - Pathologic stage from 9/30/2020: Stage IIIC (pT3, pN2b, cM0)       5/31/2022 Initial Diagnosis    Adenocarcinoma of cecum        75-year-old gentleman referred by surgery with adenocarcinoma of cecum.      He was evaluated for hematochezia and anemia.  CT scan showed a nonobstructing cecal mass.  Underwent colonoscopy by GI, revealing adenocarcinoma of cecum.  Reported loss of appetite and unintentional weight loss (unspecified) over several months.  Denied nausea, vomiting, or oral intolerance.  Reported normal and regular bowel movements.  Denied fevers, chills, night sweats, headaches, vision changes, seizures, strokes, chest pain, dyspnea, cough, nausea, vomiting, diarrhea, constipation,  melena, or hematemesis.  Previous colonoscopy was approximately 10 years ago, apparently negative for malignancy.  Reported unintentional 37 pound weight loss over 4 months.  Ultimately, underwent hemicolectomy.      #Adenocarcinoma of cecum, 10 cm, grade 2-3, moderately to poorly differentiated, lymphovascular invasion positive:   pT3, 6/20 lymph nodes positive, status post right hemicolectomy (09/30/2020)   -Presentation: Hematochezia and anemia   -CT A/P with contrast (08/13/2020): No metastasis   -Colonoscopy (09/16/2020): Large adenocarcinoma of cecum   -Open right hemicolectomy (09/30/2020)   -CT chest with contrast (11/09/2020): No metastasis   -CEA level 7.8, elevated (09/16/2020) (preop)   >>pT3 pN2a M0, stage IIIB, adenocarcinoma of cecum; 6 lymph nodes positive  -12/11/2020: CT A/P with contrast (restaging prior to adjuvant chemotherapy) (comparison: 10/04/2020): Borderline enlarged right midabdominal mesenteric lymph node is indeterminate  -Adjuvant capecitabine started 01/11/2021 (1800 mg p.o. twice daily)   -Second cycle was due to start 02/01/2021 (but he had to be hospitalized with nausea, vomiting, dehydration, poor oral intake, protein calorie malnutrition, ANGELINE, diarrhea, etc. )   -Hospitalized 02/01/2021-02/02/2021: ANGELINE, postural dizziness with near syncope, orthostasis, vomiting, protein calorie malnutrition; 3-day history of nausea and vomiting; unable to keep anything down; weak, lightheaded, orthostatic, near syncope; had just finished first cycle of adjuvant chemotherapy (capecitabine)   -Hospitalized 02/09/2021-02/22/2021: ANGELINE, dehydration, high output ostomy, protein calorie malnutrition, weakness, fatigue, nausea, vomiting; high output from ileostomy controlled with Lomotil and Imodium; ANGELINE resolved   -06/03/2021: Surveillance CT C/A/P with contrast (comparison: 02/26/2021): MARIBEL   -S/p elective loop ileostomy reversal (06/16/2021)   -12/21/2021: Surveillance CT C/A/P with contrast  (comparison: 06/03/2021): MARIBEL  -04/06/2022:  CEA level 1.74, normal  -06/23/2022:  Surveillance CTs C/A/P with contrast (comparison:  12/21/2021): Questionable soft tissue density in the hepatic flexure of the colon, cannot rule out a lesion.  Would recommend colonoscopy unless recently performed.  -07/27/2022:  Labs reviewed.  Hemoglobin 12.8, stable.  Rest of CBC unremarkable.  Creatinine 1.51, elevated but stable.  Rest of CMP unremarkable.  CEA level 2.62, normal.     11/27/2020:   Presents for initial medical oncology consultation, accompanied by his wife.   Looks and feels healthy.  Endorses no symptoms of concern whatsoever.   No weakness, fatigue, malaise, anorexia, unintentional weight loss, abdominal pain, nausea, vomiting, GI bleeding, dysphagia, odynophagia, fevers, chills, any urinary problems, chest pain, cough, dyspnea, unusual headaches, focal neurological symptoms, etc.  ECOG 0-1.    Interval History:  PORT FLUSH   [No matching plan found]   04/03/2023:  -04/06/2022:  CEA level 1.74, normal  -06/23/2022:  Surveillance CTs C/A/P with contrast (comparison:  12/21/2021): Questionable soft tissue density in the hepatic flexure of the colon, cannot rule out a lesion.  Would recommend colonoscopy unless recently performed.  -07/27/2022:  Labs reviewed.  Hemoglobin 12.8, stable.  Rest of CBC unremarkable.  Creatinine 1.51, elevated but stable.  Rest of CMP unremarkable.  CEA level 2.62, normal.  -no showed 08/08/2022  -10/19/2022: Colonoscopy:  Internal hemorrhoids; otherwise, unremarkable; no biopsies (repeat colonoscopy in 3 years for surveillance)  Labs reviewed.  CEA level normal.    CBC, CMP unremarkable.    Presents for a follow-up visit, accompanied by his wife.  Doing well.  Except for some wheezing, no complaints.  No dyspnea.  No chest pain, cough, hemoptysis, fevers, chills, weakness, fatigue, or anorexia.  Does not smoke.  Good appetite.  Bowels moving normally.  ECOG 1.  No blood in stool.         Medications:  Current Outpatient Medications on File Prior to Visit   Medication Sig Dispense Refill    albuterol (PROVENTIL HFA) 90 mcg/actuation inhaler Inhale 2 puffs into the lungs every 6 (six) hours as needed for Wheezing. Rescue 6.7 g 2    aspirin 81 MG Chew Take 1 tablet (81 mg total) by mouth once daily. 90 tablet 3    atorvastatin (LIPITOR) 40 MG tablet Take 1 tablet (40 mg total) by mouth once daily. 90 tablet 3    cetirizine (ZYRTEC) 5 MG tablet Take 1 tablet (5 mg total) by mouth once daily. 30 tablet 1    ferrous sulfate (FEOSOL) 325 mg (65 mg iron) Tab tablet Take 325 mg by mouth once daily.      fluticasone propionate (FLONASE) 50 mcg/actuation nasal spray 1 spray (50 mcg total) by Each Nostril route 2 (two) times daily. 5 mL 2    hydrALAZINE (APRESOLINE) 25 MG tablet Take 1 tablet (25 mg total) by mouth 2 (two) times daily. 120 tablet 3    hydroCHLOROthiazide (HYDRODIURIL) 25 MG tablet Take 1 tablet (25 mg total) by mouth once daily. 90 tablet 3    losartan (COZAAR) 25 MG tablet Take 1 tablet (25 mg total) by mouth once daily. 90 tablet 3    magnesium oxide (MAG-OX) 400 mg (241.3 mg magnesium) tablet Take 400 mg by mouth 2 (two) times daily.      metoprolol succinate (TOPROL-XL) 25 MG 24 hr tablet Take 1 tablet (25 mg total) by mouth once daily. 90 tablet 3    MOVIPREP 100-7.5-2.691 gram solution Take by mouth.      multivitamin (THERAGRAN) per tablet Take 1 tablet by mouth once daily.      pantoprazole (PROTONIX) 40 MG tablet Take 1 tablet (40 mg total) by mouth once daily. 90 tablet 3     Current Facility-Administered Medications on File Prior to Visit   Medication Dose Route Frequency Provider Last Rate Last Admin    heparin, porcine (PF) 100 unit/mL injection flush 500 Units  500 Units Intravenous PRN Victor M Hair MD   500 Units at 07/27/22 0720    sodium chloride 0.9% flush 10 mL  10 mL Intravenous PRN Victor M Hair MD   10 mL at 07/27/22 0720       Review of Systems:   All systems  reviewed and found to be negative except for the symptoms detailed above    Physical Examination:   VITAL SIGNS:   Vitals:    04/03/23 1428   BP: (!) 201/68   Pulse: (!) 48   Resp: 20   Temp: 97.6 °F (36.4 °C)     GENERAL:  In no apparent distress.    HEAD:  No signs of head trauma.  EYES:  Pupils are equal.  Extraocular motions intact.    EARS:  Hearing grossly intact.  MOUTH:  Oropharynx is normal.   NECK:  No adenopathy, no JVD.     CHEST:  Chest with clear breath sounds bilaterally.  No wheezes, rales, rhonchi.    CARDIAC:  Regular rate and rhythm.  S1 and S2, without murmurs, gallops, rubs.  VASCULAR:  No Edema.  Peripheral pulses normal and equal in all extremities.  ABDOMEN:  Soft, without detectable tenderness.  No sign of distention.  No   rebound or guarding, and no masses palpated.   Bowel Sounds normal.  MUSCULOSKELETAL:  Good range of motion of all major joints. Extremities without clubbing, cyanosis or edema.    NEUROLOGIC EXAM:  Alert and oriented x 3.  No focal sensory or strength deficits.   Speech normal.  Follows commands.  PSYCHIATRIC:  Mood normal.    No results for input(s): CBC in the last 72 hours.   No results for input(s): CMP in the last 72 hours.     Assessment:  Problem List Items Addressed This Visit    None    Adenocarcinoma of cecum:  -presented with hematochezia and anemia  -colonoscopy 09/16/2020  -right hemicolectomy 09/30/2020  -pT3 pN2a M0, stage IIIB; 6 lymph nodes pos  -adjuvant capecitabine started 01/11/2021  -capecitabine discontinued after 1 cycle because of side effects requiring hospitalization  -S/p surveillance colonoscopy (02/19/2021) (ileoscopy; indication: High output ileostomy, etc.)  -S/P elective loop ileostomy reversal 06/16/2021  -MARIBEL on surveillance CTs 06/23/2022 except for questionable soft tissue density in the hepatic flexure of colon  -10/19/2022:  Surveillance colonoscopy:  No polyps or cancer (repeat colonoscopy in 3 years)       No evidence of Santos  syndrome:  -MSI-H by PCR  -dMMR  MLH1: Loss of expression in a subset of tumor cells  MSH2: Preserved (intact) expression in tumor cells  MSH6: Preserved (intact) expression in tumor cells  PMS2: Loss of expression in a subset of tumor cells  -V600 BRAF mutation negative  -IHC: MLH1 preserved (intact) expression in tumor cells (no deficiency of mismatch repair protein MLH1)  >>No evidence of Santos syndrome       Pulmonary embolism, incidental diagnosis (02/26/2021):  -02/26/2021: CT chest with contrast: New subacute/chronic small PE segmental right lower lung lobe, new since 11/09/2020       Right lower extremity DVT, proximal:  -03/03/2021: Bilateral lower extremity venous Doppler: Right femoral DVT, nearly occlusive to occlusive (age indeterminate); right popliteal DVT, partially occlusive (age indeterminate)  -anticoagulated with Eliquis x3 months (started 03/03/2021)       Chronic normocytic anemia, starting around 06/2017, subsequently worsening:   PRBC x1 (10/04/2020)   PRBC x1 (10/02/2020)   PRBC x1 (09/30/2020)   08/07/2020: Serum iron 27, low; TIBC 175, low; transferrin saturation 15.4%, borderline; ferritin 295.1, normal  -Feraheme 510 mg IV x2 (12/03/2020, 12/11/2020) (for relative iron deficiency)   (01/07/2021: Transferrin saturation 33%, up from 18%; ferritin level 1162.37, up from 594.74; hemoglobin 11.9, up from 10.9)        Plan:  -Continue surveillance appropriate for stage III colon cancer  -S/p right hemicolectomy 09/30/2020  -History and physical and CEA every 3-6 months for 2 years (09/2020-09/2022), then every 6 months for total of 5 years (09/2022-09/2025)  -Surveillance CT C/A/P with contrast every 6-12 months for 5 years   -S/p surveillance colonoscopy (02/19/2021) (ileoscopy; indication: High output ileostomy, etc.)  -MARIBEL on surveillance CTs 06/23/2022 except for a questionable soft tissue density hepatic flexure of colon  -surveillance colonoscopy 10/19/2022: Normal (repeat colonoscopy in  3 years)   >>>  Repeat colonoscopy in 3 years (10/2025)   Surveillance CT scans of C/A/P with contrast was due in December; will order now   Check CBC, CMP, CEA level now    Follow-up in 2 weeks, with scans and labs.      Above discussed with the patient.  All questions answered.    Discussed labs and scans and gave him copies of relevant reports.    Compliance emphasized.    He understands and agrees with this plan.   --------------------------     Surveillance:   History and physical every 3-6 months for 2 years (09/2020-09/2022), then   every 6 months for a total of 5 years (09/2022-09/2025);      CEA every 3-6 months for 2 years, then   every 6 months for a total of 5 years;      chest/abdominal/pelvic CT scan every 6-12 months (category 2B for frequency less than 12 months) for a total of 5 years (09/2020-09/2025)    (CT should be with IV and oral contrast; if either CT of abdomen/pelvis is inadequate, or if patient has a contraindication to CT with IV contrast, then consider abdominal/pelvic MRI with MRI contrast plus a noncontrast chest CT);   PET CT scan is not recommended;     colonoscopy in 1 year after surgery except if no preoperative colonoscopy due to obstructing lesion, then colonoscopy in 3-6 months:   if advanced adenoma then repeat in 1 year,   if no advanced adenoma then repeat in 3 years, and then   every 5 years.       Follow-up:  No follow-ups on file.

## 2023-04-14 ENCOUNTER — HOSPITAL ENCOUNTER (OUTPATIENT)
Dept: RADIOLOGY | Facility: HOSPITAL | Age: 78
Discharge: HOME | End: 2023-04-14
Attending: INTERNAL MEDICINE
Payer: MEDICARE

## 2023-04-14 DIAGNOSIS — C18.0 ADENOCARCINOMA OF CECUM: ICD-10-CM

## 2023-04-14 PROCEDURE — 74177 CT ABD & PELVIS W/CONTRAST: CPT | Mod: TC

## 2023-04-14 PROCEDURE — 25500020 PHARM REV CODE 255: Performed by: INTERNAL MEDICINE

## 2023-04-14 PROCEDURE — 71260 CT THORAX DX C+: CPT | Mod: TC

## 2023-04-14 RX ADMIN — IOHEXOL 100 ML: 350 INJECTION, SOLUTION INTRAVENOUS at 12:04

## 2023-04-18 DIAGNOSIS — J30.2 SEASONAL ALLERGIES: Primary | ICD-10-CM

## 2023-04-18 NOTE — TELEPHONE ENCOUNTER
----- Message from Lucita Be sent at 4/18/2023  8:55 AM CDT -----  Regarding: [place]/Medication refill  Fluticasone spray- [facility]

## 2023-04-19 RX ORDER — FLUTICASONE PROPIONATE 50 MCG
1 SPRAY, SUSPENSION (ML) NASAL 2 TIMES DAILY
Qty: 5 ML | Refills: 2 | Status: SHIPPED | OUTPATIENT
Start: 2023-04-19 | End: 2023-08-02 | Stop reason: SDUPTHER

## 2023-05-02 ENCOUNTER — OFFICE VISIT (OUTPATIENT)
Dept: HEMATOLOGY/ONCOLOGY | Facility: CLINIC | Age: 78
End: 2023-05-02
Attending: INTERNAL MEDICINE
Payer: MEDICARE

## 2023-05-02 VITALS
HEART RATE: 78 BPM | WEIGHT: 176 LBS | TEMPERATURE: 98 F | HEIGHT: 69 IN | BODY MASS INDEX: 26.07 KG/M2 | SYSTOLIC BLOOD PRESSURE: 165 MMHG | RESPIRATION RATE: 20 BRPM | OXYGEN SATURATION: 97 % | DIASTOLIC BLOOD PRESSURE: 70 MMHG

## 2023-05-02 DIAGNOSIS — C18.0 ADENOCARCINOMA OF CECUM: Primary | ICD-10-CM

## 2023-05-02 DIAGNOSIS — Z08 ENCOUNTER FOR FOLLOW-UP SURVEILLANCE OF COLON CANCER: ICD-10-CM

## 2023-05-02 DIAGNOSIS — Z85.038 ENCOUNTER FOR FOLLOW-UP SURVEILLANCE OF COLON CANCER: ICD-10-CM

## 2023-05-02 DIAGNOSIS — Z86.711 HISTORY OF PULMONARY EMBOLISM: ICD-10-CM

## 2023-05-02 DIAGNOSIS — Z86.718 HISTORY OF DVT (DEEP VEIN THROMBOSIS): ICD-10-CM

## 2023-05-02 DIAGNOSIS — Z86.2 HISTORY OF IRON DEFICIENCY ANEMIA: ICD-10-CM

## 2023-05-02 PROCEDURE — 99213 OFFICE O/P EST LOW 20 MIN: CPT | Mod: PBBFAC | Performed by: INTERNAL MEDICINE

## 2023-05-02 PROCEDURE — 99213 OFFICE O/P EST LOW 20 MIN: CPT | Mod: S$PBB,,, | Performed by: INTERNAL MEDICINE

## 2023-05-02 PROCEDURE — 99213 PR OFFICE/OUTPT VISIT, EST, LEVL III, 20-29 MIN: ICD-10-PCS | Mod: S$PBB,,, | Performed by: INTERNAL MEDICINE

## 2023-05-02 NOTE — PROGRESS NOTES
History:  Past Medical History:   Diagnosis Date    Cancer     Encounter for blood transfusion     Hyperlipidemia     Hypertension    Past medical history: Hypertension.  NIDDM.  CKD.  History of retropharyngeal carotid-carotid bypass (2014).  Bronchial asthma.  Cataract.  Diabetic neuropathy.  Hypertension.  Dyslipidemia.  Ptosis.  Visual field defect.  Cataract surgery.  Carotid endarterectomy (01/06/2014; 02/12/2014).  Intraocular lens, (05/28/2019).  Social history: .  Lives in [place], [place].  Has 4 children.  Retired.  Used to ride horses.  Small third a pack of cigarettes daily for 10 years; quit 50 years back.  No history of alcohol or illicit drug abuse.  Family history: Sister experienced breast cancer at age 58 years.  No family history of colorectal cancer.  Health maintenance: Screening colonoscopy 10 years back, apparently unremarkable.  Prior to that, history of colon polyps.  Past Surgical History:   Procedure Laterality Date    CAROTID ENDARTERECTOMY      COLONOSCOPY N/A 10/19/2022    Procedure: COLONOSCOPY;  Surgeon: Natalya Neely MD;  Location: [facility] ENDOSCOPY;  Service: Gastroenterology;  Laterality: N/A;    MEDIPORT INSERTION, SINGLE        Social History     Socioeconomic History    Marital status:      Spouse name: Ya Hamilton    Number of children: 4   Tobacco Use    Smoking status: Former    Smokeless tobacco: Never   Substance and Sexual Activity    Alcohol use: Not Currently    Drug use: Never    Sexual activity: Not Currently     Social Determinants of Health     Financial Resource Strain: Low Risk     Difficulty of Paying Living Expenses: Not hard at all   Food Insecurity: No Food Insecurity    Worried About Running Out of Food in the Last Year: Never true    Ran Out of Food in the Last Year: Never true   Transportation Needs: No Transportation Needs    Lack of Transportation (Medical): No    Lack of Transportation (Non-Medical): No   Physical Activity:  Inactive    Days of Exercise per Week: 0 days    Minutes of Exercise per Session: 0 min   Stress: No Stress Concern Present    Feeling of Stress : Not at all   Social Connections: Moderately Isolated    Frequency of Communication with Friends and Family: Once a week    Frequency of Social Gatherings with Friends and Family: Once a week    Attends Bahai Services: More than 4 times per year    Active Member of Clubs or Organizations: No    Attends Club or Organization Meetings: Never    Marital Status:    Housing Stability: Low Risk     Unable to Pay for Housing in the Last Year: No    Number of Places Lived in the Last Year: 1    Unstable Housing in the Last Year: No      Family History   Problem Relation Age of Onset    Stroke Sister     Hypertension Sister     Cancer Sister     Stroke Brother     Hypertension Brother         Reason for Follow-up:   -Adenocarcinoma of cecum, stage IIIB   -Intolerant of adjuvant capecitabine   -Incidental PE   -Right lower extremity DVT   -Iron deficiency anemia      History of Present Illness:   No chief complaint on file.        Oncologic/Hematologic History:  Oncology History   Adenocarcinoma of cecum   9/30/2020 Cancer Staged    Staging form: Colon and Rectum, AJCC 8th Edition  - Pathologic stage from 9/30/2020: Stage IIIC (pT3, pN2b, cM0)       5/31/2022 Initial Diagnosis    Adenocarcinoma of cecum        75-year-old gentleman referred by surgery with adenocarcinoma of cecum.      He was evaluated for hematochezia and anemia.  CT scan showed a nonobstructing cecal mass.  Underwent colonoscopy by GI, revealing adenocarcinoma of cecum.  Reported loss of appetite and unintentional weight loss (unspecified) over several months.  Denied nausea, vomiting, or oral intolerance.  Reported normal and regular bowel movements.  Denied fevers, chills, night sweats, headaches, vision changes, seizures, strokes, chest pain, dyspnea, cough, nausea, vomiting, diarrhea, constipation,  melena, or hematemesis.  Previous colonoscopy was approximately 10 years ago, apparently negative for malignancy.  Reported unintentional 37 pound weight loss over 4 months.  Ultimately, underwent hemicolectomy.      #Adenocarcinoma of cecum, 10 cm, grade 2-3, moderately to poorly differentiated, lymphovascular invasion positive:   pT3, 6/20 lymph nodes positive, status post right hemicolectomy (09/30/2020)   -Presentation: Hematochezia and anemia   -CT A/P with contrast (08/13/2020): No metastasis   -Colonoscopy (09/16/2020): Large adenocarcinoma of cecum   -Open right hemicolectomy (09/30/2020)   -CT chest with contrast (11/09/2020): No metastasis   -CEA level 7.8, elevated (09/16/2020) (preop)   >>pT3 pN2a M0, stage IIIB, adenocarcinoma of cecum; 6 lymph nodes positive  -12/11/2020: CT A/P with contrast (restaging prior to adjuvant chemotherapy) (comparison: 10/04/2020): Borderline enlarged right midabdominal mesenteric lymph node is indeterminate  -Adjuvant capecitabine started 01/11/2021 (1800 mg p.o. twice daily)   -Second cycle was due to start 02/01/2021 (but he had to be hospitalized with nausea, vomiting, dehydration, poor oral intake, protein calorie malnutrition, ANGELINE, diarrhea, etc. )   -Hospitalized 02/01/2021-02/02/2021: ANGELINE, postural dizziness with near syncope, orthostasis, vomiting, protein calorie malnutrition; 3-day history of nausea and vomiting; unable to keep anything down; weak, lightheaded, orthostatic, near syncope; had just finished first cycle of adjuvant chemotherapy (capecitabine)   -Hospitalized 02/09/2021-02/22/2021: ANGELINE, dehydration, high output ostomy, protein calorie malnutrition, weakness, fatigue, nausea, vomiting; high output from ileostomy controlled with Lomotil and Imodium; ANGELINE resolved   -06/03/2021: Surveillance CT C/A/P with contrast (comparison: 02/26/2021): MARIBEL   -S/p elective loop ileostomy reversal (06/16/2021)   -12/21/2021: Surveillance CT C/A/P with contrast  (comparison: 06/03/2021): MARIBEL  -04/06/2022:  CEA level 1.74, normal  -06/23/2022:  Surveillance CTs C/A/P with contrast (comparison:  12/21/2021): Questionable soft tissue density in the hepatic flexure of the colon, cannot rule out a lesion.  Would recommend colonoscopy unless recently performed.  -07/27/2022:  Labs reviewed.  Hemoglobin 12.8, stable.  Rest of CBC unremarkable.  Creatinine 1.51, elevated but stable.  Rest of CMP unremarkable.  CEA level 2.62, normal.     11/27/2020:   Presents for initial medical oncology consultation, accompanied by his wife.   Looks and feels healthy.  Endorses no symptoms of concern whatsoever.   No weakness, fatigue, malaise, anorexia, unintentional weight loss, abdominal pain, nausea, vomiting, GI bleeding, dysphagia, odynophagia, fevers, chills, any urinary problems, chest pain, cough, dyspnea, unusual headaches, focal neurological symptoms, etc.  ECOG 0-1.    Interval History:  PORT FLUSH   [No matching plan found]   05/02/2023:   -04/03/2023: CEA level 2.29, normal  -04/14/2023: Surveillance CTs C/A/P with contrast (comparison:  06/23/2022):  Scattered mediastinal lymph nodes and hilar lymph nodes, small by imaging criteria, slightly less prominent than seen on the prior exam. No other evidence of malignancy to the chest abdomen or pelvis.  Presents for a follow-up visit.  Doing very well.  No complaints.  Good appetite.  Good energy.  Bowels moving normally.  No blood in stool.  ECOG 0.       Medications:  Current Outpatient Medications on File Prior to Visit   Medication Sig Dispense Refill    albuterol (PROVENTIL HFA) 90 mcg/actuation inhaler Inhale 2 puffs into the lungs every 6 (six) hours as needed for Wheezing. Rescue 6.7 g 2    aspirin 81 MG Chew Take 1 tablet (81 mg total) by mouth once daily. 90 tablet 3    atorvastatin (LIPITOR) 40 MG tablet Take 1 tablet (40 mg total) by mouth once daily. 90 tablet 3    cetirizine (ZYRTEC) 5 MG tablet Take 1 tablet (5 mg total)  by mouth once daily. 30 tablet 1    ferrous sulfate (FEOSOL) 325 mg (65 mg iron) Tab tablet Take 325 mg by mouth once daily.      fluticasone propionate (FLONASE) 50 mcg/actuation nasal spray 1 spray (50 mcg total) by Each Nostril route 2 (two) times daily. 5 mL 2    hydrALAZINE (APRESOLINE) 25 MG tablet Take 1 tablet (25 mg total) by mouth 2 (two) times daily. 120 tablet 3    hydroCHLOROthiazide (HYDRODIURIL) 25 MG tablet Take 1 tablet (25 mg total) by mouth once daily. 90 tablet 3    losartan (COZAAR) 25 MG tablet Take 1 tablet (25 mg total) by mouth once daily. 90 tablet 3    magnesium oxide (MAG-OX) 400 mg (241.3 mg magnesium) tablet Take 400 mg by mouth 2 (two) times daily.      metoprolol succinate (TOPROL-XL) 25 MG 24 hr tablet Take 1 tablet (25 mg total) by mouth once daily. 90 tablet 3    MOVIPREP 100-7.5-2.691 gram solution Take by mouth.      multivitamin (THERAGRAN) per tablet Take 1 tablet by mouth once daily.      pantoprazole (PROTONIX) 40 MG tablet Take 1 tablet (40 mg total) by mouth once daily. 90 tablet 3     Current Facility-Administered Medications on File Prior to Visit   Medication Dose Route Frequency Provider Last Rate Last Admin    heparin, porcine (PF) 100 unit/mL injection flush 500 Units  500 Units Intravenous PRN Victor M Hair MD   500 Units at 07/27/22 0720    sodium chloride 0.9% flush 10 mL  10 mL Intravenous PRN Victor M Hair MD   10 mL at 07/27/22 0720       Review of Systems:   All systems reviewed and found to be negative except for the symptoms detailed above    Physical Examination:   VITAL SIGNS:   Vitals:    05/02/23 1450   BP: (!) 165/70   Pulse: 78   Resp: 20   Temp: 98.2 °F (36.8 °C)       GENERAL:  In no apparent distress.    HEAD:  No signs of head trauma.  EYES:  Pupils are equal.  Extraocular motions intact.    EARS:  Hearing grossly intact.  MOUTH:  Oropharynx is normal.   NECK:  No adenopathy, no JVD.     CHEST:  Chest with clear breath sounds bilaterally.  No  wheezes, rales, rhonchi.    CARDIAC:  Regular rate and rhythm.  S1 and S2, without murmurs, gallops, rubs.  VASCULAR:  No Edema.  Peripheral pulses normal and equal in all extremities.  ABDOMEN:  Soft, without detectable tenderness.  No sign of distention.  No   rebound or guarding, and no masses palpated.   Bowel Sounds normal.  MUSCULOSKELETAL:  Good range of motion of all major joints. Extremities without clubbing, cyanosis or edema.    NEUROLOGIC EXAM:  Alert and oriented x 3.  No focal sensory or strength deficits.   Speech normal.  Follows commands.  PSYCHIATRIC:  Mood normal.    No results for input(s): CBC in the last 72 hours.   No results for input(s): CMP in the last 72 hours.     Assessment:  Problem List Items Addressed This Visit          Hematology    History of DVT (deep vein thrombosis)    History of pulmonary embolism       Oncology    Adenocarcinoma of cecum - Primary    History of iron deficiency anemia    Encounter for follow-up surveillance of colon cancer     Adenocarcinoma of cecum:  -presented with hematochezia and anemia  -colonoscopy 09/16/2020  -right hemicolectomy 09/30/2020  -pT3 pN2a M0, stage IIIB; 6 lymph nodes pos  -adjuvant capecitabine started 01/11/2021  -capecitabine discontinued after 1 cycle because of side effects requiring hospitalization  -S/p surveillance colonoscopy (02/19/2021) (ileoscopy; indication: High output ileostomy, etc.)  -S/P elective loop ileostomy reversal 06/16/2021  -MARIBEL on surveillance CTs 06/23/2022 except for questionable soft tissue density in the hepatic flexure of colon  -10/19/2022:  Surveillance colonoscopy:  No polyps or cancer (repeat colonoscopy in 3 years)  -04/03/2023: CEA level 2.29, normal  -04/14/2023: Surveillance CTs C/A/P: No recurrence or metastases       No evidence of Santos syndrome:  -MSI-H by PCR  -dMMR  MLH1: Loss of expression in a subset of tumor cells  MSH2: Preserved (intact) expression in tumor cells  MSH6: Preserved (intact)  expression in tumor cells  PMS2: Loss of expression in a subset of tumor cells  -V600 BRAF mutation negative  -IHC: MLH1 preserved (intact) expression in tumor cells (no deficiency of mismatch repair protein MLH1)  >>No evidence of Santos syndrome       Pulmonary embolism, incidental diagnosis (02/26/2021):  -02/26/2021: CT chest with contrast: New subacute/chronic small PE segmental right lower lung lobe, new since 11/09/2020       Right lower extremity DVT, proximal:  -03/03/2021: Bilateral lower extremity venous Doppler: Right femoral DVT, nearly occlusive to occlusive (age indeterminate); right popliteal DVT, partially occlusive (age indeterminate)  -anticoagulated with Eliquis x3 months (started 03/03/2021)       Chronic normocytic anemia, starting around 06/2017, subsequently worsening:   PRBC x1 (10/04/2020)   PRBC x1 (10/02/2020)   PRBC x1 (09/30/2020)   08/07/2020: Serum iron 27, low; TIBC 175, low; transferrin saturation 15.4%, borderline; ferritin 295.1, normal  -Feraheme 510 mg IV x2 (12/03/2020, 12/11/2020) (for relative iron deficiency)   (01/07/2021: Transferrin saturation 33%, up from 18%; ferritin level 1162.37, up from 594.74; hemoglobin 11.9, up from 10.9)        Plan:  -Continue surveillance appropriate for stage III colon cancer  -S/p right hemicolectomy 09/30/2020  -History and physical and CEA every 3-6 months for 2 years (09/2020-09/2022), then every 6 months for total of 5 years (09/2022-09/2025)  -Surveillance CT C/A/P with contrast every 6-12 months for 5 years   -S/p surveillance colonoscopy (02/19/2021) (ileoscopy; indication: High output ileostomy, etc.)  -MARIBEL on surveillance CTs 06/23/2022 except for a questionable soft tissue density hepatic flexure of colon  -surveillance colonoscopy 10/19/2022: Normal (repeat colonoscopy in 3 years)  -04/03/2023: CEA level 2.29, normal  -04/14/2023: Surveillance CTs C/A/P: No recurrence or metastases   >>>  Repeat colonoscopy in 3 years  (10/2025)  Repeat surveillance CT scans of C/A/P with contrast in 6 months (mid October)  Repeat CEA level in 6 months (October)    Follow-up in October, with surveillance CT scans of C/A/P with contrast, CBC, CMP, and CEA level; earlier, if any concerns    Above discussed with the patient.  All questions answered.    Discussed labs and scans and gave him copies of relevant reports.    Compliance emphasized.    He understands and agrees with this plan.   --------------------------     Surveillance:   History and physical every 3-6 months for 2 years (09/2020-09/2022), then   every 6 months for a total of 5 years (09/2022-09/2025);      CEA every 3-6 months for 2 years, then   every 6 months for a total of 5 years;      chest/abdominal/pelvic CT scan every 6-12 months (category 2B for frequency less than 12 months) for a total of 5 years (09/2020-09/2025)    (CT should be with IV and oral contrast; if either CT of abdomen/pelvis is inadequate, or if patient has a contraindication to CT with IV contrast, then consider abdominal/pelvic MRI with MRI contrast plus a noncontrast chest CT);   PET CT scan is not recommended;     colonoscopy in 1 year after surgery except if no preoperative colonoscopy due to obstructing lesion, then colonoscopy in 3-6 months:   if advanced adenoma then repeat in 1 year,   if no advanced adenoma then repeat in 3 years, and then   every 5 years.       Follow-up:  No follow-ups on file.

## 2023-05-02 NOTE — Clinical Note
Orders for today:   Colonoscopy October 2025  Surveillance CT scans of C/A/P with contrast and CEA level in middle of October   Follow-up in October with scans, CBC, CMP, and CEA level.

## 2023-05-06 ENCOUNTER — HOSPITAL ENCOUNTER (EMERGENCY)
Facility: HOSPITAL | Age: 78
Discharge: HOME | End: 2023-05-07
Attending: EMERGENCY MEDICINE
Payer: MEDICARE

## 2023-05-06 DIAGNOSIS — J45.901 ASTHMA WITH ACUTE EXACERBATION, UNSPECIFIED ASTHMA SEVERITY, UNSPECIFIED WHETHER PERSISTENT: Primary | ICD-10-CM

## 2023-05-06 DIAGNOSIS — R05.9 COUGH: ICD-10-CM

## 2023-05-06 LAB
ALBUMIN SERPL-MCNC: 3.4 G/DL (ref 3.4–4.8)
ALBUMIN/GLOB SERPL: 1 RATIO (ref 1.1–2)
ALP SERPL-CCNC: 131 UNIT/L (ref 40–150)
ALT SERPL-CCNC: 24 UNIT/L (ref 0–55)
AST SERPL-CCNC: 26 UNIT/L (ref 5–34)
BASOPHILS # BLD AUTO: 0.08 X10(3)/MCL
BASOPHILS NFR BLD AUTO: 0.9 %
BILIRUBIN DIRECT+TOT PNL SERPL-MCNC: 0.3 MG/DL
BUN SERPL-MCNC: 16.5 MG/DL (ref 8.4–25.7)
CALCIUM SERPL-MCNC: 9.4 MG/DL (ref 8.8–10)
CHLORIDE SERPL-SCNC: 103 MMOL/L (ref 98–107)
CO2 SERPL-SCNC: 26 MMOL/L (ref 23–31)
CREAT SERPL-MCNC: 1.41 MG/DL (ref 0.73–1.18)
EOSINOPHIL # BLD AUTO: 1.13 X10(3)/MCL (ref 0–0.9)
EOSINOPHIL NFR BLD AUTO: 13.2 %
ERYTHROCYTE [DISTWIDTH] IN BLOOD BY AUTOMATED COUNT: 13.2 % (ref 11.5–17)
GFR SERPLBLD CREATININE-BSD FMLA CKD-EPI: 51 MLS/MIN/1.73/M2
GLOBULIN SER-MCNC: 3.5 GM/DL (ref 2.4–3.5)
GLUCOSE SERPL-MCNC: 119 MG/DL (ref 82–115)
HCT VFR BLD AUTO: 38 % (ref 42–52)
HGB BLD-MCNC: 13 G/DL (ref 14–18)
IMM GRANULOCYTES # BLD AUTO: 0.02 X10(3)/MCL (ref 0–0.04)
IMM GRANULOCYTES NFR BLD AUTO: 0.2 %
LYMPHOCYTES # BLD AUTO: 2.26 X10(3)/MCL (ref 0.6–4.6)
LYMPHOCYTES NFR BLD AUTO: 26.5 %
MCH RBC QN AUTO: 32.3 PG (ref 27–31)
MCHC RBC AUTO-ENTMCNC: 34.2 G/DL (ref 33–36)
MCV RBC AUTO: 94.5 FL (ref 80–94)
MONOCYTES # BLD AUTO: 0.72 X10(3)/MCL (ref 0.1–1.3)
MONOCYTES NFR BLD AUTO: 8.4 %
NEUTROPHILS # BLD AUTO: 4.33 X10(3)/MCL (ref 2.1–9.2)
NEUTROPHILS NFR BLD AUTO: 50.8 %
NRBC BLD AUTO-RTO: 0 %
PLATELET # BLD AUTO: 241 X10(3)/MCL (ref 130–400)
PMV BLD AUTO: 9.7 FL (ref 7.4–10.4)
POTASSIUM SERPL-SCNC: 3.8 MMOL/L (ref 3.5–5.1)
PROT SERPL-MCNC: 6.9 GM/DL (ref 5.8–7.6)
RBC # BLD AUTO: 4.02 X10(6)/MCL (ref 4.7–6.1)
SODIUM SERPL-SCNC: 138 MMOL/L (ref 136–145)
TROPONIN I SERPL-MCNC: 0.01 NG/ML (ref 0–0.04)
WBC # SPEC AUTO: 8.54 X10(3)/MCL (ref 4.5–11.5)

## 2023-05-06 PROCEDURE — 85025 COMPLETE CBC W/AUTO DIFF WBC: CPT | Performed by: PHYSICIAN ASSISTANT

## 2023-05-06 PROCEDURE — 0240U COVID/FLU A&B PCR: CPT | Performed by: PHYSICIAN ASSISTANT

## 2023-05-06 PROCEDURE — 25000242 PHARM REV CODE 250 ALT 637 W/ HCPCS: Performed by: PHYSICIAN ASSISTANT

## 2023-05-06 PROCEDURE — 99285 EMERGENCY DEPT VISIT HI MDM: CPT | Mod: 25,CS

## 2023-05-06 PROCEDURE — 94761 N-INVAS EAR/PLS OXIMETRY MLT: CPT

## 2023-05-06 PROCEDURE — 94640 AIRWAY INHALATION TREATMENT: CPT

## 2023-05-06 PROCEDURE — 80053 COMPREHEN METABOLIC PANEL: CPT | Performed by: PHYSICIAN ASSISTANT

## 2023-05-06 PROCEDURE — 84484 ASSAY OF TROPONIN QUANT: CPT | Performed by: PHYSICIAN ASSISTANT

## 2023-05-06 RX ORDER — METHYLPREDNISOLONE SOD SUCC 125 MG
125 VIAL (EA) INJECTION
Status: COMPLETED | OUTPATIENT
Start: 2023-05-06 | End: 2023-05-07

## 2023-05-06 RX ORDER — IPRATROPIUM BROMIDE AND ALBUTEROL SULFATE 2.5; .5 MG/3ML; MG/3ML
9 SOLUTION RESPIRATORY (INHALATION)
Status: DISPENSED | OUTPATIENT
Start: 2023-05-06 | End: 2023-05-06

## 2023-05-06 RX ADMIN — IPRATROPIUM BROMIDE AND ALBUTEROL SULFATE 9 ML: .5; 3 SOLUTION RESPIRATORY (INHALATION) at 11:05

## 2023-05-07 VITALS
BODY MASS INDEX: 26.09 KG/M2 | TEMPERATURE: 98 F | RESPIRATION RATE: 18 BRPM | SYSTOLIC BLOOD PRESSURE: 184 MMHG | WEIGHT: 176.13 LBS | HEIGHT: 69 IN | OXYGEN SATURATION: 95 % | HEART RATE: 90 BPM | DIASTOLIC BLOOD PRESSURE: 75 MMHG

## 2023-05-07 LAB
FLUAV AG UPPER RESP QL IA.RAPID: NOT DETECTED
FLUBV AG UPPER RESP QL IA.RAPID: NOT DETECTED
SARS-COV-2 RNA RESP QL NAA+PROBE: NOT DETECTED

## 2023-05-07 PROCEDURE — 93005 ELECTROCARDIOGRAM TRACING: CPT

## 2023-05-07 PROCEDURE — 96372 THER/PROPH/DIAG INJ SC/IM: CPT | Performed by: PHYSICIAN ASSISTANT

## 2023-05-07 PROCEDURE — 63600175 PHARM REV CODE 636 W HCPCS: Performed by: PHYSICIAN ASSISTANT

## 2023-05-07 RX ORDER — ALBUTEROL SULFATE 0.83 MG/ML
2.5 SOLUTION RESPIRATORY (INHALATION) EVERY 4 HOURS PRN
Qty: 75 ML | Refills: 0 | Status: SHIPPED | OUTPATIENT
Start: 2023-05-07 | End: 2023-05-23 | Stop reason: SDUPTHER

## 2023-05-07 RX ORDER — BENZONATATE 100 MG/1
100 CAPSULE ORAL 3 TIMES DAILY PRN
Qty: 20 CAPSULE | Refills: 0 | Status: SHIPPED | OUTPATIENT
Start: 2023-05-07 | End: 2023-05-17 | Stop reason: SDUPTHER

## 2023-05-07 RX ORDER — PREDNISONE 20 MG/1
40 TABLET ORAL DAILY
Qty: 10 TABLET | Refills: 0 | Status: SHIPPED | OUTPATIENT
Start: 2023-05-07 | End: 2023-05-12

## 2023-05-07 RX ADMIN — METHYLPREDNISOLONE SODIUM SUCCINATE 125 MG: 125 INJECTION INTRAMUSCULAR; INTRAVENOUS at 12:05

## 2023-05-07 NOTE — ED PROVIDER NOTES
Encounter Date: 5/6/2023       History     Chief Complaint   Patient presents with    Cough     Coughing and SOB with wheezing for several days     76 YO AAM in ER with complaints of cough, wheezing and SOB X several days. Hx of asthma. Had the same symptoms about 3 months ago per pt and was feeling well until a few days ago. Denies fever, chills, chest pain, abdominal pain, N/V/D, HA or dizziness. No other complaints.     The history is provided by the patient.   Review of patient's allergies indicates:  No Known Allergies  Past Medical History:   Diagnosis Date    Cancer     Encounter for blood transfusion     Hyperlipidemia     Hypertension      Past Surgical History:   Procedure Laterality Date    CAROTID ENDARTERECTOMY      COLONOSCOPY N/A 10/19/2022    Procedure: COLONOSCOPY;  Surgeon: Natalya Neely MD;  Location: [facility] ENDOSCOPY;  Service: Gastroenterology;  Laterality: N/A;    MEDIPORT INSERTION, SINGLE       Family History   Problem Relation Age of Onset    Stroke Sister     Hypertension Sister     Cancer Sister     Stroke Brother     Hypertension Brother      Social History     Tobacco Use    Smoking status: Former    Smokeless tobacco: Never   Substance Use Topics    Alcohol use: Not Currently    Drug use: Never     Review of Systems   Constitutional:  Negative for chills and fever.   HENT:  Negative for congestion and trouble swallowing.    Respiratory:  Positive for cough, shortness of breath and wheezing.    Cardiovascular:  Negative for chest pain and leg swelling.   Gastrointestinal:  Negative for abdominal pain, diarrhea, nausea and vomiting.   Musculoskeletal:  Negative for joint swelling.   Skin:  Negative for rash.   Neurological:  Negative for dizziness, weakness and headaches.     Physical Exam     Initial Vitals [05/06/23 2213]   BP Pulse Resp Temp SpO2   (!) 183/69 85 18 98.2 °F (36.8 °C) 96 %      MAP       --         Physical Exam    Nursing note and vitals reviewed.  Constitutional:  He appears well-developed and well-nourished.   HENT:   Head: Normocephalic and atraumatic.   Nose: Nose normal.   Eyes: Conjunctivae are normal.   Neck: Neck supple.   Cardiovascular:  Normal rate and regular rhythm.           Pulmonary/Chest: No respiratory distress. He has wheezes in the right upper field, the right middle field, the right lower field, the left upper field, the left middle field and the left lower field.   Musculoskeletal:         General: Normal range of motion.      Cervical back: Neck supple.     Neurological: He is alert and oriented to person, place, and time.   Skin: Skin is dry.   Psychiatric: He has a normal mood and affect.       ED Course   Procedures  Labs Reviewed   COMPREHENSIVE METABOLIC PANEL - Abnormal; Notable for the following components:       Result Value    Glucose Level 119 (*)     Creatinine 1.41 (*)     Albumin/Globulin Ratio 1.0 (*)     All other components within normal limits   CBC WITH DIFFERENTIAL - Abnormal; Notable for the following components:    RBC 4.02 (*)     Hgb 13.0 (*)     Hct 38.0 (*)     MCV 94.5 (*)     MCH 32.3 (*)     Eos # 1.13 (*)     All other components within normal limits   COVID/FLU A&B PCR - Normal    Narrative:     The Xpert Xpress SARS-CoV-2/FLU/RSV plus is a rapid, multiplexed real-time PCR test intended for the simultaneous qualitative detection and differentiation of SARS-CoV-2, Influenza A, Influenza B, and respiratory syncytial virus (RSV) viral RNA in either nasopharyngeal swab or nasal swab specimens.         TROPONIN I - Normal   CBC W/ AUTO DIFFERENTIAL    Narrative:     The following orders were created for panel order CBC Auto Differential.  Procedure                               Abnormality         Status                     ---------                               -----------         ------                     CBC with Differential[[id number]]        Abnormal            Final result                 Please view results for these  tests on the individual orders.   EXTRA TUBES    Narrative:     The following orders were created for panel order EXTRA TUBES.  Procedure                               Abnormality         Status                     ---------                               -----------         ------                     Light Blue Top Hold[[id number]]                              In process                 Lavender Top Hold[[id number]]                                In process                 Gold Top Hold[[id number]]                                    In process                   Please view results for these tests on the individual orders.   LIGHT BLUE TOP HOLD   LAVENDER TOP HOLD   GOLD TOP HOLD     EKG Readings: (Independently Interpreted)   Initial Reading: No STEMI. Rhythm: Normal Sinus Rhythm. Heart Rate: 91. Conduction: 1st Degree AV Block. ST Segments: Normal ST Segments. T Waves: Normal. Clinical Impression: Normal Sinus Rhythm   0019     Imaging Results              X-Ray Chest PA And Lateral (In process)                      Medications   albuterol-ipratropium 2.5 mg-0.5 mg/3 mL nebulizer solution 9 mL (9 mLs Nebulization Not Given 5/6/23 2414)   methylPREDNISolone sodium succinate injection 125 mg (125 mg Intramuscular Given 5/7/23 0017)     Medical Decision Making:   Initial Assessment:   Wheezing on exam, hx of asthma and allergies, has albuterol inhaler that he has been using without much relief, no neb machine at home  Differential Diagnosis:   Bronchitis, pneumonia, asthma exacerbation  Clinical Tests:   Lab Tests: Ordered and Reviewed  The following lab test(s) were unremarkable: CBC, CMP and Troponin  Radiological Study: Ordered and Reviewed  Medical Tests: Ordered and Reviewed  ED Management:  Duo Nebs and SoluMedrol given in ER, pt feeling better and ready to go home, still has some mild expiratory wheezing on exam, will Rx Nebulizer machine and albuterol for at home use                        Clinical Impression:    Final diagnoses:  [R05.9] Cough  [J45.901] Asthma with acute exacerbation, unspecified asthma severity, unspecified whether persistent (Primary)        ED Disposition Condition    Discharge Stable          ED Prescriptions       Medication Sig Dispense Start Date End Date Auth. Provider    albuterol (PROVENTIL) 2.5 mg /3 mL (0.083 %) nebulizer solution Take 3 mLs (2.5 mg total) by nebulization every 4 (four) hours as needed for Wheezing or Shortness of Breath. Rescue 75 mL 5/7/2023 5/6/2024 CIPRIANO Covarrubias    predniSONE (DELTASONE) 20 MG tablet Take 2 tablets (40 mg total) by mouth once daily. for 5 days 10 tablet 5/7/2023 5/12/2023 CIPRIANO Covarrubias    benzonatate (TESSALON) 100 MG capsule Take 1 capsule (100 mg total) by mouth 3 (three) times daily as needed for Cough. 20 capsule 5/7/2023 5/17/2023 CIPRIANO Covarrubias          Follow-up Information       Follow up With Specialties Details Why Contact Info    Ancelmo Richard MD Internal Medicine Schedule an appointment as soon as possible for a visit in 3 days  [address] [facility] [address]  [phone]      [facility] Emergency Medicine In 3 days As needed, If symptoms worsen [address] [facility] [id number] [phone]             CIPRIANO Covarrubias  05/07/23 0046

## 2023-05-17 DIAGNOSIS — J45.909 ASTHMA, UNSPECIFIED ASTHMA SEVERITY, UNSPECIFIED WHETHER COMPLICATED, UNSPECIFIED WHETHER PERSISTENT: Primary | ICD-10-CM

## 2023-05-17 DIAGNOSIS — Z87.09 HISTORY OF ASTHMA: ICD-10-CM

## 2023-05-17 RX ORDER — ALBUTEROL SULFATE 0.83 MG/ML
2.5 SOLUTION RESPIRATORY (INHALATION) EVERY 4 HOURS PRN
Qty: 75 ML | Refills: 0 | Status: CANCELLED | OUTPATIENT
Start: 2023-05-17 | End: 2024-05-16

## 2023-05-17 NOTE — TELEPHONE ENCOUNTER
Mr Gu requesting refill. Reports he is completely out of Benzonatate & will run out of Duoneb before clinic visit. Please advise  Seen in ED on 5/6/2023  Clinic visit scheduled 5/30/2023

## 2023-05-19 DIAGNOSIS — Z87.09 HISTORY OF ASTHMA: Primary | ICD-10-CM

## 2023-05-19 RX ORDER — ALBUTEROL SULFATE 0.83 MG/ML
2.5 SOLUTION RESPIRATORY (INHALATION) EVERY 4 HOURS PRN
Qty: 75 ML | Refills: 0 | Status: CANCELLED | OUTPATIENT
Start: 2023-05-19 | End: 2024-05-18

## 2023-05-19 NOTE — TELEPHONE ENCOUNTER
Order for refill of albuterol 2.5 mg in chart. There is no order for benzonatate 100 mg in the chart.

## 2023-05-19 NOTE — TELEPHONE ENCOUNTER
----- Message from Ulisses Magana sent at 5/19/2023  8:35 AM CDT -----  Regarding: Dr Richard RX RQ  Provider: Dr. Richard      Last Visit: 2/6/23      Next Visit: 5/30/23       Patient's Contact #: [phone]      Medication Name & Dose: Benzonatate 100mg                                             Albuterol Sulfate 2.5mg       Preferred Pharmacy: [facility] [address]       Comment:

## 2023-05-23 ENCOUNTER — OFFICE VISIT (OUTPATIENT)
Dept: CARDIOLOGY | Facility: CLINIC | Age: 78
End: 2023-05-23
Payer: MEDICARE

## 2023-05-23 VITALS
RESPIRATION RATE: 18 BRPM | OXYGEN SATURATION: 99 % | BODY MASS INDEX: 25.57 KG/M2 | WEIGHT: 172.63 LBS | HEIGHT: 69 IN | HEART RATE: 50 BPM | SYSTOLIC BLOOD PRESSURE: 138 MMHG | TEMPERATURE: 98 F | DIASTOLIC BLOOD PRESSURE: 48 MMHG

## 2023-05-23 DIAGNOSIS — I10 HYPERTENSION, UNSPECIFIED TYPE: Primary | ICD-10-CM

## 2023-05-23 PROCEDURE — 99215 OFFICE O/P EST HI 40 MIN: CPT | Mod: PBBFAC | Performed by: INTERNAL MEDICINE

## 2023-05-23 RX ORDER — ALBUTEROL SULFATE 0.83 MG/ML
2.5 SOLUTION RESPIRATORY (INHALATION) EVERY 4 HOURS PRN
Qty: 75 ML | Refills: 0 | Status: SHIPPED | OUTPATIENT
Start: 2023-05-23 | End: 2023-05-30 | Stop reason: SDUPTHER

## 2023-05-23 NOTE — PROGRESS NOTES
[facility] INTERNAL MEDICINE  OUTPATIENT OFFICE VISIT NOTE    SUBJECTIVE:      Chief Complaint: Follow-up (1 yr follow up pt states he was told to use nebulizer machine for sob but unable toget medication pharmacist told him to check with cardiologist)     HPI: Riccardo Hamilton is a 77 y.o. yo male w/ PMH of colonic adenocarcinoma (followed by Oncology; s/p hemicolectomy and adjuvant chemo), HTN, HLD, T2DM, Asthma, Retropharyngeal Carotid Bypass (2014), Carotid Endarterectomy (1/2014; 2/2014), and previous RLE DVT w/ subacute small PE of R. Lung (s/p x3 month Eliquis txt) who presents for 1 year follow-up.  Patient states he has been doing well and denies any recent episodes of chest pain, sob, palpitations, orthopnea, dyspnea on exertion, syncope, clots, or LE swelling.  Patient continuing to endorse chronic cough and states he was taken off of his lisinopril at his last PCP visit but cough persisted.  He states he was placed on albuterol nebulizer which has been helping with his sob and wheezing but he ran out and had to go to the ED for management.  He is otherwise feeling well with no complaints at this time.  States he is able to walk around his house and function independently, lives at home with his wife.  Does not exercise secondary to arthritis. Is compliant with medication regimen.  Denies any alcohol, tobacco, or recreational drug use.       Past Medical History:   has a past medical history of Cancer, Encounter for blood transfusion, Hyperlipidemia, and Hypertension.     Past Surgical History:   has a past surgical history that includes Mediport insertion, single; Carotid endarterectomy; and Colonoscopy (N/A, 10/19/2022).     Family History:  family history includes Cancer in his sister; Hypertension in his brother and sister; Stroke in his brother and sister.     Social History:   reports that he has quit smoking. He has never used smokeless tobacco. He reports that he does not currently use alcohol. He  reports that he does not use drugs.     Allergies:  has No Known Allergies.     Home Medications:  Prior to Admission medications    Medication Sig Start Date End Date Taking? Authorizing Provider   albuterol (PROVENTIL HFA) 90 mcg/actuation inhaler Inhale 2 puffs into the lungs every 6 (six) hours as needed for Wheezing. Rescue 2/6/23  Yes Ancelmo Richard MD   albuterol (PROVENTIL) 2.5 mg /3 mL (0.083 %) nebulizer solution Take 3 mLs (2.5 mg total) by nebulization every 4 (four) hours as needed for Wheezing or Shortness of Breath. Rescue 5/7/23 5/6/24 Yes CIPRIANO Covarrubias   aspirin 81 MG Chew Take 1 tablet (81 mg total) by mouth once daily. 9/21/22  Yes Ancelmo Richard MD   atorvastatin (LIPITOR) 40 MG tablet Take 1 tablet (40 mg total) by mouth once daily. 9/21/22  Yes Ancelmo Richard MD   cetirizine (ZYRTEC) 5 MG tablet Take 1 tablet (5 mg total) by mouth once daily. 9/21/22 9/21/23 Yes Ancelmo Richard MD   ferrous sulfate (FEOSOL) 325 mg (65 mg iron) Tab tablet Take 325 mg by mouth once daily.   Yes Historical Provider   fluticasone propionate (FLONASE) 50 mcg/actuation nasal spray 1 spray (50 mcg total) by Each Nostril route 2 (two) times daily. 4/19/23  Yes Ancelmo Richard MD   hydrALAZINE (APRESOLINE) 25 MG tablet Take 1 tablet (25 mg total) by mouth 2 (two) times daily. 9/21/22  Yes Ancelmo Richard MD   hydroCHLOROthiazide (HYDRODIURIL) 25 MG tablet Take 1 tablet (25 mg total) by mouth once daily. 9/21/22  Yes Ancelmo Richard MD   losartan (COZAAR) 25 MG tablet Take 1 tablet (25 mg total) by mouth once daily. 2/6/23 2/6/24 Yes Ancelmo Richard MD   magnesium oxide (MAG-OX) 400 mg (241.3 mg magnesium) tablet Take 400 mg by mouth 2 (two) times daily.   Yes Historical Provider   metoprolol succinate (TOPROL-XL) 25 MG 24 hr tablet Take 1 tablet (25 mg total) by mouth once daily. 9/21/22  Yes Ancelmo Richard MD   multivitamin (THERAGRAN) per tablet Take 1 tablet by mouth once daily.   Yes  Historical Provider   pantoprazole (PROTONIX) 40 MG tablet Take 1 tablet (40 mg total) by mouth once daily. 9/21/22  Yes Ancelmo Richard MD   MOVIPREP 100-7.5-2.691 gram solution Take by mouth. 8/11/22   Historical Provider     Review of Systems   Constitutional: Negative.    Respiratory:  Positive for cough and wheezing. Negative for shortness of breath.    Cardiovascular:  Negative for chest pain, palpitations, orthopnea, claudication, leg swelling and PND.       OBJECTIVE:     Vital signs:   There were no vitals taken for this visit.     Physical Examination:  General: Patient resting comfortably, in no acute distress   Eye: PERRLA, EOMI, clear conjunctiva, eyelids normal  HENT: Head-normocephalic and atraumatic  Neck: full range of motion, no thyromegaly or lymphadenopathy, trachea midline, supple, no palpable thyroid nodules  Respiratory: clear to auscultation bilaterally without wheezes, rales, rhonchi  Cardiovascular: regular rate and rhythm without murmurs.  No gallops or rubs no JVD.  Capillary refill within normal limits.  Gastrointestinal: soft, non-tender, non-distended with normal bowel sounds, without masses to palpation  Genitourinary: no CVA tenderness to palpation  Musculoskeletal: full range of motion of all extremities/spine without limitation or discomfort  Integumentary: no rashes or skin lesions present  Neurologic: no signs of peripheral neurological deficit, motor/sensory function intact  Psychiatric:  alert and oriented, cognitive function intact, cooperative with exam, good eye contact, judgement and insight intact, mood and affect full range.     Labs:  CMP:   Lab Results   Component Value Date    GLUCOSE 119 (H) 05/06/2023    CALCIUM 9.4 05/06/2023    ALBUMIN 3.4 05/06/2023     05/06/2023    K 3.8 05/06/2023    CO2 26 05/06/2023    BUN 16.5 05/06/2023    CREATININE 1.41 (H) 05/06/2023    ALKPHOS 131 05/06/2023    ALT 24 05/06/2023    AST 26 05/06/2023    BILITOT 0.3 05/06/2023       CBC:   Lab Results   Component Value Date    WBC 8.54 05/06/2023    HGB 13.0 (L) 05/06/2023    HCT 38.0 (L) 05/06/2023    MCV 94.5 (H) 05/06/2023    RDW 13.2 05/06/2023     FLP:   Lab Results   Component Value Date    CHOL 142 01/30/2023    HDL 28 (L) 01/30/2023    LDL 80.00 01/30/2023    TRIG 168 (H) 01/30/2023    TOTALCHOLEST 5 01/30/2023     Coagulation:   Lab Results   Component Value Date    INR 1.19 07/21/2018     DM:   Lab Results   Component Value Date    HGBA1C 6.2 01/30/2023    .2 01/30/2023    CREATININE 1.41 (H) 05/06/2023    CREATRANDUR 163.0 12/24/2018     Thyroid:   Lab Results   Component Value Date    TSH 1.1025 02/23/2022     LFTs:   Lab Results   Component Value Date    LABPROT 6.9 05/06/2023    ALBUMIN 3.4 05/06/2023    AST 26 05/06/2023    ALT 24 05/06/2023    ALKPHOS 131 05/06/2023       ASSESSMENT & PLAN:     HTN  -BP well controlled at 138/48  -Continue regimen of Losartan 10mg,  HCTZ 25mg daily, Toprol 25mg daily and Hydralazine 25mg BID  -Recent CMP reviewed, electrolytes wnl      Hypercholesterolemia  -LDL 80 on 1/2023  -Continue Lipitor 40mg daily     ADRIÁN s/p CEA bilaterally  -Last carotid US in 4/2021 that showed < 50% stenosis bilaterally  -Previously followed by vascular surgery but has since been discharged  -Continue aspirin and statin     Prediabetes  -Diet controlled  -A1c 6.2 1/30/2023, management per PCP     CKD stage 3  -Management per PCP     Cecal adenocarcinoma stage 3  -s/p hemicolectomy and adjuvant chemo  -continue surveillance and management per onc     prox RLE DVT and subacute small PE Rlung   - s/p Eliquis for 3 months  -continue aspirin 81mg daily     Activity Level: 2.5 MET  RCRI score 0 - Class 1 risk, 3.9% 30-day risk of death, MI, or cardiac arrest.      Return to clinic in 1 year with NP     Samaria Bauman MD  \[facility]\"" Internal Medicine, -

## 2023-05-23 NOTE — PROGRESS NOTES
Cardiology Attending    I evaluated Riccardo Hamilton and discussed the patient's symptoms, findings, and management plan with the resident.  Please see the Cardiology note for details.

## 2023-05-29 RX ORDER — BENZONATATE 100 MG/1
100 CAPSULE ORAL 3 TIMES DAILY PRN
Qty: 20 CAPSULE | Refills: 0 | Status: SHIPPED | OUTPATIENT
Start: 2023-05-29 | End: 2023-06-08

## 2023-05-30 ENCOUNTER — OFFICE VISIT (OUTPATIENT)
Dept: INTERNAL MEDICINE | Facility: CLINIC | Age: 78
End: 2023-05-30
Payer: MEDICARE

## 2023-05-30 VITALS
RESPIRATION RATE: 18 BRPM | SYSTOLIC BLOOD PRESSURE: 146 MMHG | HEART RATE: 62 BPM | DIASTOLIC BLOOD PRESSURE: 58 MMHG | HEIGHT: 69 IN | WEIGHT: 176.19 LBS | TEMPERATURE: 98 F | BODY MASS INDEX: 26.1 KG/M2

## 2023-05-30 DIAGNOSIS — Z87.09 HISTORY OF ASTHMA: ICD-10-CM

## 2023-05-30 PROCEDURE — 99214 OFFICE O/P EST MOD 30 MIN: CPT | Mod: PBBFAC

## 2023-05-30 RX ORDER — ALBUTEROL SULFATE 90 UG/1
2 AEROSOL, METERED RESPIRATORY (INHALATION) EVERY 6 HOURS PRN
Qty: 6.7 G | Refills: 2 | Status: SHIPPED | OUTPATIENT
Start: 2023-05-30 | End: 2023-07-06 | Stop reason: SDUPTHER

## 2023-05-30 RX ORDER — FLUTICASONE PROPIONATE AND SALMETEROL 250; 50 UG/1; UG/1
1 POWDER RESPIRATORY (INHALATION) 2 TIMES DAILY
Qty: 60 EACH | Refills: 2 | Status: SHIPPED | OUTPATIENT
Start: 2023-05-30 | End: 2023-08-02 | Stop reason: SDUPTHER

## 2023-05-30 RX ORDER — ALBUTEROL SULFATE 0.83 MG/ML
2.5 SOLUTION RESPIRATORY (INHALATION) EVERY 4 HOURS PRN
Qty: 75 ML | Refills: 0 | Status: SHIPPED | OUTPATIENT
Start: 2023-05-30 | End: 2023-06-15 | Stop reason: SDUPTHER

## 2023-05-30 NOTE — PROGRESS NOTES
"[facility] INTERNAL MEDICINE  OUTPATIENT OFFICE VISIT NOTE    SUBJECTIVE:      HPI: Riccardo Hamilton is a 77 y.o. yo male w/ PMH of colonic adenocarcinoma (followed by Oncology; s/p hemicolectomy and adjuvant chemo), HTN, HLD, T2DM, Asthma, Retropharyngeal Carotid Bypass (2014), Carotid Endarterectomy (1/2014; 2/2014), and previous RLE DVT w/ subacute small PE of R. Lung (s/p x3 month Eliquis txt) presents to [facility] IM clinic for follow up visit.     Since last visit, patient presented to ED 2/2 cough, wheezing, & shortness of breath. At previous appointments, patient was taken off of Advair Diskus after PFTs were unremarkable for obstructive disease. Since taken off of this medicine cough, SOB, and wheezing has returned/worsened and has been utilizing Nebulized Albuterol (x2-3/day) & Albuterol MDI (x2 daily) with mild improvement of symptoms. Denies symptoms related to vocal cord dysfunction. Does endorse some symptoms suspicious for GERD and is not currently on PPI/H2 blocker. Labs do reveal elevated eosinophils. Does take zyrtec & flonase daily for allergies. States prior to recent discontinuation, took Advair Diskus for past x10 years with decent control of respiratory symptoms.    ROS:  Denies any headaches, changes in vision, chest pain, palpitations, shortness of breath, N/V, or lightheadedness/dizziness, abdominal pain, pain/difficulty with urination/stools, or blood in urine/BMs.       OBJECTIVE:     Vital signs:   BP (!) 146/58 (BP Location: Right arm, Patient Position: Sitting, BP Method: Medium (Manual))   Pulse 62   Temp 98.1 °F (36.7 °C) (Oral)   Resp 18   Ht 5' 9" (1.753 m)   Wt 79.9 kg (176 lb 3.2 oz)   BMI 26.02 kg/m²    Repeat BP (112/56), 2/6/23    Physical Examination:  Gen: well-nourished, well-developed gentleman, in no acute distress   HEENT: Normocephalic, atraumatic. PERRL.   Neck: No thyromegaly. No lymphadenopathy.   Heart: RRR, no murmurs, gallops, clicks or rubs.   Lungs: Posterior breath " sounds with inspiratory fine crackles w/ possible velcr0-like appreciations   Abd: Midline well-healed abdominal scar. Soft, non-tender, non-distended and without guarding.   Extremities: Radial and pedal pulses 2+ bilaterally, no appreciable LE edema.   MSK: No obvious deformities. Moves all extremities purposefully.   Neuro: Responds well to commands. CN III-XII grossly intact. No focal neurological deficits noted   Skin: Warm, dry, intact.     ASSESSMENT & PLAN:   Reported Hx of Asthma; possible reactive airway disease  - Discontinued Advair Discus at previous appt 2/2 to unremarkable recent PFTs for obstruction  - Has since required x2 puffs Albuterol daily 2/2 worsening cough, SOB, & sputum production  - Discussed possiblities of additional pulmonary etiologies with patient & possible exposure w/ differential including GERD, post-nasal drip, vocal cord dysfunction, reactive airway disease, and/or ILD  - Recent CT thorax (4/2023) w/ scattered mediastinal lymph nodes & hilar lymph nodes, chronic interstitial markings at inferior lingula & RML, & mildly prominent peripheral distribution of interstitial markings at lung bases  - Eosinophils mildly elevated on serial CBCs  - Patient may benefit from High-Res CT Thorax for further evaluation  - Continue Albuterol inhaler as needed  - Restarted Advair Diskus today for trial therapy  - Plan to discuss case further with pulmonologist    Seasonal Allergies - well controlled  - Intermittent-persistent dry cough and eye irritiation  - Relieved with Zyrtec/Flonase therapy    Hypertension  - Previously Well Controlled  - Continue HCTZ 25 daily, Toprol 25 daily, Hydralazine 25 BID, Losartan 25 q.D.  - Counseled on DASH diet & increasing exercise as tolerated     Hyperlipidemia  - Latest lipid profile WNL  - Continue Lipitor 40 daily     Stage IIIB Adenocarcinoma of Cecum  - s/p hemicolectomy and adjuvant chemotherapy  - At this time denies any GI-related symptoms  - patient  is followed by oncology; continue to follow as scheduled     Hx of B/L Carotid Endarterectomy   - Continue Aspirin 81, Lipitor 40 daily     GERD   - Continue Protonix 40 daily     Hx of DVT/PE  - Occurred in 2021, treated with x3 months Eliquis  - Denies any recurrence  - Continue Aspirin 81 daily      Health Maintenance:  Immunization History   Administered Date(s) Administered    COVID-19, MRNA, LN-S, PF (MODERNA FULL 0.5 ML DOSE) 01/12/2021, 02/26/2021, 10/22/2021, 05/24/2022    COVID-19, MRNA, LN-S, PF (Pfizer) (Purple Cap) 01/12/2021, 02/26/2021    Influenza (FLUAD) - Quadrivalent - Adjuvanted - PF *Preferred* (65+) 09/21/2022    Influenza - High Dose - PF (65 years and older) 09/22/2015, 10/06/2016, 10/02/2017, 09/24/2018    Influenza - Quadrivalent - PF *Preferred* (6 months and older) 09/12/2019    Influenza - Trivalent - PF (ADULT) 09/22/2015, 10/06/2016, 10/02/2017, 09/24/2018, 09/12/2019    Influenza A (H1N1) 2009 Monovalent - IM 01/21/2010    Pneumococcal Conjugate - 13 Valent 09/04/2020    Pneumococcal Polysaccharide - 23 Valent 10/02/2015    Tdap 02/09/2022    Zoster 09/22/2015    Zoster Recombinant 05/31/2022, 09/21/2022     Return to clinic in 2 months with labs.    Ancelmo Richard MD   [facility] Internal Medicine, PGY-2

## 2023-06-06 ENCOUNTER — INFUSION (OUTPATIENT)
Dept: INFUSION THERAPY | Facility: HOSPITAL | Age: 78
End: 2023-06-06
Attending: INTERNAL MEDICINE
Payer: MEDICARE

## 2023-06-06 VITALS
DIASTOLIC BLOOD PRESSURE: 61 MMHG | TEMPERATURE: 98 F | WEIGHT: 177 LBS | HEART RATE: 66 BPM | SYSTOLIC BLOOD PRESSURE: 148 MMHG | OXYGEN SATURATION: 98 % | RESPIRATION RATE: 20 BRPM | BODY MASS INDEX: 26.14 KG/M2

## 2023-06-06 DIAGNOSIS — C18.0 ADENOCARCINOMA OF CECUM: Primary | ICD-10-CM

## 2023-06-06 PROCEDURE — 96523 IRRIG DRUG DELIVERY DEVICE: CPT

## 2023-06-06 PROCEDURE — A4216 STERILE WATER/SALINE, 10 ML: HCPCS | Performed by: INTERNAL MEDICINE

## 2023-06-06 PROCEDURE — 63600175 PHARM REV CODE 636 W HCPCS: Performed by: INTERNAL MEDICINE

## 2023-06-06 PROCEDURE — 25000003 PHARM REV CODE 250: Performed by: INTERNAL MEDICINE

## 2023-06-06 RX ORDER — HEPARIN 100 UNIT/ML
500 SYRINGE INTRAVENOUS
Status: DISCONTINUED | OUTPATIENT
Start: 2023-06-06 | End: 2023-06-06 | Stop reason: HOSPADM

## 2023-06-06 RX ORDER — SODIUM CHLORIDE 0.9 % (FLUSH) 0.9 %
10 SYRINGE (ML) INJECTION
Status: DISCONTINUED | OUTPATIENT
Start: 2023-06-06 | End: 2023-06-06 | Stop reason: HOSPADM

## 2023-06-06 RX ADMIN — HEPARIN 500 UNITS: 100 SYRINGE at 07:06

## 2023-06-06 RX ADMIN — Medication 10 ML: at 07:06

## 2023-06-15 DIAGNOSIS — Z87.09 HISTORY OF ASTHMA: ICD-10-CM

## 2023-06-15 RX ORDER — ALBUTEROL SULFATE 0.83 MG/ML
2.5 SOLUTION RESPIRATORY (INHALATION) EVERY 4 HOURS PRN
Qty: 75 ML | Refills: 0 | Status: SHIPPED | OUTPATIENT
Start: 2023-06-15 | End: 2023-07-05 | Stop reason: SDUPTHER

## 2023-06-15 NOTE — TELEPHONE ENCOUNTER
Pt's wife (Ya) called and is requesting a call back. She can be reached @ [phone].    Please advise.

## 2023-07-05 DIAGNOSIS — Z87.09 HISTORY OF ASTHMA: ICD-10-CM

## 2023-07-05 NOTE — TELEPHONE ENCOUNTER
----- Message from Ulisses Magana sent at 7/5/2023  8:11 AM CDT -----  Regarding: Dr. Richard RX RQ  Provider: Dr Richard      Last Visit: 5/30/23      Next Visit: 8/21/23       Patient's Contact #: [phone]      Medication Name & Dose: Albuterol 2.5mg/3mL       [facility]      Comment:

## 2023-07-06 RX ORDER — ALBUTEROL SULFATE 0.83 MG/ML
2.5 SOLUTION RESPIRATORY (INHALATION) EVERY 4 HOURS PRN
Qty: 75 ML | Refills: 1 | Status: SHIPPED | OUTPATIENT
Start: 2023-07-06 | End: 2023-08-30 | Stop reason: SDUPTHER

## 2023-07-07 ENCOUNTER — HOSPITAL ENCOUNTER (EMERGENCY)
Facility: HOSPITAL | Age: 78
Discharge: HOME | End: 2023-07-07
Attending: INTERNAL MEDICINE
Payer: MEDICARE

## 2023-07-07 VITALS
SYSTOLIC BLOOD PRESSURE: 129 MMHG | TEMPERATURE: 97 F | HEIGHT: 69 IN | OXYGEN SATURATION: 98 % | BODY MASS INDEX: 25.89 KG/M2 | HEART RATE: 65 BPM | RESPIRATION RATE: 17 BRPM | WEIGHT: 174.81 LBS | DIASTOLIC BLOOD PRESSURE: 80 MMHG

## 2023-07-07 DIAGNOSIS — G89.29 CHRONIC PAIN OF BOTH SHOULDERS: Primary | ICD-10-CM

## 2023-07-07 DIAGNOSIS — M25.511 CHRONIC PAIN OF BOTH SHOULDERS: Primary | ICD-10-CM

## 2023-07-07 DIAGNOSIS — G89.29 CHRONIC WRIST PAIN, RIGHT: ICD-10-CM

## 2023-07-07 DIAGNOSIS — M25.531 CHRONIC WRIST PAIN, RIGHT: ICD-10-CM

## 2023-07-07 DIAGNOSIS — M25.512 CHRONIC PAIN OF BOTH SHOULDERS: Primary | ICD-10-CM

## 2023-07-07 PROCEDURE — 63600175 PHARM REV CODE 636 W HCPCS: Performed by: NURSE PRACTITIONER

## 2023-07-07 PROCEDURE — 99284 EMERGENCY DEPT VISIT MOD MDM: CPT

## 2023-07-07 PROCEDURE — 96372 THER/PROPH/DIAG INJ SC/IM: CPT | Performed by: NURSE PRACTITIONER

## 2023-07-07 RX ORDER — MELOXICAM 7.5 MG/1
7.5 TABLET ORAL DAILY PRN
Qty: 12 TABLET | Refills: 0 | OUTPATIENT
Start: 2023-07-07 | End: 2023-07-20

## 2023-07-07 RX ORDER — KETOROLAC TROMETHAMINE 30 MG/ML
15 INJECTION, SOLUTION INTRAMUSCULAR; INTRAVENOUS
Status: COMPLETED | OUTPATIENT
Start: 2023-07-07 | End: 2023-07-07

## 2023-07-07 RX ADMIN — KETOROLAC TROMETHAMINE 15 MG: 30 INJECTION, SOLUTION INTRAMUSCULAR; INTRAVENOUS at 07:07

## 2023-07-07 NOTE — ED PROVIDER NOTES
Encounter Date: 7/7/2023       History     Chief Complaint   Patient presents with    Shoulder Pain     C/o bilateral shoulder and right wrist pain x 3 weeks. Hx of arthritis     The patient presents with chronic bilateral shoulder and right wrist pain.  The onset was chronic.  The course/duration of symptoms is constant.  Type of injury: none known.  Location: bilateral shoulders and right wrist. Radiating pain: none. The character of symptoms is pain.  The degree of pain is moderate and with certain movements.  The degree of swelling is none.  The exacerbating factor is movement.  There are relieving factors including rest and immobilization.  Risk factors consist of age, history of arthritis.  Prior episodes: chronic.  Therapy today: none.  Associated symptoms: none.  Additional history: none.         Review of patient's allergies indicates:  No Known Allergies  Past Medical History:   Diagnosis Date    Cancer     Encounter for blood transfusion     Hyperlipidemia     Hypertension      Past Surgical History:   Procedure Laterality Date    CAROTID ENDARTERECTOMY      COLONOSCOPY N/A 10/19/2022    Procedure: COLONOSCOPY;  Surgeon: Natalya Neely MD;  Location: [facility] ENDOSCOPY;  Service: Gastroenterology;  Laterality: N/A;    MEDIPORT INSERTION, SINGLE       Family History   Problem Relation Age of Onset    Stroke Sister     Hypertension Sister     Cancer Sister     Stroke Brother     Hypertension Brother      Social History     Tobacco Use    Smoking status: Former    Smokeless tobacco: Never   Substance Use Topics    Alcohol use: Not Currently    Drug use: Never     Review of Systems   Constitutional:  Negative for fever.   HENT:  Negative for sore throat.    Respiratory:  Negative for shortness of breath.    Cardiovascular:  Negative for chest pain.   Gastrointestinal:  Negative for nausea.   Genitourinary:  Negative for dysuria.   Musculoskeletal:  Positive for arthralgias. Negative for back pain.   Skin:   Negative for rash.   Neurological:  Negative for weakness.   Hematological:  Does not bruise/bleed easily.   All other systems reviewed and are negative.    Physical Exam     Initial Vitals [07/07/23 0720]   BP Pulse Resp Temp SpO2   134/72 62 20 97.3 °F (36.3 °C) 98 %      MAP       --         Physical Exam    Nursing note and vitals reviewed.  Constitutional: He appears well-developed and well-nourished.   HENT:   Head: Normocephalic and atraumatic.   Neck: Neck supple.   Normal range of motion.  Cardiovascular:  Normal rate, regular rhythm, normal heart sounds and intact distal pulses.           Pulmonary/Chest: Breath sounds normal.   Abdominal: Abdomen is soft. Bowel sounds are normal.   Musculoskeletal:         General: Normal range of motion.      Cervical back: Normal range of motion and neck supple.      Comments: Mild ttp bilateral shoulders, FROM, good distal pulses, NVI    No ttp or swelling to right wrist, FROM, good distal pulses, NVI     Neurological: He is alert and oriented to person, place, and time. He has normal strength.   Skin: Skin is warm and dry.   Psychiatric: He has a normal mood and affect.       ED Course   Procedures  Labs Reviewed - No data to display       Imaging Results    None          Medications   ketorolac injection 15 mg (has no administration in time range)     Medical Decision Making:   History:   Old Records Summarized: records from clinic visits and records from previous admission(s).  Initial Assessment:   The patient presents with chronic bilateral shoulder and right wrist pain.  The onset was chronic.  The course/duration of symptoms is constant.  Type of injury: none known.  Location: bilateral shoulders and right wrist. Radiating pain: none. The character of symptoms is pain.  The degree of pain is moderate and with certain movements.  The degree of swelling is none.  The exacerbating factor is movement.  There are relieving factors including rest and immobilization.   Risk factors consist of age, history of arthritis.  Prior episodes: chronic.  Therapy today: none.  Associated symptoms: none.  Additional history: none.     Differential Diagnosis:   Fracture, septic joint, cellulitis, abscess, gout, RA, OA among others   ED Management:  Ketorolac 15mg IM given in the ER  7:35 AM DISPOSITION: The patient is resting comfortably in no acute distress.  He is hemodynamically stable and is without objective evidence for acute process requiring urgent intervention or hospitalization. I provided counseling to patient with regard to condition, the treatment plan, specific conditions for return, and the importance of follow up. Detailed written and verbal instructions provided to patient and he expressed a verbal understanding of the discharge instructions and overall management plan. Reiterated the importance of medication administration and safety and advised patient to follow up with primary care provider in 3-5 days or sooner if needed.  Answered questions at this time. The patient is stable for discharge.                           Clinical Impression:   Final diagnoses:  [M25.511, G89.29, M25.512] Chronic pain of both shoulders (Primary)  [M25.531, G89.29] Chronic wrist pain, right        ED Disposition Condition    Discharge Stable          ED Prescriptions       Medication Sig Dispense Start Date End Date Auth. Provider    meloxicam (MOBIC) 7.5 MG tablet Take 1 tablet (7.5 mg total) by mouth daily as needed for Pain. 12 tablet 7/7/2023 -- RAMYA Briggs          Follow-up Information       Follow up With Specialties Details Why Contact Info    Ancelmo Richadr MD Internal Medicine In 3 days  [address] [facility] [address]  [phone]      [facility] Emergency Medicine  If symptoms worsen [address] [facility] [id number] [phone]             RAMYA Briggs  07/07/23 6628

## 2023-07-20 ENCOUNTER — HOSPITAL ENCOUNTER (EMERGENCY)
Facility: HOSPITAL | Age: 78
Discharge: HOME | End: 2023-07-20
Attending: STUDENT IN AN ORGANIZED HEALTH CARE EDUCATION/TRAINING PROGRAM
Payer: MEDICARE

## 2023-07-20 VITALS
RESPIRATION RATE: 18 BRPM | OXYGEN SATURATION: 99 % | TEMPERATURE: 98 F | HEIGHT: 69 IN | WEIGHT: 171.94 LBS | BODY MASS INDEX: 25.47 KG/M2 | DIASTOLIC BLOOD PRESSURE: 72 MMHG | SYSTOLIC BLOOD PRESSURE: 160 MMHG | HEART RATE: 98 BPM

## 2023-07-20 DIAGNOSIS — M25.531 CHRONIC PAIN OF RIGHT WRIST: Primary | ICD-10-CM

## 2023-07-20 DIAGNOSIS — G89.29 CHRONIC PAIN OF RIGHT WRIST: Primary | ICD-10-CM

## 2023-07-20 DIAGNOSIS — M25.512 CHRONIC PAIN OF BOTH SHOULDERS: ICD-10-CM

## 2023-07-20 DIAGNOSIS — G89.29 CHRONIC PAIN OF BOTH SHOULDERS: ICD-10-CM

## 2023-07-20 DIAGNOSIS — M25.511 CHRONIC PAIN OF BOTH SHOULDERS: ICD-10-CM

## 2023-07-20 PROCEDURE — 63600175 PHARM REV CODE 636 W HCPCS: Performed by: NURSE PRACTITIONER

## 2023-07-20 PROCEDURE — 99284 EMERGENCY DEPT VISIT MOD MDM: CPT

## 2023-07-20 PROCEDURE — 96372 THER/PROPH/DIAG INJ SC/IM: CPT | Performed by: NURSE PRACTITIONER

## 2023-07-20 RX ORDER — MELOXICAM 15 MG/1
15 TABLET ORAL DAILY PRN
Qty: 12 TABLET | Refills: 0 | Status: SHIPPED | OUTPATIENT
Start: 2023-07-20 | End: 2024-01-11 | Stop reason: ALTCHOICE

## 2023-07-20 RX ORDER — KETOROLAC TROMETHAMINE 30 MG/ML
15 INJECTION, SOLUTION INTRAMUSCULAR; INTRAVENOUS
Status: COMPLETED | OUTPATIENT
Start: 2023-07-20 | End: 2023-07-20

## 2023-07-20 RX ADMIN — KETOROLAC TROMETHAMINE 15 MG: 30 INJECTION, SOLUTION INTRAMUSCULAR; INTRAVENOUS at 09:07

## 2023-07-20 NOTE — ED PROVIDER NOTES
Encounter Date: 7/20/2023       History     Chief Complaint   Patient presents with    Joint Pain     Joint pain for one month , seen 2 weeks ago given meds ,pt states not taking pain away      The patient presents with chronic bilateral shoulder and right wrist pain.  The onset was chronic.  The course/duration of symptoms is constant.  Type of injury: none known.  Location: bilateral shoulders and right wrist. Radiating pain: none. The character of symptoms is pain.  The degree of pain is moderate and with certain movements.  The degree of swelling is none.  The exacerbating factor is movement.  There are relieving factors including rest and immobilization.  Risk factors consist of age, history of arthritis.  Prior episodes: chronic.  Therapy today: none.  Associated symptoms: none.  Additional history: he was seen here for the same on 7/7. He requests additional pain medication. He has a pcp appointment scheduled for 8/21.       Review of patient's allergies indicates:  No Known Allergies  Past Medical History:   Diagnosis Date    Cancer     Encounter for blood transfusion     Hyperlipidemia     Hypertension      Past Surgical History:   Procedure Laterality Date    CAROTID ENDARTERECTOMY      COLONOSCOPY N/A 10/19/2022    Procedure: COLONOSCOPY;  Surgeon: Natalya Neely MD;  Location: [facility] ENDOSCOPY;  Service: Gastroenterology;  Laterality: N/A;    MEDIPORT INSERTION, SINGLE       Family History   Problem Relation Age of Onset    Stroke Sister     Hypertension Sister     Cancer Sister     Stroke Brother     Hypertension Brother      Social History     Tobacco Use    Smoking status: Former    Smokeless tobacco: Never   Substance Use Topics    Alcohol use: Not Currently    Drug use: Never     Review of Systems   Constitutional:  Negative for fever.   HENT:  Negative for sore throat.    Respiratory:  Negative for shortness of breath.    Cardiovascular:  Negative for chest pain.   Gastrointestinal:  Negative  for nausea.   Genitourinary:  Negative for dysuria.   Musculoskeletal:  Positive for arthralgias. Negative for back pain.   Skin:  Negative for rash.   Neurological:  Negative for weakness.   Hematological:  Does not bruise/bleed easily.   All other systems reviewed and are negative.    Physical Exam     Initial Vitals [07/20/23 0900]   BP Pulse Resp Temp SpO2   (!) 160/72 98 18 98.2 °F (36.8 °C) 99 %      MAP       --         Physical Exam    Nursing note and vitals reviewed.  Constitutional: He appears well-developed and well-nourished.   HENT:   Head: Normocephalic and atraumatic.   Neck: Neck supple.   Normal range of motion.  Cardiovascular:  Normal rate, regular rhythm, normal heart sounds and intact distal pulses.           Pulmonary/Chest: Breath sounds normal.   Abdominal: Abdomen is soft. Bowel sounds are normal.   Musculoskeletal:         General: Normal range of motion.      Cervical back: Normal range of motion and neck supple.      Comments:  Mild ttp bilateral shoulders, FROM, good distal pulses, NVI     No ttp or swelling to right wrist, FROM, good distal pulses, NVI         Neurological: He is alert and oriented to person, place, and time. He has normal strength.   Skin: Skin is warm and dry.   Psychiatric: He has a normal mood and affect.       ED Course   Procedures  Labs Reviewed - No data to display       Imaging Results    None          Medications   ketorolac injection 15 mg (15 mg Intramuscular Given 7/20/23 0936)     Medical Decision Making:   History:   Old Records Summarized: records from clinic visits and records from previous admission(s).       <> Summary of Records: Notes reviewed from previous ER visit on 7/7.  Initial Assessment:   The patient presents with chronic bilateral shoulder and right wrist pain.  The onset was chronic.  The course/duration of symptoms is constant.  Type of injury: none known.  Location: bilateral shoulders and right wrist. Radiating pain: none. The  character of symptoms is pain.  The degree of pain is moderate and with certain movements.  The degree of swelling is none.  The exacerbating factor is movement.  There are relieving factors including rest and immobilization.  Risk factors consist of age, history of arthritis.  Prior episodes: chronic.  Therapy today: none.  Associated symptoms: none.  Additional history: he was seen here for the same on 7/7. He requests additional pain medication. He has a pcp appointment scheduled for 8/21.       Differential Diagnosis:   Fracture, septic joint, cellulitis, abscess, gout, RA, OA among others   ED Management:  Ketorolac 15mg IM given in the ER.  9:52 AM DISPOSITION: The patient is resting comfortably in no acute distress.  He is hemodynamically stable and is without objective evidence for acute process requiring urgent intervention or hospitalization. I provided counseling to patient with regard to condition, the treatment plan, specific conditions for return, and the importance of follow up. Detailed written and verbal instructions provided to patient and he expressed a verbal understanding of the discharge instructions and overall management plan. Reiterated the importance of medication administration and safety and advised patient to follow up with primary care provider in 3-5 days or sooner if needed.  Answered questions at this time. The patient is stable for discharge.        APC / Resident Notes:   I was not physically present during the history, exam or disposition of this patient. I was available at all times for consultation. (Zmora)                   Clinical Impression:   Final diagnoses:  [M25.531, G89.29] Chronic pain of right wrist (Primary)  [M25.511, G89.29, M25.512] Chronic pain of both shoulders        ED Disposition Condition    Discharge Stable          ED Prescriptions       Medication Sig Dispense Start Date End Date Auth. Provider    meloxicam (MOBIC) 15 MG tablet Take 1 tablet (15 mg total)  by mouth daily as needed for Pain. 12 tablet 7/20/2023 -- RAMYA Briggs          Follow-up Information       Follow up With Specialties Details Why Contact Info    Ancelmo Richard MD Internal Medicine   [address] [address]  [phone]      [facility] Emergency Medicine  If symptoms worsen [address] [facility] [id number] [phone]             RAMYA Briggs  07/20/23 7315       Dima Ruby MD  07/20/23 8509

## 2023-08-02 DIAGNOSIS — Z87.09 HISTORY OF ASTHMA: ICD-10-CM

## 2023-08-02 DIAGNOSIS — J30.2 SEASONAL ALLERGIES: ICD-10-CM

## 2023-08-06 RX ORDER — FLUTICASONE PROPIONATE 50 MCG
1 SPRAY, SUSPENSION (ML) NASAL 2 TIMES DAILY
Qty: 5 ML | Refills: 2 | Status: SHIPPED | OUTPATIENT
Start: 2023-08-06 | End: 2023-10-24 | Stop reason: SDUPTHER

## 2023-08-06 RX ORDER — FLUTICASONE PROPIONATE AND SALMETEROL 250; 50 UG/1; UG/1
1 POWDER RESPIRATORY (INHALATION) 2 TIMES DAILY
Qty: 60 EACH | Refills: 2 | Status: SHIPPED | OUTPATIENT
Start: 2023-08-06 | End: 2023-10-31 | Stop reason: SDUPTHER

## 2023-08-07 PROBLEM — Z08 ENCOUNTER FOR FOLLOW-UP SURVEILLANCE OF COLON CANCER: Status: RESOLVED | Noted: 2023-05-02 | Resolved: 2023-08-07

## 2023-08-07 PROBLEM — Z85.038 ENCOUNTER FOR FOLLOW-UP SURVEILLANCE OF COLON CANCER: Status: RESOLVED | Noted: 2023-05-02 | Resolved: 2023-08-07

## 2023-08-30 DIAGNOSIS — Z87.09 HISTORY OF ASTHMA: ICD-10-CM

## 2023-08-30 DIAGNOSIS — K21.9 GASTROESOPHAGEAL REFLUX DISEASE, UNSPECIFIED WHETHER ESOPHAGITIS PRESENT: Primary | ICD-10-CM

## 2023-08-31 NOTE — TELEPHONE ENCOUNTER
----- Message from Luisa Pineda sent at 8/31/2023 11:38 AM CDT -----  Regarding: med refill/darbonne  Patient needs a refill on albuterol and pantoprazole to [facility]  in [place]

## 2023-09-01 RX ORDER — ALBUTEROL SULFATE 0.83 MG/ML
2.5 SOLUTION RESPIRATORY (INHALATION) EVERY 4 HOURS PRN
Qty: 75 ML | Refills: 1 | Status: SHIPPED | OUTPATIENT
Start: 2023-09-01 | End: 2023-09-18 | Stop reason: SDUPTHER

## 2023-09-01 RX ORDER — PANTOPRAZOLE SODIUM 40 MG/1
40 TABLET, DELAYED RELEASE ORAL DAILY
Qty: 90 TABLET | Refills: 3 | Status: SHIPPED | OUTPATIENT
Start: 2023-09-01 | End: 2023-09-18 | Stop reason: SDUPTHER

## 2023-09-18 ENCOUNTER — OFFICE VISIT (OUTPATIENT)
Dept: INTERNAL MEDICINE | Facility: CLINIC | Age: 78
End: 2023-09-18
Payer: MEDICARE

## 2023-09-18 VITALS
SYSTOLIC BLOOD PRESSURE: 108 MMHG | HEART RATE: 64 BPM | TEMPERATURE: 98 F | RESPIRATION RATE: 18 BRPM | WEIGHT: 173.31 LBS | OXYGEN SATURATION: 96 % | DIASTOLIC BLOOD PRESSURE: 64 MMHG | BODY MASS INDEX: 25.67 KG/M2 | HEIGHT: 69 IN

## 2023-09-18 DIAGNOSIS — M72.0 DUPUYTREN'S CONTRACTURE OF LEFT HAND: ICD-10-CM

## 2023-09-18 DIAGNOSIS — R06.09 DYSPNEA ON EXERTION: ICD-10-CM

## 2023-09-18 DIAGNOSIS — M72.0 DUPUYTREN'S CONTRACTURE OF RIGHT HAND: ICD-10-CM

## 2023-09-18 DIAGNOSIS — E78.5 HYPERLIPIDEMIA, UNSPECIFIED HYPERLIPIDEMIA TYPE: Primary | ICD-10-CM

## 2023-09-18 DIAGNOSIS — K21.9 GASTROESOPHAGEAL REFLUX DISEASE, UNSPECIFIED WHETHER ESOPHAGITIS PRESENT: ICD-10-CM

## 2023-09-18 DIAGNOSIS — I10 HYPERTENSION, UNSPECIFIED TYPE: ICD-10-CM

## 2023-09-18 DIAGNOSIS — Z87.09 HISTORY OF ASTHMA: ICD-10-CM

## 2023-09-18 DIAGNOSIS — M19.90 OSTEOARTHRITIS, UNSPECIFIED OSTEOARTHRITIS TYPE, UNSPECIFIED SITE: ICD-10-CM

## 2023-09-18 DIAGNOSIS — R59.0 HILAR ADENOPATHY: ICD-10-CM

## 2023-09-18 PROCEDURE — 99215 OFFICE O/P EST HI 40 MIN: CPT | Mod: PBBFAC | Performed by: STUDENT IN AN ORGANIZED HEALTH CARE EDUCATION/TRAINING PROGRAM

## 2023-09-18 RX ORDER — LOSARTAN POTASSIUM 25 MG/1
25 TABLET ORAL DAILY
Qty: 90 TABLET | Refills: 3 | Status: SHIPPED | OUTPATIENT
Start: 2023-09-18 | End: 2023-12-14 | Stop reason: SDUPTHER

## 2023-09-18 RX ORDER — ALBUTEROL SULFATE 0.83 MG/ML
2.5 SOLUTION RESPIRATORY (INHALATION) EVERY 4 HOURS PRN
Qty: 75 ML | Refills: 1 | Status: SHIPPED | OUTPATIENT
Start: 2023-09-18 | End: 2023-11-13 | Stop reason: SDUPTHER

## 2023-09-18 RX ORDER — METOPROLOL SUCCINATE 25 MG/1
25 TABLET, EXTENDED RELEASE ORAL DAILY
Qty: 90 TABLET | Refills: 3 | Status: SHIPPED | OUTPATIENT
Start: 2023-09-18 | End: 2023-12-14 | Stop reason: SDUPTHER

## 2023-09-18 RX ORDER — DICLOFENAC SODIUM 10 MG/G
2 GEL TOPICAL DAILY
Qty: 100 G | Refills: 2 | Status: SHIPPED | OUTPATIENT
Start: 2023-09-18 | End: 2023-12-14 | Stop reason: SDUPTHER

## 2023-09-18 RX ORDER — PANTOPRAZOLE SODIUM 40 MG/1
40 TABLET, DELAYED RELEASE ORAL DAILY
Qty: 90 TABLET | Refills: 3 | Status: SHIPPED | OUTPATIENT
Start: 2023-09-18 | End: 2023-12-14 | Stop reason: SDUPTHER

## 2023-09-18 RX ORDER — ATORVASTATIN CALCIUM 40 MG/1
40 TABLET, FILM COATED ORAL DAILY
Qty: 90 TABLET | Refills: 3 | Status: SHIPPED | OUTPATIENT
Start: 2023-09-18 | End: 2023-12-14 | Stop reason: SDUPTHER

## 2023-09-18 RX ORDER — ALBUTEROL SULFATE 90 UG/1
2 AEROSOL, METERED RESPIRATORY (INHALATION) EVERY 6 HOURS PRN
COMMUNITY
Start: 2023-08-07 | End: 2023-12-14

## 2023-09-18 RX ORDER — HYDROCHLOROTHIAZIDE 25 MG/1
25 TABLET ORAL DAILY
Qty: 90 TABLET | Refills: 3 | Status: SHIPPED | OUTPATIENT
Start: 2023-09-18 | End: 2023-12-14 | Stop reason: SDUPTHER

## 2023-09-18 RX ORDER — HYDRALAZINE HYDROCHLORIDE 25 MG/1
25 TABLET, FILM COATED ORAL 2 TIMES DAILY
Qty: 120 TABLET | Refills: 3 | Status: SHIPPED | OUTPATIENT
Start: 2023-09-18 | End: 2023-12-14 | Stop reason: SDUPTHER

## 2023-09-18 NOTE — PROGRESS NOTES
"[facility] INTERNAL MEDICINE  OUTPATIENT OFFICE VISIT NOTE    SUBJECTIVE:      HPI: Riccardo Hamilton is a 78 y.o. yo male w/ PMH of colonic adenocarcinoma (followed by Oncology; s/p hemicolectomy and adjuvant chemo), HTN, HLD, T2DM, Asthma, Retropharyngeal Carotid Bypass (2014), Carotid Endarterectomy (1/2014; 2/2014), and previous RLE DVT w/ subacute small PE of R. Lung (s/p x3 month Eliquis txt) presents to [facility] IM clinic for follow up visit. Since last visit, and restarting Advair Diskus, patient reports nearly complete resolution of all previous pulmonary related symptoms (cough, dyspnea on exertion, wheezing).  Recent CT thorax revealed mediastinal and hilar adenopathy with interstitial changes.  With the CT findings and minimally elevated peripheral eosinophilia and previously normal PFTs; discussed referring patient to pulmonology further evaluation. Patient is agreeable with plan. States prior to prior discontinuation, took Advair Diskus for past x10 years with decent control of respiratory symptoms.  At this time, patient denies any recent unexpected weight loss, loss of appetite, early satiety, night sweats, fever/chills.  Patient is a lifetime nonsmoker.    ROS:  Denies any headaches, changes in vision, chest pain, palpitations, shortness of breath, N/V, or lightheadedness/dizziness, abdominal pain, pain/difficulty with urination/stools, or blood in urine/BMs.       OBJECTIVE:     Vital signs:   /64 (BP Location: Left arm, Patient Position: Sitting, BP Method: X-Large (Automatic))   Pulse 64   Temp 98.4 °F (36.9 °C) (Oral)   Resp 18   Ht 5' 9" (1.753 m)   Wt 78.6 kg (173 lb 4.5 oz)   SpO2 96%   BMI 25.59 kg/m²    Repeat BP (112/56), 2/6/23    Physical Examination:  Gen: well-nourished, well-developed gentleman, in no acute distress   HEENT: Normocephalic, atraumatic. PERRL.   Neck: No thyromegaly. No lymphadenopathy.   Heart: RRR, no murmurs, gallops, clicks or rubs.   Lungs: Posterior breath sounds " with inspiratory fine crackles w/ possible velcr0-like appreciations   Abd: Midline well-healed abdominal scar. Soft, non-tender, non-distended and without guarding.   Extremities: Radial and pedal pulses 2+ bilaterally, no appreciable LE edema.   MSK: No obvious deformities. Moves all extremities purposefully.   Neuro: Responds well to commands. CN III-XII grossly intact. No focal neurological deficits noted   Skin: Warm, dry, intact.     ASSESSMENT & PLAN:   Reported Hx of Asthma; possible reactive airway disease  - Discontinued Advair Discus at previous appt 2/2 to unremarkable recent PFTs for obstruction  - Has since required x2 puffs Albuterol daily 2/2 worsening cough, SOB, & sputum production  - Discussed possiblities of additional pulmonary etiologies with patient & possible exposure w/ differential including GERD, post-nasal drip, vocal cord dysfunction, reactive airway disease, and/or ILD  - Recent CT thorax (4/2023) w/ scattered mediastinal lymph nodes & hilar lymph nodes, chronic interstitial markings at inferior lingula & RML, & mildly prominent peripheral distribution of interstitial markings at lung bases  - Eosinophils mildly elevated on serial CBCs  - Patient may benefit from High-Res CT Thorax for further evaluation  - C/w Advair Diskus today for trial therapy  - C/w Albuterol inhaler PRN  - In setting of above, referred patient for further evaluation via Pulmonology    Suspected B/L Dupuytren's Contracture  - Referred to Ortho for further evaluation; appreciate assistance in care    Stage IIIB Adenocarcinoma of Cecum  - s/p hemicolectomy and adjuvant chemotherapy  - At this time denies any GI-related symptoms  - patient is followed by oncology; continue to follow as scheduled    Hypertension  - Well Controlled  - Continue HCTZ 25 daily, Toprol 25 daily, Hydralazine 25 BID, Losartan 25 q.D.  - Counseled on DASH diet & increasing exercise as tolerated     Hyperlipidemia  - Latest lipid profile  WNL  - Continue Lipitor 40 daily    Hx of B/L Carotid Endarterectomy   - Continue Aspirin 81, Lipitor 40 daily    Seasonal Allergies - well controlled  - Intermittent-persistent dry cough and eye irritiation  - Relieved with Zyrtec/Flonase therapy     GERD   - Continue Protonix 40 daily     Hx of DVT/PE  - Occurred in 2021, treated with x3 months Eliquis  - Denies any recurrence  - Continue Aspirin 81 daily      Health Maintenance:  Immunization History   Administered Date(s) Administered    COVID-19, MRNA, LN-S, PF (MODERNA FULL 0.5 ML DOSE) 01/12/2021, 02/26/2021, 10/22/2021, 05/24/2022    COVID-19, MRNA, LN-S, PF (Pfizer) (Purple Cap) 01/12/2021, 02/26/2021    Influenza (FLUAD) - Quadrivalent - Adjuvanted - PF *Preferred* (65+) 09/21/2022    Influenza - High Dose - PF (65 years and older) 09/22/2015, 10/06/2016, 10/02/2017, 09/24/2018    Influenza - Quadrivalent - PF *Preferred* (6 months and older) 09/12/2019    Influenza - Trivalent - PF (ADULT) 09/22/2015, 10/06/2016, 10/02/2017, 09/24/2018, 09/12/2019    Influenza A (H1N1) 2009 Monovalent - IM 01/21/2010    Pneumococcal Conjugate - 13 Valent 09/04/2020    Pneumococcal Polysaccharide - 23 Valent 10/02/2015    Tdap 02/09/2022    Zoster 09/22/2015    Zoster Recombinant 05/31/2022, 09/21/2022     Return to clinic in 4 months with labs.    Ancelmo Richard MD   [facility], PGY-3

## 2023-10-16 ENCOUNTER — HOSPITAL ENCOUNTER (OUTPATIENT)
Dept: RADIOLOGY | Facility: HOSPITAL | Age: 78
Discharge: HOME | End: 2023-10-16
Attending: INTERNAL MEDICINE
Payer: MEDICARE

## 2023-10-16 DIAGNOSIS — C18.0 ADENOCARCINOMA OF CECUM: ICD-10-CM

## 2023-10-16 LAB
CREAT SERPL-MCNC: 1.28 MG/DL (ref 0.73–1.18)
GFR SERPLBLD CREATININE-BSD FMLA CKD-EPI: 57 MLS/MIN/1.73/M2

## 2023-10-16 PROCEDURE — 25500020 PHARM REV CODE 255: Performed by: INTERNAL MEDICINE

## 2023-10-16 PROCEDURE — 82565 ASSAY OF CREATININE: CPT | Performed by: INTERNAL MEDICINE

## 2023-10-16 PROCEDURE — 71260 CT THORAX DX C+: CPT | Mod: TC

## 2023-10-16 PROCEDURE — 25500020 PHARM REV CODE 255

## 2023-10-16 PROCEDURE — A9698 NON-RAD CONTRAST MATERIALNOC: HCPCS

## 2023-10-16 RX ADMIN — BARIUM SULFATE 450 ML: 20 SUSPENSION ORAL at 08:10

## 2023-10-16 RX ADMIN — IOHEXOL 100 ML: 350 INJECTION, SOLUTION INTRAVENOUS at 09:10

## 2023-10-19 ENCOUNTER — INFUSION (OUTPATIENT)
Dept: INFUSION THERAPY | Facility: HOSPITAL | Age: 78
End: 2023-10-19
Attending: INTERNAL MEDICINE
Payer: MEDICARE

## 2023-10-19 ENCOUNTER — OFFICE VISIT (OUTPATIENT)
Dept: HEMATOLOGY/ONCOLOGY | Facility: CLINIC | Age: 78
End: 2023-10-19
Attending: INTERNAL MEDICINE
Payer: MEDICARE

## 2023-10-19 VITALS
BODY MASS INDEX: 25.86 KG/M2 | HEART RATE: 64 BPM | SYSTOLIC BLOOD PRESSURE: 148 MMHG | RESPIRATION RATE: 20 BRPM | OXYGEN SATURATION: 98 % | DIASTOLIC BLOOD PRESSURE: 56 MMHG | HEIGHT: 69 IN | TEMPERATURE: 98 F | WEIGHT: 174.63 LBS

## 2023-10-19 VITALS — HEART RATE: 58 BPM | SYSTOLIC BLOOD PRESSURE: 146 MMHG | DIASTOLIC BLOOD PRESSURE: 71 MMHG

## 2023-10-19 DIAGNOSIS — Z85.038 ENCOUNTER FOR FOLLOW-UP SURVEILLANCE OF COLON CANCER: Primary | ICD-10-CM

## 2023-10-19 DIAGNOSIS — Z08 ENCOUNTER FOR FOLLOW-UP SURVEILLANCE OF COLON CANCER: Primary | ICD-10-CM

## 2023-10-19 DIAGNOSIS — Z08 ENCOUNTER FOR FOLLOW-UP SURVEILLANCE OF COLON CANCER: ICD-10-CM

## 2023-10-19 DIAGNOSIS — Z85.038 ENCOUNTER FOR FOLLOW-UP SURVEILLANCE OF COLON CANCER: ICD-10-CM

## 2023-10-19 DIAGNOSIS — Z86.718 HISTORY OF DVT (DEEP VEIN THROMBOSIS): Primary | ICD-10-CM

## 2023-10-19 DIAGNOSIS — C18.0 ADENOCARCINOMA OF CECUM: ICD-10-CM

## 2023-10-19 DIAGNOSIS — Z86.2 HISTORY OF IRON DEFICIENCY ANEMIA: ICD-10-CM

## 2023-10-19 DIAGNOSIS — C18.0 ADENOCARCINOMA OF CECUM: Primary | ICD-10-CM

## 2023-10-19 DIAGNOSIS — Z86.711 HISTORY OF PULMONARY EMBOLISM: ICD-10-CM

## 2023-10-19 PROCEDURE — 63600175 PHARM REV CODE 636 W HCPCS: Performed by: INTERNAL MEDICINE

## 2023-10-19 PROCEDURE — 25000003 PHARM REV CODE 250: Performed by: INTERNAL MEDICINE

## 2023-10-19 PROCEDURE — 96523 IRRIG DRUG DELIVERY DEVICE: CPT | Mod: 59

## 2023-10-19 PROCEDURE — 99215 OFFICE O/P EST HI 40 MIN: CPT | Mod: PBBFAC | Performed by: INTERNAL MEDICINE

## 2023-10-19 PROCEDURE — 99214 OFFICE O/P EST MOD 30 MIN: CPT | Mod: S$PBB,,, | Performed by: INTERNAL MEDICINE

## 2023-10-19 PROCEDURE — A4216 STERILE WATER/SALINE, 10 ML: HCPCS | Performed by: INTERNAL MEDICINE

## 2023-10-19 PROCEDURE — 99214 PR OFFICE/OUTPT VISIT, EST, LEVL IV, 30-39 MIN: ICD-10-PCS | Mod: S$PBB,,, | Performed by: INTERNAL MEDICINE

## 2023-10-19 RX ORDER — HEPARIN 100 UNIT/ML
500 SYRINGE INTRAVENOUS
Status: DISCONTINUED | OUTPATIENT
Start: 2023-10-19 | End: 2023-10-19 | Stop reason: HOSPADM

## 2023-10-19 RX ORDER — SODIUM CHLORIDE 0.9 % (FLUSH) 0.9 %
10 SYRINGE (ML) INJECTION
Status: DISCONTINUED | OUTPATIENT
Start: 2023-10-19 | End: 2023-10-19 | Stop reason: HOSPADM

## 2023-10-19 RX ADMIN — Medication 10 ML: at 11:10

## 2023-10-19 RX ADMIN — HEPARIN 500 UNITS: 100 SYRINGE at 11:10

## 2023-10-19 NOTE — Clinical Note
Check CEA level today In April 2024: Surveillance CT scans of C/A/P with contrast, CBC, CMP, CEA level Colonoscopy in October 2025 Follow-up in April with scans and labs.

## 2023-10-19 NOTE — PROGRESS NOTES
History:  Past Medical History:   Diagnosis Date    Cancer     Encounter for blood transfusion     Hyperlipidemia     Hypertension    Past medical history: Hypertension.  NIDDM.  CKD.  History of retropharyngeal carotid-carotid bypass (2014).  Bronchial asthma.  Cataract.  Diabetic neuropathy.  Hypertension.  Dyslipidemia.  Ptosis.  Visual field defect.  Cataract surgery.  Carotid endarterectomy (01/06/2014; 02/12/2014).  Intraocular lens, (05/28/2019).  Social history: .  Lives in [place], [place].  Has 4 children.  Retired.  Used to ride horses.  Small third a pack of cigarettes daily for 10 years; quit 50 years back.  No history of alcohol or illicit drug abuse.  Family history: Sister experienced breast cancer at age 58 years.  No family history of colorectal cancer.  Health maintenance: Screening colonoscopy 10 years back, apparently unremarkable.  Prior to that, history of colon polyps.  Past Surgical History:   Procedure Laterality Date    CAROTID ENDARTERECTOMY      COLONOSCOPY N/A 10/19/2022    Procedure: COLONOSCOPY;  Surgeon: Natalya Neely MD;  Location: [facility] ENDOSCOPY;  Service: Gastroenterology;  Laterality: N/A;    MEDIPORT INSERTION, SINGLE        Social History     Socioeconomic History    Marital status:      Spouse name: Ya Hamilton    Number of children: 4   Tobacco Use    Smoking status: Former    Smokeless tobacco: Never   Substance and Sexual Activity    Alcohol use: Not Currently    Drug use: Never    Sexual activity: Not Currently     Social Determinants of Health     Financial Resource Strain: Low Risk  (5/31/2022)    Overall Financial Resource Strain (CARDIA)     Difficulty of Paying Living Expenses: Not hard at all   Food Insecurity: No Food Insecurity (5/31/2022)    Hunger Vital Sign     Worried About Running Out of Food in the Last Year: Never true     Ran Out of Food in the Last Year: Never true   Transportation Needs: No Transportation Needs (5/31/2022)     PRAPARE - Transportation     Lack of Transportation (Medical): No     Lack of Transportation (Non-Medical): No   Physical Activity: Inactive (5/31/2022)    Exercise Vital Sign     Days of Exercise per Week: 0 days     Minutes of Exercise per Session: 0 min   Stress: No Stress Concern Present (5/31/2022)    [employer] [place] [employer] - Occupational Stress Questionnaire     Feeling of Stress : Not at all   Social Connections: Moderately Isolated (5/31/2022)    Social Connection and Isolation Panel [NHANES]     Frequency of Communication with Friends and Family: Once a week     Frequency of Social Gatherings with Friends and Family: Once a week     Attends Tenriism Services: More than 4 times per year     Active Member of Clubs or Organizations: No     Attends Club or Organization Meetings: Never     Marital Status:    Housing Stability: Low Risk  (5/31/2022)    Housing Stability Vital Sign     Unable to Pay for Housing in the Last Year: No     Number of Places Lived in the Last Year: 1     Unstable Housing in the Last Year: No      Family History   Problem Relation Age of Onset    Stroke Sister     Hypertension Sister     Cancer Sister     Stroke Brother     Hypertension Brother         Reason for Follow-up:   -Adenocarcinoma of cecum, stage IIIB   -Intolerant of adjuvant capecitabine   -Incidental PE   -Right lower extremity DVT   -Iron deficiency anemia      History of Present Illness:   No chief complaint on file.        Oncologic/Hematologic History:  Oncology History   Adenocarcinoma of cecum   9/30/2020 Cancer Staged    Staging form: Colon and Rectum, AJCC 8th Edition  - Pathologic stage from 9/30/2020: Stage IIIC (pT3, pN2b, cM0)     5/31/2022 Initial Diagnosis    Adenocarcinoma of cecum      75-year-old gentleman referred by surgery with adenocarcinoma of cecum.      He was evaluated for hematochezia and anemia.  CT scan showed a nonobstructing cecal mass.  Underwent colonoscopy by GI,  revealing adenocarcinoma of cecum.  Reported loss of appetite and unintentional weight loss (unspecified) over several months.  Denied nausea, vomiting, or oral intolerance.  Reported normal and regular bowel movements.  Denied fevers, chills, night sweats, headaches, vision changes, seizures, strokes, chest pain, dyspnea, cough, nausea, vomiting, diarrhea, constipation, melena, or hematemesis.  Previous colonoscopy was approximately 10 years ago, apparently negative for malignancy.  Reported unintentional 37 pound weight loss over 4 months.  Ultimately, underwent hemicolectomy.      #Adenocarcinoma of cecum, 10 cm, grade 2-3, moderately to poorly differentiated, lymphovascular invasion positive:   pT3, 6/20 lymph nodes positive, status post right hemicolectomy (09/30/2020)   -Presentation: Hematochezia and anemia   -CT A/P with contrast (08/13/2020): No metastasis   -Colonoscopy (09/16/2020): Large adenocarcinoma of cecum   -Open right hemicolectomy (09/30/2020)   -CT chest with contrast (11/09/2020): No metastasis   -CEA level 7.8, elevated (09/16/2020) (preop)   >>pT3 pN2a M0, stage IIIB, adenocarcinoma of cecum; 6 lymph nodes positive  -12/11/2020: CT A/P with contrast (restaging prior to adjuvant chemotherapy) (comparison: 10/04/2020): Borderline enlarged right midabdominal mesenteric lymph node is indeterminate  -Adjuvant capecitabine started 01/11/2021 (1800 mg p.o. twice daily)   -Second cycle was due to start 02/01/2021 (but he had to be hospitalized with nausea, vomiting, dehydration, poor oral intake, protein calorie malnutrition, ANGELINE, diarrhea, etc. )   -Hospitalized 02/01/2021-02/02/2021: ANGELINE, postural dizziness with near syncope, orthostasis, vomiting, protein calorie malnutrition; 3-day history of nausea and vomiting; unable to keep anything down; weak, lightheaded, orthostatic, near syncope; had just finished first cycle of adjuvant chemotherapy (capecitabine)   -Hospitalized 02/09/2021-02/22/2021:  ANGELINE, dehydration, high output ostomy, protein calorie malnutrition, weakness, fatigue, nausea, vomiting; high output from ileostomy controlled with Lomotil and Imodium; ANGELINE resolved   -06/03/2021: Surveillance CT C/A/P with contrast (comparison: 02/26/2021): MARIBEL   -S/p elective loop ileostomy reversal (06/16/2021)   -12/21/2021: Surveillance CT C/A/P with contrast (comparison: 06/03/2021): MARIBEL  -04/06/2022:  CEA level 1.74, normal  -06/23/2022:  Surveillance CTs C/A/P with contrast (comparison:  12/21/2021): Questionable soft tissue density in the hepatic flexure of the colon, cannot rule out a lesion.  Would recommend colonoscopy unless recently performed.  -07/27/2022:  Labs reviewed.  Hemoglobin 12.8, stable.  Rest of CBC unremarkable.  Creatinine 1.51, elevated but stable.  Rest of CMP unremarkable.  CEA level 2.62, normal.     11/27/2020:   Presents for initial medical oncology consultation, accompanied by his wife.   Looks and feels healthy.  Endorses no symptoms of concern whatsoever.   No weakness, fatigue, malaise, anorexia, unintentional weight loss, abdominal pain, nausea, vomiting, GI bleeding, dysphagia, odynophagia, fevers, chills, any urinary problems, chest pain, cough, dyspnea, unusual headaches, focal neurological symptoms, etc.  ECOG 0-1.    Interval History:  PORT FLUSH   [No matching plan found]   10/19/2023:   -10/16/2023: Surveillance CTs C/A/P with contrast (comparison:  CTs C/A/P 0 04/14/2023:  No definitive evidence of recurrent or metastatic disease in the chest abdomen or pelvis.   Unchanged prominent, but not pathologically enlarged lymph nodes in the mediastinum.  These are indeterminate but stable.  Labs reviewed.  CEA level is pending.  Presents for a follow-up visit.  Doing very well.  Great appetite.  Good energy.  ECOG 0-1.  No abdominal pain, nausea, vomiting or blood in stool.  No constipation or diarrhea.  No weakness or fatigue.  No new lumps or lymphadenopathy.  No chest pain,  cough, or dyspnea.  Happy.      Medications:  Current Outpatient Medications on File Prior to Visit   Medication Sig Dispense Refill    albuterol (PROVENTIL) 2.5 mg /3 mL (0.083 %) nebulizer solution Take 3 mLs (2.5 mg total) by nebulization every 4 (four) hours as needed for Wheezing or Shortness of Breath. Rescue 75 mL 1    aspirin 81 MG Chew Take 1 tablet (81 mg total) by mouth once daily. 90 tablet 3    atorvastatin (LIPITOR) 40 MG tablet Take 1 tablet (40 mg total) by mouth once daily. 90 tablet 3    cetirizine (ZYRTEC) 5 MG tablet Take 1 tablet (5 mg total) by mouth once daily. 30 tablet 1    diclofenac sodium (VOLTAREN) 1 % Gel Apply 2 g topically once daily. 100 g 2    ferrous sulfate (FEOSOL) 325 mg (65 mg iron) Tab tablet Take 325 mg by mouth once daily.      fluticasone propionate (FLONASE) 50 mcg/actuation nasal spray 1 spray (50 mcg total) by Each Nostril route 2 (two) times daily. 5 mL 2    fluticasone-salmeterol diskus inhaler 250-50 mcg Inhale 1 puff into the lungs 2 (two) times daily. Controller 60 each 2    hydrALAZINE (APRESOLINE) 25 MG tablet Take 1 tablet (25 mg total) by mouth 2 (two) times daily. 120 tablet 3    hydroCHLOROthiazide (HYDRODIURIL) 25 MG tablet Take 1 tablet (25 mg total) by mouth once daily. 90 tablet 3    losartan (COZAAR) 25 MG tablet Take 1 tablet (25 mg total) by mouth once daily. 90 tablet 3    magnesium oxide (MAG-OX) 400 mg (241.3 mg magnesium) tablet Take 400 mg by mouth 2 (two) times daily.      meloxicam (MOBIC) 15 MG tablet Take 1 tablet (15 mg total) by mouth daily as needed for Pain. (Patient not taking: Reported on 9/18/2023) 12 tablet 0    metoprolol succinate (TOPROL-XL) 25 MG 24 hr tablet Take 1 tablet (25 mg total) by mouth once daily. 90 tablet 3    MOVIPREP 100-7.5-2.691 gram solution Take by mouth.      multivitamin (THERAGRAN) per tablet Take 1 tablet by mouth once daily.      pantoprazole (PROTONIX) 40 MG tablet Take 1 tablet (40 mg total) by mouth once  "daily. 90 tablet 3    VENTOLIN HFA 90 mcg/actuation inhaler Inhale 2 puffs into the lungs every 6 (six) hours as needed.       Current Facility-Administered Medications on File Prior to Visit   Medication Dose Route Frequency Provider Last Rate Last Admin    heparin, porcine (PF) 100 unit/mL injection flush 500 Units  500 Units Intravenous PRN Victor M Hair MD   500 Units at 07/27/22 0720    sodium chloride 0.9% flush 10 mL  10 mL Intravenous PRN Victor M Hair MD   10 mL at 07/27/22 0720       Review of Systems:   All systems reviewed and found to be negative except for the symptoms detailed above    Physical Examination:   VITAL SIGNS:   Vitals:    10/19/23 1037   BP: (!) 148/56   Pulse: 64   Resp: 20   Temp: 97.9 °F (36.6 °C)     GENERAL:  In no apparent distress.    HEAD:  No signs of head trauma.  EYES:  Pupils are equal.  Extraocular motions intact.    EARS:  Hearing grossly intact.  MOUTH:  Oropharynx is normal.   NECK:  No adenopathy, no JVD.     CHEST:  Chest with clear breath sounds bilaterally.  No wheezes, rales, rhonchi.    CARDIAC:  Regular rate and rhythm.  S1 and S2, without murmurs, gallops, rubs.  VASCULAR:  No Edema.  Peripheral pulses normal and equal in all extremities.  ABDOMEN:  Soft, without detectable tenderness.  No sign of distention.  No   rebound or guarding, and no masses palpated.   Bowel Sounds normal.  MUSCULOSKELETAL:  Good range of motion of all major joints. Extremities without clubbing, cyanosis or edema.    NEUROLOGIC EXAM:  Alert and oriented x 3.  No focal sensory or strength deficits.   Speech normal.  Follows commands.  PSYCHIATRIC:  Mood normal.    No results for input(s): "CBC" in the last 72 hours.   No results for input(s): "CMP" in the last 72 hours.     Assessment:  Problem List Items Addressed This Visit          Hematology    History of DVT (deep vein thrombosis) - Primary    History of pulmonary embolism       Oncology    Adenocarcinoma of cecum    History of " iron deficiency anemia    Encounter for follow-up surveillance of colon cancer       Adenocarcinoma of cecum:  -presented with hematochezia and anemia  -colonoscopy 09/16/2020  -right hemicolectomy 09/30/2020  -pT3 pN2a M0, stage IIIB; 6 lymph nodes pos  -adjuvant capecitabine started 01/11/2021  -capecitabine discontinued after 1 cycle because of side effects requiring hospitalization  -S/p surveillance colonoscopy (02/19/2021) (ileoscopy; indication: High output ileostomy, etc.)  -S/P elective loop ileostomy reversal 06/16/2021  -MARIBEL on surveillance CTs 06/23/2022 except for questionable soft tissue density in the hepatic flexure of colon  -10/19/2022:  Surveillance colonoscopy:  No polyps or cancer (repeat colonoscopy in 3 years)  -04/03/2023: CEA level 2.29, normal  -04/14/2023: Surveillance CTs C/A/P: No recurrence or metastases  -MARIBEL on surveillance CTs C/A/P with contrast 10/16/2023    No evidence of Santos syndrome:  -MSI-H by PCR  -dMMR  MLH1: Loss of expression in a subset of tumor cells  MSH2: Preserved (intact) expression in tumor cells  MSH6: Preserved (intact) expression in tumor cells  PMS2: Loss of expression in a subset of tumor cells  -V600 BRAF mutation negative  -IHC: MLH1 preserved (intact) expression in tumor cells (no deficiency of mismatch repair protein MLH1)  >>No evidence of Santos syndrome       Pulmonary embolism, incidental diagnosis (02/26/2021):  -02/26/2021: CT chest with contrast: New subacute/chronic small PE segmental right lower lung lobe, new since 11/09/2020       Right lower extremity DVT, proximal:  -03/03/2021: Bilateral lower extremity venous Doppler: Right femoral DVT, nearly occlusive to occlusive (age indeterminate); right popliteal DVT, partially occlusive (age indeterminate)  -anticoagulated with Eliquis x3 months (started 03/03/2021)       Chronic normocytic anemia, starting around 06/2017, subsequently worsening:   PRBC x1 (10/04/2020)   PRBC x1 (10/02/2020)   PRBC x1  (09/30/2020)   08/07/2020: Serum iron 27, low; TIBC 175, low; transferrin saturation 15.4%, borderline; ferritin 295.1, normal  -Feraheme 510 mg IV x2 (12/03/2020, 12/11/2020) (for relative iron deficiency)   (01/07/2021: Transferrin saturation 33%, up from 18%; ferritin level 1162.37, up from 594.74; hemoglobin 11.9, up from 10.9)        Plan:  Check CEA level today  In April 2024: Surveillance CT scans of C/A/P with contrast, CBC, CMP, CEA level  Colonoscopy in October 2025  Follow-up in April with scans and labs.  ------------------------      -Continue surveillance appropriate for stage III colon cancer  -S/p right hemicolectomy 09/30/2020  -History and physical and CEA every 3-6 months for 2 years (09/2020-09/2022), then every 6 months for total of 5 years (09/2022-09/2025)  -Surveillance CT C/A/P with contrast every 6-12 months for 5 years (09/2020-09/2025)  >>>  -S/p surveillance colonoscopy (02/19/2021) (ileoscopy; indication: High output ileostomy, etc.)  -MARIBEL on surveillance CTs 06/23/2022 except for a questionable soft tissue density hepatic flexure of colon  -surveillance colonoscopy 10/19/2022: Normal (repeat colonoscopy in 3 years)  -04/03/2023: CEA level 2.29, normal  -04/14/2023: Surveillance CTs C/A/P: No recurrence or metastases  -MARIBEL on surveillance CTs C/A/P with contrast 10/16/2023  >>>  -check CBC, CMP, CEA level; if normal, then, repeat in 6 months  -repeat surveillance CT scans of C/A/P with contrast in 6 months (April 2024)  -repeat surveillance colonoscopy in 3 years (10/2025)    Follow-up in April 2024, with surveillance CT scans of C/A/P with contrast, CBC, CMP, and CEA level; earlier, if any concerns    Above discussed with the patient.  All questions answered.    Discussed labs and scans and gave him copies of relevant reports.    He understands and agrees with this plan.   --------------------------     Surveillance:   History and physical every 3-6 months for 2 years  (09/2020-09/2022), then   every 6 months for a total of 5 years (09/2022-09/2025);      CEA every 3-6 months for 2 years, then   every 6 months for a total of 5 years;      chest/abdominal/pelvic CT scan every 6-12 months (category 2B for frequency less than 12 months) for a total of 5 years (09/2020-09/2025)    (CT should be with IV and oral contrast; if either CT of abdomen/pelvis is inadequate, or if patient has a contraindication to CT with IV contrast, then consider abdominal/pelvic MRI with MRI contrast plus a noncontrast chest CT);   PET CT scan is not recommended;     colonoscopy in 1 year after surgery except if no preoperative colonoscopy due to obstructing lesion, then colonoscopy in 3-6 months:   if advanced adenoma then repeat in 1 year,   if no advanced adenoma then repeat in 3 years, and then   every 5 years.       Follow-up:  No follow-ups on file.

## 2023-10-24 DIAGNOSIS — J30.2 SEASONAL ALLERGIES: ICD-10-CM

## 2023-10-25 RX ORDER — FLUTICASONE PROPIONATE 50 MCG
1 SPRAY, SUSPENSION (ML) NASAL 2 TIMES DAILY
Qty: 5 ML | Refills: 2 | Status: SHIPPED | OUTPATIENT
Start: 2023-10-25 | End: 2023-12-14 | Stop reason: SDUPTHER

## 2023-10-31 DIAGNOSIS — J30.2 SEASONAL ALLERGIES: ICD-10-CM

## 2023-10-31 DIAGNOSIS — Z87.09 HISTORY OF ASTHMA: ICD-10-CM

## 2023-10-31 RX ORDER — FLUTICASONE PROPIONATE AND SALMETEROL 250; 50 UG/1; UG/1
1 POWDER RESPIRATORY (INHALATION) 2 TIMES DAILY
Qty: 60 EACH | Refills: 2 | Status: SHIPPED | OUTPATIENT
Start: 2023-10-31 | End: 2023-12-14 | Stop reason: SDUPTHER

## 2023-11-13 DIAGNOSIS — Z87.09 HISTORY OF ASTHMA: ICD-10-CM

## 2023-11-13 NOTE — TELEPHONE ENCOUNTER
----- Message from Ulisses Magana sent at 11/13/2023  8:19 AM CST -----  Regarding: Dr Richard RX RQ  Provider: Dr Richard    Last Visit: 9/18/23      Next Visit: 12/19/23       Patient's Contact #: [phone]      Medication Name & Dose: Albuterol Solution                                             Advair       Preferred Pharmacy: [facility] [address]      Comment:

## 2023-11-14 RX ORDER — ALBUTEROL SULFATE 0.83 MG/ML
2.5 SOLUTION RESPIRATORY (INHALATION) EVERY 4 HOURS PRN
Qty: 75 ML | Refills: 3 | Status: SHIPPED | OUTPATIENT
Start: 2023-11-14 | End: 2023-12-14

## 2023-12-14 ENCOUNTER — HOSPITAL ENCOUNTER (OUTPATIENT)
Dept: RADIOLOGY | Facility: HOSPITAL | Age: 78
Discharge: HOME | End: 2023-12-14
Attending: STUDENT IN AN ORGANIZED HEALTH CARE EDUCATION/TRAINING PROGRAM
Payer: MEDICARE

## 2023-12-14 ENCOUNTER — OFFICE VISIT (OUTPATIENT)
Dept: INTERNAL MEDICINE | Facility: CLINIC | Age: 78
End: 2023-12-14
Payer: MEDICARE

## 2023-12-14 VITALS
HEART RATE: 81 BPM | OXYGEN SATURATION: 97 % | HEIGHT: 68 IN | SYSTOLIC BLOOD PRESSURE: 110 MMHG | WEIGHT: 176.5 LBS | TEMPERATURE: 98 F | DIASTOLIC BLOOD PRESSURE: 61 MMHG | RESPIRATION RATE: 16 BRPM | BODY MASS INDEX: 26.75 KG/M2

## 2023-12-14 DIAGNOSIS — M19.90 OSTEOARTHRITIS, UNSPECIFIED OSTEOARTHRITIS TYPE, UNSPECIFIED SITE: ICD-10-CM

## 2023-12-14 DIAGNOSIS — M75.01 ADHESIVE CAPSULITIS OF BOTH SHOULDERS: ICD-10-CM

## 2023-12-14 DIAGNOSIS — E55.9 VITAMIN D DEFICIENCY: ICD-10-CM

## 2023-12-14 DIAGNOSIS — I10 HYPERTENSION, UNSPECIFIED TYPE: ICD-10-CM

## 2023-12-14 DIAGNOSIS — M75.02 ADHESIVE CAPSULITIS OF BOTH SHOULDERS: ICD-10-CM

## 2023-12-14 DIAGNOSIS — E78.5 HYPERLIPIDEMIA, UNSPECIFIED HYPERLIPIDEMIA TYPE: ICD-10-CM

## 2023-12-14 DIAGNOSIS — M72.0 DUPUYTREN'S CONTRACTURE OF BOTH HANDS: ICD-10-CM

## 2023-12-14 DIAGNOSIS — R73.03 PREDIABETES: Primary | ICD-10-CM

## 2023-12-14 DIAGNOSIS — K21.9 GASTROESOPHAGEAL REFLUX DISEASE, UNSPECIFIED WHETHER ESOPHAGITIS PRESENT: ICD-10-CM

## 2023-12-14 DIAGNOSIS — J30.2 SEASONAL ALLERGIES: ICD-10-CM

## 2023-12-14 DIAGNOSIS — Z87.09 HISTORY OF ASTHMA: ICD-10-CM

## 2023-12-14 LAB — HBA1C MFR BLD: 6.1 %

## 2023-12-14 PROCEDURE — 83036 HEMOGLOBIN GLYCOSYLATED A1C: CPT | Mod: PBBFAC | Performed by: STUDENT IN AN ORGANIZED HEALTH CARE EDUCATION/TRAINING PROGRAM

## 2023-12-14 PROCEDURE — 99215 OFFICE O/P EST HI 40 MIN: CPT | Mod: PBBFAC,25 | Performed by: STUDENT IN AN ORGANIZED HEALTH CARE EDUCATION/TRAINING PROGRAM

## 2023-12-14 PROCEDURE — 73030 X-RAY EXAM OF SHOULDER: CPT | Mod: TC,RT

## 2023-12-14 PROCEDURE — 71046 X-RAY EXAM CHEST 2 VIEWS: CPT | Mod: TC

## 2023-12-14 PROCEDURE — 73030 X-RAY EXAM OF SHOULDER: CPT | Mod: TC,LT

## 2023-12-14 RX ORDER — ALBUTEROL SULFATE 90 UG/1
2 AEROSOL, METERED RESPIRATORY (INHALATION) EVERY 6 HOURS PRN
Qty: 18 G | Refills: 0 | Status: SHIPPED | OUTPATIENT
Start: 2023-12-14

## 2023-12-14 RX ORDER — NAPROXEN SODIUM 220 MG/1
81 TABLET, FILM COATED ORAL DAILY
Qty: 90 TABLET | Refills: 3 | Status: SHIPPED | OUTPATIENT
Start: 2023-12-14

## 2023-12-14 RX ORDER — METOPROLOL SUCCINATE 25 MG/1
25 TABLET, EXTENDED RELEASE ORAL DAILY
Qty: 90 TABLET | Refills: 3 | Status: SHIPPED | OUTPATIENT
Start: 2023-12-14 | End: 2024-01-11 | Stop reason: DRUGHIGH

## 2023-12-14 RX ORDER — FLUTICASONE PROPIONATE AND SALMETEROL 250; 50 UG/1; UG/1
1 POWDER RESPIRATORY (INHALATION) 2 TIMES DAILY
Qty: 60 EACH | Refills: 2 | Status: SHIPPED | OUTPATIENT
Start: 2023-12-14 | End: 2024-12-13

## 2023-12-14 RX ORDER — FLUTICASONE PROPIONATE 50 MCG
1 SPRAY, SUSPENSION (ML) NASAL 2 TIMES DAILY
Qty: 5 ML | Refills: 2 | Status: SHIPPED | OUTPATIENT
Start: 2023-12-14

## 2023-12-14 RX ORDER — ATORVASTATIN CALCIUM 40 MG/1
40 TABLET, FILM COATED ORAL DAILY
Qty: 90 TABLET | Refills: 3 | Status: SHIPPED | OUTPATIENT
Start: 2023-12-14 | End: 2024-01-11 | Stop reason: DRUGHIGH

## 2023-12-14 RX ORDER — HYDROCHLOROTHIAZIDE 25 MG/1
25 TABLET ORAL DAILY
Qty: 90 TABLET | Refills: 3 | Status: SHIPPED | OUTPATIENT
Start: 2023-12-14

## 2023-12-14 RX ORDER — LOSARTAN POTASSIUM 25 MG/1
25 TABLET ORAL DAILY
Qty: 90 TABLET | Refills: 3 | Status: SHIPPED | OUTPATIENT
Start: 2023-12-14 | End: 2024-01-11 | Stop reason: DRUGHIGH

## 2023-12-14 RX ORDER — PANTOPRAZOLE SODIUM 40 MG/1
40 TABLET, DELAYED RELEASE ORAL DAILY
Qty: 90 TABLET | Refills: 3 | Status: SHIPPED | OUTPATIENT
Start: 2023-12-14

## 2023-12-14 RX ORDER — DICLOFENAC SODIUM 10 MG/G
2 GEL TOPICAL DAILY
Qty: 100 G | Refills: 2 | Status: SHIPPED | OUTPATIENT
Start: 2023-12-14

## 2023-12-14 RX ORDER — HYDRALAZINE HYDROCHLORIDE 25 MG/1
25 TABLET, FILM COATED ORAL 2 TIMES DAILY
Qty: 120 TABLET | Refills: 3 | Status: SHIPPED | OUTPATIENT
Start: 2023-12-14 | End: 2024-01-11 | Stop reason: DRUGHIGH

## 2023-12-14 NOTE — PROGRESS NOTES
[facility] INTERNAL MEDICINE  OUTPATIENT OFFICE VISIT NOTE    SUBJECTIVE:      HPI: Riccardo Hamilton is a 78 y.o. yo male w/ PMH of colonic adenocarcinoma (followed by Oncology; s/p hemicolectomy and adjuvant chemo), HTN, HLD, T2DM, Asthma, Retropharyngeal Carotid Bypass (2014), Carotid Endarterectomy (1/2014; 2/2014), and previous RLE DVT w/ subacute small PE of R. Lung (s/p x3 month Eliquis txt) presents to [facility] IM clinic for follow up visit. Since last visit patient reports feeling well overall and has only minor complaints.  He was referred to orthopedic at last visit secondary to B/L and Dupuytren's contractures though unfortunately has not received call regarding appointment.  Patient also reports 2-3/10 to 9/10, aching shoulder pain, worse with weather changes & movement, improved with topical diclofenac, limited range of motion, occasionally awakening from sleep.  Denies any associated trauma/falls or MVC.  First noticed pain x1 year ago though as acutely worsened in the past x3 months.  Patient states that since restarting Advair Diskus at prior appointment has had nearly complete resolution of all previous pulmonary related symptoms (cough, dyspnea on exertion, wheezing). With the CT findings and minimally elevated peripheral eosinophilia and previously normal PFTs; discussed referring patient to pulmonology further evaluation. Patient is agreeable with plan; pending pulmonary appointment at this time. At this time, patient denies any recent unexpected weight loss, loss of appetite, early satiety, night sweats, fever/chills.  Patient is a lifetime nonsmoker.    ROS:  Denies any headaches, changes in vision, chest pain, palpitations, shortness of breath, N/V, or lightheadedness/dizziness, abdominal pain, pain/difficulty with urination/stools, or blood in urine/BMs.       OBJECTIVE:     Vital signs:   /61 (BP Location: Left arm, Patient Position: Sitting, BP Method: Large (Automatic))   Pulse 81   Temp 97.8  "°F (36.6 °C)   Resp 16   Ht 5' 8" (1.727 m)   Wt 80.1 kg (176 lb 8 oz)   SpO2 97%   BMI 26.84 kg/m²      Physical Examination:  Gen: well-nourished, well-developed gentleman, in no acute distress   HEENT: Normocephalic, atraumatic. PERRL.   Neck: No thyromegaly. No lymphadenopathy.   Heart: RRR, no murmurs, gallops, clicks or rubs.   Lungs: Posterior breath sounds with inspiratory fine crackles w/ possible velcr0-like appreciations   Abd: Midline well-healed abdominal scar. Soft, non-tender, non-distended and without guarding.   Extremities: Radial and pedal pulses 2+ bilaterally, no appreciable LE edema.   MSK:  B/L hand Dupuytren's contractures.  B/L shoulder sirs nontender to moderate palpation though limited range of motion on active and passive movement.  Inability to raise and completed the above head with concern for possible adhesive capsulitis.  Moves all extremities purposefully.   Neuro: Responds well to commands. CN III-XII grossly intact. No focal neurological deficits noted   Skin: Warm, dry, intact.     ASSESSMENT & PLAN:   Reported Hx of Asthma; possible reactive airway disease  - Discontinued Advair Discus at previous appt 2/2 to unremarkable recent PFTs for obstruction  - Has since required x2 puffs Albuterol daily 2/2 worsening cough, SOB, & sputum production  - Discussed possiblities of additional pulmonary etiologies with patient & possible exposure w/ differential including GERD, post-nasal drip, vocal cord dysfunction, reactive airway disease, and/or ILD  - Recent CT thorax (4/2023) w/ scattered mediastinal lymph nodes & hilar lymph nodes, chronic interstitial markings at inferior lingula & RML, & mildly prominent peripheral distribution of interstitial markings at lung bases  - Eosinophils mildly elevated on serial CBCs  - Patient may benefit from High-Res CT Thorax for further evaluation  - C/w Advair Diskus today for trial therapy  - C/w Albuterol inhaler PRN    Suspected B/L " Dupuytren's Contracture  - Referred to Ortho for further evaluation; appreciate assistance in care    B/L Shoulder Pain/discomfort/stiffness  Concern for B/L shoulder arthritic component and/or adhesive capsulitis  - 2-3/10 to 9/10, aching pain, worse with weather changes & movement, improved with topical diclofenac, limited range of motion, occasionally awakening from sleep  - Denies any trauma, falls, MVCs  - First noticed pain x1 year, worsened acutely over past x3 months  - Ordered B/L x-rays    Stage IIIB Adenocarcinoma of Cecum  - s/p hemicolectomy and adjuvant chemotherapy  - At this time denies any GI-related symptoms  - patient is followed by oncology; continue to follow as scheduled    Hypertension  - Well Controlled  - Continue HCTZ 25 daily, Toprol 25 daily, Hydralazine 25 BID, Losartan 25 q.D.  - Counseled on DASH diet & increasing exercise as tolerated     Hyperlipidemia  - Latest lipid profile WNL  - Continue Lipitor 40 daily    Hx of B/L Carotid Endarterectomy   - Continue Aspirin 81, Lipitor 40 daily    Seasonal Allergies - well controlled  - Intermittent-persistent dry cough and eye irritiation  - Relieved with Zyrtec/Flonase therapy     GERD   - Continue Protonix 40 daily     Hx of DVT/PE  - Occurred in 2021, treated with x3 months Eliquis  - Denies any recurrence  - Continue Aspirin 81 daily      Health Maintenance:  Immunization History   Administered Date(s) Administered    COVID-19, MRNA, LN-S, PF (MODERNA FULL 0.5 ML DOSE) 01/12/2021, 02/26/2021, 10/22/2021, 05/24/2022    COVID-19, MRNA, LN-S, PF (Pfizer) (Purple Cap) 01/12/2021, 02/26/2021    Influenza (FLUAD) - Quadrivalent - Adjuvanted - PF *Preferred* (65+) 09/21/2022    Influenza - High Dose - PF (65 years and older) 09/22/2015, 10/06/2016, 10/02/2017, 09/24/2018    Influenza - Quadrivalent - PF *Preferred* (6 months and older) 09/12/2019    Influenza - Trivalent - PF (ADULT) 09/22/2015, 10/06/2016, 10/02/2017, 09/24/2018, 09/12/2019     Influenza A (H1N1) 2009 Monovalent - IM 01/21/2010    Pneumococcal Conjugate - 13 Valent 09/04/2020    Pneumococcal Polysaccharide - 23 Valent 10/02/2015    Tdap 02/09/2022    Zoster 09/22/2015    Zoster Recombinant 05/31/2022, 09/21/2022     Return to clinic in 4 months with labs.    Ancelmo Richard MD   [employer] [facility], PGY-3

## 2024-01-03 ENCOUNTER — OFFICE VISIT (OUTPATIENT)
Dept: ORTHOPEDICS | Facility: CLINIC | Age: 79
End: 2024-01-03
Payer: MEDICARE

## 2024-01-03 ENCOUNTER — HOSPITAL ENCOUNTER (OUTPATIENT)
Dept: RADIOLOGY | Facility: CLINIC | Age: 79
Discharge: HOME | End: 2024-01-03
Attending: STUDENT IN AN ORGANIZED HEALTH CARE EDUCATION/TRAINING PROGRAM
Payer: MEDICARE

## 2024-01-03 ENCOUNTER — HOSPITAL ENCOUNTER (OUTPATIENT)
Dept: RADIOLOGY | Facility: HOSPITAL | Age: 79
Discharge: HOME | End: 2024-01-03
Attending: STUDENT IN AN ORGANIZED HEALTH CARE EDUCATION/TRAINING PROGRAM
Payer: MEDICARE

## 2024-01-03 VITALS
BODY MASS INDEX: 26.52 KG/M2 | WEIGHT: 175 LBS | SYSTOLIC BLOOD PRESSURE: 194 MMHG | DIASTOLIC BLOOD PRESSURE: 69 MMHG | HEIGHT: 68 IN | HEART RATE: 49 BPM

## 2024-01-03 DIAGNOSIS — M79.642 BILATERAL HAND PAIN: ICD-10-CM

## 2024-01-03 DIAGNOSIS — M72.0 DUPUYTREN'S CONTRACTURE OF BOTH HANDS: ICD-10-CM

## 2024-01-03 DIAGNOSIS — M72.0 DUPUYTREN'S CONTRACTURE OF RIGHT HAND: ICD-10-CM

## 2024-01-03 DIAGNOSIS — M79.641 BILATERAL HAND PAIN: ICD-10-CM

## 2024-01-03 DIAGNOSIS — M72.0 DUPUYTREN'S CONTRACTURE OF BOTH HANDS: Primary | ICD-10-CM

## 2024-01-03 PROCEDURE — 99204 OFFICE O/P NEW MOD 45 MIN: CPT | Mod: ,,, | Performed by: STUDENT IN AN ORGANIZED HEALTH CARE EDUCATION/TRAINING PROGRAM

## 2024-01-03 PROCEDURE — 73130 X-RAY EXAM OF HAND: CPT | Mod: LT,,, | Performed by: STUDENT IN AN ORGANIZED HEALTH CARE EDUCATION/TRAINING PROGRAM

## 2024-01-03 PROCEDURE — 71046 X-RAY EXAM CHEST 2 VIEWS: CPT | Mod: TC

## 2024-01-03 PROCEDURE — 73130 X-RAY EXAM OF HAND: CPT | Mod: RT,,, | Performed by: STUDENT IN AN ORGANIZED HEALTH CARE EDUCATION/TRAINING PROGRAM

## 2024-01-03 RX ORDER — SODIUM CHLORIDE 9 MG/ML
INJECTION, SOLUTION INTRAVENOUS CONTINUOUS
Status: CANCELLED | OUTPATIENT
Start: 2024-01-03

## 2024-01-03 NOTE — H&P (VIEW-ONLY)
Chief Complaint:  bilateral hand contractures    Consulting Physician: Ancelmo Richard MD    History of present illness:     Patient is a 78-year-old male who is retired right-hand dominant presents for initial evaluation of his bilateral hand contractures the right side is worse than the left.  This has been ongoing for several years in his getting worse.  On the right side this affects his ring and small fingers.  On the left side this also affects his ring and small fingers but it is less severe than on the right side.  States that he is starting to drop items.  He would like to have these corrected.    Past Medical History:   Diagnosis Date    Cancer     Encounter for blood transfusion     Hyperlipidemia     Hypertension        Past Surgical History:   Procedure Laterality Date    CAROTID ENDARTERECTOMY      COLONOSCOPY N/A 10/19/2022    Procedure: COLONOSCOPY;  Surgeon: Natalya Neely MD;  Location: [facility] ENDOSCOPY;  Service: Gastroenterology;  Laterality: N/A;    MEDIPORT INSERTION, SINGLE         Current Outpatient Medications   Medication Sig    aspirin 81 MG Chew Take 1 tablet (81 mg total) by mouth once daily.    atorvastatin (LIPITOR) 40 MG tablet Take 1 tablet (40 mg total) by mouth once daily.    cetirizine (ZYRTEC) 5 MG tablet Take 1 tablet (5 mg total) by mouth once daily.    diclofenac sodium (VOLTAREN) 1 % Gel Apply 2 g topically once daily.    ferrous sulfate (FEOSOL) 325 mg (65 mg iron) Tab tablet Take 325 mg by mouth once daily.    fluticasone propionate (FLONASE) 50 mcg/actuation nasal spray 1 spray (50 mcg total) by Each Nostril route 2 (two) times daily.    fluticasone-salmeterol diskus inhaler 250-50 mcg Inhale 1 puff into the lungs 2 (two) times daily. Controller    hydrALAZINE (APRESOLINE) 25 MG tablet Take 1 tablet (25 mg total) by mouth 2 (two) times daily.    hydroCHLOROthiazide (HYDRODIURIL) 25 MG tablet Take 1 tablet (25 mg total) by mouth once daily.    losartan (COZAAR) 25 MG  tablet Take 1 tablet (25 mg total) by mouth once daily.    magnesium oxide (MAG-OX) 400 mg (241.3 mg magnesium) tablet Take 400 mg by mouth 2 (two) times daily.    metoprolol succinate (TOPROL-XL) 25 MG 24 hr tablet Take 1 tablet (25 mg total) by mouth once daily.    multivitamin (THERAGRAN) per tablet Take 1 tablet by mouth once daily.    pantoprazole (PROTONIX) 40 MG tablet Take 1 tablet (40 mg total) by mouth once daily.    VENTOLIN HFA 90 mcg/actuation inhaler Inhale 2 puffs into the lungs every 6 (six) hours as needed for Wheezing or Shortness of Breath.    meloxicam (MOBIC) 15 MG tablet Take 1 tablet (15 mg total) by mouth daily as needed for Pain. (Patient not taking: Reported on 9/18/2023)    MOVIPREP 100-7.5-2.691 gram solution Take by mouth.     No current facility-administered medications for this visit.     Facility-Administered Medications Ordered in Other Visits   Medication    heparin, porcine (PF) 100 unit/mL injection flush 500 Units    sodium chloride 0.9% flush 10 mL       Review of patient's allergies indicates:  No Known Allergies    Family History   Problem Relation Age of Onset    Stroke Sister     Hypertension Sister     Cancer Sister     Stroke Brother     Hypertension Brother        Social History     Socioeconomic History    Marital status:      Spouse name: Ya Hamilton    Number of children: 4   Tobacco Use    Smoking status: Former    Smokeless tobacco: Never   Substance and Sexual Activity    Alcohol use: Not Currently    Drug use: Never    Sexual activity: Not Currently     Social Determinants of Health     Financial Resource Strain: Low Risk  (5/31/2022)    Overall Financial Resource Strain (CARDIA)     Difficulty of Paying Living Expenses: Not hard at all   Food Insecurity: No Food Insecurity (5/31/2022)    Hunger Vital Sign     Worried About Running Out of Food in the Last Year: Never true     Ran Out of Food in the Last Year: Never true   Transportation Needs: No  "Transportation Needs (5/31/2022)    PRAPARE - Transportation     Lack of Transportation (Medical): No     Lack of Transportation (Non-Medical): No   Physical Activity: Inactive (5/31/2022)    Exercise Vital Sign     Days of Exercise per Week: 0 days     Minutes of Exercise per Session: 0 min   Stress: No Stress Concern Present (5/31/2022)    [employer] [place] [employer] - Occupational Stress Questionnaire     Feeling of Stress : Not at all   Social Connections: Moderately Isolated (5/31/2022)    Social Connection and Isolation Panel [NHANES]     Frequency of Communication with Friends and Family: Once a week     Frequency of Social Gatherings with Friends and Family: Once a week     Attends Syn[employer] Services: More than 4 times per year     Active Member of Clubs or Organizations: No     Attends Club or Organization Meetings: Never     Marital Status:    Housing Stability: Low Risk  (5/31/2022)    Housing Stability Vital Sign     Unable to Pay for Housing in the Last Year: No     Number of Places Lived in the Last Year: 1     Unstable Housing in the Last Year: No       Review of Systems:    Constitution:   Denies chills, fever, and sweats.  HENT:   Denies headaches or blurry vision.  Cardiovascular:  Denies chest pain or irregular heart beat.  Respiratory:   Denies cough or shortness of breath.  Gastrointestinal:  Denies abdominal pain, nausea, or vomiting.  Musculoskeletal:   Denies muscle cramps.  Neurological:   Denies dizziness or focal weakness.  Psychiatric/Behavior: Normal mental status.  Hematology/Lymph:  Denies bleeding problem or easy bruising/bleeding.  Skin:    Denies rash or suspicious lesions.    Examination:    Vital Signs:    Vitals:    01/03/24 1453   Weight: 79.4 kg (175 lb)   Height: 5' 8" (1.727 m)   PainSc:   9       Body mass index is 26.61 kg/m².    Constitution:   Well-developed, well nourished patient in no acute distress.  Neurological:   Alert and oriented x 3 and " cooperative to examination.     Psychiatric/Behavior: Normal mental status.  Respiratory:   No shortness of breath.  Eyes:    Extraoccular muscles intact  Skin:    No scars, rash or suspicious lesions.    MSK:     Right hand:  Dupuytren's cords in line with the ring and small fingers.  There is a ring finger MP joint contracture of 30° and PIP joint contracture of 15°.  There has a small finger MP joint contracture 70° and PIP joint contracture of 60°.  Sensation light touch intact in median ulnar radial distribution.  Radial pulse 2 +hand is warm well perfused    Imaging:    X-ray of the right hand three views shows degenerative changes but no fractures   X-ray of the left hand three views shows degenerative changes but no fractures     Assessment:  bilateral Dupuytren's contractures of hands    Plan:   he has severe Dupuytren's contracture affecting his right ring and little fingers.  He would like to have these addressed surgically.  I can take him to the operating room for limited palmar fasciectomy of the ring and small fingers.  I will book and consent him for 01/26/2024    I explained that surgery and the nature of their condition are not without risks. These include, but are not limited to, bleeding, infection, neurovascular compromise, wound complications, scarring, cosmetic defects, need for later and/or repeated surgeries, pain, loss of ROM, loss of function, deformity, functional abnormalities, stiffness, thromboembolic complications, compartment syndrome, loss of limb, loss of life, anesthetic complications, and other imponderables. I explained that these can occur despite the adequacy of treatments rendered, and that their risks are heightened given the nature of their condition. They verbalized understanding. They would like to continue with surgery at this time. If appropriate family was involved with surgical discussion.      Follow Up:  after surgery  Xray at next visit:  none

## 2024-01-03 NOTE — PROGRESS NOTES
Chief Complaint:  bilateral hand contractures    Consulting Physician: Ancelmo Richard MD    History of present illness:     Patient is a 78-year-old male who is retired right-hand dominant presents for initial evaluation of his bilateral hand contractures the right side is worse than the left.  This has been ongoing for several years in his getting worse.  On the right side this affects his ring and small fingers.  On the left side this also affects his ring and small fingers but it is less severe than on the right side.  States that he is starting to drop items.  He would like to have these corrected.    Past Medical History:   Diagnosis Date    Cancer     Encounter for blood transfusion     Hyperlipidemia     Hypertension        Past Surgical History:   Procedure Laterality Date    CAROTID ENDARTERECTOMY      COLONOSCOPY N/A 10/19/2022    Procedure: COLONOSCOPY;  Surgeon: Natalya Neely MD;  Location: [facility] ENDOSCOPY;  Service: Gastroenterology;  Laterality: N/A;    MEDIPORT INSERTION, SINGLE         Current Outpatient Medications   Medication Sig    aspirin 81 MG Chew Take 1 tablet (81 mg total) by mouth once daily.    atorvastatin (LIPITOR) 40 MG tablet Take 1 tablet (40 mg total) by mouth once daily.    cetirizine (ZYRTEC) 5 MG tablet Take 1 tablet (5 mg total) by mouth once daily.    diclofenac sodium (VOLTAREN) 1 % Gel Apply 2 g topically once daily.    ferrous sulfate (FEOSOL) 325 mg (65 mg iron) Tab tablet Take 325 mg by mouth once daily.    fluticasone propionate (FLONASE) 50 mcg/actuation nasal spray 1 spray (50 mcg total) by Each Nostril route 2 (two) times daily.    fluticasone-salmeterol diskus inhaler 250-50 mcg Inhale 1 puff into the lungs 2 (two) times daily. Controller    hydrALAZINE (APRESOLINE) 25 MG tablet Take 1 tablet (25 mg total) by mouth 2 (two) times daily.    hydroCHLOROthiazide (HYDRODIURIL) 25 MG tablet Take 1 tablet (25 mg total) by mouth once daily.    losartan (COZAAR) 25 MG  tablet Take 1 tablet (25 mg total) by mouth once daily.    magnesium oxide (MAG-OX) 400 mg (241.3 mg magnesium) tablet Take 400 mg by mouth 2 (two) times daily.    metoprolol succinate (TOPROL-XL) 25 MG 24 hr tablet Take 1 tablet (25 mg total) by mouth once daily.    multivitamin (THERAGRAN) per tablet Take 1 tablet by mouth once daily.    pantoprazole (PROTONIX) 40 MG tablet Take 1 tablet (40 mg total) by mouth once daily.    VENTOLIN HFA 90 mcg/actuation inhaler Inhale 2 puffs into the lungs every 6 (six) hours as needed for Wheezing or Shortness of Breath.    meloxicam (MOBIC) 15 MG tablet Take 1 tablet (15 mg total) by mouth daily as needed for Pain. (Patient not taking: Reported on 9/18/2023)    MOVIPREP 100-7.5-2.691 gram solution Take by mouth.     No current facility-administered medications for this visit.     Facility-Administered Medications Ordered in Other Visits   Medication    heparin, porcine (PF) 100 unit/mL injection flush 500 Units    sodium chloride 0.9% flush 10 mL       Review of patient's allergies indicates:  No Known Allergies    Family History   Problem Relation Age of Onset    Stroke Sister     Hypertension Sister     Cancer Sister     Stroke Brother     Hypertension Brother        Social History     Socioeconomic History    Marital status:      Spouse name: Ya Hamilton    Number of children: 4   Tobacco Use    Smoking status: Former    Smokeless tobacco: Never   Substance and Sexual Activity    Alcohol use: Not Currently    Drug use: Never    Sexual activity: Not Currently     Social Determinants of Health     Financial Resource Strain: Low Risk  (5/31/2022)    Overall Financial Resource Strain (CARDIA)     Difficulty of Paying Living Expenses: Not hard at all   Food Insecurity: No Food Insecurity (5/31/2022)    Hunger Vital Sign     Worried About Running Out of Food in the Last Year: Never true     Ran Out of Food in the Last Year: Never true   Transportation Needs: No  "Transportation Needs (5/31/2022)    PRAPARE - Transportation     Lack of Transportation (Medical): No     Lack of Transportation (Non-Medical): No   Physical Activity: Inactive (5/31/2022)    Exercise Vital Sign     Days of Exercise per Week: 0 days     Minutes of Exercise per Session: 0 min   Stress: No Stress Concern Present (5/31/2022)    [place] [employer] - Occupational Stress Questionnaire     Feeling of Stress : Not at all   Social Connections: Moderately Isolated (5/31/2022)    Social Connection and Isolation Panel [NHANES]     Frequency of Communication with Friends and Family: Once a week     Frequency of Social Gatherings with Friends and Family: Once a week     Attends [employer] Services: More than 4 times per year     Active Member of Clubs or Organizations: No     Attends Club or Organization Meetings: Never     Marital Status:    Housing Stability: Low Risk  (5/31/2022)    Housing Stability Vital Sign     Unable to Pay for Housing in the Last Year: No     Number of Places Lived in the Last Year: 1     Unstable Housing in the Last Year: No       Review of Systems:    Constitution:   Denies chills, fever, and sweats.  HENT:   Denies headaches or blurry vision.  Cardiovascular:  Denies chest pain or irregular heart beat.  Respiratory:   Denies cough or shortness of breath.  Gastrointestinal:  Denies abdominal pain, nausea, or vomiting.  Musculoskeletal:   Denies muscle cramps.  Neurological:   Denies dizziness or focal weakness.  Psychiatric/Behavior: Normal mental status.  Hematology/Lymph:  Denies bleeding problem or easy bruising/bleeding.  Skin:    Denies rash or suspicious lesions.    Examination:    Vital Signs:    Vitals:    01/03/24 1453   Weight: 79.4 kg (175 lb)   Height: 5' 8" (1.727 m)   PainSc:   9       Body mass index is 26.61 kg/m².    Constitution:   Well-developed, well nourished patient in no acute distress.  Neurological:   Alert and oriented x 3 and " cooperative to examination.     Psychiatric/Behavior: Normal mental status.  Respiratory:   No shortness of breath.  Eyes:    Extraoccular muscles intact  Skin:    No scars, rash or suspicious lesions.    MSK:     Right hand:  Dupuytren's cords in line with the ring and small fingers.  There is a ring finger MP joint contracture of 30° and PIP joint contracture of 15°.  There has a small finger MP joint contracture 70° and PIP joint contracture of 60°.  Sensation light touch intact in median ulnar radial distribution.  Radial pulse 2 +hand is warm well perfused    Imaging:    X-ray of the right hand three views shows degenerative changes but no fractures   X-ray of the left hand three views shows degenerative changes but no fractures     Assessment:  bilateral Dupuytren's contractures of hands    Plan:   he has severe Dupuytren's contracture affecting his right ring and little fingers.  He would like to have these addressed surgically.  I can take him to the operating room for limited palmar fasciectomy of the ring and small fingers.  I will book and consent him for 01/26/2024    I explained that surgery and the nature of their condition are not without risks. These include, but are not limited to, bleeding, infection, neurovascular compromise, wound complications, scarring, cosmetic defects, need for later and/or repeated surgeries, pain, loss of ROM, loss of function, deformity, functional abnormalities, stiffness, thromboembolic complications, compartment syndrome, loss of limb, loss of life, anesthetic complications, and other imponderables. I explained that these can occur despite the adequacy of treatments rendered, and that their risks are heightened given the nature of their condition. They verbalized understanding. They would like to continue with surgery at this time. If appropriate family was involved with surgical discussion.      Follow Up:  after surgery  Xray at next visit:  none

## 2024-01-04 ENCOUNTER — TELEPHONE (OUTPATIENT)
Dept: ORTHOPEDICS | Facility: CLINIC | Age: 79
End: 2024-01-04
Payer: MEDICARE

## 2024-01-04 NOTE — LETTER
Patients Name: Riccardo Hamilton  : 1945  Today's Date: 2024     Dr. Jordan Holloway ([facility]),       The above named patient is scheduled to have a right ring and little finger dupuytren's contracture release on 2024.    Surgeon: Dr. Bernabe Mensah  Type of Anesthesia: General    This patient needs surgical clearance from your office for this procedure.  Please perform necessary tests in order for this patient to be medically cleared for surgery. Please send or fax the clearance letter with most recent ECHO, EKG or stress test, to our office as soon as possible.     Please include any medication instructions that should be held prior to surgery.     We appreciate your help in getting our patient cleared for surgery.    Please feel free to call our office should you have any questions.     Thank you,   Bernabe Mensah MD/HRL      Phone Number: [phone]  Fax Number: [phone]

## 2024-01-04 NOTE — TELEPHONE ENCOUNTER
I routed the surgery clearance request to provider and staff pool.     Provider: Dr. Jordan Holloway (Cardiologist)   Group: [facility]  Sx Date: 1/26/24  Date Faxed: 1/4/24

## 2024-01-11 ENCOUNTER — OFFICE VISIT (OUTPATIENT)
Dept: CARDIOLOGY | Facility: CLINIC | Age: 79
End: 2024-01-11
Payer: MEDICARE

## 2024-01-11 VITALS
HEART RATE: 67 BPM | DIASTOLIC BLOOD PRESSURE: 64 MMHG | TEMPERATURE: 98 F | WEIGHT: 173.63 LBS | BODY MASS INDEX: 26.32 KG/M2 | RESPIRATION RATE: 20 BRPM | HEIGHT: 68 IN | SYSTOLIC BLOOD PRESSURE: 119 MMHG | OXYGEN SATURATION: 97 %

## 2024-01-11 DIAGNOSIS — Z98.890 HISTORY OF BILATERAL CAROTID ENDARTERECTOMY: ICD-10-CM

## 2024-01-11 DIAGNOSIS — Z01.810 PRE-OPERATIVE CARDIOVASCULAR EXAMINATION: Primary | ICD-10-CM

## 2024-01-11 DIAGNOSIS — E78.5 HYPERLIPIDEMIA, UNSPECIFIED HYPERLIPIDEMIA TYPE: ICD-10-CM

## 2024-01-11 DIAGNOSIS — I10 HYPERTENSION, UNSPECIFIED TYPE: ICD-10-CM

## 2024-01-11 PROCEDURE — 99214 OFFICE O/P EST MOD 30 MIN: CPT | Mod: PBBFAC | Performed by: INTERNAL MEDICINE

## 2024-01-11 PROCEDURE — 93005 ELECTROCARDIOGRAM TRACING: CPT

## 2024-01-11 RX ORDER — ALBUTEROL SULFATE 0.83 MG/ML
2.5 SOLUTION RESPIRATORY (INHALATION)
COMMUNITY
Start: 2024-01-09 | End: 2024-02-14 | Stop reason: SDUPTHER

## 2024-01-11 RX ORDER — ATORVASTATIN CALCIUM 80 MG/1
80 TABLET, FILM COATED ORAL DAILY
Qty: 90 TABLET | Refills: 3 | Status: SHIPPED | OUTPATIENT
Start: 2024-01-11 | End: 2025-01-10

## 2024-01-11 RX ORDER — LOSARTAN POTASSIUM 50 MG/1
50 TABLET ORAL DAILY
Qty: 90 TABLET | Refills: 3 | Status: SHIPPED | OUTPATIENT
Start: 2024-01-11 | End: 2025-01-10

## 2024-01-11 NOTE — PROGRESS NOTES
01/11/2024 9:14 AM    Subjective:     CHIEF COMPLAINT:   Chief Complaint   Patient presents with    f/u denies chest pain or sob since LV questions on stopping                           HPI:    Mr. Riccardo Hamilton is a 78 y.o. male with HTN, T2DM, HLD, asthma, ADRIÁN s/p CEA 2014, colonic adenocarcinoma with previous RLE DVT and PE s/p 3 months of OAC who presents to clinic today for follow up and pre-operative risk stratification.     The patient reports he is active. He reports he can perform all ADLs. He reports he can walk at least 1 block. He reports he can climb a flight of stair. He reports he can perform outside yard work. He he participate in moderate sporting activities such but not strenuous sporting activity. He does not know whether he can job a short distance. He does not have sexual relations.     He denies angina or anginal equivalents. He denies symptoms of heart failure. He denies syncope or pre-syncope.     He reports his blood pressure is well controlled at physician visits but does not take them at home.     Past Medical History    Patient Active Problem List   Diagnosis    Adenocarcinoma of cecum    Hypertension    Hyperlipidemia    GERD (gastroesophageal reflux disease)    Asthma    History of DVT (deep vein thrombosis)    History of bilateral carotid endarterectomy    History of pulmonary embolism    History of iron deficiency anemia    Encounter for follow-up surveillance of colon cancer       Surgical History    Past Surgical History:   Procedure Laterality Date    CAROTID ENDARTERECTOMY      COLONOSCOPY N/A 10/19/2022    Procedure: COLONOSCOPY;  Surgeon: Natalya Neely MD;  Location: [facility] ENDOSCOPY;  Service: Gastroenterology;  Laterality: N/A;    MEDIPORT INSERTION, SINGLE         Social History     Socioeconomic History    Marital status:      Spouse name: Ya Hamilton    Number of children: 4   Tobacco Use    Smoking status: Former    Smokeless tobacco: Never   Substance and  Sexual Activity    Alcohol use: Not Currently    Drug use: Never    Sexual activity: Not Currently     Social Determinants of Health     Financial Resource Strain: Low Risk  (5/31/2022)    Overall Financial Resource Strain (CARDIA)     Difficulty of Paying Living Expenses: Not hard at all   Food Insecurity: No Food Insecurity (5/31/2022)    Hunger Vital Sign     Worried About Running Out of Food in the Last Year: Never true     Ran Out of Food in the Last Year: Never true   Transportation Needs: No Transportation Needs (5/31/2022)    PRAPARE - Transportation     Lack of Transportation (Medical): No     Lack of Transportation (Non-Medical): No   Physical Activity: Inactive (5/31/2022)    Exercise Vital Sign     Days of Exercise per Week: 0 days     Minutes of Exercise per Session: 0 min   Stress: No Stress Concern Present (5/31/2022)    [employer] - Occupational Stress Questionnaire     Feeling of Stress : Not at all   Social Connections: Moderately Isolated (5/31/2022)    Social Connection and Isolation Panel [NHANES]     Frequency of Communication with Friends and Family: Once a week     Frequency of Social Gatherings with Friends and Family: Once a week     Attends Protestant Services: More than 4 times per year     Active Member of Clubs or Organizations: No     Attends Club or Organization Meetings: Never     Marital Status:    Housing Stability: Low Risk  (5/31/2022)    Housing Stability Vital Sign     Unable to Pay for Housing in the Last Year: No     Number of Places Lived in the Last Year: 1     Unstable Housing in the Last Year: No       Family History   Problem Relation Age of Onset    Stroke Sister     Hypertension Sister     Cancer Sister     Stroke Brother     Hypertension Brother      Review of patient's allergies indicates:  No Known Allergies    Current Medications    Current Outpatient Medications   Medication Instructions    albuterol (PROVENTIL) 2.5 mg,  "Nebulization, As needed (PRN)    aspirin 81 mg, Oral, Daily    atorvastatin (LIPITOR) 40 mg, Oral, Daily    cetirizine (ZYRTEC) 5 mg, Oral, Daily    diclofenac sodium (VOLTAREN) 2 g, Topical (Top), Daily    ferrous sulfate (FEOSOL) 325 mg, Oral, Daily    fluticasone propionate (FLONASE) 50 mcg, Each Nostril, 2 times daily    fluticasone-salmeterol diskus inhaler 250-50 mcg 1 puff, Inhalation, 2 times daily, Controller    hydrALAZINE (APRESOLINE) 25 mg, Oral, 2 times daily    hydroCHLOROthiazide (HYDRODIURIL) 25 mg, Oral, Daily    losartan (COZAAR) 25 mg, Oral, Daily    magnesium oxide (MAG-OX) 400 mg, Oral, 2 times daily    meloxicam (MOBIC) 15 mg, Oral, Daily PRN    metoprolol succinate (TOPROL-XL) 25 mg, Oral, Daily    MOVIPREP 100-7.5-2.691 gram solution Oral    multivitamin (THERAGRAN) per tablet 1 tablet, Oral, Daily    pantoprazole (PROTONIX) 40 mg, Oral, Daily    VENTOLIN HFA 90 mcg/actuation inhaler 2 puffs, Inhalation, Every 6 hours PRN         ROS:   Denies headaches, changes in vision, nausea, emesis, fevers, chills, chest pain, palpitations, dyspnea, abdominal pain, changes in urinary or bowel habits.     Objective:     BP Readings from Last 3 Encounters:   01/11/24 119/64   01/03/24 (!) 194/69   12/14/23 110/61        Pulse Readings from Last 3 Encounters:   01/11/24 67   01/03/24 (!) 49   12/14/23 81        Temp Readings from Last 3 Encounters:   01/11/24 98.2 °F (36.8 °C) (Oral)   12/14/23 97.8 °F (36.6 °C)   10/19/23 97.9 °F (36.6 °C)       Wt Readings from Last 3 Encounters:   01/11/24 78.7 kg (173 lb 9.6 oz)   01/03/24 79.4 kg (175 lb)   12/14/23 80.1 kg (176 lb 8 oz)         PE:  Blood pressure 119/64, pulse 67, temperature 98.2 °F (36.8 °C), temperature source Oral, resp. rate 20, height 5' 8" (1.727 m), weight 78.7 kg (173 lb 9.6 oz), SpO2 97 %.     Physical Exam  Vitals reviewed.   Constitutional:       General: He is not in acute distress.     Appearance: Normal appearance. He is normal " weight. He is not ill-appearing.   HENT:      Head: Normocephalic and atraumatic.      Right Ear: External ear normal.      Left Ear: External ear normal.      Nose: Nose normal.      Mouth/Throat:      Mouth: Mucous membranes are moist.   Cardiovascular:      Rate and Rhythm: Normal rate and regular rhythm.      Pulses: Normal pulses.      Heart sounds: No murmur heard.  Pulmonary:      Effort: Pulmonary effort is normal. No respiratory distress.      Breath sounds: Normal breath sounds. No stridor. No wheezing, rhonchi or rales.   Chest:      Chest wall: No tenderness.   Abdominal:      General: Abdomen is flat. Bowel sounds are normal. There is no distension.      Palpations: Abdomen is soft.   Musculoskeletal:      Cervical back: Neck supple.      Right lower leg: No edema.      Left lower leg: No edema.   Skin:     General: Skin is warm and dry.      Capillary Refill: Capillary refill takes less than 2 seconds.   Neurological:      Mental Status: He is alert and oriented to person, place, and time.   Psychiatric:         Mood and Affect: Mood normal.         Behavior: Behavior normal.                                                                                                                                                                                                                                                                                                                                                                                                                                                                                  CARDIAC TESTING:  Echocardiogram  No results found for this or any previous visit.      Stress Test  No results found for this or any previous visit.     Coronary Angiogram  No results found for this or any previous visit.     Holter Monitor  No cardiac monitor results found for the past 12 months    Last BMP BMP  Lab Results   Component Value Date     01/03/2024    K  "4.2 01/03/2024    CO2 30 01/03/2024    BUN 12.7 01/03/2024    CREATININE 1.39 (H) 01/03/2024    CALCIUM 10.0 01/03/2024    EGFRNORACEVR 51 01/03/2024      Mag No components found for: "MAG"  Last CBC     Lab Results   Component Value Date    WBC 7.68 01/03/2024    HGB 13.4 (L) 01/03/2024    HCT 40.1 (L) 01/03/2024    MCV 94.4 (H) 01/03/2024     01/03/2024     BNP    Lab Results   Component Value Date    BNP 78.2 03/21/2023     Last lipids    Lab Results   Component Value Date    CHOL 142 01/30/2023    CHOL 117 02/23/2022    CHOL 117 12/24/2018    HDL 28 (L) 01/30/2023    HDL 29 02/23/2022    HDL 34 (L) 12/24/2018    LDL 80.00 01/30/2023    LDL 68.00 02/23/2022    LDL 68 12/24/2018    TRIG 168 (H) 01/30/2023    TRIG 98 02/23/2022    TRIG 77 12/24/2018    TOTALCHOLEST 5 01/30/2023    TOTALCHOLEST 4 02/23/2022    TOTALCHOLEST 3.4 12/24/2018      LFT     Lab Results   Component Value Date    ALT 26 01/03/2024    ALT 19 10/19/2023    ALT 24 05/06/2023    AST 30 01/03/2024    AST 24 10/19/2023    AST 26 05/06/2023       Assessment:     Mr. Riccardo Hamilton is a 78 y.o. male with HTN, T2DM, HLD, asthma, ADRIÁN s/p CEA 2014, colonic adenocarcinoma with previous RLE DVT and PE s/p 3 months of OAC who presents to clinic today for follow up and pre-operative risk stratification.     Plan:     Hypertension  -Continue HCTZ 25 mg daily.   -Increase to Losartan 50 mg daily.   -Stop Hydralazine 25 mg BID.   -Stop Toprol XL 25 mg daily. Beta 1 selective thus poor anti-hypertensive properties. Has periods of bradycardia and taking for hypertension (no other indication such as HFrEF or anti-anginal).   -If additional blood pressure control is needed increase to Losartan 100 mg daily (CKD + T2DM) . Additionally optimize CCB (Norvasc or Nifedipine).   -Recommend discontinuation of Meloxicam as this causes afferent arteriole vasoconstriction and worsens HTN and can worsen renal function in CKD III.    Hyperlipidemia  -Increase to " Lipitor 80 mg daily. Target LDL at least < 70.     Type II Diabetes  -A1c is well controlled (6.1). Recommend primary care start Jardiance in the setting of T2DM + CKD III (DAPA-CKD trial demonstrated SGLT2i slow down the progression of renal disease).     CKD III  -Stop NSAIDs. Ok with diclofenac gel.   -Consider SGLT2i per DAPA-CKD trial.   -ARB.    ADRIÁN s/p CEA 2014  -Continue ASA 81 mg daily and increase to Lipitor 80 mg daily.     Pre-Operative Risk Stratification  -The patient is moderate risk for a low-moderate risk surgery. RCRI is 1. DASI 37.45 whic is > 4 METs.   -Ideally would continue ASA therapy given history of CEA but if unable to operate on ASA will need to be held 5 days prior and resumed as soon as possible per surgeons discretion.   -Continue anti-hypertensive on the day of surgery and high intensity statin. Can take diuretic after case.   -Denies angina or equivalents. Denies symptoms of heart failure. No murmurs on examination to suggestive severe valvular disease. ECG with NRS and 1st degree AVB.     Donte Renteria MD  Cardiology Fellow

## 2024-01-16 NOTE — TELEPHONE ENCOUNTER
Received surgery clearance.     Cardiologist:  Dr. Holloway    Risk: moderate     Per Sx clearance - Moderate risk for a low-moderate risk surgery. Ideally, would continue ASA therapy given history of CEA but if unable to operate on ASA will need to be held 5 days prior and resumed ASAP per surgeons' discretion. Continue anti-hypertensive on the day or surgery and high intensity statin. Can take diuretic after case.

## 2024-01-17 NOTE — TELEPHONE ENCOUNTER
We will call the patient to go over his history and medications.  We do NOT see patients in our clinic (since COVID).  I will call them.  Thanks

## 2024-01-17 NOTE — TELEPHONE ENCOUNTER
I called the patient and explained that I received surgery clearance. I explained that it is ok for him to continue Aspirin. Patient and his wife request to come in to go over his medications and what needs to be stopped prior to surgery. I explained that I will let pre-admit nurse know that they would like to come in to go over medications.

## 2024-01-18 ENCOUNTER — INFUSION (OUTPATIENT)
Dept: INFUSION THERAPY | Facility: HOSPITAL | Age: 79
End: 2024-01-18
Attending: INTERNAL MEDICINE
Payer: MEDICARE

## 2024-01-18 DIAGNOSIS — C18.0 ADENOCARCINOMA OF CECUM: Primary | ICD-10-CM

## 2024-01-18 PROCEDURE — 96523 IRRIG DRUG DELIVERY DEVICE: CPT

## 2024-01-18 RX ORDER — ALBUTEROL SULFATE 90 UG/1
2 AEROSOL, METERED RESPIRATORY (INHALATION) EVERY 6 HOURS PRN
COMMUNITY
End: 2024-02-14 | Stop reason: SDUPTHER

## 2024-01-18 RX ORDER — ACETAMINOPHEN 500 MG
500 TABLET ORAL DAILY PRN
COMMUNITY

## 2024-01-18 RX ORDER — HEPARIN 100 UNIT/ML
500 SYRINGE INTRAVENOUS
Status: DISCONTINUED | OUTPATIENT
Start: 2024-01-18 | End: 2024-01-18 | Stop reason: HOSPADM

## 2024-01-18 RX ORDER — SODIUM CHLORIDE 0.9 % (FLUSH) 0.9 %
10 SYRINGE (ML) INJECTION
Status: DISCONTINUED | OUTPATIENT
Start: 2024-01-18 | End: 2024-01-18 | Stop reason: HOSPADM

## 2024-01-18 NOTE — NURSING
Patient arrived ambulatory to infusion alert and oriented with no complaints at this time.  Pt is here for a right sided port flush per protocol, pt tolerated well.    Ana Barone RN

## 2024-01-22 ENCOUNTER — ANESTHESIA EVENT (OUTPATIENT)
Dept: SURGERY | Facility: HOSPITAL | Age: 79
End: 2024-01-22
Payer: MEDICARE

## 2024-01-22 PROBLEM — Z85.038 ENCOUNTER FOR FOLLOW-UP SURVEILLANCE OF COLON CANCER: Status: RESOLVED | Noted: 2023-10-19 | Resolved: 2024-01-22

## 2024-01-22 PROBLEM — Z08 ENCOUNTER FOR FOLLOW-UP SURVEILLANCE OF COLON CANCER: Status: RESOLVED | Noted: 2023-10-19 | Resolved: 2024-01-22

## 2024-01-26 ENCOUNTER — ANESTHESIA (OUTPATIENT)
Dept: SURGERY | Facility: HOSPITAL | Age: 79
End: 2024-01-26
Payer: MEDICARE

## 2024-01-26 ENCOUNTER — HOSPITAL ENCOUNTER (OUTPATIENT)
Facility: HOSPITAL | Age: 79
Discharge: HOME | End: 2024-01-26
Attending: STUDENT IN AN ORGANIZED HEALTH CARE EDUCATION/TRAINING PROGRAM | Admitting: STUDENT IN AN ORGANIZED HEALTH CARE EDUCATION/TRAINING PROGRAM
Payer: MEDICARE

## 2024-01-26 DIAGNOSIS — M72.0 DUPUYTREN'S CONTRACTURE OF RIGHT HAND: ICD-10-CM

## 2024-01-26 DIAGNOSIS — M72.0 DUPUYTREN'S CONTRACTURE OF BOTH HANDS: ICD-10-CM

## 2024-01-26 LAB — POCT GLUCOSE: 118 MG/DL (ref 70–110)

## 2024-01-26 PROCEDURE — 64415 NJX AA&/STRD BRCH PLXS IMG: CPT | Mod: 59,RT,, | Performed by: ANESTHESIOLOGY

## 2024-01-26 PROCEDURE — D9220A PRA ANESTHESIA: Mod: ANES,,, | Performed by: ANESTHESIOLOGY

## 2024-01-26 PROCEDURE — 71000015 HC POSTOP RECOV 1ST HR: Performed by: STUDENT IN AN ORGANIZED HEALTH CARE EDUCATION/TRAINING PROGRAM

## 2024-01-26 PROCEDURE — 64415 NJX AA&/STRD BRCH PLXS IMG: CPT | Performed by: ANESTHESIOLOGY

## 2024-01-26 PROCEDURE — 26123 RELEASE PALM CONTRACTURE: CPT | Mod: F8,,, | Performed by: STUDENT IN AN ORGANIZED HEALTH CARE EDUCATION/TRAINING PROGRAM

## 2024-01-26 PROCEDURE — 25000003 PHARM REV CODE 250

## 2024-01-26 PROCEDURE — 37000008 HC ANESTHESIA 1ST 15 MINUTES: Performed by: STUDENT IN AN ORGANIZED HEALTH CARE EDUCATION/TRAINING PROGRAM

## 2024-01-26 PROCEDURE — D9220A PRA ANESTHESIA: Mod: CRNA,,,

## 2024-01-26 PROCEDURE — 63600175 PHARM REV CODE 636 W HCPCS: Performed by: STUDENT IN AN ORGANIZED HEALTH CARE EDUCATION/TRAINING PROGRAM

## 2024-01-26 PROCEDURE — 37000009 HC ANESTHESIA EA ADD 15 MINS: Performed by: STUDENT IN AN ORGANIZED HEALTH CARE EDUCATION/TRAINING PROGRAM

## 2024-01-26 PROCEDURE — 25000003 PHARM REV CODE 250: Performed by: STUDENT IN AN ORGANIZED HEALTH CARE EDUCATION/TRAINING PROGRAM

## 2024-01-26 PROCEDURE — 26125 RELEASE PALM CONTRACTURE: CPT | Mod: F9,,, | Performed by: STUDENT IN AN ORGANIZED HEALTH CARE EDUCATION/TRAINING PROGRAM

## 2024-01-26 PROCEDURE — 71000033 HC RECOVERY, INTIAL HOUR: Performed by: STUDENT IN AN ORGANIZED HEALTH CARE EDUCATION/TRAINING PROGRAM

## 2024-01-26 PROCEDURE — 71000016 HC POSTOP RECOV ADDL HR: Performed by: STUDENT IN AN ORGANIZED HEALTH CARE EDUCATION/TRAINING PROGRAM

## 2024-01-26 PROCEDURE — 63600175 PHARM REV CODE 636 W HCPCS

## 2024-01-26 PROCEDURE — 36000706: Performed by: STUDENT IN AN ORGANIZED HEALTH CARE EDUCATION/TRAINING PROGRAM

## 2024-01-26 PROCEDURE — 63600175 PHARM REV CODE 636 W HCPCS: Performed by: ANESTHESIOLOGY

## 2024-01-26 PROCEDURE — 36000707: Performed by: STUDENT IN AN ORGANIZED HEALTH CARE EDUCATION/TRAINING PROGRAM

## 2024-01-26 RX ORDER — ROPIVACAINE HYDROCHLORIDE 5 MG/ML
INJECTION, SOLUTION EPIDURAL; INFILTRATION; PERINEURAL
Status: COMPLETED
Start: 2024-01-26 | End: 2024-01-26

## 2024-01-26 RX ORDER — ONDANSETRON HYDROCHLORIDE 2 MG/ML
INJECTION, SOLUTION INTRAVENOUS
Status: DISCONTINUED | OUTPATIENT
Start: 2024-01-26 | End: 2024-01-26

## 2024-01-26 RX ORDER — ONDANSETRON HYDROCHLORIDE 2 MG/ML
4 INJECTION, SOLUTION INTRAVENOUS EVERY 6 HOURS PRN
Status: CANCELLED | OUTPATIENT
Start: 2024-01-26

## 2024-01-26 RX ORDER — DEXAMETHASONE SODIUM PHOSPHATE 4 MG/ML
INJECTION, SOLUTION INTRA-ARTICULAR; INTRALESIONAL; INTRAMUSCULAR; INTRAVENOUS; SOFT TISSUE
Status: DISCONTINUED | OUTPATIENT
Start: 2024-01-26 | End: 2024-01-26

## 2024-01-26 RX ORDER — HYDROCODONE BITARTRATE AND ACETAMINOPHEN 5; 325 MG/1; MG/1
1 TABLET ORAL EVERY 6 HOURS PRN
Qty: 28 TABLET | Refills: 0 | Status: SHIPPED | OUTPATIENT
Start: 2024-01-26 | End: 2024-04-09

## 2024-01-26 RX ORDER — SODIUM CHLORIDE, SODIUM GLUCONATE, SODIUM ACETATE, POTASSIUM CHLORIDE AND MAGNESIUM CHLORIDE 30; 37; 368; 526; 502 MG/100ML; MG/100ML; MG/100ML; MG/100ML; MG/100ML
INJECTION, SOLUTION INTRAVENOUS CONTINUOUS
Status: CANCELLED | OUTPATIENT
Start: 2024-01-26 | End: 2024-02-25

## 2024-01-26 RX ORDER — ROPIVACAINE HYDROCHLORIDE 5 MG/ML
INJECTION, SOLUTION EPIDURAL; INFILTRATION; PERINEURAL
Status: DISCONTINUED | OUTPATIENT
Start: 2024-01-26 | End: 2024-01-26

## 2024-01-26 RX ORDER — PROPOFOL 10 MG/ML
INJECTION, EMULSION INTRAVENOUS CONTINUOUS PRN
Status: DISCONTINUED | OUTPATIENT
Start: 2024-01-26 | End: 2024-01-26

## 2024-01-26 RX ORDER — MORPHINE SULFATE 4 MG/ML
4 INJECTION, SOLUTION INTRAMUSCULAR; INTRAVENOUS
Status: CANCELLED | OUTPATIENT
Start: 2024-01-26

## 2024-01-26 RX ORDER — TRIAMCINOLONE ACETONIDE 40 MG/ML
INJECTION, SUSPENSION INTRA-ARTICULAR; INTRAMUSCULAR
Status: DISCONTINUED | OUTPATIENT
Start: 2024-01-26 | End: 2024-01-26 | Stop reason: HOSPADM

## 2024-01-26 RX ORDER — LIDOCAINE HYDROCHLORIDE 10 MG/ML
1 INJECTION, SOLUTION EPIDURAL; INFILTRATION; INTRACAUDAL; PERINEURAL ONCE
Status: CANCELLED | OUTPATIENT
Start: 2024-01-26 | End: 2024-01-26

## 2024-01-26 RX ORDER — GLYCOPYRROLATE 0.2 MG/ML
INJECTION INTRAMUSCULAR; INTRAVENOUS
Status: DISCONTINUED | OUTPATIENT
Start: 2024-01-26 | End: 2024-01-26

## 2024-01-26 RX ORDER — METHOCARBAMOL 500 MG/1
500 TABLET, FILM COATED ORAL EVERY 6 HOURS PRN
Status: CANCELLED | OUTPATIENT
Start: 2024-01-26

## 2024-01-26 RX ORDER — LIDOCAINE HYDROCHLORIDE 20 MG/ML
INJECTION INTRAVENOUS
Status: DISCONTINUED | OUTPATIENT
Start: 2024-01-26 | End: 2024-01-26

## 2024-01-26 RX ORDER — SODIUM CHLORIDE 9 MG/ML
INJECTION, SOLUTION INTRAVENOUS CONTINUOUS
Status: CANCELLED | OUTPATIENT
Start: 2024-01-26

## 2024-01-26 RX ORDER — TRIAMCINOLONE ACETONIDE 40 MG/ML
INJECTION, SUSPENSION INTRA-ARTICULAR; INTRAMUSCULAR
Status: DISCONTINUED
Start: 2024-01-26 | End: 2024-01-26 | Stop reason: HOSPADM

## 2024-01-26 RX ORDER — MIDAZOLAM HYDROCHLORIDE 1 MG/ML
INJECTION INTRAMUSCULAR; INTRAVENOUS
Status: COMPLETED
Start: 2024-01-26 | End: 2024-01-26

## 2024-01-26 RX ORDER — HYDROCODONE BITARTRATE AND ACETAMINOPHEN 5; 325 MG/1; MG/1
1 TABLET ORAL EVERY 4 HOURS PRN
Status: CANCELLED | OUTPATIENT
Start: 2024-01-26

## 2024-01-26 RX ORDER — METOCLOPRAMIDE HYDROCHLORIDE 5 MG/ML
10 INJECTION INTRAMUSCULAR; INTRAVENOUS EVERY 6 HOURS PRN
Status: CANCELLED | OUTPATIENT
Start: 2024-01-26

## 2024-01-26 RX ORDER — SODIUM CHLORIDE 9 MG/ML
INJECTION, SOLUTION INTRAVENOUS CONTINUOUS
Status: DISCONTINUED | OUTPATIENT
Start: 2024-01-26 | End: 2024-01-26 | Stop reason: HOSPADM

## 2024-01-26 RX ADMIN — MIDAZOLAM 1 MG: 1 INJECTION INTRAMUSCULAR; INTRAVENOUS at 09:01

## 2024-01-26 RX ADMIN — ROPIVACAINE HYDROCHLORIDE 30 ML: 5 INJECTION, SOLUTION EPIDURAL; INFILTRATION; PERINEURAL at 09:01

## 2024-01-26 RX ADMIN — CEFAZOLIN 2 G: 2 INJECTION, POWDER, FOR SOLUTION INTRAMUSCULAR; INTRAVENOUS at 10:01

## 2024-01-26 RX ADMIN — SODIUM CHLORIDE, SODIUM GLUCONATE, SODIUM ACETATE, POTASSIUM CHLORIDE AND MAGNESIUM CHLORIDE: 526; 502; 368; 37; 30 INJECTION, SOLUTION INTRAVENOUS at 09:01

## 2024-01-26 RX ADMIN — GLYCOPYRROLATE 0.2 MG: 0.2 INJECTION INTRAMUSCULAR; INTRAVENOUS at 09:01

## 2024-01-26 RX ADMIN — PROPOFOL 100 MCG/KG/MIN: 10 INJECTION, EMULSION INTRAVENOUS at 09:01

## 2024-01-26 RX ADMIN — ONDANSETRON HYDROCHLORIDE 4 MG: 2 SOLUTION INTRAMUSCULAR; INTRAVENOUS at 10:01

## 2024-01-26 RX ADMIN — DEXAMETHASONE SODIUM PHOSPHATE 4 MG: 4 INJECTION, SOLUTION INTRA-ARTICULAR; INTRALESIONAL; INTRAMUSCULAR; INTRAVENOUS; SOFT TISSUE at 09:01

## 2024-01-26 RX ADMIN — LIDOCAINE HYDROCHLORIDE 100 MG: 20 INJECTION INTRAVENOUS at 09:01

## 2024-01-26 NOTE — TRANSFER OF CARE
"Anesthesia Transfer of Care Note    Patient: Riccardo Hamilton    Procedure(s) Performed: Procedure(s) (LRB):  RELEASE, DUPUYTREN CONTRACTURE (Right)    Patient location: PACU    Anesthesia Type: general and regional    Transport from OR: Transported from OR on room air with adequate spontaneous ventilation    Post pain: adequate analgesia    Post assessment: no apparent anesthetic complications    Post vital signs: stable    Level of consciousness: awake    Nausea/Vomiting: no nausea/vomiting    Complications: none    Transfer of care protocol was followed      Last vitals: Visit Vitals  BP (!) 92/55   Pulse 105   Temp 36 °C (96.8 °F)   Resp 19   Ht 5' 9" (1.753 m)   Wt 77.9 kg (171 lb 11.8 oz)   SpO2 98%   BMI 25.36 kg/m²     "

## 2024-01-26 NOTE — ANESTHESIA POSTPROCEDURE EVALUATION
Anesthesia Post Evaluation    Patient: Riccardo Hamilton    Procedure(s) Performed: Procedure(s) (LRB):  RELEASE, DUPUYTREN CONTRACTURE (Right)    Final Anesthesia Type: general      Patient location during evaluation: PACU  Patient participation: Yes- Able to Participate  Level of consciousness: awake and alert and oriented  Post-procedure vital signs: reviewed and stable  Pain management: adequate  Airway patency: patent  TOD mitigation strategies: Multimodal analgesia  PONV status at discharge: No PONV  Anesthetic complications: no      Cardiovascular status: blood pressure returned to baseline, hemodynamically stable and stable  Respiratory status: unassisted  Hydration status: euvolemic  Follow-up not needed.  Comments: PostOp pain well managed with Nerve Block (Suprclavic., Axillary, Femoral, etc.)              Vitals Value Taken Time   /64 01/26/24 1046   Temp 36.7 °C (98.1 °F) 01/26/24 1046   Pulse 101 01/26/24 1046   Resp 20 01/26/24 1046   SpO2 95 % 01/26/24 1046         Event Time   Out of Recovery 10:45:00         Pain/Yanelis Score: Yanelis Score: 9 (1/26/2024 10:36 AM)

## 2024-01-26 NOTE — ANESTHESIA PROCEDURE NOTES
Intubation    Date/Time: 1/26/2024 10:02 AM    Performed by: Nestor Eason CRNA  Authorized by: Jay López MD    Intubation:     Induction:  Intravenous    Intubated:  Postinduction    Mask Ventilation:  Not attempted    Attempts:  1    Attempted By:  CRNA    Method of Intubation:  Direct    Difficult Airway Encountered?: No      Complications:  None    Airway Device:  Supraglottic airway/LMA    Airway Device Size:  4.0    Style/Cuff Inflation:  Cuffed (inflated to minimal occlusive pressure)    Secured at:  The lips    Placement Verified By:  Capnometry    Complicating Factors:  None    Findings Post-Intubation:  BS equal bilateral and atraumatic/condition of teeth unchanged

## 2024-01-26 NOTE — ANESTHESIA PROCEDURE NOTES
Peripheral Block/Axillary blk    Patient location during procedure: pre-op   Block not for primary anesthetic.  Reason for block: at surgeon's request and post-op pain management   Post-op Pain Location: Right hand   Start time: 1/26/2024 9:38 AM  Timeout: 1/26/2024 9:30 AM   End time: 1/26/2024 9:42 AM    Staffing  Authorizing Provider: Jay López MD  Performing Provider: Jay López MD    Staffing  Performed by: Jay López MD  Authorized by: Jay López MD    Preanesthetic Checklist  Completed: patient identified, IV checked, site marked, risks and benefits discussed, surgical consent, monitors and equipment checked, pre-op evaluation and timeout performed  Peripheral Block  Patient position: supine  Prep: ChloraPrep  Patient monitoring: heart rate, cardiac monitor, continuous pulse ox, continuous capnometry and frequent blood pressure checks  Block type: axillary  Laterality: right  Injection technique: single shot  Needle  Needle type: Stimuplex   Needle gauge: 21 G  Needle length: 4 in  Needle localization: anatomical landmarks, ultrasound guidance and nerve stimulator   -ultrasound image captured on disc.  Assessment  Injection assessment: negative aspiration and negative parasthesia  Paresthesia pain: none  Heart rate change: no  Slow fractionated injection: yes  Pain Tolerance: comfortable throughout block and no complaints  Medications:    Medications: ropivacaine (NAROPIN) injection 0.5% - Perineural   30 mL - 1/26/2024 9:38:00 AM    Additional Notes  After Consent obtained, questions entertained and answered to pt./fly.satisfaction.  Sedation titrated for desired effect.  Utilizing U/S, Axillary Artery and assoc. Nerve bundles identified.  Block needle identified and guided to close proximity to target area using U/S.  After negative aspiration and test dose, Local anesthetic incrementally injected   (5ml increments) to a total volume of 40mls.  Documentation stored digitally of  placement of LA surrounding nerves.    VSS.  [facility] RN monitoring vitals throughout procedure.  Patient tolerated procedure well.

## 2024-01-26 NOTE — OP NOTE
Operative Note    Patient Information:  Riccardo Hamilton    Date of Surgery:  01/26/2024    Surgeon:  Bernabe Mensah MD    Assistant:  Luisana Isaacs PA-C  who was necessary and essential for all aspects of the operation, including but not limited to patient positioning, surgical exposure, wound closure, and dressing placement.        Pre-operative Diagnosis:  Right ring and little finger Dupuytren's Contracture with PIP joint contracture    Post-operative Diagnosis:  Right ring and little finger Dupuytren's Contracture with PIP joint contracture    Procedure Performed:  Partial fasciectomy to proximal interphalangeal joint right ring finger (CPT 44213)  Partial fasciectomy to proximal interphalangeal joint right little finger CPT 41531    Anesthesia:  MAC with regional block    Complications:  None    Blood Loss:  5 ml    Specimens:  None    Implants:  * No implants in log *     Indications for Procedure:  Riccardo Hamilton is a 78 y.o. male that has Dupuytrens contracture of the of the right ring and little finger involving the MP and PIP joints.    I explained that surgery and the nature of their condition are not without risks. These include, but are not limited to, bleeding, infection, neurovascular compromise, wound complications, scarring, cosmetic defects, need for later and/or repeated surgeries, pain, loss of ROM, loss of function, deformity, functional abnormalities, stiffness, thromboembolic complications, compartment syndrome, loss of limb, loss of life, anesthetic complications, and other imponderables. I explained that these can occur despite the adequacy of treatments rendered, and that their risks are heightened given the nature of their condition. They verbalized understanding. They would like to continue with surgery at this time. If appropriate family was involved with surgical discussion.  The patient expressed understanding of and agreement with the plan. Informed consent was obtained and signed  prior going to the operative room.    Procedure in Detail:  Patient was brought to the operating room and placed under MAC anesthesia by the anesthesia department without difficulty. The patient was then placed in the supine position on the operating room table.     Time out was performed and all parties present agreed with correct patient, correct procedure, correct side, correct site. The operative extremity was prepped and draped in standard normal fashion.     Pre-incision antibiotics were administered prior to skin incision.Tourniquet was inflated.     A longitudinal incision was made in the palm in the webspace between the ring and little fingers.  Dissection was carried down to the Dupuytren's cord.  A 2 cm segment of the Dupuytren's cord was sharply excised with a knife.  Care was taken to protect any nerves as they were carefully freed and this cord was isolated prior to excision.  Once I excised this I was able to get the ring and little finger metacarpophalangeal joints extended.  A Z-plasty was performed of the longitudinal incision.    Next an oblique incision was made over the proximal phalanx of the ring finger.  The Dupuytren's cord was isolated and care was taken to dissect any digital nerves away from this cord.  A 1 cm segment of this cord was excised in the ring finger was able to get extended at the proximal interphalangeal joint.    Next an oblique incision was made over the proximal phalanx of the little finger.  The Dupuytren's cord was isolated and care was taken to dissect any digital nerves away from this cord.  A 1 cm segment of this cord was excised in the little finger was able to get extended at the proximal interphalangeal joint.    40 mg of Kenalog was topically applied between the 3 wounds.  The wounds were closed with 4-0 nylon suture.    A soft dressing was applied, followed by a static short arm ulnar gutter splint.    Patient was extubated without complications and transferred  to the recovery room in stable condition.       Post-operative Plan:  Patient will return to clinic in 2 weeks for a wound check.  They will get into therapy for a custom made thermoplastic splint that they can wear at nighttime and start working on digit range of motion.

## 2024-01-26 NOTE — PLAN OF CARE
Pt requests different PT office then previously discussed. Nishi/ Luisana notified. Will contact pt and change referral to preference

## 2024-01-26 NOTE — DISCHARGE SUMMARY
[facility] - Periop Services  Discharge Note  Short Stay    Procedure(s) (LRB):  RELEASE, DUPUYTREN CONTRACTURE (Right)      OUTCOME: Patient tolerated treatment/procedure well without complication and is now ready for discharge.    DISPOSITION: Home or Self Care    FINAL DIAGNOSIS:  Right dupuytren contracture    FOLLOWUP: In clinic    DISCHARGE INSTRUCTIONS:    Discharge Procedure Orders   Diet general     Activity as tolerated     Keep surgical extremity elevated     Ice to affected area     Lifting restrictions   Order Comments: No lifting, pushing, pulling with operative extremity     No driving, operating heavy equipment or signing legal documents while taking pain medication.     Other restrictions (specify):   Order Comments: Okay to wean sling as tolerated.     Leave dressing on - Keep it clean, dry, and intact until clinic visit     Call MD for:  temperature >100.4     Call MD for:  persistent nausea and vomiting     Call MD for:  severe uncontrolled pain     Call MD for:  difficulty breathing, headache or visual disturbances     Call MD for:  redness, tenderness, or signs of infection (pain, swelling, redness, odor or green/yellow discharge around incision site)     Call MD for:  hives     Call MD for:  persistent dizziness or light-headedness     Call MD for:  extreme fatigue     Shower on day dressing removed (No bath)        TIME SPENT ON DISCHARGE: 5 minutes  
sore throat

## 2024-01-29 VITALS
SYSTOLIC BLOOD PRESSURE: 110 MMHG | TEMPERATURE: 98 F | BODY MASS INDEX: 25.44 KG/M2 | HEART RATE: 101 BPM | HEIGHT: 69 IN | WEIGHT: 171.75 LBS | RESPIRATION RATE: 20 BRPM | OXYGEN SATURATION: 95 % | DIASTOLIC BLOOD PRESSURE: 64 MMHG

## 2024-01-30 DIAGNOSIS — M72.0 DUPUYTREN'S CONTRACTURE OF RIGHT HAND: Primary | ICD-10-CM

## 2024-02-06 ENCOUNTER — CLINICAL SUPPORT (OUTPATIENT)
Dept: CARDIOLOGY | Facility: CLINIC | Age: 79
End: 2024-02-06
Payer: MEDICARE

## 2024-02-06 VITALS
SYSTOLIC BLOOD PRESSURE: 122 MMHG | RESPIRATION RATE: 20 BRPM | DIASTOLIC BLOOD PRESSURE: 69 MMHG | HEIGHT: 69 IN | OXYGEN SATURATION: 98 % | BODY MASS INDEX: 24.44 KG/M2 | WEIGHT: 165 LBS | HEART RATE: 87 BPM

## 2024-02-06 DIAGNOSIS — I10 HYPERTENSION, UNSPECIFIED TYPE: Primary | ICD-10-CM

## 2024-02-06 PROCEDURE — 99214 OFFICE O/P EST MOD 30 MIN: CPT | Mod: PBBFAC

## 2024-02-06 NOTE — PROGRESS NOTES
Here for B/P check. He did make medication changes as instructed at last visit. He did take AM medications.   Today's B/P is 122/69, HR is 87. Instructions are to continue current medications, keep all future appointments.   He verbalizes understanding. Future appointments printed and given to patient.

## 2024-02-07 ENCOUNTER — OFFICE VISIT (OUTPATIENT)
Dept: ORTHOPEDICS | Facility: CLINIC | Age: 79
End: 2024-02-07
Payer: MEDICARE

## 2024-02-07 VITALS
HEIGHT: 69 IN | BODY MASS INDEX: 24.59 KG/M2 | SYSTOLIC BLOOD PRESSURE: 128 MMHG | WEIGHT: 166 LBS | DIASTOLIC BLOOD PRESSURE: 75 MMHG | TEMPERATURE: 98 F | HEART RATE: 96 BPM

## 2024-02-07 DIAGNOSIS — M72.0 DUPUYTREN'S CONTRACTURE OF RIGHT HAND: Primary | ICD-10-CM

## 2024-02-07 PROCEDURE — 99024 POSTOP FOLLOW-UP VISIT: CPT | Mod: ,,,

## 2024-02-07 NOTE — PROGRESS NOTES
Orthopedic Clinic - Hand    Chief Complaint: Post Op Visit    Surgery: right ring and little finger Dupuytren's contracture release    History of Present Illness: Riccardo Hamilton is a 78 y.o. male here today for post op right Dupuytren contracture release. Patient is doing well. No fevers or chills.       Past Medical History:   Diagnosis Date    Cancer     Colon    Carotid stenosis, bilateral     DM (diabetes mellitus)     Dupuytren's contracture of both hands     Encounter for blood transfusion     Hyperlipidemia     Hypertension     Mild intermittent asthma, uncomplicated         Past Surgical History:   Procedure Laterality Date    CAROTID ENDARTERECTOMY Bilateral     cataract surgery Bilateral     COLON RESECTION      COLONOSCOPY N/A 10/19/2022    Procedure: COLONOSCOPY;  Surgeon: Natalya Neely MD;  Location: [facility] ENDOSCOPY;  Service: Gastroenterology;  Laterality: N/A;    colostomy reversal      DUPUYTREN CONTRACTURE RELEASE Right 1/26/2024    Procedure: RELEASE, DUPUYTREN CONTRACTURE;  Surgeon: Bernabe Mensah MD;  Location: [facility] OR;  Service: Orthopedics;  Laterality: Right;    MEDIPORT INSERTION, SINGLE          Social History     Tobacco Use    Smoking status: Former    Smokeless tobacco: Never   Substance and Sexual Activity    Alcohol use: Not Currently    Drug use: Never    Sexual activity: Not Currently        Current Outpatient Medications   Medication Instructions    acetaminophen (TYLENOL) 500 mg, Oral, Daily PRN, Patient takes one in the AM and one in the PM    albuterol (PROVENTIL HFA) 90 mcg/actuation inhaler 2 puffs, Inhalation, Every 6 hours PRN, Rescue    albuterol (PROVENTIL) 2.5 mg, Nebulization, As needed (PRN)    aspirin 81 mg, Oral, Daily    atorvastatin (LIPITOR) 80 mg, Oral, Daily    cetirizine (ZYRTEC) 5 mg, Oral, Daily    diclofenac sodium (VOLTAREN) 2 g, Topical (Top), Daily    ferrous sulfate (FEOSOL) 325 mg, Oral, Daily    fluticasone propionate (FLONASE) 50 mcg, Each  "Nostril, 2 times daily    fluticasone-salmeterol diskus inhaler 250-50 mcg 1 puff, Inhalation, 2 times daily, Controller    hydroCHLOROthiazide (HYDRODIURIL) 25 mg, Oral, Daily    HYDROcodone-acetaminophen (NORCO) 5-325 mg per tablet 1 tablet, Oral, Every 6 hours PRN    losartan (COZAAR) 50 mg, Oral, Daily    magnesium oxide (MAG-OX) 800 mg, Oral, Daily    MOVIPREP 100-7.5-2.691 gram solution Oral    multivitamin (THERAGRAN) per tablet 1 tablet, Oral, Daily    pantoprazole (PROTONIX) 40 mg, Oral, Daily    VENTOLIN HFA 90 mcg/actuation inhaler 2 puffs, Inhalation, Every 6 hours PRN       Review of patient's allergies indicates:  No Known Allergies     Patient Care Team:  Ancelmo Richard MD as PCP - General (Internal Medicine)     Review of Systems:    Constitution:   Denies chills, fever, and sweats.  HENT:   Denies headaches or blurry vision.  Cardiovascular:  Denies chest pain or irregular heart beat.  Respiratory:   Denies cough or shortness of breath.  Gastrointestinal:  Denies abdominal pain, nausea, or vomiting.  Musculoskeletal:   Denies muscle cramps.  Neurological:   Denies dizziness or focal weakness.  Psychiatric/Behavior: Normal mental status.  Hematology/Lymph:  Denies bleeding problem or easy bruising/bleeding.  Skin:    Denies rash or suspicious lesions.    Physical Examination:    Vital Signs:    Vitals:    02/07/24 1337   BP: 128/75   Pulse: 96   Temp: 97.5 °F (36.4 °C)   Weight: 75.3 kg (166 lb)   Height: 5' 9" (1.753 m)   Body mass index is 24.51 kg/m².    Constitution:   Well-developed, well nourished patient in no acute distress.  Neurological:   Alert and oriented x 3 and cooperative to examination.     Psychiatric/Behavior: Normal mental status.  Respiratory:   No shortness of breath. Non-labored breathing.  Eyes:    Extraoccular muscles intact.    Musculoskeletal Examination:   right Upper Extremity:  Surgical incision is healing well with sutures in place no evidence of infection. Radial " pulses 2+ hand is warm well perfused.       Imagin}  No new imaging.     Assessment:  Right ring and little finger Dupuytren's contracture with PIP joint contracture    Plan:  Patient is doing well.   Patient was counseled on scar massage.  Start easing into activity as tolerated. Patient will continue to wear thermoplastic splint and begin occupational therapy to start working on range of motion.     Follow Up: 4 months 274}    X-Ray at Next Visit: None.

## 2024-02-14 ENCOUNTER — TELEPHONE (OUTPATIENT)
Dept: ORTHOPEDICS | Facility: CLINIC | Age: 79
End: 2024-02-14
Payer: MEDICARE

## 2024-02-14 DIAGNOSIS — M72.0 DUPUYTREN'S CONTRACTURE OF RIGHT HAND: Primary | ICD-10-CM

## 2024-02-14 NOTE — TELEPHONE ENCOUNTER
Per Dr. Mensah patient can continue therapy and treatment from wound care.     I spoke to Henrique and explained to him. He states that they have wound care at that hospital.     I faxed the order for wound care to [phone]

## 2024-02-14 NOTE — TELEPHONE ENCOUNTER
Henrique (therapist at [facility]) called stating that the patients incision has dehisced. Henrique states that he had wound care look at it and they think he needs a follow-up appointment.

## 2024-02-16 RX ORDER — ALBUTEROL SULFATE 0.83 MG/ML
2.5 SOLUTION RESPIRATORY (INHALATION)
Qty: 3 ML | Refills: 3 | Status: SHIPPED | OUTPATIENT
Start: 2024-02-16 | End: 2024-04-09 | Stop reason: SDUPTHER

## 2024-02-16 RX ORDER — ALBUTEROL SULFATE 90 UG/1
2 AEROSOL, METERED RESPIRATORY (INHALATION) EVERY 6 HOURS PRN
Qty: 18 G | Refills: 32 | Status: SHIPPED | OUTPATIENT
Start: 2024-02-16 | End: 2024-04-09 | Stop reason: SDUPTHER

## 2024-04-09 ENCOUNTER — OFFICE VISIT (OUTPATIENT)
Dept: PULMONOLOGY | Facility: CLINIC | Age: 79
End: 2024-04-09
Payer: MEDICARE

## 2024-04-09 VITALS
TEMPERATURE: 98 F | RESPIRATION RATE: 18 BRPM | WEIGHT: 169.31 LBS | DIASTOLIC BLOOD PRESSURE: 76 MMHG | HEIGHT: 69 IN | SYSTOLIC BLOOD PRESSURE: 129 MMHG | BODY MASS INDEX: 25.08 KG/M2 | OXYGEN SATURATION: 98 % | HEART RATE: 64 BPM

## 2024-04-09 DIAGNOSIS — J45.20 MILD INTERMITTENT ASTHMA WITHOUT COMPLICATION: Primary | ICD-10-CM

## 2024-04-09 DIAGNOSIS — Z87.09 HISTORY OF ASTHMA: ICD-10-CM

## 2024-04-09 DIAGNOSIS — R59.0 HILAR ADENOPATHY: ICD-10-CM

## 2024-04-09 DIAGNOSIS — R06.09 DYSPNEA ON EXERTION: ICD-10-CM

## 2024-04-09 PROCEDURE — 99215 OFFICE O/P EST HI 40 MIN: CPT | Mod: PBBFAC

## 2024-04-09 PROCEDURE — 3078F DIAST BP <80 MM HG: CPT | Mod: CPTII,,, | Performed by: INTERNAL MEDICINE

## 2024-04-09 PROCEDURE — 3074F SYST BP LT 130 MM HG: CPT | Mod: CPTII,,, | Performed by: INTERNAL MEDICINE

## 2024-04-09 PROCEDURE — 1159F MED LIST DOCD IN RCRD: CPT | Mod: CPTII,,, | Performed by: INTERNAL MEDICINE

## 2024-04-09 PROCEDURE — 1101F PT FALLS ASSESS-DOCD LE1/YR: CPT | Mod: CPTII,,, | Performed by: INTERNAL MEDICINE

## 2024-04-09 PROCEDURE — 3288F FALL RISK ASSESSMENT DOCD: CPT | Mod: CPTII,,, | Performed by: INTERNAL MEDICINE

## 2024-04-09 PROCEDURE — 99204 OFFICE O/P NEW MOD 45 MIN: CPT | Mod: S$PBB,,, | Performed by: INTERNAL MEDICINE

## 2024-04-09 RX ORDER — ALBUTEROL SULFATE 90 UG/1
2 AEROSOL, METERED RESPIRATORY (INHALATION) EVERY 6 HOURS PRN
Qty: 18 G | Refills: 32 | Status: SHIPPED | OUTPATIENT
Start: 2024-04-09 | End: 2024-05-01

## 2024-04-09 RX ORDER — FLUTICASONE PROPIONATE AND SALMETEROL 250; 50 UG/1; UG/1
1 POWDER RESPIRATORY (INHALATION) 2 TIMES DAILY
Qty: 60 EACH | Refills: 2 | Status: SHIPPED | OUTPATIENT
Start: 2024-04-09 | End: 2024-05-01 | Stop reason: SDUPTHER

## 2024-04-09 RX ORDER — ALBUTEROL SULFATE 0.83 MG/ML
2.5 SOLUTION RESPIRATORY (INHALATION)
Qty: 3 ML | Refills: 3 | Status: SHIPPED | OUTPATIENT
Start: 2024-04-09 | End: 2024-05-01 | Stop reason: SDUPTHER

## 2024-04-09 NOTE — PROGRESS NOTES
[facility] PULMONOLOGY  OUTPATIENT OFFICE VISIT NOTE    SUBJECTIVE:      Chief Complaint: No chief complaint on file.       HPI: Riccardo Hamilton is a 78 y.o. yo male w/ PMH of  has a past medical history of Cancer, Carotid stenosis, bilateral, DM (diabetes mellitus), Dupuytren's contracture of both hands, Encounter for blood transfusion, Hyperlipidemia, Hypertension, and Mild intermittent asthma, uncomplicated., who presents for  establishment of care in pulmonary clinic for asthma. Patient reports that he has been utilizing his Advair Diskus and rescue inhaler good effect.  Reports that he was previously using his rescue approximately 3 to 4 times a day and is now utilizing at 1 to 2 times a day.  Reports that he has not having frequent shortness a breath episodes.  Reports no baseline wheezing.  Educated patient on proper utilization of his inhalers.  He has extensive cancer history with adenocarcinoma stage IIIB status post resection.  Had course of chemotherapy however he was not able tolerate this was stopped.  Denies any radiation therapy exposure.  Occupational, previously worked with farm animals.  Reports no fevers, chills.  Had some weight loss recently.  Has no other complaints at this time.        ROS:  Constitutional: no fever, fatigue, weakness  Eye: no vision loss, eye redness, drainage, or pain  ENMT: no sore throat, ear pain, sinus pain/congestion, nasal congestion/drainage  Respiratory: no cough, no wheezing, no shortness of breath  Cardiovascular: no chest pain, no palpitations, no edema  Gastrointestinal: no nausea, vomiting, or diarrhea. No abdominal pain  Genitourinary: no dysuria, no urinary frequency or urgency, no hematuria  Hema/Lymph: no abnormal bruising or bleeding  Endocrine: no heat or cold intolerance, no excessive thirst or excessive urination  Musculoskeletal: no muscle or joint pain, no joint swelling  Integumentary: no skin rash or abnormal lesion  Neurologic: no headache, no dizziness,  "no weakness or numbness       OBJECTIVE:     Vital signs:   /76 (BP Location: Right arm, Patient Position: Sitting, BP Method: Large (Manual))   Pulse 64   Temp 97.9 °F (36.6 °C) (Oral)   Resp 18   Ht 5' 9" (1.753 m)   Wt 76.8 kg (169 lb 5 oz)   SpO2 98%   BMI 25.00 kg/m²      Physical Examination:  General appearance: alert, appears stated age, cooperative and no distress  Head: Normocephalic, without obvious abnormality, atraumatic  Neck: no adenopathy, no carotid bruit, no JVD and supple, symmetrical, trachea midline  Lungs: clear to auscultation bilaterally  Heart: regular rate and rhythm, S1, S2 normal, no murmur, click, rub or gallop  Abdomen: soft, non-tender; bowel sounds normal; no masses,  no organomegaly  Extremities: extremities normal, atraumatic, no cyanosis or edema  Pulses: 2+ and symmetric  Skin: Skin color, texture, turgor normal. No rashes or lesions  Neurologic: Grossly normal    Labs:  BMP:   Lab Results   Component Value Date    CHLORIDE 102 01/03/2024    CO2 30 01/03/2024    BUN 12.7 01/03/2024    CREATININE 1.39 (H) 01/03/2024    GLUCOSE 106 01/03/2024    CALCIUM 10.0 01/03/2024     CBC:   Lab Results   Component Value Date    WBC 7.68 01/03/2024    HGB 13.4 (L) 01/03/2024    HCT 40.1 (L) 01/03/2024    MCV 94.4 (H) 01/03/2024    RDW 13.2 01/03/2024     LFTs:   Lab Results   Component Value Date    LABPROT 7.5 01/03/2024    ALBUMIN 3.7 01/03/2024    AST 30 01/03/2024    ALT 26 01/03/2024    ALKPHOS 118 01/03/2024     FLP:   Lab Results   Component Value Date    CHOL 142 01/30/2023    HDL 28 (L) 01/30/2023    LDL 80.00 01/30/2023    TRIG 168 (H) 01/30/2023    TOTALCHOLEST 5 01/30/2023     DM:   Lab Results   Component Value Date    HGBA1C 6.2 01/30/2023    .2 01/30/2023    CREATININE 1.39 (H) 01/03/2024    CREATRANDUR 163.0 12/24/2018     Thyroid:   Lab Results   Component Value Date    TSH 1.1025 02/23/2022     Anemia: No results found for: "IRON", "TIBC", "FERRITIN", " ""[id number]", "FOLATE"            ASSESSMENT & PLAN:        Asthma, mild intermittent  - continue p.r.n. albuterol breakthrough wheezing   -continue fluticasone 250-50 mcg 1 puff b.i.d.  -history of adjuvant chemotherapy, no radiation exposure  -previous PFTs with normal findings  -CTs with no fluids on lungs, mild stable lymphadenopathy noted        Return to clinic prn if symptoms worsen or fail to improve.    Sam Callaway, DO  [facility], HO-3  04/09/2024            "

## 2024-04-12 ENCOUNTER — HOSPITAL ENCOUNTER (OUTPATIENT)
Dept: RADIOLOGY | Facility: HOSPITAL | Age: 79
Discharge: HOME | End: 2024-04-12
Attending: INTERNAL MEDICINE
Payer: MEDICARE

## 2024-04-12 DIAGNOSIS — Z85.038 ENCOUNTER FOR FOLLOW-UP SURVEILLANCE OF COLON CANCER: ICD-10-CM

## 2024-04-12 DIAGNOSIS — Z08 ENCOUNTER FOR FOLLOW-UP SURVEILLANCE OF COLON CANCER: ICD-10-CM

## 2024-04-12 LAB
CREAT SERPL-MCNC: 1.25 MG/DL (ref 0.73–1.18)
GFR SERPLBLD CREATININE-BSD FMLA CKD-EPI: 58 MLS/MIN/1.73/M2

## 2024-04-12 PROCEDURE — 74177 CT ABD & PELVIS W/CONTRAST: CPT | Mod: TC

## 2024-04-12 PROCEDURE — 82565 ASSAY OF CREATININE: CPT | Performed by: INTERNAL MEDICINE

## 2024-04-12 PROCEDURE — 25500020 PHARM REV CODE 255

## 2024-04-12 RX ADMIN — IOHEXOL 100 ML: 350 INJECTION, SOLUTION INTRAVENOUS at 08:04

## 2024-04-19 ENCOUNTER — INFUSION (OUTPATIENT)
Dept: INFUSION THERAPY | Facility: HOSPITAL | Age: 79
End: 2024-04-19
Attending: INTERNAL MEDICINE
Payer: MEDICARE

## 2024-04-19 ENCOUNTER — OFFICE VISIT (OUTPATIENT)
Dept: HEMATOLOGY/ONCOLOGY | Facility: CLINIC | Age: 79
End: 2024-04-19
Attending: INTERNAL MEDICINE
Payer: MEDICARE

## 2024-04-19 VITALS
WEIGHT: 167.56 LBS | HEIGHT: 69 IN | OXYGEN SATURATION: 99 % | SYSTOLIC BLOOD PRESSURE: 139 MMHG | BODY MASS INDEX: 24.82 KG/M2 | RESPIRATION RATE: 20 BRPM | HEART RATE: 58 BPM | DIASTOLIC BLOOD PRESSURE: 65 MMHG | TEMPERATURE: 98 F

## 2024-04-19 VITALS
WEIGHT: 167.63 LBS | TEMPERATURE: 98 F | RESPIRATION RATE: 17 BRPM | BODY MASS INDEX: 24.83 KG/M2 | DIASTOLIC BLOOD PRESSURE: 72 MMHG | SYSTOLIC BLOOD PRESSURE: 133 MMHG | HEART RATE: 65 BPM | HEIGHT: 69 IN | OXYGEN SATURATION: 100 %

## 2024-04-19 DIAGNOSIS — C18.0 ADENOCARCINOMA OF CECUM: Primary | ICD-10-CM

## 2024-04-19 DIAGNOSIS — Z85.038 ENCOUNTER FOR FOLLOW-UP SURVEILLANCE OF COLON CANCER: ICD-10-CM

## 2024-04-19 DIAGNOSIS — Z86.718 HISTORY OF DVT (DEEP VEIN THROMBOSIS): Primary | ICD-10-CM

## 2024-04-19 DIAGNOSIS — Z08 ENCOUNTER FOR FOLLOW-UP SURVEILLANCE OF COLON CANCER: ICD-10-CM

## 2024-04-19 DIAGNOSIS — Z86.711 HISTORY OF PULMONARY EMBOLISM: ICD-10-CM

## 2024-04-19 DIAGNOSIS — C18.0 ADENOCARCINOMA OF CECUM: ICD-10-CM

## 2024-04-19 DIAGNOSIS — Z86.718 HISTORY OF DVT (DEEP VEIN THROMBOSIS): ICD-10-CM

## 2024-04-19 PROCEDURE — A4216 STERILE WATER/SALINE, 10 ML: HCPCS | Performed by: INTERNAL MEDICINE

## 2024-04-19 PROCEDURE — 63600175 PHARM REV CODE 636 W HCPCS: Performed by: INTERNAL MEDICINE

## 2024-04-19 PROCEDURE — 1101F PT FALLS ASSESS-DOCD LE1/YR: CPT | Mod: CPTII,,, | Performed by: INTERNAL MEDICINE

## 2024-04-19 PROCEDURE — 99214 OFFICE O/P EST MOD 30 MIN: CPT | Mod: S$PBB,,, | Performed by: INTERNAL MEDICINE

## 2024-04-19 PROCEDURE — 1159F MED LIST DOCD IN RCRD: CPT | Mod: CPTII,,, | Performed by: INTERNAL MEDICINE

## 2024-04-19 PROCEDURE — 99214 OFFICE O/P EST MOD 30 MIN: CPT | Mod: PBBFAC,25 | Performed by: INTERNAL MEDICINE

## 2024-04-19 PROCEDURE — 1160F RVW MEDS BY RX/DR IN RCRD: CPT | Mod: CPTII,,, | Performed by: INTERNAL MEDICINE

## 2024-04-19 PROCEDURE — 96523 IRRIG DRUG DELIVERY DEVICE: CPT | Mod: 59

## 2024-04-19 PROCEDURE — 25000003 PHARM REV CODE 250: Performed by: INTERNAL MEDICINE

## 2024-04-19 PROCEDURE — 3288F FALL RISK ASSESSMENT DOCD: CPT | Mod: CPTII,,, | Performed by: INTERNAL MEDICINE

## 2024-04-19 PROCEDURE — 3078F DIAST BP <80 MM HG: CPT | Mod: CPTII,,, | Performed by: INTERNAL MEDICINE

## 2024-04-19 PROCEDURE — 3075F SYST BP GE 130 - 139MM HG: CPT | Mod: CPTII,,, | Performed by: INTERNAL MEDICINE

## 2024-04-19 PROCEDURE — 1125F AMNT PAIN NOTED PAIN PRSNT: CPT | Mod: CPTII,,, | Performed by: INTERNAL MEDICINE

## 2024-04-19 RX ORDER — HEPARIN 100 UNIT/ML
500 SYRINGE INTRAVENOUS
OUTPATIENT
Start: 2024-04-19

## 2024-04-19 RX ORDER — HEPARIN 100 UNIT/ML
500 SYRINGE INTRAVENOUS
Status: DISCONTINUED | OUTPATIENT
Start: 2024-04-19 | End: 2024-04-19 | Stop reason: HOSPADM

## 2024-04-19 RX ORDER — SODIUM CHLORIDE 0.9 % (FLUSH) 0.9 %
10 SYRINGE (ML) INJECTION
Status: DISCONTINUED | OUTPATIENT
Start: 2024-04-19 | End: 2024-04-19 | Stop reason: HOSPADM

## 2024-04-19 RX ORDER — SODIUM CHLORIDE 0.9 % (FLUSH) 0.9 %
10 SYRINGE (ML) INJECTION
OUTPATIENT
Start: 2024-04-19

## 2024-04-19 RX ADMIN — SODIUM CHLORIDE, PRESERVATIVE FREE 10 ML: 5 INJECTION INTRAVENOUS at 12:04

## 2024-04-19 RX ADMIN — HEPARIN 500 UNITS: 100 SYRINGE at 12:04

## 2024-04-19 NOTE — Clinical Note
CBC, CMP, CEA level in 6 months  Surveillance CT scans of C/A/P with contrast in 6 months Surveillance colonoscopy in October 2025 Follow-up in 6 months with scans and labs.

## 2024-04-19 NOTE — PROGRESS NOTES
History:  Past Medical History:   Diagnosis Date    Cancer     Colon    Carotid stenosis, bilateral     DM (diabetes mellitus)     Dupuytren's contracture of both hands     Encounter for blood transfusion     Hyperlipidemia     Hypertension     Mild intermittent asthma, uncomplicated    Past medical history: Hypertension.  NIDDM.  CKD.  History of retropharyngeal carotid-carotid bypass (2014). Bronchial asthma.  Cataract.  Diabetic neuropathy.  Hypertension.  Dyslipidemia.  Ptosis.  Visual field defect.  Cataract surgery.  Carotid endarterectomy (01/06/2014; 02/12/2014).  Intraocular lens, (05/28/2019).  Social history: .  Lives in [place], [place].  Has 4 children.  Retired.  Used to ride horses.  Small third a pack of cigarettes daily for 10 years; quit 50 years back.  No history of alcohol or illicit drug abuse.  Family history: Sister experienced breast cancer at age 58 years.  No family history of colorectal cancer.  Health maintenance: Screening colonoscopy 10 years back, apparently unremarkable.  Prior to that, history of colon polyps.  Past Surgical History:   Procedure Laterality Date    CAROTID ENDARTERECTOMY Bilateral     cataract surgery Bilateral     COLON RESECTION      COLONOSCOPY N/A 10/19/2022    Procedure: COLONOSCOPY;  Surgeon: Natalya Neely MD;  Location: [facility] ENDOSCOPY;  Service: Gastroenterology;  Laterality: N/A;    colostomy reversal      DUPUYTREN CONTRACTURE RELEASE Right 01/26/2024    Procedure: RELEASE, DUPUYTREN CONTRACTURE;  Surgeon: Bernabe Mensah MD;  Location: [facility] OR;  Service: Orthopedics;  Laterality: Right;    HAND SURGERY Right 01/2024    MEDIPORT INSERTION, SINGLE        Social History     Socioeconomic History    Marital status:      Spouse name: Ya Hamilton    Number of children: 4   Tobacco Use    Smoking status: Former    Smokeless tobacco: Never   Substance and Sexual Activity    Alcohol use: Not Currently    Drug use: Never    Sexual  activity: Not Currently     Social Determinants of Health     Financial Resource Strain: Low Risk  (5/31/2022)    Overall Financial Resource Strain (CARDIA)     Difficulty of Paying Living Expenses: Not hard at all   Food Insecurity: No Food Insecurity (5/31/2022)    Hunger Vital Sign     Worried About Running Out of Food in the Last Year: Never true     Ran Out of Food in the Last Year: Never true   Transportation Needs: No Transportation Needs (5/31/2022)    PRAPARE - Transportation     Lack of Transportation (Medical): No     Lack of Transportation (Non-Medical): No   Physical Activity: Inactive (5/31/2022)    Exercise Vital Sign     Days of Exercise per Week: 0 days     Minutes of Exercise per Session: 0 min   Stress: No Stress Concern Present (5/31/2022)    [place] [employer] - Occupational Stress Questionnaire     Feeling of Stress : Not at all   Social Connections: Moderately Isolated (5/31/2022)    Social Connection and Isolation Panel [NHANES]     Frequency of Communication with Friends and Family: Once a week     Frequency of Social Gatherings with Friends and Family: Once a week     Attends Shinto Services: More than 4 times per year     Active Member of Clubs or Organizations: No     Attends Club or Organization Meetings: Never     Marital Status:    Housing Stability: Low Risk  (5/31/2022)    Housing Stability Vital Sign     Unable to Pay for Housing in the Last Year: No     Number of Places Lived in the Last Year: 1     Unstable Housing in the Last Year: No      Family History   Problem Relation Name Age of Onset    Stroke Sister      Hypertension Sister      Cancer Sister      Stroke Brother      Hypertension Brother          Reason for Follow-up:   -Adenocarcinoma of cecum, stage IIIB   -Intolerant of adjuvant capecitabine   -Incidental finding of PE   -Right lower extremity DVT   -Iron deficiency anemia      History of Present Illness:   No chief complaint on file.         Oncologic/Hematologic History:  Oncology History   Adenocarcinoma of cecum   9/30/2020 Cancer Staged    Staging form: Colon and Rectum, AJCC 8th Edition  - Pathologic stage from 9/30/2020: Stage IIIC (pT3, pN2b, cM0)     5/31/2022 Initial Diagnosis    Adenocarcinoma of cecum     She is being followed for history of adenocarcinoma of cecum.    Please see assessment and plan section for details.     11/27/2020:   Presents for initial medical oncology consultation, accompanied by his wife.   Looks and feels healthy.  Endorses no symptoms of concern whatsoever.   No weakness, fatigue, malaise, anorexia, unintentional weight loss, abdominal pain, nausea, vomiting, GI bleeding, dysphagia, odynophagia, fevers, chills, any urinary problems, chest pain, cough, dyspnea, unusual headaches, focal neurological symptoms, etc.  ECOG 0-1.    Interval History:  PORT FLUSH   [No matching plan found]   04/19/2024:   -10/19/2023:  CEA 1.94, normal  -04/12/2024: Surveillance CTs C/A/P with contrast (comparison:  CTs C/A/P 10 16 2023):  No evidence of recurrent or metastatic disease in the chest abdomen or pelvis.  -04/19/2024:  Labs reviewed; hemoglobin 12.5; CBC essentially unremarkable; CMP reviewed; creatinine 1.35, elevated but stable; CEA 2.48, normal  Presents for follow-up visit.  Doing well.  Great appetite.  Feels healthy.  No abdominal pain, nausea, vomiting, change in bowel habits, or GI bleeding.  No anorexia or unintentional weight loss.  No weakness or fatigue.  ECOG 0.  Very happy.  No chest pain, cough, or dyspnea.  No unusual headaches or focal neurological symptoms.    Medications:  Current Outpatient Medications on File Prior to Visit   Medication Sig Dispense Refill    acetaminophen (TYLENOL) 500 MG tablet Take 500 mg by mouth daily as needed for Pain. Patient takes one in the AM and one in the PM      albuterol (PROVENTIL HFA) 90 mcg/actuation inhaler Inhale 2 puffs into the lungs every 6 (six) hours as needed  for Wheezing. Rescue 18 g 32    albuterol (PROVENTIL) 2.5 mg /3 mL (0.083 %) nebulizer solution Take 3 mLs (2.5 mg total) by nebulization as needed. 3 mL 3    aspirin 81 MG Chew Take 1 tablet (81 mg total) by mouth once daily. 90 tablet 3    atorvastatin (LIPITOR) 80 MG tablet Take 1 tablet (80 mg total) by mouth once daily. 90 tablet 3    diclofenac sodium (VOLTAREN) 1 % Gel Apply 2 g topically once daily. (Patient taking differently: Apply 2 g topically once daily. prn) 100 g 2    ferrous sulfate (FEOSOL) 325 mg (65 mg iron) Tab tablet Take 325 mg by mouth once daily.      fluticasone propionate (FLONASE) 50 mcg/actuation nasal spray 1 spray (50 mcg total) by Each Nostril route 2 (two) times daily. 5 mL 2    fluticasone-salmeterol diskus inhaler 250-50 mcg Inhale 1 puff into the lungs 2 (two) times daily. Controller 60 each 2    hydroCHLOROthiazide (HYDRODIURIL) 25 MG tablet Take 1 tablet (25 mg total) by mouth once daily. 90 tablet 3    losartan (COZAAR) 50 MG tablet Take 1 tablet (50 mg total) by mouth once daily. 90 tablet 3    magnesium oxide (MAG-OX) 400 mg (241.3 mg magnesium) tablet Take 800 mg by mouth once daily.      multivitamin (THERAGRAN) per tablet Take 1 tablet by mouth once daily.      pantoprazole (PROTONIX) 40 MG tablet Take 1 tablet (40 mg total) by mouth once daily. 90 tablet 3    VENTOLIN HFA 90 mcg/actuation inhaler Inhale 2 puffs into the lungs every 6 (six) hours as needed for Wheezing or Shortness of Breath. 18 g 0    cetirizine (ZYRTEC) 5 MG tablet Take 1 tablet (5 mg total) by mouth once daily. 30 tablet 1     Current Facility-Administered Medications on File Prior to Visit   Medication Dose Route Frequency Provider Last Rate Last Admin    heparin, porcine (PF) 100 unit/mL injection flush 500 Units  500 Units Intravenous PRN Victor M Hair MD   500 Units at 07/27/22 0720    sodium chloride 0.9% flush 10 mL  10 mL Intravenous PRN Victor M Hair MD   10 mL at 07/27/22 0789  "      Review of Systems:   All systems reviewed and found to be negative except for the symptoms detailed above    Physical Examination:   VITAL SIGNS:   Vitals:    04/19/24 1037   BP: 133/72   Pulse: 65   Resp: 17   Temp: 97.8 °F (36.6 °C)       GENERAL:  In no apparent distress.    HEAD:  No signs of head trauma.  EYES:  Pupils are equal.  Extraocular motions intact.    EARS:  Hearing grossly intact.  MOUTH:  Oropharynx is normal.   NECK:  No adenopathy, no JVD.     CHEST:  Chest with clear breath sounds bilaterally.  No wheezes, rales, rhonchi.    CARDIAC:  Regular rate and rhythm.  S1 and S2, without murmurs, gallops, rubs.  VASCULAR:  No Edema.  Peripheral pulses normal and equal in all extremities.  ABDOMEN:  Soft, without detectable tenderness.  No sign of distention.  No   rebound or guarding, and no masses palpated.   Bowel Sounds normal.  MUSCULOSKELETAL:  Good range of motion of all major joints. Extremities without clubbing, cyanosis or edema.    NEUROLOGIC EXAM:  Alert and oriented x 3.  No focal sensory or strength deficits.   Speech normal.  Follows commands.  PSYCHIATRIC:  Mood normal.    No results for input(s): "CBC" in the last 72 hours.   No results for input(s): "CMP" in the last 72 hours.     Assessment:  Problem List Items Addressed This Visit          Hematology    History of DVT (deep vein thrombosis) - Primary    History of pulmonary embolism       Oncology    Adenocarcinoma of cecum    RESOLVED: Encounter for follow-up surveillance of colon cancer     Adenocarcinoma of cecum:  -presented with hematochezia and anemia  -colonoscopy 09/16/2020  -right hemicolectomy 09/30/2020  -pT3 pN2a M0, stage IIIB; 6 lymph nodes pos  -adjuvant capecitabine started 01/11/2021  -capecitabine discontinued after 1 cycle because of side effects requiring hospitalization  -S/p surveillance colonoscopy (02/19/2021) (ileoscopy; indication: High output ileostomy, etc.)  -S/P elective loop ileostomy reversal " 06/16/2021  -MARIBEL on surveillance CTs 06/23/2022 except for questionable soft tissue density in the hepatic flexure of colon  -10/19/2022:  Surveillance colonoscopy:  No polyps or cancer (repeat colonoscopy in 3 years)  -04/03/2023: CEA level 2.29, normal  -04/14/2023: Surveillance CTs C/A/P: No recurrence or metastases  -MARIBEL on surveillance CTs C/A/P with contrast 10/16/2023  -10/19/2023:  CEA 1.94, normal  -surveillance CTs C/A/P 0 04/12/2024:  MARIBEL      No evidence of Santos syndrome:  -MSI-H by PCR  -dMMR  MLH1: Loss of expression in a subset of tumor cells  MSH2: Preserved (intact) expression in tumor cells  MSH6: Preserved (intact) expression in tumor cells  PMS2: Loss of expression in a subset of tumor cells  -V600 BRAF mutation negative  -IHC: MLH1 preserved (intact) expression in tumor cells (no deficiency of mismatch repair protein MLH1)  >>No evidence of Santos syndrome       Pulmonary embolism, incidental diagnosis (02/26/2021):  -02/26/2021: CT chest with contrast: New subacute/chronic small PE segmental right lower lung lobe, new since 11/09/2020       Right lower extremity DVT, proximal:  -03/03/2021: Bilateral lower extremity venous Doppler: Right femoral DVT, nearly occlusive to occlusive (age indeterminate); right popliteal DVT, partially occlusive (age indeterminate)  -anticoagulated with Eliquis x3 months (started 03/03/2021)       Chronic normocytic anemia, starting around 06/2017, subsequently worsening:   PRBC x1 (10/04/2020)   PRBC x1 (10/02/2020)   PRBC x1 (09/30/2020)   08/07/2020: Serum iron 27, low; TIBC 175, low; transferrin saturation 15.4%, borderline; ferritin 295.1, normal  -Feraheme 510 mg IV x2 (12/03/2020, 12/11/2020) (for relative iron deficiency)   (01/07/2021: Transferrin saturation 33%, up from 18%; ferritin level 1162.37, up from 594.74; hemoglobin 11.9, up from 10.9)        Plan:  CBC, CMP, CEA level in 6 months   Surveillance CT scans of C/A/P with contrast in 6  months  Surveillance colonoscopy in October 2025  Follow-up in 6 months with scans and labs.  ------------------------------------      -Continue surveillance appropriate for stage III colon cancer  -S/p right hemicolectomy 09/30/2020  -History and physical and CEA every 3-6 months for 2 years (09/2020-09/2022), then every 6 months for total of 5 years (09/2022-09/2025)  -Surveillance CT C/A/P with contrast every 6-12 months for 5 years (09/2020-09/2025)  >>>  -S/p surveillance colonoscopy (02/19/2021) (ileoscopy; indication: High output ileostomy, etc.)  -MARIBEL on surveillance CTs 06/23/2022 except for a questionable soft tissue density hepatic flexure of colon  -surveillance colonoscopy 10/19/2022: Normal (repeat colonoscopy in 3 years)  -04/03/2023: CEA level 2.29, normal  -04/14/2023: Surveillance CTs C/A/P: No recurrence or metastases  -MARIBEL on surveillance CTs C/A/P with contrast 10/16/2023  -10/19/2023:  CEA 1.94, normal  -surveillance CTs C/A/P 0 04/12/2024:  MARIBEL  >>>  -check CBC, CMP, CEA level; if normal, then, repeat in 6 months  -repeat surveillance CT scans of C/A/P with contrast in 6 months (October 2024)  -repeat surveillance colonoscopy in 3 years (10/2025)    Follow-up in 6 months (October), with surveillance CT scans of C/A/P with contrast, CBC, CMP, and CEA level; earlier, if any concerns    Above discussed with the patient.  All questions answered.    Discussed labs and scans and gave him copies of relevant reports.    He understands and agrees with this plan.   --------------------------     Surveillance:   History and physical every 3-6 months for 2 years (09/2020-09/2022), then   every 6 months for a total of 5 years (09/2022-09/2025);      CEA every 3-6 months for 2 years, then   every 6 months for a total of 5 years;      chest/abdominal/pelvic CT scan every 6-12 months (category 2B for frequency less than 12 months) for a total of 5 years (09/2020-09/2025)    (CT should be with IV and oral  contrast; if either CT of abdomen/pelvis is inadequate, or if patient has a contraindication to CT with IV contrast, then consider abdominal/pelvic MRI with MRI contrast plus a noncontrast chest CT);   PET CT scan is not recommended;     colonoscopy in 1 year after surgery except if no preoperative colonoscopy due to obstructing lesion, then colonoscopy in 3-6 months:   if advanced adenoma then repeat in 1 year,   if no advanced adenoma then repeat in 3 years, and then   every 5 years.       Follow-up:  No follow-ups on file.

## 2024-04-23 ENCOUNTER — LAB VISIT (OUTPATIENT)
Dept: LAB | Facility: HOSPITAL | Age: 79
End: 2024-04-23
Attending: STUDENT IN AN ORGANIZED HEALTH CARE EDUCATION/TRAINING PROGRAM
Payer: MEDICARE

## 2024-04-23 DIAGNOSIS — E55.9 VITAMIN D DEFICIENCY: ICD-10-CM

## 2024-04-23 LAB — DEPRECATED CALCIDIOL+CALCIFEROL SERPL-MC: 41.2 NG/ML (ref 30–80)

## 2024-04-23 PROCEDURE — 36415 COLL VENOUS BLD VENIPUNCTURE: CPT

## 2024-04-23 PROCEDURE — 82306 VITAMIN D 25 HYDROXY: CPT

## 2024-05-01 ENCOUNTER — OFFICE VISIT (OUTPATIENT)
Dept: INTERNAL MEDICINE | Facility: CLINIC | Age: 79
End: 2024-05-01
Payer: MEDICARE

## 2024-05-01 VITALS
WEIGHT: 167.38 LBS | SYSTOLIC BLOOD PRESSURE: 114 MMHG | DIASTOLIC BLOOD PRESSURE: 70 MMHG | HEART RATE: 93 BPM | BODY MASS INDEX: 24.79 KG/M2 | TEMPERATURE: 98 F | RESPIRATION RATE: 18 BRPM | HEIGHT: 69 IN | OXYGEN SATURATION: 100 %

## 2024-05-01 DIAGNOSIS — E78.5 HYPERLIPIDEMIA, UNSPECIFIED HYPERLIPIDEMIA TYPE: Primary | ICD-10-CM

## 2024-05-01 DIAGNOSIS — Z00.00 HEALTHCARE MAINTENANCE: ICD-10-CM

## 2024-05-01 DIAGNOSIS — E55.9 VITAMIN D DEFICIENCY: ICD-10-CM

## 2024-05-01 DIAGNOSIS — Z87.09 HISTORY OF ASTHMA: ICD-10-CM

## 2024-05-01 DIAGNOSIS — J30.2 SEASONAL ALLERGIES: ICD-10-CM

## 2024-05-01 PROCEDURE — 99214 OFFICE O/P EST MOD 30 MIN: CPT | Mod: PBBFAC | Performed by: STUDENT IN AN ORGANIZED HEALTH CARE EDUCATION/TRAINING PROGRAM

## 2024-05-01 RX ORDER — ALBUTEROL SULFATE 0.83 MG/ML
2.5 SOLUTION RESPIRATORY (INHALATION) EVERY 6 HOURS PRN
Qty: 1 EACH | Refills: 4 | Status: SHIPPED | OUTPATIENT
Start: 2024-05-01 | End: 2024-06-19

## 2024-05-01 RX ORDER — FLUTICASONE PROPIONATE 50 MCG
1 SPRAY, SUSPENSION (ML) NASAL 2 TIMES DAILY
Qty: 5 ML | Refills: 4 | Status: SHIPPED | OUTPATIENT
Start: 2024-05-01

## 2024-05-01 RX ORDER — ALBUTEROL SULFATE 0.83 MG/ML
2.5 SOLUTION RESPIRATORY (INHALATION)
Qty: 3 ML | Refills: 4 | Status: SHIPPED | OUTPATIENT
Start: 2024-05-01

## 2024-05-01 RX ORDER — FLUTICASONE PROPIONATE AND SALMETEROL 250; 50 UG/1; UG/1
1 POWDER RESPIRATORY (INHALATION) 2 TIMES DAILY
Qty: 60 EACH | Refills: 4 | Status: SHIPPED | OUTPATIENT
Start: 2024-05-01 | End: 2025-05-01

## 2024-05-08 ENCOUNTER — OFFICE VISIT (OUTPATIENT)
Dept: ORTHOPEDICS | Facility: CLINIC | Age: 79
End: 2024-05-08
Payer: MEDICARE

## 2024-05-08 VITALS
HEART RATE: 83 BPM | BODY MASS INDEX: 24.86 KG/M2 | SYSTOLIC BLOOD PRESSURE: 129 MMHG | HEIGHT: 69 IN | WEIGHT: 167.81 LBS | DIASTOLIC BLOOD PRESSURE: 60 MMHG

## 2024-05-08 DIAGNOSIS — M72.0 DUPUYTREN'S CONTRACTURE OF RIGHT HAND: Primary | ICD-10-CM

## 2024-05-08 PROCEDURE — 1160F RVW MEDS BY RX/DR IN RCRD: CPT | Mod: CPTII,,,

## 2024-05-08 PROCEDURE — 3074F SYST BP LT 130 MM HG: CPT | Mod: CPTII,,,

## 2024-05-08 PROCEDURE — 3078F DIAST BP <80 MM HG: CPT | Mod: CPTII,,,

## 2024-05-08 PROCEDURE — 1159F MED LIST DOCD IN RCRD: CPT | Mod: CPTII,,,

## 2024-05-08 PROCEDURE — 99213 OFFICE O/P EST LOW 20 MIN: CPT | Mod: ,,,

## 2024-05-09 NOTE — PROGRESS NOTES
Orthopedic Clinic - Hand    Chief Complaint: Follow-up      History of Present Illness: Riccardo Hamilton is a 78 y.o. male here today for right ring and little finger dupuytren's contracture release on 01/26/2024. He still has one more therapy session left but would like to know how long the pain in the ring finger will last.      Past Medical History:   Diagnosis Date    Cancer     Colon    Carotid stenosis, bilateral     DM (diabetes mellitus)     Dupuytren's contracture of both hands     Encounter for blood transfusion     Hyperlipidemia     Hypertension     Mild intermittent asthma, uncomplicated         Past Surgical History:   Procedure Laterality Date    CAROTID ENDARTERECTOMY Bilateral     cataract surgery Bilateral     COLON RESECTION      COLONOSCOPY N/A 10/19/2022    Procedure: COLONOSCOPY;  Surgeon: Natalya Neely MD;  Location: [facility] ENDOSCOPY;  Service: Gastroenterology;  Laterality: N/A;    colostomy reversal      DUPUYTREN CONTRACTURE RELEASE Right 01/26/2024    Procedure: RELEASE, DUPUYTREN CONTRACTURE;  Surgeon: Bernabe Mensah MD;  Location: [facility] OR;  Service: Orthopedics;  Laterality: Right;    HAND SURGERY Right 01/2024    MEDIPORT INSERTION, SINGLE          Social History     Tobacco Use    Smoking status: Former    Smokeless tobacco: Never   Substance and Sexual Activity    Alcohol use: Not Currently    Drug use: Never    Sexual activity: Not Currently        Current Outpatient Medications   Medication Instructions    acetaminophen (TYLENOL) 500 mg, Oral, Daily PRN, Patient takes one in the AM and one in the PM    albuterol (PROVENTIL) 2.5 mg, Nebulization, As needed (PRN)    albuterol (PROVENTIL) 2.5 mg, Nebulization, Every 6 hours PRN, Rescue    aspirin 81 mg, Oral, Daily    atorvastatin (LIPITOR) 80 mg, Oral, Daily    cetirizine (ZYRTEC) 5 mg, Oral, Daily    diclofenac sodium (VOLTAREN) 2 g, Topical (Top), Daily    ferrous sulfate (FEOSOL) 325 mg, Oral, Daily    fluticasone propionate  "(FLONASE) 50 mcg, Each Nostril, 2 times daily    fluticasone-salmeterol diskus inhaler 250-50 mcg 1 puff, Inhalation, 2 times daily, Controller    hydroCHLOROthiazide (HYDRODIURIL) 25 mg, Oral, Daily    losartan (COZAAR) 50 mg, Oral, Daily    magnesium oxide (MAG-OX) 800 mg, Oral, Daily    multivitamin (THERAGRAN) per tablet 1 tablet, Oral, Daily    pantoprazole (PROTONIX) 40 mg, Oral, Daily       Review of patient's allergies indicates:  No Known Allergies     Patient Care Team:  Ancelmo Richard MD as PCP - General (Internal Medicine)     Review of Systems:    Constitution:   Denies chills, fever, and sweats.  HENT:   Denies headaches or blurry vision.  Cardiovascular:   Denies chest pain or irregular heart beat.  Respiratory:   Denies cough or shortness of breath.  Gastrointestinal:  Denies abdominal pain, nausea, or vomiting.  Musculoskeletal:   Denies muscle cramps.  Neurological:   Denies dizziness or focal weakness.  Psychiatric/Behavior: Normal mental status.  Hematology/Lymph:  Denies bleeding problem or easy bruising/bleeding.  Skin:    Denies rash or suspicious lesions.    Physical Examination:    Vital Signs:    Vitals:    05/08/24 1259 05/08/24 1300   BP: (!) 147/74 129/60   Pulse: 78 83   Weight: 76.1 kg (167 lb 12.8 oz)    Height: 5' 9" (1.753 m)    Body mass index is 24.78 kg/m².    Constitution:   Well-developed, well nourished patient in no acute distress.  Neurological:   Alert and oriented x 3 and cooperative to examination.     Psychiatric/Behavior: Normal mental status.  Respiratory:   No shortness of breath. Non-labored breathing.  Eyes:    Extraoccular muscles intact.    Musculoskeletal Examination:   Right Upper Extremity:    [x] No open wounds or rashes.    [] Abnormal skin findings:      [x] Radial pulses 2+ is warm well perfused.                           Imaging:   No new imaging     Assessment:  Post right little finger Dupuytren's contracture release    Plan: He is making great " progress with therapy. However, I think that he would benefit from more occupational therapy sessions to continue working on DROM. He will follow up in 6 weeks to see his progress.      Follow Up: 6 weeks    X-Ray at Next Visit: None

## 2024-06-19 ENCOUNTER — OFFICE VISIT (OUTPATIENT)
Dept: ORTHOPEDICS | Facility: CLINIC | Age: 79
End: 2024-06-19
Payer: MEDICARE

## 2024-06-19 VITALS
BODY MASS INDEX: 24.29 KG/M2 | SYSTOLIC BLOOD PRESSURE: 134 MMHG | HEART RATE: 76 BPM | DIASTOLIC BLOOD PRESSURE: 73 MMHG | WEIGHT: 164 LBS | HEIGHT: 69 IN

## 2024-06-19 DIAGNOSIS — M72.0 DUPUYTREN'S CONTRACTURE OF RIGHT HAND: Primary | ICD-10-CM

## 2024-06-19 PROCEDURE — 3075F SYST BP GE 130 - 139MM HG: CPT | Mod: CPTII,,, | Performed by: STUDENT IN AN ORGANIZED HEALTH CARE EDUCATION/TRAINING PROGRAM

## 2024-06-19 PROCEDURE — 3288F FALL RISK ASSESSMENT DOCD: CPT | Mod: CPTII,,, | Performed by: STUDENT IN AN ORGANIZED HEALTH CARE EDUCATION/TRAINING PROGRAM

## 2024-06-19 PROCEDURE — 1101F PT FALLS ASSESS-DOCD LE1/YR: CPT | Mod: CPTII,,, | Performed by: STUDENT IN AN ORGANIZED HEALTH CARE EDUCATION/TRAINING PROGRAM

## 2024-06-19 PROCEDURE — 99213 OFFICE O/P EST LOW 20 MIN: CPT | Mod: ,,, | Performed by: STUDENT IN AN ORGANIZED HEALTH CARE EDUCATION/TRAINING PROGRAM

## 2024-06-19 PROCEDURE — 3078F DIAST BP <80 MM HG: CPT | Mod: CPTII,,, | Performed by: STUDENT IN AN ORGANIZED HEALTH CARE EDUCATION/TRAINING PROGRAM

## 2024-06-19 PROCEDURE — 1159F MED LIST DOCD IN RCRD: CPT | Mod: CPTII,,, | Performed by: STUDENT IN AN ORGANIZED HEALTH CARE EDUCATION/TRAINING PROGRAM

## 2024-06-19 RX ORDER — ALBUTEROL SULFATE 90 UG/1
2 AEROSOL, METERED RESPIRATORY (INHALATION) EVERY 6 HOURS PRN
COMMUNITY
Start: 2024-06-17

## 2024-06-19 NOTE — PROGRESS NOTES
Orthopedic Clinic - Hand    Chief Complaint: Follow-up      History of Present Illness: Riccardo Hamilton is a 79 y.o. male here today for right ring and little finger dupuytren's contracture release on 01/26/2024.  He is doing well.  He has been discharged from therapy.  He was happy with the result.  He states that he is now doing things that he was not able to do prior to surgery.  No numbness or tingling into the fingertips.      Past Medical History:   Diagnosis Date    Cancer     Colon    Carotid stenosis, bilateral     DM (diabetes mellitus)     Dupuytren's contracture of both hands     Encounter for blood transfusion     Hyperlipidemia     Hypertension     Mild intermittent asthma, uncomplicated         Past Surgical History:   Procedure Laterality Date    CAROTID ENDARTERECTOMY Bilateral     cataract surgery Bilateral     COLON RESECTION      COLONOSCOPY N/A 10/19/2022    Procedure: COLONOSCOPY;  Surgeon: Natalya Neely MD;  Location: [facility] ENDOSCOPY;  Service: Gastroenterology;  Laterality: N/A;    colostomy reversal      DUPUYTREN CONTRACTURE RELEASE Right 01/26/2024    Procedure: RELEASE, DUPUYTREN CONTRACTURE;  Surgeon: Bernabe Mensah MD;  Location: [facility] OR;  Service: Orthopedics;  Laterality: Right;    HAND SURGERY Right 01/2024    MEDIPORT INSERTION, SINGLE          Social History     Tobacco Use    Smoking status: Former    Smokeless tobacco: Never   Substance and Sexual Activity    Alcohol use: Not Currently    Drug use: Never    Sexual activity: Not Currently        Current Outpatient Medications   Medication Instructions    acetaminophen (TYLENOL) 500 mg, Oral, Daily PRN, Patient takes one in the AM and one in the PM    albuterol (PROVENTIL) 2.5 mg, Nebulization, As needed (PRN)    albuterol (PROVENTIL/VENTOLIN HFA) 90 mcg/actuation inhaler 2 puffs, Inhalation, Every 6 hours PRN    aspirin 81 mg, Oral, Daily    atorvastatin (LIPITOR) 80 mg, Oral, Daily    cetirizine (ZYRTEC) 5 mg, Oral,  "Daily    ferrous sulfate (FEOSOL) 325 mg, Oral, Daily    fluticasone propionate (FLONASE) 50 mcg, Each Nostril, 2 times daily    fluticasone-salmeterol diskus inhaler 250-50 mcg 1 puff, Inhalation, 2 times daily, Controller    hydroCHLOROthiazide (HYDRODIURIL) 25 mg, Oral, Daily    losartan (COZAAR) 50 mg, Oral, Daily    magnesium oxide (MAG-OX) 800 mg, Oral, Daily    multivitamin (THERAGRAN) per tablet 1 tablet, Oral, Daily    pantoprazole (PROTONIX) 40 mg, Oral, Daily       Review of patient's allergies indicates:  No Known Allergies     Patient Care Team:  Ancelmo Richard MD as PCP - General (Internal Medicine)     Review of Systems:    Constitution:   Denies chills, fever, and sweats.  HENT:   Denies headaches or blurry vision.  Cardiovascular:   Denies chest pain or irregular heart beat.  Respiratory:   Denies cough or shortness of breath.  Gastrointestinal:  Denies abdominal pain, nausea, or vomiting.  Musculoskeletal:   Denies muscle cramps.  Neurological:   Denies dizziness or focal weakness.  Psychiatric/Behavior: Normal mental status.  Hematology/Lymph:  Denies bleeding problem or easy bruising/bleeding.  Skin:    Denies rash or suspicious lesions.    Physical Examination:    Vital Signs:    Vitals:    06/19/24 0931   BP: 134/73   Pulse: 76   Weight: 74.4 kg (164 lb)   Height: 5' 9" (1.753 m)   Body mass index is 24.22 kg/m².    Constitution:   Well-developed, well nourished patient in no acute distress.  Neurological:   Alert and oriented x 3 and cooperative to examination.     Psychiatric/Behavior: Normal mental status.  Respiratory:   No shortness of breath. Non-labored breathing.  Eyes:    Extraoccular muscles intact.    Musculoskeletal Examination:   Right Upper Extremity:    [x] No open wounds or rashes.    [] Abnormal skin findings:      [x] Radial pulses 2+ is warm well perfused.        Right hand:  Surgical incisions are now healed.  He was able to get the ring and small finger straight.  There " is a residual 5 degree flexion contracture of the little finger at the PIP joint.  Sensation light touch intact to median ulnar radial distribution.  Radial pulse 2 +hand is warm well perfused         Imaging:   No new imaging     Assessment:  Post right little finger Dupuytren's contracture release    Plan:  He is doing well.  He can continue full activities as tolerated.  He no longer needs any therapy.  He no longer needs to nighttime splint.  He can follow up with me as needed    Follow Up:  As needed    X-Ray at Next Visit: None

## 2024-06-20 ENCOUNTER — HOSPITAL ENCOUNTER (EMERGENCY)
Facility: HOSPITAL | Age: 79
Discharge: HOME | End: 2024-06-20
Attending: EMERGENCY MEDICINE
Payer: MEDICARE

## 2024-06-20 VITALS
OXYGEN SATURATION: 98 % | RESPIRATION RATE: 18 BRPM | HEART RATE: 68 BPM | DIASTOLIC BLOOD PRESSURE: 83 MMHG | WEIGHT: 159.63 LBS | TEMPERATURE: 98 F | BODY MASS INDEX: 23.64 KG/M2 | SYSTOLIC BLOOD PRESSURE: 178 MMHG | HEIGHT: 69 IN

## 2024-06-20 DIAGNOSIS — R42 POSTURAL DIZZINESS: ICD-10-CM

## 2024-06-20 DIAGNOSIS — I95.1 ORTHOSTATIC HYPOTENSION: Primary | ICD-10-CM

## 2024-06-20 LAB
ANION GAP SERPL CALC-SCNC: 7 MEQ/L
BASOPHILS # BLD AUTO: 0.07 X10(3)/MCL
BASOPHILS NFR BLD AUTO: 1 %
BNP BLD-MCNC: 12.6 PG/ML
BUN SERPL-MCNC: 18.1 MG/DL (ref 8.4–25.7)
CALCIUM SERPL-MCNC: 9.8 MG/DL (ref 8.8–10)
CHLORIDE SERPL-SCNC: 102 MMOL/L (ref 98–107)
CO2 SERPL-SCNC: 26 MMOL/L (ref 23–31)
CREAT SERPL-MCNC: 1.36 MG/DL (ref 0.73–1.18)
CREAT/UREA NIT SERPL: 13
EOSINOPHIL # BLD AUTO: 0.38 X10(3)/MCL (ref 0–0.9)
EOSINOPHIL NFR BLD AUTO: 5.2 %
ERYTHROCYTE [DISTWIDTH] IN BLOOD BY AUTOMATED COUNT: 13.5 % (ref 11.5–17)
GFR SERPLBLD CREATININE-BSD FMLA CKD-EPI: 52 ML/MIN/1.73/M2
GLUCOSE SERPL-MCNC: 175 MG/DL (ref 82–115)
HCT VFR BLD AUTO: 39.1 % (ref 42–52)
HGB BLD-MCNC: 13.6 G/DL (ref 14–18)
IMM GRANULOCYTES # BLD AUTO: 0.02 X10(3)/MCL (ref 0–0.04)
IMM GRANULOCYTES NFR BLD AUTO: 0.3 %
LYMPHOCYTES # BLD AUTO: 1.8 X10(3)/MCL (ref 0.6–4.6)
LYMPHOCYTES NFR BLD AUTO: 24.5 %
MCH RBC QN AUTO: 32.9 PG (ref 27–31)
MCHC RBC AUTO-ENTMCNC: 34.8 G/DL (ref 33–36)
MCV RBC AUTO: 94.4 FL (ref 80–94)
MONOCYTES # BLD AUTO: 0.51 X10(3)/MCL (ref 0.1–1.3)
MONOCYTES NFR BLD AUTO: 6.9 %
NEUTROPHILS # BLD AUTO: 4.57 X10(3)/MCL (ref 2.1–9.2)
NEUTROPHILS NFR BLD AUTO: 62.1 %
NRBC BLD AUTO-RTO: 0 %
PLATELET # BLD AUTO: 292 X10(3)/MCL (ref 130–400)
PMV BLD AUTO: 9.9 FL (ref 7.4–10.4)
POTASSIUM SERPL-SCNC: 3.6 MMOL/L (ref 3.5–5.1)
RBC # BLD AUTO: 4.14 X10(6)/MCL (ref 4.7–6.1)
SODIUM SERPL-SCNC: 135 MMOL/L (ref 136–145)
TROPONIN I SERPL-MCNC: <0.01 NG/ML (ref 0–0.04)
WBC # BLD AUTO: 7.35 X10(3)/MCL (ref 4.5–11.5)

## 2024-06-20 PROCEDURE — 96361 HYDRATE IV INFUSION ADD-ON: CPT

## 2024-06-20 PROCEDURE — 85025 COMPLETE CBC W/AUTO DIFF WBC: CPT | Performed by: EMERGENCY MEDICINE

## 2024-06-20 PROCEDURE — 25000003 PHARM REV CODE 250: Performed by: EMERGENCY MEDICINE

## 2024-06-20 PROCEDURE — 80048 BASIC METABOLIC PNL TOTAL CA: CPT | Performed by: EMERGENCY MEDICINE

## 2024-06-20 PROCEDURE — 96360 HYDRATION IV INFUSION INIT: CPT

## 2024-06-20 PROCEDURE — 83880 ASSAY OF NATRIURETIC PEPTIDE: CPT | Performed by: EMERGENCY MEDICINE

## 2024-06-20 PROCEDURE — 84484 ASSAY OF TROPONIN QUANT: CPT | Performed by: EMERGENCY MEDICINE

## 2024-06-20 PROCEDURE — 99284 EMERGENCY DEPT VISIT MOD MDM: CPT | Mod: 25

## 2024-06-20 RX ADMIN — SODIUM CHLORIDE 1000 ML: 9 INJECTION, SOLUTION INTRAVENOUS at 05:06

## 2024-06-20 RX ADMIN — SODIUM CHLORIDE 1000 ML: 9 INJECTION, SOLUTION INTRAVENOUS at 03:06

## 2024-06-20 NOTE — ED PROVIDER NOTES
ED PROVIDER NOTE  6/20/2024    CHIEF COMPLAINT:   Chief Complaint   Patient presents with    Dizziness     Reports orthostatic weakness/dizziness when changing positions. On HTN meds.    Weakness       HISTORY OF PRESENT ILLNESS:   Riccardo Hamilton is a 79 y.o. male who presents with chief complaint Dizziness.  Onset was yesterday whenever he began noticing that whenever he would stand up he would get a little dizzy with lightheadedness and weak in both of his legs.  He states that today he was bending over and whenever he stood back up he had this symptom happen again.  Denies chest pain, shortness of breath, palpitations, nausea, vomiting, diarrhea.      The history is provided by the patient.         REVIEW OF SYSTEMS: as noted in the HPI.  NURSING NOTES REVIEWED      PAST MEDICAL/SURGICAL HISTORY:   Past Medical History:   Diagnosis Date    Cancer     Colon    Carotid stenosis, bilateral     DM (diabetes mellitus)     Dupuytren's contracture of both hands     Encounter for blood transfusion     Hyperlipidemia     Hypertension     Mild intermittent asthma, uncomplicated       Past Surgical History:   Procedure Laterality Date    CAROTID ENDARTERECTOMY Bilateral     cataract surgery Bilateral     COLON RESECTION      COLONOSCOPY N/A 10/19/2022    Procedure: COLONOSCOPY;  Surgeon: Natalya Neely MD;  Location: [facility] ENDOSCOPY;  Service: Gastroenterology;  Laterality: N/A;    colostomy reversal      DUPUYTREN CONTRACTURE RELEASE Right 01/26/2024    Procedure: RELEASE, DUPUYTREN CONTRACTURE;  Surgeon: Bernabe Mensah MD;  Location: [facility] OR;  Service: Orthopedics;  Laterality: Right;    HAND SURGERY Right 01/2024    MEDIPORT INSERTION, SINGLE         FAMILY HISTORY:   Family History   Problem Relation Name Age of Onset    Stroke Sister      Hypertension Sister      Cancer Sister      Stroke Brother      Hypertension Brother         SOCIAL HISTORY:   Social History     Tobacco Use    Smoking status: Former     Smokeless tobacco: Never   Substance Use Topics    Alcohol use: Not Currently    Drug use: Never       ALLERGIES: Review of patient's allergies indicates:  No Known Allergies    PHYSICAL EXAM:  Initial Vitals [06/20/24 1510]   BP Pulse Resp Temp SpO2   (!) 167/85 99 16 97.8 °F (36.6 °C) 99 %      MAP       --         Physical Exam    Nursing note and vitals reviewed.  Constitutional: He appears well-developed and well-nourished. No distress.   HENT:   Head: Normocephalic and atraumatic.   Nose: Nose normal.   Mouth/Throat: Oropharynx is clear and moist and mucous membranes are normal.   Eyes: Conjunctivae and EOM are normal. Pupils are equal, round, and reactive to light.   Neck: Neck supple. No tracheal deviation present.   Cardiovascular:  Normal rate, regular rhythm, normal heart sounds, intact distal pulses and normal pulses.           Pulmonary/Chest: Effort normal and breath sounds normal. No respiratory distress.   Abdominal: Abdomen is soft. There is no abdominal tenderness. There is no rebound and no guarding.   Musculoskeletal:         General: Normal range of motion.      Cervical back: Neck supple.     Neurological: He is alert and oriented to person, place, and time. GCS eye subscore is 4. GCS verbal subscore is 5. GCS motor subscore is 6.   CN II-XII intact. Moves all extremities. No gross sensory or motor deficits.   Skin: Skin is warm, dry and intact.   Psychiatric: He has a normal mood and affect. His speech is normal and behavior is normal. Judgment and thought content normal. Cognition and memory are normal.         RESULTS:  Labs Reviewed   BASIC METABOLIC PANEL - Abnormal; Notable for the following components:       Result Value    Sodium 135 (*)     Glucose 175 (*)     Creatinine 1.36 (*)     All other components within normal limits   CBC WITH DIFFERENTIAL - Abnormal; Notable for the following components:    RBC 4.14 (*)     Hgb 13.6 (*)     Hct 39.1 (*)     MCV 94.4 (*)     MCH 32.9 (*)      All other components within normal limits   B-TYPE NATRIURETIC PEPTIDE - Normal   TROPONIN I - Normal   CBC W/ AUTO DIFFERENTIAL    Narrative:     The following orders were created for panel order CBC auto differential.  Procedure                               Abnormality         Status                     ---------                               -----------         ------                     CBC with Differential[[id number]]       Abnormal            Final result                 Please view results for these tests on the individual orders.     Imaging Results    None         PROCEDURES:  Procedures    ECG:  EKG Readings: (Independently Interpreted)   Initial Reading: No STEMI. Rhythm: Normal Sinus Rhythm. Heart Rate: 81. Ectopy: No Ectopy. Conduction: Normal. Axis: Normal.       ED COURSE AND MEDICAL DECISION MAKING:  Medications   sodium chloride 0.9% bolus 1,000 mL 1,000 mL (0 mLs Intravenous Stopped 6/20/24 1616)   sodium chloride 0.9% bolus 1,000 mL 1,000 mL (0 mLs Intravenous Stopped 6/20/24 1756)     ED Course as of 06/20/24 1846   u Jun 20, 2024   1602 WBC: 7.35 [IB]   1602 Hemoglobin(!): 13.6 [IB]   1602 Platelet Count: 292 [IB]   1618 Creatinine(!): 1.36  Unchanged from 2 months ago [IB]   1620 Troponin I: <0.010 [IB]   1620 BNP: 12.6 [IB]   1650 Still orthostatic. Will give another liter of fluid and reassess. [IB]   1730 Assumed care from Dr Quispe [RQ]   1828 Patient is feeling better, he tolerated fluids. He has no dizziness when moving, standing. He is not orthostatic. He denies chest pain, sob, cough, he voided without difficulty. Normal effort, symmetrical chest rise and fall, no accessory muscle use. Bilateral clear breath sounds in all fields anterior and posterior.   [RQ]      ED Course User Index  [IB] Nithin Quispe,   [RQ] Tressa Brice, ROSENDO        Medical Decision Making  Well-appearing 79-year-old male who presents with orthostatic weakness and dizziness over the past day.   Differential diagnosis includes orthostatic hypotension, benign positional vertigo, near syncope.  Orthostatic vitals positive for orthostatic hypotension with reflex tachycardia.  We will check basic labs and give IV hydration with normal saline and reassess.    Assumed care  ER course  After one liter of IV fluids, patient orthostatic. Second liter of IV fluids given.   After two liters of IV fluids, patient is feeling well, denies dizziness/lightheaded, he is no longer orthostatic.   His blood pressure is elevated, he is asymptomatic, he denies sob, chest pain, palpitations, edema, swelling, headache. He has urinated twice while in the ED without difficulty. His wife is sitting in the room.   Provided education on increasing fluids to avoid dehydration due to heat.   Strict ER precautions discussed, sob, chest pain, cough, edema, swelling, decreased urine output. He and his wife verbalized understanding.   The patient is resting comfortably in no acute distress, hemodynamically stable and is without objective evidence for acute process requiring urgent intervention or hospitalization. I provided counseling to patient with regard to condition, the treatment plan, specific conditions for return, and the importance of follow up. Detailed written and verbal instructions provided to patient and he expressed a verbal understanding of the discharge instructions and overall management plan. Reiterated the importance of medication administration and safety and advised patient to follow up with primary care provider in 3-5 days or sooner if needed. Answered questions at this time. The patient is stable for discharge.         Amount and/or Complexity of Data Reviewed  Labs: ordered. Decision-making details documented in ED Course.        CLINICAL IMPRESSION:  1. Orthostatic hypotension    2. Postural dizziness        DISPOSITION:   ED Disposition Condition    Discharge Stable            ED Prescriptions    None        Follow-up Information       Follow up With Specialties Details Why Contact Info    Ancelmo Richard MD Internal Medicine Schedule an appointment as soon as possible for a visit   [address] [facility] [address]  [phone]      [facility] - Emergency Dept Emergency Medicine  If symptoms worsen [address] [facility] [facility] [id number] [phone]    Ancelmo Richard MD Internal Medicine In 3 days  [address] [facility] [address]  [phone]      [facility] - Emergency Dept Emergency Medicine  If symptoms worsen [address] [facility] [id number] [phone]               Tressa Brice, ROSENDO  06/20/24 1845

## 2024-06-25 ENCOUNTER — OFFICE VISIT (OUTPATIENT)
Dept: INTERNAL MEDICINE | Facility: CLINIC | Age: 79
End: 2024-06-25
Payer: MEDICARE

## 2024-06-25 VITALS
HEIGHT: 69 IN | DIASTOLIC BLOOD PRESSURE: 74 MMHG | RESPIRATION RATE: 18 BRPM | BODY MASS INDEX: 24.71 KG/M2 | HEART RATE: 65 BPM | TEMPERATURE: 98 F | SYSTOLIC BLOOD PRESSURE: 125 MMHG | OXYGEN SATURATION: 100 % | WEIGHT: 166.81 LBS

## 2024-06-25 DIAGNOSIS — I95.1 ORTHOSTASIS: ICD-10-CM

## 2024-06-25 DIAGNOSIS — Z87.09 HISTORY OF ASTHMA: Primary | ICD-10-CM

## 2024-06-25 PROCEDURE — 99215 OFFICE O/P EST HI 40 MIN: CPT | Mod: PBBFAC | Performed by: STUDENT IN AN ORGANIZED HEALTH CARE EDUCATION/TRAINING PROGRAM

## 2024-06-25 RX ORDER — AMLODIPINE BESYLATE 5 MG/1
5 TABLET ORAL DAILY
Qty: 90 TABLET | Refills: 3 | Status: SHIPPED | OUTPATIENT
Start: 2024-06-25 | End: 2025-06-25

## 2024-06-25 NOTE — PROGRESS NOTES
"[facility] INTERNAL MEDICINE  OUTPATIENT OFFICE VISIT NOTE    SUBJECTIVE:      HPI: Riccardo Hamilton is a 79 y.o. yo male w/ PMH of colonic adenocarcinoma (followed by Oncology; s/p hemicolectomy and adjuvant chemo), HTN, HLD, T2DM, Asthma, Retropharyngeal Carotid Bypass (2014), Carotid Endarterectomy (1/2014; 2/2014), and previous RLE DVT w/ subacute small PE of R. Lung (s/p x3 month Eliquis txt) presents to [facility] IM clinic for care follow-up after recent ED presentation for lightheadedness/dizziness and findings of orthostasis the vitals performed at that time.  He received x2 L IV crystalloid boluses with improvement of orthostatics and discharge thereafter.  Patient was hypotensive on ED on current regimen Losartan/HCTZ.  He does report increased daytime and nighttime urination though denies any associated dysuria, pyuria, hematuria, suprapubic pain, CVA tenderness.  Also denied recent infection or close sick contacts.  We discussed any changes in current medications or taking prior prescriptions of metoprolol or hydralazine in which he denies. Denies any recent dietary changes or new over-the-counter medications.  Does take magnesium 400 mg b.i.d. for the past x1 year though denies any recent changes in dosing.  We discussed transitioning from HCTZ to Norvasc 5 mg q.d. in which patient is amenable.     ROS:  Denies any headaches, changes in vision, chest pain, palpitations, shortness of breath, N/V, or lightheadedness/dizziness, abdominal pain, pain/difficulty with urination/stools, or blood in urine/BMs.       OBJECTIVE:     Vital signs:   /74 (BP Location: Left arm, Patient Position: Standing, BP Method: Large (Automatic))   Pulse 65   Temp 97.9 °F (36.6 °C) (Oral)   Resp 18   Ht 5' 9" (1.753 m)   Wt 75.7 kg (166 lb 12.8 oz)   SpO2 100%   BMI 24.63 kg/m²      Physical Examination:  Gen: well-nourished, well-developed gentleman, in no acute distress   HEENT: Normocephalic, atraumatic. PERRL.   Neck: No " thyromegaly. No lymphadenopathy.   Heart: RRR, no murmurs, gallops, clicks or rubs.   Lungs: Posterior breath sounds with inspiratory fine crackles w/ possible velcr0-like appreciations   Abd: Midline well-healed abdominal scar. Soft, non-tender, non-distended and without guarding.   Extremities: Radial and pedal pulses 2+ bilaterally, no appreciable LE edema.   MSK:  B/L hand Dupuytren's contractures with improvement to right hand s/p recent surgical procedure & well healing. Pending surgical intervention of left hand   Neuro: Responds well to commands. CN III-XII grossly intact. No focal neurological deficits noted   Skin: Warm, dry, intact.     ASSESSMENT & PLAN:   Hypertension  Recent Orthostatsis  - HTN previously well Controlled  - Continue Losartan 25 q.D.  - Discontinued HCTZ 25mg qD (6/25/24) with prescribing of Norvasc 5mg qD  - Counseled on DASH diet & increasing exercise as tolerated  - Patient to be scheduled for nurse BP visit next week & provided BP logs to track at home measurements with transitioning of above antihypertensives    Reported Hx of Asthma; possible reactive airway disease  - Discussed possiblities of additional pulmonary etiologies with patient & possible exposure w/ differential including GERD, post-nasal drip, vocal cord dysfunction, reactive airway disease, and/or ILD  - Prior CT thorax (4/2023) w/ scattered mediastinal lymph nodes & hilar lymph nodes, chronic interstitial markings at inferior lingula & RML, & mildly prominent peripheral distribution of interstitial markings at lung bases  - PFTs normal; referred to Pulm clinic & followed up w/ continuation of below medications & f/u PRN; appreciate assistance in care  - C/w Johnny Cantrell today for trial therapy  - C/w Albuterol inhaler PRN    B/L Dupuytren's Contracture  - Referred to Ortho for further evaluation; appreciate assistance in care  - Has followed up with Ortho & completed surgical correction of right hand contracture  (2/2024) w/ relief & is currently healing well  - Set to f/u w/ Ortho for further evaluation of left hand contracture    B/L Shoulder Pain/discomfort/stiffness - improved  Concern for B/L shoulder arthritic component and/or adhesive capsulitis  - 2-3/10 to 9/10, aching pain, worse with weather changes & movement, improved with topical diclofenac, limited range of motion, occasionally awakening from sleep  - Denies any trauma, falls, MVCs  - First noticed pain x1 year, worsened acutely over past x3 months  - Ordered B/L x-rays    Stage IIIB Adenocarcinoma of Cecum  - s/p hemicolectomy and adjuvant chemotherapy  - At this time denies any GI-related symptoms  - patient is followed by oncology; continue to follow as scheduled  - Recent CT Chest/Abdomen/Pelvis (4/2024) unremarkable for any recurrent or metastatic disease     Hyperlipidemia  - Latest lipid profile WNL  - Continue Lipitor 40 daily    Hx of B/L Carotid Endarterectomy   - Continue Aspirin 81, Lipitor 40 daily    Seasonal Allergies - well controlled  - Intermittent-persistent dry cough and eye irritiation  - Relieved with Zyrtec/Flonase therapy     GERD   - Continue Protonix 40 daily     Hx of DVT/PE  - Occurred in 2021, treated with x3 months Eliquis  - Denies any recurrence  - Continue Aspirin 81 daily      Health Maintenance:  Immunization History   Administered Date(s) Administered    COVID-19, MRNA, LN-S, PF (MODERNA FULL 0.5 ML DOSE) 01/12/2021, 02/26/2021, 10/22/2021, 05/24/2022    COVID-19, MRNA, LN-S, PF (Pfizer) (Purple Cap) 01/12/2021, 02/26/2021    Influenza (FLUAD) - Quadrivalent - Adjuvanted - PF *Preferred* (65+) 09/21/2022    Influenza - High Dose - PF (65 years and older) 09/22/2015, 10/06/2016, 10/02/2017, 09/24/2018    Influenza - Quadrivalent - PF *Preferred* (6 months and older) 09/12/2019    Influenza - Trivalent - PF (ADULT) 09/22/2015, 10/06/2016, 10/02/2017, 09/24/2018, 09/12/2019    Influenza A (H1N1) 2009 Monovalent - IM 01/21/2010     Pneumococcal Conjugate - 13 Valent 09/04/2020    Pneumococcal Polysaccharide - 23 Valent 10/02/2015    Tdap 02/09/2022    Zoster 09/22/2015    Zoster Recombinant 05/31/2022, 09/21/2022     Return to clinic in x1 week for nurse BP visit  Return to clinic in x4 months with labs for regular follow up    Ancelmo Richard MD   [employer] Internal Medicine, PGY-III

## 2024-07-03 ENCOUNTER — CLINICAL SUPPORT (OUTPATIENT)
Dept: INTERNAL MEDICINE | Facility: CLINIC | Age: 79
End: 2024-07-03
Payer: MEDICARE

## 2024-07-03 DIAGNOSIS — I10 HYPERTENSION, UNSPECIFIED TYPE: Primary | ICD-10-CM

## 2024-07-03 PROCEDURE — 99211 OFF/OP EST MAY X REQ PHY/QHP: CPT | Mod: PBBFAC

## 2024-07-03 NOTE — PROGRESS NOTES
Riccardo Hamilton 79 y.o. male is here today for Blood Pressure check.   History of HTN no.    Review of patient's allergies indicates:  No Known Allergies  Creatinine   Date Value Ref Range Status   06/20/2024 1.36 (H) 0.73 - 1.18 mg/dL Final     Sodium   Date Value Ref Range Status   06/20/2024 135 (L) 136 - 145 mmol/L Final     Potassium   Date Value Ref Range Status   06/20/2024 3.6 3.5 - 5.1 mmol/L Final   ]  Patient verifies taking blood pressure medications on a regular basis at the same time of the day.     Current Outpatient Medications:     acetaminophen (TYLENOL) 500 MG tablet, Take 500 mg by mouth daily as needed for Pain. Patient takes one in the AM and one in the PM, Disp: , Rfl:     albuterol (PROVENTIL) 2.5 mg /3 mL (0.083 %) nebulizer solution, Take 3 mLs (2.5 mg total) by nebulization as needed for Wheezing or Shortness of Breath., Disp: 3 mL, Rfl: 4    albuterol (PROVENTIL/VENTOLIN HFA) 90 mcg/actuation inhaler, Inhale 2 puffs into the lungs every 6 (six) hours as needed., Disp: , Rfl:     amLODIPine (NORVASC) 5 MG tablet, Take 1 tablet (5 mg total) by mouth once daily., Disp: 90 tablet, Rfl: 3    aspirin 81 MG Chew, Take 1 tablet (81 mg total) by mouth once daily., Disp: 90 tablet, Rfl: 3    atorvastatin (LIPITOR) 80 MG tablet, Take 1 tablet (80 mg total) by mouth once daily., Disp: 90 tablet, Rfl: 3    cetirizine (ZYRTEC) 5 MG tablet, Take 1 tablet (5 mg total) by mouth once daily., Disp: 30 tablet, Rfl: 1    ferrous sulfate (FEOSOL) 325 mg (65 mg iron) Tab tablet, Take 325 mg by mouth once daily., Disp: , Rfl:     fluticasone propionate (FLONASE) 50 mcg/actuation nasal spray, 1 spray (50 mcg total) by Each Nostril route 2 (two) times daily., Disp: 5 mL, Rfl: 4    fluticasone-salmeterol diskus inhaler 250-50 mcg, Inhale 1 puff into the lungs 2 (two) times daily. Controller, Disp: 60 each, Rfl: 4    losartan (COZAAR) 50 MG tablet, Take 1 tablet (50 mg total) by mouth once daily., Disp: 90 tablet,  Rfl: 3    magnesium oxide (MAG-OX) 400 mg (241.3 mg magnesium) tablet, Take 800 mg by mouth once daily., Disp: , Rfl:     multivitamin (THERAGRAN) per tablet, Take 1 tablet by mouth once daily., Disp: , Rfl:     pantoprazole (PROTONIX) 40 MG tablet, Take 1 tablet (40 mg total) by mouth once daily., Disp: 90 tablet, Rfl: 3  No current facility-administered medications for this visit.    Facility-Administered Medications Ordered in Other Visits:     heparin, porcine (PF) 100 unit/mL injection flush 500 Units, 500 Units, Intravenous, PRN, Victor M Hair MD, 500 Units at 07/27/22 0720    sodium chloride 0.9% flush 10 mL, 10 mL, Intravenous, PRN, Victor M Hair MD, 10 mL at 07/27/22 0720  Does patient have record of home blood pressure readings yes. Readings have been averaging 135/76 to 163/85.   Last dose of blood pressure medication was taken at 6:00 AM.  Patient is asymptomatic.   Complains of getting dizzy during the night when he gets up to go tho the restroom.      ,   .    Blood pressure reading after 15 minutes [id number], Pulse 68.  Dr. Vega to be notified.

## 2024-07-05 NOTE — PROGRESS NOTES
"CHIEF COMPLAINT:   Chief Complaint   Patient presents with    Follow-up     Pt states ED visit bp up and down  dizziness in the morning with head swimming                                                  HPI:  Riccardo Hamilton 79 y.o. male  with HTN, T2DM, HLD, asthma, ADRIÁN s/p CEA 2014, colonic adenocarcinoma with previous RLE DVT and PE s/p 3 months of OAC who presents to clinic today for follow up and ongoing care.  He was last seen for preoperative risk assessment.  At that time he denied any cardiac complaints.    He was recently seen in ED for hypotension.  Medication adjustments were made at that time.  He was continuing Losartan, however HCTZ was discontinued and patient was placed on Amlodipine 5 mg daily instead.  Patient states that since then he still does not feel good.  He states that he was episodes of feeling lightheaded, dizzy, weak "like I want to fall.  He denies any actual syncopal episodes.  He states that these episodes typically occur in the mornings right when he gets up and in the evenings.  He states that throughout the day his symptoms do improve.  He endorses drinking 3 bottles of water per day.  He was not wear compression socks.  He does have a history of carotid artery stenosis and did have bilateral CEA, but he has not had ultrasounds in a few years.  Otherwise he denies any further cardiac complaints such as chest pain, palpitations, PND, orthopnea, SOB, LOPEZ, PND, orthopnea, or syncope.  He is still able to complete his ADLs without any ischemic symptoms, however his dizziness is limiting how much activity he was able to do.  He states that he is active in his daily life.  He reports compliance with all his current medications.  He denies any tobacco or other illicit drug use.                                                                                                                                                                                                                   "                  CARDIAC TESTING:  No results found for this or any previous visit.    No results found for this or any previous visit.     No results found for this or any previous visit.       Patient Active Problem List   Diagnosis    Adenocarcinoma of cecum    Hypertension    Hyperlipidemia    GERD (gastroesophageal reflux disease)    Asthma    History of DVT (deep vein thrombosis)    History of bilateral carotid endarterectomy    History of pulmonary embolism    History of iron deficiency anemia    Dupuytren's contracture of right hand     Past Surgical History:   Procedure Laterality Date    CAROTID ENDARTERECTOMY Bilateral     cataract surgery Bilateral     COLON RESECTION      COLONOSCOPY N/A 10/19/2022    Procedure: COLONOSCOPY;  Surgeon: Natalya Neely MD;  Location[facility] ENDOSCOPY;  Service: Gastroenterology;  Laterality: N/A;    colostomy reversal      DUPUYTREN CONTRACTURE RELEASE Right 01/26/2024    Procedure: RELEASE, DUPUYTREN CONTRACTURE;  Surgeon: Bernabe Mensah MD;  Location: [facility] OR;  Service: Orthopedics;  Laterality: Right;    HAND SURGERY Right 01/2024    MEDIPORT INSERTION, SINGLE       Social History     Socioeconomic History    Marital status:      Spouse name: Ya Hamilton    Number of children: 4   Tobacco Use    Smoking status: Former    Smokeless tobacco: Never   Substance and Sexual Activity    Alcohol use: Not Currently    Drug use: Never    Sexual activity: Not Currently     Social Determinants of Health     Financial Resource Strain: Low Risk  (5/1/2024)    Overall Financial Resource Strain (CARDIA)     Difficulty of Paying Living Expenses: Not hard at all   Food Insecurity: No Food Insecurity (5/1/2024)    Hunger Vital Sign     Worried About Running Out of Food in the Last Year: Never true     Ran Out of Food in the Last Year: Never true   Transportation Needs: No Transportation Needs (5/1/2024)    PRAPARE - Transportation     Lack of Transportation (Medical): No      Lack of Transportation (Non-Medical): No   Physical Activity: Inactive (5/1/2024)    Exercise Vital Sign     Days of Exercise per Week: 0 days     Minutes of Exercise per Session: 0 min   Stress: No Stress Concern Present (5/1/2024)    [employer] - Occupational Stress Questionnaire     Feeling of Stress : Not at all   Housing Stability: Low Risk  (5/1/2024)    Housing Stability Vital Sign     Unable to Pay for Housing in the Last Year: No     Homeless in the Last Year: No        Family History   Problem Relation Name Age of Onset    Stroke Sister      Hypertension Sister      Cancer Sister      Stroke Brother      Hypertension Brother       Review of patient's allergies indicates:  No Known Allergies      ROS:  Review of Systems   Constitutional:  Negative for malaise/fatigue.   Cardiovascular:  Positive for leg swelling (Mild). Negative for chest pain, palpitations, orthopnea, claudication and PND.   Neurological:  Positive for dizziness.        Lightheadedness                                                                                                                                                                                Negative except as stated in the history of present illness. See HPI for details.    PHYSICAL EXAM:  There were no vitals taken for this visit.    Physical Exam  Constitutional:       Appearance: Normal appearance.   HENT:      Head: Normocephalic.      Mouth/Throat:      Mouth: Mucous membranes are moist.   Eyes:      Extraocular Movements: Extraocular movements intact.   Cardiovascular:      Rate and Rhythm: Normal rate and regular rhythm.      Pulses: Normal pulses.      Heart sounds: Normal heart sounds.   Pulmonary:      Effort: Pulmonary effort is normal.   Abdominal:      General: Bowel sounds are normal. There is no distension.   Musculoskeletal:         General: Normal range of motion.      Right lower leg: Edema (Mild) present.      Left lower  leg: Edema (Mild) present.   Skin:     General: Skin is warm and dry.   Neurological:      General: No focal deficit present.      Mental Status: He is alert and oriented to person, place, and time.   Psychiatric:         Mood and Affect: Mood normal.         Behavior: Behavior normal.         Current Outpatient Medications   Medication Instructions    acetaminophen (TYLENOL) 500 mg, Oral, Daily PRN, Patient takes one in the AM and one in the PM    albuterol (PROVENTIL) 2.5 mg, Nebulization, As needed (PRN)    albuterol (PROVENTIL/VENTOLIN HFA) 90 mcg/actuation inhaler 2 puffs, Inhalation, Every 6 hours PRN    amLODIPine (NORVASC) 5 mg, Oral, Daily    aspirin 81 mg, Oral, Daily    atorvastatin (LIPITOR) 80 mg, Oral, Daily    cetirizine (ZYRTEC) 5 mg, Oral, Daily    ferrous sulfate (FEOSOL) 325 mg, Oral, Daily    fluticasone propionate (FLONASE) 50 mcg, Each Nostril, 2 times daily    fluticasone-salmeterol diskus inhaler 250-50 mcg 1 puff, Inhalation, 2 times daily, Controller    losartan (COZAAR) 50 mg, Oral, Daily    magnesium oxide (MAG-OX) 800 mg, Oral, Daily    multivitamin (THERAGRAN) per tablet 1 tablet, Oral, Daily    pantoprazole (PROTONIX) 40 mg, Oral, Daily        All medications, laboratory studies, cardiac diagnostic imaging reviewed.     Lab Results   Component Value Date    LDL 80.00 01/30/2023    LDL 68.00 02/23/2022    TRIG 168 (H) 01/30/2023    TRIG 98 02/23/2022    CREATININE 1.36 (H) 06/20/2024    MG 1.70 04/03/2023    K 3.6 06/20/2024        ASSESSMENT/PLAN:    Hypertension  Orthostatic Hypotension  -Continue Losartan 50 mg daily.   -Continue Amlodipine 5 MG daily  -Nurse visit in approximately 1 month for BP/HR/symptom check.  Patient has had significant fluctuations in breath pressure.  Per his report it sounds as though he may be experiencing some orthostatic hypotension, however he has been complaining of significant lightheadedness and dizziness that typically occur in the late evenings and  early mornings and with positional changes   -Echocardiogram ordered to assess for any underlying heart failure, valvular disease, or structural disease that may be contributing to lightheadedness and dizziness  - Carotid ultrasounds ordered for further assessment of lightheadedness and dizziness as well  -If additional blood pressure control is needed increase to Losartan 100 mg daily (CKD + T2DM) or increase Amlodipine to 10 mg daily  - Orthostatic hypotension education given for patient to read through  - Counseled on importance of low sodium/ heart healthy diet and exercise  -ED precautions     Hyperlipidemia  -Continue Lipitor 80 mg daily.   -Target LDL at least < 70.   -LDL 80 per labs January 2023  -FLP/CMP prior next office visit-ordered per PCP  -Counseled on the importance of following a low cholesterol heart healthy diet and exercise as tolerated     Type II Diabetes  -A1c is well controlled (6.2).   -Management per PCP     CKD III  -Stop NSAIDs. Ok with diclofenac gel.   -Consider SGLT2i per DAPA-CKD trial.   -ARB.  -Management per PCP/Nephrology     ADRIÁN s/p CEA 2014  -Continue ASA 81 mg daily and increase to Lipitor 80 mg daily.   -Complaining of lightheadedness, dizziness, no syncope or near syncopal events   -Will have patient complete carotid ultrasounds to assess for any underlying carotid artery stenosis-patient states that he has not had these in several years  -He has been having several episodes where he feels as though he may falls his a ground.  These typically occur when changing positions quickly, so may be more suggestive of orthostatic hypotension, however we will assess of his ADRIÁN and treat appropriately  -ED precautions given           Return to clinic for nurse visit for BP/symptom check   Please notify clinic before then if you are having issues   EKG today   Complete carotid ultrasound   Complete echocardiogram  Follow up in cardiology clinic in 3 months or sooner if needed   Follow  up with PCP as directed  Please notify clinic for any new concerns or change in symptoms

## 2024-07-11 ENCOUNTER — OFFICE VISIT (OUTPATIENT)
Dept: CARDIOLOGY | Facility: CLINIC | Age: 79
End: 2024-07-11
Payer: MEDICARE

## 2024-07-11 VITALS
OXYGEN SATURATION: 96 % | DIASTOLIC BLOOD PRESSURE: 71 MMHG | RESPIRATION RATE: 18 BRPM | WEIGHT: 165.81 LBS | BODY MASS INDEX: 25.13 KG/M2 | HEART RATE: 70 BPM | HEIGHT: 68 IN | TEMPERATURE: 98 F | SYSTOLIC BLOOD PRESSURE: 151 MMHG

## 2024-07-11 DIAGNOSIS — I10 PRIMARY HYPERTENSION: ICD-10-CM

## 2024-07-11 DIAGNOSIS — R42 LIGHTHEADEDNESS: Primary | ICD-10-CM

## 2024-07-11 DIAGNOSIS — R42 DIZZINESS: ICD-10-CM

## 2024-07-11 DIAGNOSIS — Z98.890 HISTORY OF BILATERAL CAROTID ENDARTERECTOMY: ICD-10-CM

## 2024-07-11 DIAGNOSIS — E78.5 HYPERLIPIDEMIA, UNSPECIFIED HYPERLIPIDEMIA TYPE: ICD-10-CM

## 2024-07-11 DIAGNOSIS — R09.89 OTHER SPECIFIED SYMPTOMS AND SIGNS INVOLVING THE CIRCULATORY AND RESPIRATORY SYSTEMS: ICD-10-CM

## 2024-07-11 LAB
OHS QRS DURATION: 88 MS
OHS QTC CALCULATION: 432 MS

## 2024-07-11 PROCEDURE — 1159F MED LIST DOCD IN RCRD: CPT | Mod: CPTII,,,

## 2024-07-11 PROCEDURE — 1101F PT FALLS ASSESS-DOCD LE1/YR: CPT | Mod: CPTII,,,

## 2024-07-11 PROCEDURE — 93005 ELECTROCARDIOGRAM TRACING: CPT

## 2024-07-11 PROCEDURE — 99215 OFFICE O/P EST HI 40 MIN: CPT | Mod: PBBFAC,25

## 2024-07-11 PROCEDURE — 3288F FALL RISK ASSESSMENT DOCD: CPT | Mod: CPTII,,,

## 2024-07-11 PROCEDURE — 1126F AMNT PAIN NOTED NONE PRSNT: CPT | Mod: CPTII,,,

## 2024-07-11 PROCEDURE — 3077F SYST BP >= 140 MM HG: CPT | Mod: CPTII,,,

## 2024-07-11 PROCEDURE — 3078F DIAST BP <80 MM HG: CPT | Mod: CPTII,,,

## 2024-07-11 PROCEDURE — 99214 OFFICE O/P EST MOD 30 MIN: CPT | Mod: S$PBB,,,

## 2024-07-11 PROCEDURE — 1160F RVW MEDS BY RX/DR IN RCRD: CPT | Mod: CPTII,,,

## 2024-07-11 NOTE — PATIENT INSTRUCTIONS
Return to clinic for nurse visit for BP/symptom check   Please notify clinic before then if you are having issues   EKG today   Complete carotid ultrasound   Complete echocardiogram  Follow up in cardiology clinic in 3 months or sooner if needed   Follow up with PCP as directed  Please notify clinic for any new concerns or change in symptoms  
78

## 2024-07-19 ENCOUNTER — INFUSION (OUTPATIENT)
Dept: INFUSION THERAPY | Facility: HOSPITAL | Age: 79
End: 2024-07-19
Attending: INTERNAL MEDICINE
Payer: MEDICARE

## 2024-07-19 VITALS
HEART RATE: 86 BPM | OXYGEN SATURATION: 99 % | RESPIRATION RATE: 18 BRPM | SYSTOLIC BLOOD PRESSURE: 153 MMHG | TEMPERATURE: 98 F | HEIGHT: 68 IN | DIASTOLIC BLOOD PRESSURE: 68 MMHG | BODY MASS INDEX: 25.42 KG/M2 | WEIGHT: 167.75 LBS

## 2024-07-19 DIAGNOSIS — C18.0 ADENOCARCINOMA OF CECUM: Primary | ICD-10-CM

## 2024-07-19 PROCEDURE — 25000003 PHARM REV CODE 250: Performed by: INTERNAL MEDICINE

## 2024-07-19 PROCEDURE — A4216 STERILE WATER/SALINE, 10 ML: HCPCS | Performed by: INTERNAL MEDICINE

## 2024-07-19 PROCEDURE — 96523 IRRIG DRUG DELIVERY DEVICE: CPT

## 2024-07-19 PROCEDURE — 63600175 PHARM REV CODE 636 W HCPCS: Performed by: INTERNAL MEDICINE

## 2024-07-19 RX ORDER — HEPARIN 100 UNIT/ML
500 SYRINGE INTRAVENOUS
OUTPATIENT
Start: 2024-07-19

## 2024-07-19 RX ORDER — HEPARIN 100 UNIT/ML
500 SYRINGE INTRAVENOUS
Status: DISCONTINUED | OUTPATIENT
Start: 2024-07-19 | End: 2024-07-19 | Stop reason: HOSPADM

## 2024-07-19 RX ORDER — SODIUM CHLORIDE 0.9 % (FLUSH) 0.9 %
10 SYRINGE (ML) INJECTION
Status: DISCONTINUED | OUTPATIENT
Start: 2024-07-19 | End: 2024-07-19 | Stop reason: HOSPADM

## 2024-07-19 RX ORDER — SODIUM CHLORIDE 0.9 % (FLUSH) 0.9 %
10 SYRINGE (ML) INJECTION
OUTPATIENT
Start: 2024-07-19

## 2024-07-19 RX ADMIN — SODIUM CHLORIDE, PRESERVATIVE FREE 10 ML: 5 INJECTION INTRAVENOUS at 07:07

## 2024-07-19 RX ADMIN — HEPARIN 500 UNITS: 100 SYRINGE at 07:07

## 2024-08-12 ENCOUNTER — CLINICAL SUPPORT (OUTPATIENT)
Dept: CARDIOLOGY | Facility: CLINIC | Age: 79
End: 2024-08-12
Payer: MEDICARE

## 2024-08-12 VITALS
BODY MASS INDEX: 26.02 KG/M2 | HEART RATE: 60 BPM | HEIGHT: 67 IN | DIASTOLIC BLOOD PRESSURE: 64 MMHG | OXYGEN SATURATION: 99 % | SYSTOLIC BLOOD PRESSURE: 145 MMHG | TEMPERATURE: 98 F | RESPIRATION RATE: 18 BRPM | WEIGHT: 165.81 LBS

## 2024-08-12 DIAGNOSIS — I10 PRIMARY HYPERTENSION: Primary | ICD-10-CM

## 2024-08-12 PROCEDURE — 99212 OFFICE O/P EST SF 10 MIN: CPT | Mod: S$PBB,,, | Performed by: NURSE PRACTITIONER

## 2024-08-12 PROCEDURE — 99214 OFFICE O/P EST MOD 30 MIN: CPT | Mod: PBBFAC

## 2024-08-12 NOTE — PROGRESS NOTES
Pt seen in clinic today for bp check. LCV 07/11/24 bp above goal. Today pt denies any cardiac target and since changed to amlodipine he feels better. Bp today 145/64. Bp rechecked manually 120/80. Presented to Azra Saenz NP for review. Continue current medication regimen keep all future appointments. Verbalized understanding of POC

## 2024-08-19 ENCOUNTER — HOSPITAL ENCOUNTER (OUTPATIENT)
Dept: RADIOLOGY | Facility: HOSPITAL | Age: 79
Discharge: HOME | End: 2024-08-19
Payer: MEDICARE

## 2024-08-19 ENCOUNTER — HOSPITAL ENCOUNTER (OUTPATIENT)
Dept: CARDIOLOGY | Facility: HOSPITAL | Age: 79
Discharge: HOME | End: 2024-08-19
Payer: MEDICARE

## 2024-08-19 VITALS
SYSTOLIC BLOOD PRESSURE: 161 MMHG | DIASTOLIC BLOOD PRESSURE: 64 MMHG | BODY MASS INDEX: 25.9 KG/M2 | HEIGHT: 67 IN | WEIGHT: 165 LBS

## 2024-08-19 DIAGNOSIS — Z98.890 HISTORY OF BILATERAL CAROTID ENDARTERECTOMY: ICD-10-CM

## 2024-08-19 DIAGNOSIS — R42 DIZZINESS: ICD-10-CM

## 2024-08-19 DIAGNOSIS — R42 LIGHTHEADEDNESS: ICD-10-CM

## 2024-08-19 DIAGNOSIS — R09.89 OTHER SPECIFIED SYMPTOMS AND SIGNS INVOLVING THE CIRCULATORY AND RESPIRATORY SYSTEMS: ICD-10-CM

## 2024-08-19 LAB
APICAL FOUR CHAMBER EJECTION FRACTION: 59 %
APICAL TWO CHAMBER EJECTION FRACTION: 58 %
AV INDEX (PROSTH): 0.57
AV MEAN GRADIENT: 4 MMHG
AV PEAK GRADIENT: 7 MMHG
AV VALVE AREA BY VELOCITY RATIO: 2.47 CM²
AV VALVE AREA: 2.24 CM²
AV VELOCITY RATIO: 0.63
BSA FOR ECHO PROCEDURE: 1.88 M2
CV ECHO LV RWT: 0.38 CM
DOP CALC AO PEAK VEL: 1.33 M/S
DOP CALC AO VTI: 33.3 CM
DOP CALC LVOT AREA: 3.9 CM2
DOP CALC LVOT DIAMETER: 2.23 CM
DOP CALC LVOT PEAK VEL: 0.84 M/S
DOP CALC LVOT STROKE VOLUME: 74.56 CM3
DOP CALC MV VTI: 34.4 CM
DOP CALCLVOT PEAK VEL VTI: 19.1 CM
E WAVE DECELERATION TIME: 250.38 MSEC
E/A RATIO: 0.74
ECHO LV POSTERIOR WALL: 0.85 CM (ref 0.6–1.1)
FRACTIONAL SHORTENING: 33 % (ref 28–44)
INTERVENTRICULAR SEPTUM: 1.27 CM (ref 0.6–1.1)
LEFT ATRIUM SIZE: 3.15 CM
LEFT INTERNAL DIMENSION IN SYSTOLE: 3.02 CM (ref 2.1–4)
LEFT VENTRICLE DIASTOLIC VOLUME INDEX: 49.73 ML/M2
LEFT VENTRICLE DIASTOLIC VOLUME: 92.5 ML
LEFT VENTRICLE END DIASTOLIC VOLUME APICAL 2 CHAMBER: 75.5 ML
LEFT VENTRICLE END DIASTOLIC VOLUME APICAL 4 CHAMBER: 65.96 ML
LEFT VENTRICLE MASS INDEX: 89 G/M2
LEFT VENTRICLE SYSTOLIC VOLUME INDEX: 19.2 ML/M2
LEFT VENTRICLE SYSTOLIC VOLUME: 35.71 ML
LEFT VENTRICULAR INTERNAL DIMENSION IN DIASTOLE: 4.5 CM (ref 3.5–6)
LEFT VENTRICULAR MASS: 166.16 G
LVED V (TEICH): 92.5 ML
LVES V (TEICH): 35.71 ML
LVOT MG: 1.48 MMHG
LVOT MV: 0.56 CM/S
MV MEAN GRADIENT: 1 MMHG
MV PEAK A VEL: 0.87 M/S
MV PEAK E VEL: 0.64 M/S
MV PEAK GRADIENT: 4 MMHG
MV STENOSIS PRESSURE HALF TIME: 72.61 MS
MV VALVE AREA BY CONTINUITY EQUATION: 2.17 CM2
MV VALVE AREA P 1/2 METHOD: 3.03 CM2
OHS CV RV/LV RATIO: 0.68 CM
OHS LV EJECTION FRACTION SIMPSONS BIPLANE MOD: 60 %
PISA TR MAX VEL: 1.98 M/S
RA PRESSURE ESTIMATED: 3 MMHG
RIGHT VENTRICULAR END-DIASTOLIC DIMENSION: 3.06 CM
RV TB RVSP: 5 MMHG
TR MAX PG: 16 MMHG
TRICUSPID ANNULAR PLANE SYSTOLIC EXCURSION: 2.27 CM
TV REST PULMONARY ARTERY PRESSURE: 19 MMHG
Z-SCORE OF LEFT VENTRICULAR DIMENSION IN END DIASTOLE: -1.4
Z-SCORE OF LEFT VENTRICULAR DIMENSION IN END SYSTOLE: -0.44

## 2024-08-19 PROCEDURE — 93306 TTE W/DOPPLER COMPLETE: CPT

## 2024-08-19 PROCEDURE — 93880 EXTRACRANIAL BILAT STUDY: CPT

## 2024-08-21 LAB
LEFT CCA DIST DIAS: 9 CM/S
LEFT CCA DIST SYS: 84 CM/S
LEFT CCA PROX DIAS: 6 CM/S
LEFT CCA PROX SYS: 57 CM/S
LEFT ECA DIAS: 5 CM/S
LEFT ECA SYS: 75 CM/S
LEFT ICA DIST DIAS: 10 CM/S
LEFT ICA DIST SYS: 64 CM/S
LEFT ICA MID DIAS: 15 CM/S
LEFT ICA MID SYS: 90 CM/S
LEFT ICA PROX DIAS: 12 CM/S
LEFT ICA PROX SYS: 62 CM/S
LEFT VERTEBRAL DIAS: 11 CM/S
LEFT VERTEBRAL SYS: 45 CM/S
OHS CV CAROTID RIGHT ICA EDV HIGHEST: 15
OHS CV CAROTID ULTRASOUND LEFT ICA/CCA RATIO: 1.07
OHS CV CAROTID ULTRASOUND RIGHT ICA/CCA RATIO: 1.86
OHS CV PV CAROTID LEFT HIGHEST CCA: 84
OHS CV PV CAROTID LEFT HIGHEST ICA: 90
OHS CV PV CAROTID RIGHT HIGHEST CCA: 75
OHS CV PV CAROTID RIGHT HIGHEST ICA: 91
OHS CV US CAROTID LEFT HIGHEST EDV: 15
RIGHT CCA DIST DIAS: 0 CM/S
RIGHT CCA DIST SYS: 49 CM/S
RIGHT CCA PROX DIAS: 6 CM/S
RIGHT CCA PROX SYS: 75 CM/S
RIGHT ECA DIAS: 0 CM/S
RIGHT ECA SYS: 93 CM/S
RIGHT ICA DIST DIAS: 15 CM/S
RIGHT ICA DIST SYS: 74 CM/S
RIGHT ICA MID DIAS: 10 CM/S
RIGHT ICA MID SYS: 73 CM/S
RIGHT ICA PROX DIAS: 15 CM/S
RIGHT ICA PROX SYS: 91 CM/S
RIGHT VERTEBRAL DIAS: 6 CM/S
RIGHT VERTEBRAL SYS: 65 CM/S

## 2024-08-23 ENCOUNTER — HOSPITAL ENCOUNTER (EMERGENCY)
Facility: HOSPITAL | Age: 79
Discharge: HOME | End: 2024-08-23
Attending: EMERGENCY MEDICINE
Payer: MEDICARE

## 2024-08-23 VITALS
SYSTOLIC BLOOD PRESSURE: 173 MMHG | RESPIRATION RATE: 16 BRPM | BODY MASS INDEX: 25.46 KG/M2 | HEIGHT: 68 IN | TEMPERATURE: 98 F | DIASTOLIC BLOOD PRESSURE: 75 MMHG | HEART RATE: 76 BPM | OXYGEN SATURATION: 97 % | WEIGHT: 168 LBS

## 2024-08-23 DIAGNOSIS — B02.31 HERPES ZOSTER CONJUNCTIVITIS: ICD-10-CM

## 2024-08-23 DIAGNOSIS — B02.8 HERPES ZOSTER WITH COMPLICATION: Primary | ICD-10-CM

## 2024-08-23 PROCEDURE — 25000003 PHARM REV CODE 250: Performed by: PHYSICIAN ASSISTANT

## 2024-08-23 PROCEDURE — 99284 EMERGENCY DEPT VISIT MOD MDM: CPT

## 2024-08-23 RX ORDER — ERYTHROMYCIN 5 MG/G
OINTMENT OPHTHALMIC EVERY 6 HOURS
Qty: 3.5 G | Refills: 0 | Status: SHIPPED | OUTPATIENT
Start: 2024-08-23 | End: 2024-08-24 | Stop reason: SDUPTHER

## 2024-08-23 RX ORDER — VALACYCLOVIR HYDROCHLORIDE 1 G/1
1000 TABLET, FILM COATED ORAL 3 TIMES DAILY
Qty: 21 TABLET | Refills: 0 | Status: SHIPPED | OUTPATIENT
Start: 2024-08-23 | End: 2024-08-24 | Stop reason: SDUPTHER

## 2024-08-23 RX ORDER — TETRACAINE HYDROCHLORIDE 5 MG/ML
2 SOLUTION OPHTHALMIC
Status: COMPLETED | OUTPATIENT
Start: 2024-08-23 | End: 2024-08-23

## 2024-08-23 RX ORDER — ERYTHROMYCIN 5 MG/G
OINTMENT OPHTHALMIC EVERY 6 HOURS
Qty: 3.5 G | Refills: 0 | Status: SHIPPED | OUTPATIENT
Start: 2024-08-23 | End: 2024-08-23

## 2024-08-23 RX ORDER — DICLOFENAC SODIUM 50 MG/1
50 TABLET, DELAYED RELEASE ORAL 2 TIMES DAILY PRN
Qty: 20 TABLET | Refills: 0 | Status: SHIPPED | OUTPATIENT
Start: 2024-08-23

## 2024-08-23 RX ORDER — GABAPENTIN 100 MG/1
100 CAPSULE ORAL 3 TIMES DAILY
Qty: 90 CAPSULE | Refills: 0 | Status: SHIPPED | OUTPATIENT
Start: 2024-08-23 | End: 2024-08-23

## 2024-08-23 RX ORDER — DICLOFENAC SODIUM 50 MG/1
50 TABLET, DELAYED RELEASE ORAL 2 TIMES DAILY PRN
Qty: 20 TABLET | Refills: 0 | Status: SHIPPED | OUTPATIENT
Start: 2024-08-23 | End: 2024-08-23

## 2024-08-23 RX ORDER — GABAPENTIN 100 MG/1
100 CAPSULE ORAL 3 TIMES DAILY
Qty: 90 CAPSULE | Refills: 0 | Status: SHIPPED | OUTPATIENT
Start: 2024-08-23 | End: 2024-09-22

## 2024-08-23 RX ORDER — VALACYCLOVIR HYDROCHLORIDE 1 G/1
1000 TABLET, FILM COATED ORAL 3 TIMES DAILY
Qty: 21 TABLET | Refills: 0 | Status: SHIPPED | OUTPATIENT
Start: 2024-08-23 | End: 2024-08-23

## 2024-08-23 RX ADMIN — FLUORESCEIN SODIUM 1 EACH: 1 STRIP OPHTHALMIC at 10:08

## 2024-08-23 RX ADMIN — TETRACAINE HYDROCHLORIDE 2 DROP: 5 SOLUTION OPHTHALMIC at 10:08

## 2024-08-24 ENCOUNTER — DOCUMENTATION ONLY (OUTPATIENT)
Dept: OPHTHALMOLOGY | Facility: CLINIC | Age: 79
End: 2024-08-24
Payer: MEDICARE

## 2024-08-24 RX ORDER — VALACYCLOVIR HYDROCHLORIDE 1 G/1
1000 TABLET, FILM COATED ORAL 3 TIMES DAILY
Qty: 21 TABLET | Refills: 0 | Status: SHIPPED | OUTPATIENT
Start: 2024-08-24 | End: 2024-08-30 | Stop reason: SDUPTHER

## 2024-08-24 RX ORDER — ERYTHROMYCIN 5 MG/G
OINTMENT OPHTHALMIC EVERY 6 HOURS
Qty: 3.5 G | Refills: 0 | Status: SHIPPED | OUTPATIENT
Start: 2024-08-24 | End: 2024-08-30 | Stop reason: SDUPTHER

## 2024-08-24 NOTE — PROGRESS NOTES
Assessment /Plan             Herpes Zoster Ophthalmicus Right  - presents to the ED complaining of a rash to right side of forehead and scalp. Reports he started having a burning sensation in this area 4 days ago and then noticed the rash on 8/23.  - Reporting redness, and clear drainage on right side. Denies vision changes. Wears glasses, no contacts.   - States the pain is mild, had shingles vax x 2 but chickenpox in past   - Ocular history of CEIOL ou  - VA cc 20/30 ou, IOP 12 // 11, Pupils: rr, no rAPD, EOM: full, ortho   - Anterior Exam: V1 vesiclular rash extending from scalp to RUL, does not cross midline; negative Raphael sign; 1+ injection and watery drainage; no fluorescein uptake, K clear, AC quiet, PCIOL OU // unremarkable OS  - Posterior Exam: No vitritis, unremarkable ou  - Start Valtrex, WC, Erythromycin     PLAN:   - Start Valtrex 1000mg TID   - Start Warm Compresses to Periocular Skin TID  - Start Erythromycin TID Right Eye     - Will have patient follow up in Clinic on [address] [place] [address] in 1-2 week; Will call patient with appointment     Patient Phone Number: [phone] (House Phone)       Dudley Almonte MD   [facility], PGY-2

## 2024-08-24 NOTE — ED PROVIDER NOTES
Encounter Date: 8/23/2024       History     Chief Complaint   Patient presents with    Rash     Pain to right side of forehead that progressed to a rash x 4 days     Riccardo Hamilton is a 79 y.o. male with a history of DM, HTN, HLD, asthma who presents to the ED complaining of a painful rash to right side of forehead and scalp. Reports he started having a burning sensation in this area 4 days ago and then noticed the rash today. He is also having some eye pain, redness, and clear drainage on right side. Denies vision changes. Wears glasses, no contacts.     Of note, pt has had both doses of shingles vaccine.     The history is provided by the patient and the spouse.     Review of patient's allergies indicates:  No Known Allergies  Past Medical History:   Diagnosis Date    Cancer     Colon    Carotid stenosis, bilateral     DM (diabetes mellitus)     Dupuytren's contracture of both hands     Encounter for blood transfusion     Hyperlipidemia     Hypertension     Mild intermittent asthma, uncomplicated      Past Surgical History:   Procedure Laterality Date    CAROTID ENDARTERECTOMY Bilateral     cataract surgery Bilateral     COLON RESECTION      COLONOSCOPY N/A 10/19/2022    Procedure: COLONOSCOPY;  Surgeon: Natalya Neely MD;  Location: [facility] ENDOSCOPY;  Service: Gastroenterology;  Laterality: N/A;    colostomy reversal      DUPUYTREN CONTRACTURE RELEASE Right 01/26/2024    Procedure: RELEASE, DUPUYTREN CONTRACTURE;  Surgeon: Bernabe Mensah MD;  Location: [facility] OR;  Service: Orthopedics;  Laterality: Right;    HAND SURGERY Right 01/2024    MEDIPORT INSERTION, SINGLE       Family History   Problem Relation Name Age of Onset    Stroke Sister      Hypertension Sister      Cancer Sister      Stroke Brother      Hypertension Brother       Social History     Tobacco Use    Smoking status: Former    Smokeless tobacco: Never   Substance Use Topics    Alcohol use: Not Currently    Drug use: Never     Review of Systems    Constitutional:  Negative for activity change, chills and fever.   HENT:  Negative for congestion and trouble swallowing.    Eyes:  Positive for pain, discharge and redness. Negative for photophobia and visual disturbance.   Respiratory:  Negative for chest tightness, shortness of breath and wheezing.    Cardiovascular:  Negative for chest pain, palpitations and leg swelling.   Gastrointestinal:  Negative for abdominal pain, constipation, diarrhea, nausea and vomiting.   Genitourinary:  Negative for dysuria, frequency, hematuria and urgency.   Musculoskeletal:  Negative for arthralgias, back pain and gait problem.   Skin:  Positive for rash. Negative for color change.   Neurological:  Negative for dizziness, syncope, weakness, light-headedness, numbness and headaches.   Psychiatric/Behavioral:  Negative for agitation and confusion. The patient is not nervous/anxious.        Physical Exam     Initial Vitals [08/23/24 2209]   BP Pulse Resp Temp SpO2   (!) 183/80 91 14 98.1 °F (36.7 °C) 98 %      MAP       --         Physical Exam    Nursing note and vitals reviewed.  Constitutional: He appears well-developed and well-nourished. No distress.   HENT:   Head: Normocephalic and atraumatic.   Mouth/Throat: No oropharyngeal exudate.   Eyes: EOM are normal. Pupils are equal, round, and reactive to light. Right conjunctiva is injected. Left conjunctiva is not injected. No scleral icterus.   Slit lamp exam:       The right eye shows fluorescein uptake.   IOP 17 mmHg. Dendritic lesion noted on right slit lamp exam.    Neck: Neck supple.   Normal range of motion.  Cardiovascular:  Normal rate and regular rhythm.           No murmur heard.  Pulmonary/Chest: No respiratory distress. He has no wheezes.   Abdominal: Abdomen is soft. He exhibits no distension. There is no abdominal tenderness.   Musculoskeletal:         General: No edema. Normal range of motion.      Cervical back: Normal range of motion and neck supple.      Neurological: He is alert and oriented to person, place, and time. No cranial nerve deficit.   Skin: Skin is warm and dry. Capillary refill takes less than 2 seconds. Rash noted. No erythema.   Vesicular maculopapular rash on erythematous base to right V1 dermatome. Involves upper eyelid.    Psychiatric: He has a normal mood and affect. Thought content normal.         ED Course   Procedures  Labs Reviewed - No data to display       Imaging Results    None          Medications   TETRAcaine HCl (PF) 0.5 % Drop 2 drop (2 drops Right Eye Given by Provider 8/23/24 2230)   fluorescein ophthalmic strip 1 each (1 each Right Eye Given by Provider 8/23/24 2230)     Medical Decision Making  Physical exam consistent with herpes zoster. Discussed concern for ocular involvement with ophtho on call and they will follow up with patient in clinic. Stable for discharge with valtrex TID x 7 days, gabapentin and diclofenac prn pain. ED return precautions given. Pt and wife verbalized understanding. All questions answered.     Amount and/or Complexity of Data Reviewed  Independent Historian: spouse    Risk  Prescription drug management.               ED Course as of 08/24/24 0003   Fri Aug 23, 2024   2331 Discussed with Dr. Saez, ophthalmology on call. Recommends valtrex and erythromycin ointment. They will call patient in clinic next week. Will call to schedule appointment [KD]      ED Course User Index  [KD] Becky Jama PA-C                           Clinical Impression:  Final diagnoses:  [B02.31] Herpes zoster conjunctivitis  [B02.8] Herpes zoster with complication (Primary)          ED Disposition Condition    Discharge Stable          ED Prescriptions       Medication Sig Dispense Start Date End Date Auth. Provider    valACYclovir (VALTREX) 1000 MG tablet  (Status: Discontinued) Take 1 tablet (1,000 mg total) by mouth 3 (three) times daily. for 7 days 21 tablet 8/23/2024 8/23/2024 Becky Jama PA-C     gabapentin (NEURONTIN) 100 MG capsule  (Status: Discontinued) Take 1 capsule (100 mg total) by mouth 3 (three) times daily. 90 capsule 8/23/2024 8/23/2024 Becky Jama PA-C    diclofenac (VOLTAREN) 50 MG EC tablet  (Status: Discontinued) Take 1 tablet (50 mg total) by mouth 2 (two) times daily as needed (pain). 20 tablet 8/23/2024 8/23/2024 Becky Jama PA-C    erythromycin (ROMYCIN) ophthalmic ointment  (Status: Discontinued) Place into the right eye every 6 (six) hours. for 7 days 3.5 g 8/23/2024 8/23/2024 Becky Jama PA-C    diclofenac (VOLTAREN) 50 MG EC tablet Take 1 tablet (50 mg total) by mouth 2 (two) times daily as needed (pain). 20 tablet 8/23/2024 -- Becky Jama PA-C    erythromycin (ROMYCIN) ophthalmic ointment Place into the right eye every 6 (six) hours. for 7 days 3.5 g 8/23/2024 8/30/2024 Becky Jama PA-C    gabapentin (NEURONTIN) 100 MG capsule Take 1 capsule (100 mg total) by mouth 3 (three) times daily. 90 capsule 8/23/2024 9/22/2024 Becky Jama PA-C    valACYclovir (VALTREX) 1000 MG tablet Take 1 tablet (1,000 mg total) by mouth 3 (three) times daily. for 7 days 21 tablet 8/23/2024 8/30/2024 Becky Jaam PA-C          Follow-up Information       Follow up With Specialties Details Why Contact Info    [facility] Emergency Medicine  If symptoms worsen [address] [facility] [id number] [phone]    Nino Vega MD Internal Medicine In 3 days Hospital follow up [address] [facility] [address]  [phone]               Becky Jama PA-C  08/24/24 0003

## 2024-08-30 ENCOUNTER — OFFICE VISIT (OUTPATIENT)
Dept: OPHTHALMOLOGY | Facility: CLINIC | Age: 79
End: 2024-08-30
Payer: MEDICARE

## 2024-08-30 VITALS — WEIGHT: 168 LBS | HEIGHT: 68 IN | BODY MASS INDEX: 25.46 KG/M2

## 2024-08-30 DIAGNOSIS — B02.30 HERPES ZOSTER OPHTHALMICUS OF RIGHT EYE: Primary | ICD-10-CM

## 2024-08-30 PROCEDURE — 99213 OFFICE O/P EST LOW 20 MIN: CPT | Mod: PBBFAC,PN | Performed by: STUDENT IN AN ORGANIZED HEALTH CARE EDUCATION/TRAINING PROGRAM

## 2024-08-30 RX ORDER — HYDROCHLOROTHIAZIDE 25 MG/1
25 TABLET ORAL
COMMUNITY
Start: 2024-07-17

## 2024-08-30 RX ORDER — LORATADINE 10 MG/1
10 TABLET ORAL DAILY
COMMUNITY

## 2024-08-30 RX ORDER — ERYTHROMYCIN 5 MG/G
OINTMENT OPHTHALMIC 3 TIMES DAILY
Qty: 3.5 G | Refills: 1 | Status: SHIPPED | OUTPATIENT
Start: 2024-08-30 | End: 2024-09-06

## 2024-08-30 RX ORDER — VALACYCLOVIR HYDROCHLORIDE 1 G/1
1000 TABLET, FILM COATED ORAL 3 TIMES DAILY
Qty: 48 TABLET | Refills: 0 | Status: SHIPPED | OUTPATIENT
Start: 2024-08-30 | End: 2024-09-15

## 2024-08-30 NOTE — PROGRESS NOTES
HPI    Patient was seen in the ED 08/23/2024 Herpes zoster with complication   Patient states that his eye is not any better but not any worse either    No eye pain, no flashes, floaters, curtains,. Rash has evolved   Last edited by Dudley Noel MD on 8/30/2024 12:33 PM.            Assessment /Plan     For exam results, see Encounter Report.    Herpes zoster ophthalmicus of right eye                     Assessment /Plan   Herpes Zoster Ophthalmicus Right  - 8/23/24 start with no cardoza sign, dfe unremarkable ou (see documented note from that day, no in exam section), started on TID 1g valtrex and erythromycin flynn  - 8/30/24: rash evolving in final stages, no involvement of cornea still. Will cont valtrex 1g tid for another 2 weeks and see back. Cont erythromycin flynn to eyes 3x daily and to overlying rash. Return precautions of flashes, floaters, curtains, worsening eye pain or vision given today.       2. Pciol ou   - doing well     RTC 2 weeks k check ou sooner prn

## 2024-09-17 ENCOUNTER — OFFICE VISIT (OUTPATIENT)
Dept: OPHTHALMOLOGY | Facility: CLINIC | Age: 79
End: 2024-09-17
Payer: MEDICARE

## 2024-09-17 VITALS — WEIGHT: 167.56 LBS | BODY MASS INDEX: 25.39 KG/M2 | HEIGHT: 68 IN

## 2024-09-17 DIAGNOSIS — R21 RASH: ICD-10-CM

## 2024-09-17 DIAGNOSIS — B02.30 HERPES ZOSTER OPHTHALMICUS OF RIGHT EYE: Primary | ICD-10-CM

## 2024-09-17 PROCEDURE — 99213 OFFICE O/P EST LOW 20 MIN: CPT | Mod: PBBFAC,PN

## 2024-09-17 NOTE — PROGRESS NOTES
HPI    RTC 2 weeks k check ou  Last edited by Sonia Chandra, RN on 9/17/2024 11:43 AM.            Assessment /Plan     For exam results, see Encounter Report.    There are no diagnoses linked to this encounter.      Assessment /Plan   Herpes Zoster Ophthalmicus Right  - 8/23/24 start with no cardoza sign, dfe unremarkable ou (see documented note from that day, no in exam section), started on TID 1g valtrex and erythromycin flynn  - 8/30/24: rash evolving in final stages, no involvement of cornea still. Will cont valtrex 1g tid for another 2 weeks and see back. Cont erythromycin flynn to eyes 3x daily and to overlying rash. Return precautions of flashes, floaters, curtains, worsening eye pain or vision given today.   - 9/17/24: no active vesicles, just erythema. No involvement of cornea. Finish valtrex course. Stop erythromycin, start artificial tears QID      2. Pciol ou   - doing well     RTC 3 months Mrx/DFE

## 2024-09-18 ENCOUNTER — OFFICE VISIT (OUTPATIENT)
Dept: INTERNAL MEDICINE | Facility: CLINIC | Age: 79
End: 2024-09-18
Payer: MEDICARE

## 2024-09-18 VITALS
DIASTOLIC BLOOD PRESSURE: 70 MMHG | WEIGHT: 167.81 LBS | HEIGHT: 68 IN | TEMPERATURE: 98 F | HEART RATE: 70 BPM | SYSTOLIC BLOOD PRESSURE: 152 MMHG | BODY MASS INDEX: 25.43 KG/M2

## 2024-09-18 DIAGNOSIS — I10 HYPERTENSION, UNSPECIFIED TYPE: ICD-10-CM

## 2024-09-18 DIAGNOSIS — E78.5 HYPERLIPIDEMIA, UNSPECIFIED HYPERLIPIDEMIA TYPE: ICD-10-CM

## 2024-09-18 DIAGNOSIS — J45.20 MILD INTERMITTENT ASTHMA WITHOUT COMPLICATION: Primary | ICD-10-CM

## 2024-09-18 DIAGNOSIS — K21.9 GASTROESOPHAGEAL REFLUX DISEASE, UNSPECIFIED WHETHER ESOPHAGITIS PRESENT: ICD-10-CM

## 2024-09-18 DIAGNOSIS — J30.2 SEASONAL ALLERGIES: ICD-10-CM

## 2024-09-18 PROCEDURE — 99214 OFFICE O/P EST MOD 30 MIN: CPT | Mod: PBBFAC

## 2024-09-18 RX ORDER — ATORVASTATIN CALCIUM 80 MG/1
80 TABLET, FILM COATED ORAL DAILY
Qty: 90 TABLET | Refills: 3 | Status: SHIPPED | OUTPATIENT
Start: 2024-09-18 | End: 2025-09-18

## 2024-09-18 RX ORDER — AMLODIPINE BESYLATE 10 MG/1
10 TABLET ORAL DAILY
Qty: 90 TABLET | Refills: 3 | Status: SHIPPED | OUTPATIENT
Start: 2024-09-18 | End: 2025-09-18

## 2024-09-18 RX ORDER — FLUTICASONE PROPIONATE 50 MCG
1 SPRAY, SUSPENSION (ML) NASAL 2 TIMES DAILY
Qty: 5 ML | Refills: 4 | Status: SHIPPED | OUTPATIENT
Start: 2024-09-18 | End: 2025-09-18

## 2024-09-18 RX ORDER — ALBUTEROL SULFATE 90 UG/1
2 INHALANT RESPIRATORY (INHALATION) EVERY 6 HOURS PRN
Qty: 18 G | Refills: 3 | Status: SHIPPED | OUTPATIENT
Start: 2024-09-18 | End: 2025-09-18

## 2024-09-18 RX ORDER — NAPROXEN SODIUM 220 MG/1
81 TABLET, FILM COATED ORAL DAILY
Qty: 90 TABLET | Refills: 3 | Status: SHIPPED | OUTPATIENT
Start: 2024-09-18

## 2024-09-18 RX ORDER — ALBUTEROL SULFATE 0.83 MG/ML
2.5 SOLUTION RESPIRATORY (INHALATION)
Qty: 3 ML | Refills: 4 | Status: SHIPPED | OUTPATIENT
Start: 2024-09-18 | End: 2025-09-18

## 2024-09-18 RX ORDER — LOSARTAN POTASSIUM 50 MG/1
50 TABLET ORAL DAILY
Qty: 90 TABLET | Refills: 3 | Status: SHIPPED | OUTPATIENT
Start: 2024-09-18 | End: 2025-09-18

## 2024-09-18 RX ORDER — PANTOPRAZOLE SODIUM 40 MG/1
40 TABLET, DELAYED RELEASE ORAL DAILY
Qty: 90 TABLET | Refills: 3 | Status: SHIPPED | OUTPATIENT
Start: 2024-09-18

## 2024-09-18 NOTE — PROGRESS NOTES
"[facility] INTERNAL MEDICINE  OUTPATIENT OFFICE VISIT NOTE    SUBJECTIVE:      HPI: Riccardo Hamilton is a 79 y.o. yo male w/ PMH of colonic adenocarcinoma (followed by Oncology; s/p hemicolectomy and adjuvant chemo), HTN, HLD, T2DM, Asthma, Retropharyngeal Carotid Bypass (2014), Carotid Endarterectomy (1/2014; 2/2014), and previous RLE DVT w/ subacute small PE of R. Lung (s/p x3 month Eliquis txt) presents to [facility] IM clinic for a 6 month follow up appointment.  Previous patient of Dr. Richard.  Patient reports recent visit to the emergency department and was diagnosed with shingles and ophthalmologic shingles.  He was treated with valacyclovir for 2 weeks with improvement and near resolution.  Reports that he does still experience some post herpetic neuralgia which is resolved with Tylenol.  Has had high blood pressures at home with systolics in the 150s.  No other complaints at this time.  Health maintenance up-to-date.  Did have shingles shot prior to kojo shingles.  Requesting refill on medications.  Sent to pharmacy of choice.  No other complaints.    ROS:  Denies any headaches, changes in vision, chest pain, palpitations, shortness of breath, N/V, or lightheadedness/dizziness, abdominal pain, pain/difficulty with urination/stools, or blood in urine/BMs.       OBJECTIVE:     Vital signs:   BP (!) 152/70 (BP Location: Left arm, Patient Position: Sitting)   Pulse 70   Temp 97.9 °F (36.6 °C)   Ht 5' 8" (1.727 m)   Wt 76.1 kg (167 lb 12.8 oz)   BMI 25.51 kg/m²      Physical Examination:  Gen: well-nourished, well-developed gentleman, in no acute distress   HEENT: Normocephalic, atraumatic. PERRL.   Neck: No thyromegaly. No lymphadenopathy.   Heart: RRR, no murmurs, gallops, clicks or rubs.   Lungs: Posterior breath sounds with inspiratory fine crackles w/ possible velcr0-like appreciations   Abd: Midline well-healed abdominal scar. Soft, non-tender, non-distended and without guarding.   Extremities: Radial and " pedal pulses 2+ bilaterally, no appreciable LE edema.   MSK:  B/L hand Dupuytren's contractures with improvement to right hand s/p recent surgical procedure & well healing. Pending surgical intervention of left hand   Neuro: Responds well to commands. CN III-XII grossly intact. No focal neurological deficits noted   Skin: Warm, dry, intact.  Healing skin above right brow and right parietal scalp.     ASSESSMENT & PLAN:   Hypertension  Recent Orthostatsis  - HTN previously well Controlled  - Continue Losartan 25 q.D.  - Discontinued HCTZ 25mg qD (6/25/24)   - will increase amlodipine dose to 10 mg , informed patient to keep pressure logs for the next week while on this dose  - Counseled on DASH diet & increasing exercise as tolerated  - Patient to be scheduled for nurse BP visit next week & provided BP logs to track at home measurements with transitioning of above antihypertensives    Recent Shingles infection  - treated with 2 week dose of valcyclovir  - healing well  - shingles vaccination is UTD  - will monitor  - continue tylenol for post herpectic neuralgia    Reported Hx of Asthma; possible reactive airway disease  - Discussed possiblities of additional pulmonary etiologies with patient & possible exposure w/ differential including GERD, post-nasal drip, vocal cord dysfunction, reactive airway disease, and/or ILD  - Prior CT thorax (4/2023) w/ scattered mediastinal lymph nodes & hilar lymph nodes, chronic interstitial markings at inferior lingula & RML, & mildly prominent peripheral distribution of interstitial markings at lung bases  - PFTs normal; referred to Pulm clinic & followed up w/ continuation of below medications & f/u PRN; appreciate assistance in care  - C/w Johnny Cantrell today for trial therapy  - C/w Albuterol inhaler PRN    B/L Dupuytren's Contracture  - Referred to Ortho for further evaluation; appreciate assistance in care  - Has followed up with Ortho & completed surgical correction of right  hand contracture (2/2024) w/ relief & is currently healing well  - Set to f/u w/ Ortho for further evaluation of left hand contracture    B/L Shoulder Pain/discomfort/stiffness - improved  Concern for B/L shoulder arthritic component and/or adhesive capsulitis  - 2-3/10 to 9/10, aching pain, worse with weather changes & movement, improved with topical diclofenac, limited range of motion, occasionally awakening from sleep  - Denies any trauma, falls, MVCs  - First noticed pain x1 year, worsened acutely over past x3 months  - Ordered B/L x-rays    Stage IIIB Adenocarcinoma of Cecum  - s/p hemicolectomy and adjuvant chemotherapy  - At this time denies any GI-related symptoms  - patient is followed by oncology; continue to follow as scheduled  - Recent CT Chest/Abdomen/Pelvis (4/2024) unremarkable for any recurrent or metastatic disease     Hyperlipidemia  - Latest lipid profile WNL  - Continue Lipitor 80 daily    Hx of B/L Carotid Endarterectomy   - Continue Aspirin 81, Lipitor 40 daily    Seasonal Allergies - well controlled  - Intermittent-persistent dry cough and eye irritiation  - Relieved with Zyrtec/Flonase therapy     GERD   - Continue Protonix 40 daily     Hx of DVT/PE  - Occurred in 2021, treated with x3 months Eliquis  - Denies any recurrence  - Continue Aspirin 81 daily      Health Maintenance:  Immunization History   Administered Date(s) Administered    COVID-19, MRNA, LN-S, PF (MODERNA FULL 0.5 ML DOSE) 01/12/2021, 02/26/2021, 10/22/2021, 05/24/2022    COVID-19, MRNA, LN-S, PF (Pfizer) (Purple Cap) 01/12/2021, 02/26/2021    Influenza (FLUAD) - Quadrivalent - Adjuvanted - PF *Preferred* (65+) 09/21/2022, 09/21/2023    Influenza - High Dose - PF (65 years and older) 09/22/2015, 10/06/2016, 10/02/2017, 09/24/2018    Influenza - Quadrivalent - PF *Preferred* (6 months and older) 09/12/2019    Influenza - Trivalent - PF (ADULT) 09/22/2015, 10/06/2016, 10/02/2017, 09/24/2018, 09/12/2019    Influenza A (H1N1)  2009 Monovalent - IM 01/21/2010    Pneumococcal Conjugate - 13 Valent 09/04/2020    Pneumococcal Polysaccharide - 23 Valent 10/02/2015    Tdap 02/09/2022    Tuberculin 10/13/2020    Zoster 09/22/2015    Zoster Recombinant 05/31/2022, 09/21/2022     Disposition:  Will increase amlodipine to 10 mg and recommend that patient check blood pressure for the next week on this dose to assess efficacy.  Does have room to move up on prescribed losartan as well if needed.  We will see patient back in 6 months with labs.  Up-to-date on all vaccinations and health maintenance.  We will discuss need for flu vaccine next visit, holding off at this time due to recent shingles infection.    Nino Vega MD   [facility], PGY-II

## 2024-09-23 ENCOUNTER — TELEPHONE (OUTPATIENT)
Dept: INTERNAL MEDICINE | Facility: CLINIC | Age: 79
End: 2024-09-23

## 2024-09-23 VITALS — DIASTOLIC BLOOD PRESSURE: 68 MMHG | SYSTOLIC BLOOD PRESSURE: 125 MMHG

## 2024-09-25 DIAGNOSIS — Z87.09 HISTORY OF ASTHMA: ICD-10-CM

## 2024-09-25 DIAGNOSIS — J30.2 SEASONAL ALLERGIES: ICD-10-CM

## 2024-09-25 RX ORDER — FLUTICASONE PROPIONATE AND SALMETEROL 250; 50 UG/1; UG/1
1 POWDER RESPIRATORY (INHALATION) 2 TIMES DAILY
Qty: 90 EACH | Refills: 6 | Status: SHIPPED | OUTPATIENT
Start: 2024-09-25 | End: 2025-09-25

## 2024-10-09 ENCOUNTER — TELEPHONE (OUTPATIENT)
Dept: INTERNAL MEDICINE | Facility: CLINIC | Age: 79
End: 2024-10-09
Payer: MEDICARE

## 2024-10-09 NOTE — TELEPHONE ENCOUNTER
Contacted patient ID and  verified. Patient turned in blood pressure log, which is uploaded into media manager. Patient requesting PCP to review B/P log to evaluate if medication changes are needed at this time. Please review and advise.

## 2024-10-09 NOTE — TELEPHONE ENCOUNTER
Placed call to patient x2 attempts. No answer. Patient advised via voicemail to contact clinic at earliest convenience.

## 2024-10-09 NOTE — TELEPHONE ENCOUNTER
----- Message from Ghada sent at 10/9/2024  8:11 AM CDT -----  Regarding: Dr Vega  Caller is:  (Mika) Patient       Provider:Dr Vega    Last Visit:9/18/24    Next Visit:3/3/25    Reason for Call:  ask that nurse give him a call            Preferred Phone Number: [phone]

## 2024-10-14 ENCOUNTER — HOSPITAL ENCOUNTER (OUTPATIENT)
Dept: RADIOLOGY | Facility: HOSPITAL | Age: 79
Discharge: HOME | End: 2024-10-14
Attending: INTERNAL MEDICINE
Payer: MEDICARE

## 2024-10-14 DIAGNOSIS — C18.0 ADENOCARCINOMA OF CECUM: ICD-10-CM

## 2024-10-14 DIAGNOSIS — Z86.718 HISTORY OF DVT (DEEP VEIN THROMBOSIS): ICD-10-CM

## 2024-10-14 DIAGNOSIS — Z08 ENCOUNTER FOR FOLLOW-UP SURVEILLANCE OF COLON CANCER: ICD-10-CM

## 2024-10-14 DIAGNOSIS — Z85.038 ENCOUNTER FOR FOLLOW-UP SURVEILLANCE OF COLON CANCER: ICD-10-CM

## 2024-10-14 LAB
CREAT SERPL-MCNC: 1.18 MG/DL (ref 0.73–1.18)
GFR SERPLBLD CREATININE-BSD FMLA CKD-EPI: >60 ML/MIN/1.73/M2

## 2024-10-14 PROCEDURE — 25500020 PHARM REV CODE 255

## 2024-10-14 PROCEDURE — 74177 CT ABD & PELVIS W/CONTRAST: CPT | Mod: TC

## 2024-10-14 PROCEDURE — 82565 ASSAY OF CREATININE: CPT | Performed by: INTERNAL MEDICINE

## 2024-10-14 PROCEDURE — 71260 CT THORAX DX C+: CPT | Mod: TC

## 2024-10-14 RX ORDER — DIATRIZOATE MEGLUMINE AND DIATRIZOATE SODIUM 660; 100 MG/ML; MG/ML
SOLUTION ORAL; RECTAL
Status: COMPLETED
Start: 2024-10-14 | End: 2024-10-14

## 2024-10-14 RX ADMIN — DIATRIZOATE MEGLUMINE AND DIATRIZOATE SODIUM 30 ML: 660; 100 LIQUID ORAL; RECTAL at 07:10

## 2024-10-14 RX ADMIN — IOHEXOL 100 ML: 350 INJECTION, SOLUTION INTRAVENOUS at 07:10

## 2024-10-18 ENCOUNTER — TELEPHONE (OUTPATIENT)
Dept: HEMATOLOGY/ONCOLOGY | Facility: CLINIC | Age: 79
End: 2024-10-18
Payer: MEDICARE

## 2024-10-20 PROBLEM — C19: Status: ACTIVE | Noted: 2024-10-20

## 2024-10-20 PROBLEM — Z93.2 STATUS POST ILEOSTOMY: Status: ACTIVE | Noted: 2024-10-20

## 2024-10-21 ENCOUNTER — INFUSION (OUTPATIENT)
Dept: INFUSION THERAPY | Facility: HOSPITAL | Age: 79
End: 2024-10-21
Attending: INTERNAL MEDICINE
Payer: MEDICARE

## 2024-10-21 ENCOUNTER — OFFICE VISIT (OUTPATIENT)
Dept: HEMATOLOGY/ONCOLOGY | Facility: CLINIC | Age: 79
End: 2024-10-21
Attending: INTERNAL MEDICINE
Payer: MEDICARE

## 2024-10-21 VITALS
HEART RATE: 68 BPM | TEMPERATURE: 98 F | WEIGHT: 168.19 LBS | OXYGEN SATURATION: 100 % | HEIGHT: 68 IN | SYSTOLIC BLOOD PRESSURE: 133 MMHG | RESPIRATION RATE: 18 BRPM | DIASTOLIC BLOOD PRESSURE: 60 MMHG | BODY MASS INDEX: 25.49 KG/M2

## 2024-10-21 VITALS
OXYGEN SATURATION: 97 % | SYSTOLIC BLOOD PRESSURE: 158 MMHG | TEMPERATURE: 97 F | DIASTOLIC BLOOD PRESSURE: 74 MMHG | HEART RATE: 58 BPM | RESPIRATION RATE: 20 BRPM

## 2024-10-21 DIAGNOSIS — Z86.718 HISTORY OF DVT (DEEP VEIN THROMBOSIS): ICD-10-CM

## 2024-10-21 DIAGNOSIS — C18.0 ADENOCARCINOMA OF CECUM: ICD-10-CM

## 2024-10-21 DIAGNOSIS — C18.0 ADENOCARCINOMA OF CECUM: Primary | ICD-10-CM

## 2024-10-21 DIAGNOSIS — Z08 ENCOUNTER FOR FOLLOW-UP SURVEILLANCE OF COLON CANCER: ICD-10-CM

## 2024-10-21 DIAGNOSIS — Z93.2 STATUS POST ILEOSTOMY: Primary | ICD-10-CM

## 2024-10-21 DIAGNOSIS — Z86.711 HISTORY OF PULMONARY EMBOLISM: ICD-10-CM

## 2024-10-21 DIAGNOSIS — C19 MICROSATELLITE INSTABILITY-HIGH COLORECTAL CANCER: ICD-10-CM

## 2024-10-21 DIAGNOSIS — Z85.038 ENCOUNTER FOR FOLLOW-UP SURVEILLANCE OF COLON CANCER: ICD-10-CM

## 2024-10-21 DIAGNOSIS — Z86.2 HISTORY OF IRON DEFICIENCY ANEMIA: ICD-10-CM

## 2024-10-21 PROCEDURE — 96523 IRRIG DRUG DELIVERY DEVICE: CPT

## 2024-10-21 PROCEDURE — 99214 OFFICE O/P EST MOD 30 MIN: CPT | Mod: S$PBB,,, | Performed by: INTERNAL MEDICINE

## 2024-10-21 PROCEDURE — 25000003 PHARM REV CODE 250: Performed by: INTERNAL MEDICINE

## 2024-10-21 PROCEDURE — 99214 OFFICE O/P EST MOD 30 MIN: CPT | Mod: PBBFAC,25 | Performed by: INTERNAL MEDICINE

## 2024-10-21 PROCEDURE — 1101F PT FALLS ASSESS-DOCD LE1/YR: CPT | Mod: CPTII,,, | Performed by: INTERNAL MEDICINE

## 2024-10-21 PROCEDURE — 63600175 PHARM REV CODE 636 W HCPCS: Performed by: INTERNAL MEDICINE

## 2024-10-21 PROCEDURE — 3078F DIAST BP <80 MM HG: CPT | Mod: CPTII,,, | Performed by: INTERNAL MEDICINE

## 2024-10-21 PROCEDURE — 3075F SYST BP GE 130 - 139MM HG: CPT | Mod: CPTII,,, | Performed by: INTERNAL MEDICINE

## 2024-10-21 PROCEDURE — 3288F FALL RISK ASSESSMENT DOCD: CPT | Mod: CPTII,,, | Performed by: INTERNAL MEDICINE

## 2024-10-21 PROCEDURE — A4216 STERILE WATER/SALINE, 10 ML: HCPCS | Performed by: INTERNAL MEDICINE

## 2024-10-21 PROCEDURE — 1159F MED LIST DOCD IN RCRD: CPT | Mod: CPTII,,, | Performed by: INTERNAL MEDICINE

## 2024-10-21 PROCEDURE — 1160F RVW MEDS BY RX/DR IN RCRD: CPT | Mod: CPTII,,, | Performed by: INTERNAL MEDICINE

## 2024-10-21 RX ORDER — HEPARIN 100 UNIT/ML
500 SYRINGE INTRAVENOUS
Status: DISCONTINUED | OUTPATIENT
Start: 2024-10-21 | End: 2024-10-22 | Stop reason: HOSPADM

## 2024-10-21 RX ORDER — SODIUM CHLORIDE 0.9 % (FLUSH) 0.9 %
10 SYRINGE (ML) INJECTION
Status: DISCONTINUED | OUTPATIENT
Start: 2024-10-21 | End: 2024-10-22 | Stop reason: HOSPADM

## 2024-10-21 RX ORDER — HEPARIN 100 UNIT/ML
500 SYRINGE INTRAVENOUS
OUTPATIENT
Start: 2024-10-21

## 2024-10-21 RX ORDER — SODIUM CHLORIDE 0.9 % (FLUSH) 0.9 %
10 SYRINGE (ML) INJECTION
OUTPATIENT
Start: 2024-10-21

## 2024-10-21 RX ADMIN — HEPARIN 500 UNITS: 100 SYRINGE at 10:10

## 2024-10-21 RX ADMIN — Medication 10 ML: at 10:10

## 2024-10-21 NOTE — Clinical Note
Repeat CBC, CMP, CEA level in 6 months  Repeat surveillance CT scans of C/A/P with contrast in April 2025 Surveillance colonoscopy October 2025 Follow-up in April 2025, with scans and labs

## 2024-10-21 NOTE — PROGRESS NOTES
History:  Past Medical History:   Diagnosis Date    Cancer     Colon    Carotid stenosis, bilateral     DM (diabetes mellitus)     Dupuytren's contracture of both hands     Encounter for blood transfusion     Hyperlipidemia     Hypertension     Mild intermittent asthma, uncomplicated    Past medical history: Hypertension.  NIDDM.  CKD.  History of retropharyngeal carotid-carotid bypass (2014). Bronchial asthma.  Cataract.  Diabetic neuropathy.  Hypertension.  Dyslipidemia.  Ptosis.  Visual field defect.  Cataract surgery.  Carotid endarterectomy (01/06/2014; 02/12/2014).  Intraocular lens, (05/28/2019).  Social history: .  Lives in [place], [place].  Has 4 children.  Retired.  Used to ride horses.  Small third a pack of cigarettes daily for 10 years; quit 50 years back.  No history of alcohol or illicit drug abuse.  Family history: Sister experienced breast cancer at age 58 years.  No family history of colorectal cancer.  Health maintenance: Screening colonoscopy 10 years back, apparently unremarkable.  Prior to that, history of colon polyps.  Past Surgical History:   Procedure Laterality Date    CAROTID ENDARTERECTOMY Bilateral     cataract surgery Bilateral     COLON RESECTION      COLONOSCOPY N/A 10/19/2022    Procedure: COLONOSCOPY;  Surgeon: Natalya Neely MD;  Location: [facility] ENDOSCOPY;  Service: Gastroenterology;  Laterality: N/A;    colostomy reversal      DUPUYTREN CONTRACTURE RELEASE Right 01/26/2024    Procedure: RELEASE, DUPUYTREN CONTRACTURE;  Surgeon: Bernabe Mensah MD;  Location: [facility] OR;  Service: Orthopedics;  Laterality: Right;    HAND SURGERY Right 01/2024    MEDIPORT INSERTION, SINGLE        Social History     Socioeconomic History    Marital status:      Spouse name: Ya Hamilton    Number of children: 4   Tobacco Use    Smoking status: Former    Smokeless tobacco: Never   Substance and Sexual Activity    Alcohol use: Not Currently    Drug use: Never    Sexual  activity: Not Currently     Social Drivers of Health     Financial Resource Strain: Low Risk  (5/1/2024)    Overall Financial Resource Strain (CARDIA)     Difficulty of Paying Living Expenses: Not hard at all   Food Insecurity: No Food Insecurity (5/1/2024)    Hunger Vital Sign     Worried About Running Out of Food in the Last Year: Never true     Ran Out of Food in the Last Year: Never true   Transportation Needs: No Transportation Needs (5/1/2024)    PRAPARE - Transportation     Lack of Transportation (Medical): No     Lack of Transportation (Non-Medical): No   Physical Activity: Inactive (5/1/2024)    Exercise Vital Sign     Days of Exercise per Week: 0 days     Minutes of Exercise per Session: 0 min   Stress: No Stress Concern Present (5/1/2024)    [employer] - Occupational Stress Questionnaire     Feeling of Stress : Not at all   Housing Stability: Low Risk  (5/1/2024)    Housing Stability Vital Sign     Unable to Pay for Housing in the Last Year: No     Homeless in the Last Year: No      Family History   Problem Relation Name Age of Onset    Stroke Sister      Hypertension Sister      Cancer Sister      Stroke Brother      Hypertension Brother          Reason for Follow-up:   -Adenocarcinoma of cecum, stage IIIB   -Intolerant of adjuvant capecitabine   -Incidental finding of PE   -Right lower extremity DVT   -Iron deficiency anemia      History of Present Illness:   History of DVT (deep vein thrombosis)        Oncologic/Hematologic History:  Oncology History   Adenocarcinoma of cecum   9/30/2020 Cancer Staged    Staging form: Colon and Rectum, AJCC 8th Edition  - Pathologic stage from 9/30/2020: Stage IIIC (pT3, pN2b, cM0)     5/31/2022 Initial Diagnosis    Adenocarcinoma of cecum     She is being followed for history of adenocarcinoma of cecum.    Please see assessment and plan section for details.     11/27/2020:   Presents for initial medical oncology consultation, accompanied  by his wife.   Looks and feels healthy.  Endorses no symptoms of concern whatsoever.   No weakness, fatigue, malaise, anorexia, unintentional weight loss, abdominal pain, nausea, vomiting, GI bleeding, dysphagia, odynophagia, fevers, chills, any urinary problems, chest pain, cough, dyspnea, unusual headaches, focal neurological symptoms, etc.  ECOG 0-1.    Interval History:  PORT FLUSH   [No matching plan found]     10/21/2024:   -08/19/2021: TTE (light headedness, dizziness):  Normal LV systolic function, LVEF 60%, diastolic dysfunction, etc.  -10/14/2024: Surveillance CTs C/A/P with contrast (comparison: CTs 04/12/2024):  No metastatic disease  -04/19/2024: CEA 2.48 normal  -follows up with the cardiology: History of hypertension, NIDDM, dyslipidemia, bronchial asthma, carotid artery stenosis S/P carotid endarterectomy 2014, etc.  -10/21/2024:  Hemoglobin 12.7, stable; CBC essentially unremarkable; creatinine 1.31, stable; CMP unremarkable; CEA 3.72 slightly  Presents for a follow-up visit, accompanied by his wife.  In no acute discomfort.  In fact, doing great.  Great appetite.  No weakness or fatigue.  No unusual headaches, focal neurological symptoms, chest pain, cough, dyspnea, abdominal pain, nausea, vomiting, change in bowel habits, GI bleeding, anorexia, unintentional weight loss, etc..  ECOG 0.    Medications:  Current Outpatient Medications on File Prior to Visit   Medication Sig Dispense Refill    acetaminophen (TYLENOL) 500 MG tablet Take 500 mg by mouth daily as needed for Pain. Patient takes one in the AM and one in the PM      albuterol (PROVENTIL) 2.5 mg /3 mL (0.083 %) nebulizer solution Take 3 mLs (2.5 mg total) by nebulization as needed for Wheezing or Shortness of Breath. 3 mL 4    albuterol (PROVENTIL/VENTOLIN HFA) 90 mcg/actuation inhaler Inhale 2 puffs into the lungs every 6 (six) hours as needed for Shortness of Breath. 18 g 3    amLODIPine (NORVASC) 10 MG tablet Take 1 tablet (10 mg total)  by mouth once daily. 90 tablet 3    aspirin 81 MG Chew Take 1 tablet (81 mg total) by mouth once daily. 90 tablet 3    atorvastatin (LIPITOR) 80 MG tablet Take 1 tablet (80 mg total) by mouth once daily. 90 tablet 3    cetirizine (ZYRTEC) 5 MG tablet Take 1 tablet (5 mg total) by mouth once daily. 30 tablet 1    diclofenac (VOLTAREN) 50 MG EC tablet Take 1 tablet (50 mg total) by mouth 2 (two) times daily as needed (pain). 20 tablet 0    ferrous sulfate (FEOSOL) 325 mg (65 mg iron) Tab tablet Take 325 mg by mouth once daily.      fluticasone propionate (FLONASE) 50 mcg/actuation nasal spray 1 spray (50 mcg total) by Each Nostril route 2 (two) times daily. 5 mL 4    fluticasone-salmeterol diskus inhaler 250-50 mcg Inhale 1 puff into the lungs 2 (two) times daily. Controller 90 each 6    gabapentin (NEURONTIN) 100 MG capsule Take 1 capsule (100 mg total) by mouth 3 (three) times daily. 90 capsule 0    hydroCHLOROthiazide (HYDRODIURIL) 25 MG tablet Take 25 mg by mouth.      loratadine (CLARITIN) 10 mg tablet Take 10 mg by mouth once daily.      losartan (COZAAR) 50 MG tablet Take 1 tablet (50 mg total) by mouth once daily. 90 tablet 3    magnesium oxide (MAG-OX) 400 mg (241.3 mg magnesium) tablet Take 800 mg by mouth once daily.      multivitamin (THERAGRAN) per tablet Take 1 tablet by mouth once daily.      pantoprazole (PROTONIX) 40 MG tablet Take 1 tablet (40 mg total) by mouth once daily. 90 tablet 3    valACYclovir (VALTREX) 1000 MG tablet Take 1 tablet (1,000 mg total) by mouth 3 (three) times daily. for 16 days 48 tablet 0     Current Facility-Administered Medications on File Prior to Visit   Medication Dose Route Frequency Provider Last Rate Last Admin    heparin, porcine (PF) 100 unit/mL injection flush 500 Units  500 Units Intravenous PRN Victor M Hair MD   500 Units at 07/27/22 0720    sodium chloride 0.9% flush 10 mL  10 mL Intravenous PRN Victor M Hair MD   10 mL at 07/27/22 0720       Review of  "Systems:   All systems reviewed and found to be negative except for the symptoms detailed above    Physical Examination:   VITAL SIGNS:   Vitals:    10/21/24 0853   BP: 133/60   Pulse: 68   Resp: 18   Temp: 97.6 °F (36.4 °C)         GENERAL:  In no apparent distress.    HEAD:  No signs of head trauma.  EYES:  Pupils are equal.  Extraocular motions intact.    EARS:  Hearing grossly intact.  MOUTH:  Oropharynx is normal.   NECK:  No adenopathy, no JVD.     CHEST:  Chest with clear breath sounds bilaterally.  No wheezes, rales, rhonchi.    CARDIAC:  Regular rate and rhythm.  S1 and S2, without murmurs, gallops, rubs.  VASCULAR:  No Edema.  Peripheral pulses normal and equal in all extremities.  ABDOMEN:  Soft, without detectable tenderness.  No sign of distention.  No   rebound or guarding, and no masses palpated.   Bowel Sounds normal.  MUSCULOSKELETAL:  Good range of motion of all major joints. Extremities without clubbing, cyanosis or edema.    NEUROLOGIC EXAM:  Alert and oriented x 3.  No focal sensory or strength deficits.   Speech normal.  Follows commands.  PSYCHIATRIC:  Mood normal.    No results for input(s): "CBC" in the last 72 hours.   No results for input(s): "CMP" in the last 72 hours.     Assessment:  Problem List Items Addressed This Visit          Hematology    History of DVT (deep vein thrombosis)    History of pulmonary embolism       Oncology    Adenocarcinoma of cecum    History of iron deficiency anemia    Microsatellite instability-high colorectal cancer    RESOLVED: Encounter for follow-up surveillance of colon cancer       GI    Status post ileostomy - Primary       Orders for 10/21/2024:   Repeat CBC, CMP, CEA level in 6 months   Repeat surveillance CT scans of C/A/P with contrast in April 2025  Surveillance colonoscopy October 2025  Follow-up in April 2025, with scans and labs     Above discussed with the patient.  All questions answered.    Discussed labs and scans and gave him copies of " relevant results.  Plan of management discussed.    He understands and agrees with this plan.  ===================================      Adenocarcinoma of cecum:  -presented with hematochezia and anemia  -colonoscopy 09/16/2020  -right hemicolectomy 09/30/2020  -pT3 pN2a M0, stage IIIB; 6 lymph nodes positive  -adjuvant capecitabine started 01/11/2021  -capecitabine discontinued after 1 cycle because of side effects requiring hospitalization  -S/p surveillance colonoscopy (02/19/2021) (ileoscopy; indication: High output ileostomy, etc.)  -S/P elective loop ileostomy reversal 06/16/2021  -MARIBEL on surveillance CTs 06/23/2022 except for questionable soft tissue density in the hepatic flexure of colon  -10/19/2022:  Surveillance colonoscopy:  No polyps or cancer (repeat colonoscopy in 3 years)  -04/03/2023: CEA level 2.29, normal  -04/14/2023: Surveillance CTs C/A/P: No recurrence or metastases  -MARIBEL on surveillance CTs C/A/P with contrast 10/16/2023  -10/19/2023:  CEA 1.94, normal  -surveillance CTs C/A/P 0 04/12/2024:  MARIBEL  -04/19/2024: CEA 2.48 normal  -surveillance CTs C/A/P 10/14/2024:  MARIBEL  >>>  Plan:  -continue surveillance (see below)    -Continue surveillance appropriate for stage III colon cancer  -S/p right hemicolectomy 09/30/2020  -History and physical and CEA every 3-6 months for 2 years (09/2020-09/2022), then every 6 months for total of 5 years (09/2022-09/2025)  -Surveillance CT C/A/P with contrast every 6-12 months for 5 years (09/2020-09/2025)  >>>  -S/p surveillance colonoscopy (02/19/2021) (ileoscopy; indication: High output ileostomy, etc.)  -MARIBEL on surveillance CTs 06/23/2022 except for a questionable soft tissue density hepatic flexure of colon  -surveillance colonoscopy 10/19/2022: Normal (repeat colonoscopy in 3 years)  -04/03/2023: CEA level 2.29, normal  -04/14/2023: Surveillance CTs C/A/P: No recurrence or metastases  -MARIBEL on surveillance CTs C/A/P with contrast 10/16/2023  -10/19/2023:  CEA  1.94, normal  -surveillance CTs C/A/P 0 04/12/2024:  MARIBEL  -04/19/2024: CEA 2.48 normal  -surveillance CTs C/A/P 10/14/2024:  MARIBEL  >>>  -Repeat CBC, CMP, CEA level in 6 months   -repeat surveillance CT scans of C/A/P with contrast in 6 months (April 2025)  -repeat surveillance colonoscopy in 3 years (10/2025)      No evidence of Santos syndrome:  -MSI-H by PCR  -dMMR  MLH1: Loss of expression in a subset of tumor cells  MSH2: Preserved (intact) expression in tumor cells  MSH6: Preserved (intact) expression in tumor cells  PMS2: Loss of expression in a subset of tumor cells  -V600 BRAF mutation negative  -IHC: MLH1 preserved (intact) expression in tumor cells (no deficiency of mismatch repair protein MLH1)  >>>No evidence of Santos syndrome       Pulmonary embolism, incidental diagnosis (02/26/2021):  -02/26/2021: CT chest with contrast: New subacute/chronic small PE segmental right lower lung lobe, new since 11/09/2020       Right lower extremity DVT, proximal:  -03/03/2021: Bilateral lower extremity venous Doppler: Right femoral DVT, nearly occlusive to occlusive (age indeterminate); right popliteal DVT, partially occlusive (age indeterminate)  -anticoagulated with Eliquis x3 months (started 03/03/2021)       Chronic normocytic anemia, starting around 06/2017, subsequently worsening:   PRBC x1 (10/04/2020)   PRBC x1 (10/02/2020)   PRBC x1 (09/30/2020)   08/07/2020: Serum iron 27, low; TIBC 175, low; transferrin saturation 15.4%, borderline; ferritin 295.1, normal  -Feraheme 510 mg IV x2 (12/03/2020, 12/11/2020) (for relative iron deficiency)   (01/07/2021: Transferrin saturation 33%, up from 18%; ferritin level 1162.37, up from 594.74; hemoglobin 11.9, up from 10.9)  =======================================================================     Surveillance:   History and physical every 3-6 months for 2 years (09/2020-09/2022), then   every 6 months for a total of 5 years (09/2022-09/2025);      CEA every 3-6 months for  2 years, then   every 6 months for a total of 5 years;      chest/abdominal/pelvic CT scan every 6-12 months (category 2B for frequency less than 12 months) for a total of 5 years (09/2020-09/2025)    (CT should be with IV and oral contrast; if either CT of abdomen/pelvis is inadequate, or if patient has a contraindication to CT with IV contrast, then consider abdominal/pelvic MRI with MRI contrast plus a noncontrast chest CT);   PET CT scan is not recommended;     colonoscopy in 1 year after surgery except if no preoperative colonoscopy due to obstructing lesion, then colonoscopy in 3-6 months:   if advanced adenoma then repeat in 1 year,   if no advanced adenoma then repeat in 3 years, and then   every 5 years.       Follow-up:  No follow-ups on file.

## 2024-10-23 ENCOUNTER — OFFICE VISIT (OUTPATIENT)
Dept: CARDIOLOGY | Facility: CLINIC | Age: 79
End: 2024-10-23
Payer: MEDICARE

## 2024-10-23 VITALS
WEIGHT: 168 LBS | RESPIRATION RATE: 18 BRPM | HEART RATE: 74 BPM | SYSTOLIC BLOOD PRESSURE: 122 MMHG | DIASTOLIC BLOOD PRESSURE: 59 MMHG | TEMPERATURE: 98 F | BODY MASS INDEX: 24.88 KG/M2 | OXYGEN SATURATION: 100 % | HEIGHT: 69 IN

## 2024-10-23 DIAGNOSIS — C18.0 ADENOCARCINOMA OF CECUM: ICD-10-CM

## 2024-10-23 DIAGNOSIS — E78.5 HYPERLIPIDEMIA, UNSPECIFIED HYPERLIPIDEMIA TYPE: Primary | ICD-10-CM

## 2024-10-23 DIAGNOSIS — Z86.711 HISTORY OF PULMONARY EMBOLISM: ICD-10-CM

## 2024-10-23 DIAGNOSIS — I10 PRIMARY HYPERTENSION: ICD-10-CM

## 2024-10-23 DIAGNOSIS — Z86.718 HISTORY OF DVT (DEEP VEIN THROMBOSIS): ICD-10-CM

## 2024-10-23 DIAGNOSIS — Z98.890 HISTORY OF BILATERAL CAROTID ENDARTERECTOMY: ICD-10-CM

## 2024-10-23 PROCEDURE — 99215 OFFICE O/P EST HI 40 MIN: CPT | Mod: PBBFAC | Performed by: INTERNAL MEDICINE

## 2024-10-23 NOTE — PROGRESS NOTES
CHIEF COMPLAINT:   Chief Complaint   Patient presents with    Follow-up     3 mos f/u denies cardiac targets                                                  HPI:  Riccardo Hamilton 79 y.o. male  with HTN, T2DM, HLD, asthma, ADRIÁN s/p CEA 2014, colonic adenocarcinoma with previous RLE DVT and PE s/p 3 months of OAC who presents to clinic today for follow up and ongoing care.  He was last seen for preoperative risk assessment.  At that time he denied any cardiac complaints.    During last visit, pt complained of dizziness and orthostatic symptoms. Echo and carotid US were ordered. Echo showed preserved EF, grade 1 diastolic dysfunction and mild MR. Carotid US with <50% stenosis bilateral.   Today he reports that since his PCP increased amlodipine to 10mg his symptoms completely resolved. He has been feeling well with no major CV complaints. No exertional chest pain, shortness of breath, fatigue.  Patient also denies orthopnea, PND, lower extremity edema, palpitations, dizziness, lightheadedness or syncope.                                                                                                                                                                                                                                      CARDIAC TESTING:  Results for orders placed during the hospital encounter of 08/19/24    Echo    Interpretation Summary    Left Ventricle: The left ventricle is normal in size. Normal wall thickness. There is normal systolic function. Biplane (2D) method of discs ejection fraction is 60%. There is diastolic dysfunction.    Right Ventricle: Normal right ventricular cavity size. Systolic function is normal.    Aortic Valve: The aortic valve is a trileaflet valve. There is mild annular calcification present. There is no stenosis. Aortic valve peak velocity is 1.33 m/s. Mean gradient is 4 mmHg.    Mitral Valve: The mitral valve is structurally normal. There is normal leaflet mobility. There is mild  regurgitation.    Pulmonary Artery: The estimated pulmonary artery systolic pressure is 19 mmHg.    IVC/SVC: Normal venous pressure at 3 mmHg.         Patient Active Problem List   Diagnosis    Adenocarcinoma of cecum    Hypertension    Hyperlipidemia    GERD (gastroesophageal reflux disease)    Asthma    History of DVT (deep vein thrombosis)    History of bilateral carotid endarterectomy    History of pulmonary embolism    History of iron deficiency anemia    Dupuytren's contracture of right hand    Status post ileostomy    Microsatellite instability-high colorectal cancer     Past Surgical History:   Procedure Laterality Date    CAROTID ENDARTERECTOMY Bilateral     cataract surgery Bilateral     COLON RESECTION      COLONOSCOPY N/A 10/19/2022    Procedure: COLONOSCOPY;  Surgeon: Natalya Neely MD;  Location: [facility] ENDOSCOPY;  Service: Gastroenterology;  Laterality: N/A;    colostomy reversal      DUPUYTREN CONTRACTURE RELEASE Right 01/26/2024    Procedure: RELEASE, DUPUYTREN CONTRACTURE;  Surgeon: Bernabe Mensah MD;  Location: [facility] OR;  Service: Orthopedics;  Laterality: Right;    HAND SURGERY Right 01/2024    MEDIPORT INSERTION, SINGLE       Social History     Socioeconomic History    Marital status:      Spouse name: Ya Hamilton    Number of children: 4   Tobacco Use    Smoking status: Former    Smokeless tobacco: Never   Substance and Sexual Activity    Alcohol use: Not Currently    Drug use: Never    Sexual activity: Not Currently     Social Drivers of Health     Financial Resource Strain: Low Risk  (5/1/2024)    Overall Financial Resource Strain (CARDIA)     Difficulty of Paying Living Expenses: Not hard at all   Food Insecurity: No Food Insecurity (5/1/2024)    Hunger Vital Sign     Worried About Running Out of Food in the Last Year: Never true     Ran Out of Food in the Last Year: Never true   Transportation Needs: No Transportation Needs (5/1/2024)    PRAPARE - Transportation     Lack of  "Transportation (Medical): No     Lack of Transportation (Non-Medical): No   Physical Activity: Inactive (5/1/2024)    Exercise Vital Sign     Days of Exercise per Week: 0 days     Minutes of Exercise per Session: 0 min   Stress: No Stress Concern Present (5/1/2024)    [employer] [place] [employer] - Occupational Stress Questionnaire     Feeling of Stress : Not at all   Housing Stability: Low Risk  (5/1/2024)    Housing Stability Vital Sign     Unable to Pay for Housing in the Last Year: No     Homeless in the Last Year: No        Family History   Problem Relation Name Age of Onset    Stroke Sister      Hypertension Sister      Cancer Sister      Stroke Brother      Hypertension Brother       Review of patient's allergies indicates:  No Known Allergies      ROS:  Review of Systems   Constitutional:  Negative for malaise/fatigue.   Cardiovascular:  Positive for leg swelling (Mild). Negative for chest pain, palpitations, orthopnea, claudication and PND.   Neurological:  Positive for dizziness.        Lightheadedness                                                                                                                                                                                Negative except as stated in the history of present illness. See HPI for details.    PHYSICAL EXAM:  Visit Vitals  BP (!) 122/59   Pulse 74   Temp 98.1 °F (36.7 °C) (Oral)   Resp 18   Ht 5' 9" (1.753 m)   Wt 76.2 kg (167 lb 15.9 oz)   SpO2 100%   BMI 24.81 kg/m²       General: alert and oriented/no acute distress  Eye: EOMI/normal conjunctiva/no xanthelasma  HENT: normocephalic/moist oral mucosa  Neck: supple/nontender/no carotid bruit  Respiratory: lungs CTA/nonlabored respirations/BS equal/symmetrical expansion/no  chest wall tenderness  Cardiovascular: normal rate/normal rhythm/no murmur/normal peripheral perfusion/no  edema/no JVD  Gastrointestinal: soft/nontender  Musculoskeletal: normal ROM  Integumentary: " warm/dry/pink/intact  Neurologic: alert/oriented/normal sensory/no focal deficits  Psychiatric: cooperative/appropriate mood and affect/normal judgment      Current Outpatient Medications   Medication Instructions    acetaminophen (TYLENOL) 500 mg, Daily PRN    albuterol (PROVENTIL) 2.5 mg, Nebulization, As needed (PRN)    albuterol (PROVENTIL/VENTOLIN HFA) 90 mcg/actuation inhaler 2 puffs, Inhalation, Every 6 hours PRN    amLODIPine (NORVASC) 10 mg, Oral, Daily    aspirin 81 mg, Oral, Daily    atorvastatin (LIPITOR) 80 mg, Oral, Daily    cetirizine (ZYRTEC) 5 mg, Oral, Daily    diclofenac (VOLTAREN) 50 mg, Oral, 2 times daily PRN    ferrous sulfate (FEOSOL) 325 mg, Daily    fluticasone propionate (FLONASE) 50 mcg, Each Nostril, 2 times daily    fluticasone-salmeterol diskus inhaler 250-50 mcg 1 puff, Inhalation, 2 times daily, Controller    gabapentin (NEURONTIN) 100 mg, Oral, 3 times daily    hydroCHLOROthiazide (HYDRODIURIL) 25 mg    loratadine (CLARITIN) 10 mg, Daily    losartan (COZAAR) 50 mg, Oral, Daily    magnesium oxide (MAG-OX) 800 mg, Daily    multivitamin (THERAGRAN) per tablet 1 tablet, Daily    pantoprazole (PROTONIX) 40 mg, Oral, Daily    valACYclovir (VALTREX) 1,000 mg, Oral, 3 times daily        All medications, laboratory studies, cardiac diagnostic imaging reviewed.     Lab Results   Component Value Date    LDL 80.00 01/30/2023    LDL 68.00 02/23/2022    TRIG 168 (H) 01/30/2023    TRIG 98 02/23/2022    CREATININE 1.31 (H) 10/21/2024    MG 1.70 04/03/2023    K 4.3 10/21/2024        ASSESSMENT/PLAN:    Hypertension  BP well controlled today at 122/59   Patient is on losartan 50 mg, hydrochlorothiazide 25 and amlodipine 10 mg which was recently increased from 5 mg   Orthostatic symptoms have completely resolved   Continue current management     Hyperlipidemia  -Continue Lipitor 80 mg daily.   -Target LDL at least < 70.   -LDL 80 per labs January 2023  -FLP prior next office visit  -Counseled on the  importance of following a low cholesterol heart healthy diet and exercise as tolerated     Type II Diabetes  -A1c is well controlled (6.2).   -Management per PCP     CKD III  -Stop NSAIDs. Ok with diclofenac gel.   -Consider SGLT2i per DAPA-CKD trial.   -ARB.  -Management per PCP/Nephrology     ADRIÁN s/p CEA 2014  -Continue ASA 81 mg daily and Lipitor 80 mg daily.   Carotid artery US on 8/19/2024 shows < 50% stenosis bilaterally      FLP   6 month F/U

## 2024-12-20 ENCOUNTER — OFFICE VISIT (OUTPATIENT)
Dept: OPHTHALMOLOGY | Facility: CLINIC | Age: 79
End: 2024-12-20
Payer: MEDICARE

## 2024-12-20 VITALS — HEIGHT: 69 IN | BODY MASS INDEX: 25.18 KG/M2 | WEIGHT: 170 LBS

## 2024-12-20 DIAGNOSIS — Z96.1 PSEUDOPHAKIA OF BOTH EYES: Primary | ICD-10-CM

## 2024-12-20 PROCEDURE — 99213 OFFICE O/P EST LOW 20 MIN: CPT | Mod: PBBFAC,PN | Performed by: STUDENT IN AN ORGANIZED HEALTH CARE EDUCATION/TRAINING PROGRAM

## 2024-12-20 NOTE — PROGRESS NOTES
HPI    Here for 3mos DFE/Mrx.  - No new complaints at present time.  - Using Refresh daily OU.  - Glasses seem to be holding up well.   Last edited by Ariana Berg LPN on 12/20/2024  9:30 AM.            Assessment /Plan     For exam results, see Encounter Report.    Pseudophakia of both eyes          Assessment /Plan   Herpes Zoster Ophthalmicus Right (resolved)  - Monitor   - Artifical tears QID OU    2. Pciol ou   - doing well   - Mrx provided 12/20/24    RTC 1 year DFE

## 2024-12-30 DIAGNOSIS — J45.20 MILD INTERMITTENT ASTHMA WITHOUT COMPLICATION: ICD-10-CM

## 2024-12-30 RX ORDER — ALBUTEROL SULFATE 0.83 MG/ML
2.5 SOLUTION RESPIRATORY (INHALATION)
Qty: 3 ML | Refills: 4 | Status: SHIPPED | OUTPATIENT
Start: 2024-12-30 | End: 2025-12-30

## 2025-01-03 RX ORDER — ALBUTEROL SULFATE 90 UG/1
2 INHALANT RESPIRATORY (INHALATION) EVERY 6 HOURS PRN
Qty: 18 G | Refills: 3 | Status: SHIPPED | OUTPATIENT
Start: 2025-01-03

## 2025-01-03 NOTE — TELEPHONE ENCOUNTER
----- Message from Ghada sent at 1/3/2025  9:09 AM CST -----  Regarding: Dr Vega     Refill Request     Provider: Dr Vega     Last Visit: 9/18/24     Next Visit: 3/3/25     Patient's Contact #: [phone]     Medication Name & Dose: Albuterol Sulfate Inhalation Solution 2.5 mg 3ml ,Albuterol Sulfate Inhalation Aersol 90mcg     Preferred Pharmacy: [facility]     Comment:

## 2025-01-28 ENCOUNTER — INFUSION (OUTPATIENT)
Dept: INFUSION THERAPY | Facility: HOSPITAL | Age: 80
End: 2025-01-28
Attending: INTERNAL MEDICINE
Payer: MEDICARE

## 2025-01-28 VITALS
SYSTOLIC BLOOD PRESSURE: 145 MMHG | HEIGHT: 69 IN | RESPIRATION RATE: 20 BRPM | HEART RATE: 74 BPM | BODY MASS INDEX: 24.59 KG/M2 | OXYGEN SATURATION: 98 % | TEMPERATURE: 98 F | DIASTOLIC BLOOD PRESSURE: 56 MMHG | WEIGHT: 166 LBS

## 2025-01-28 DIAGNOSIS — C18.0 ADENOCARCINOMA OF CECUM: Primary | ICD-10-CM

## 2025-01-28 PROCEDURE — 63600175 PHARM REV CODE 636 W HCPCS: Performed by: INTERNAL MEDICINE

## 2025-01-28 PROCEDURE — A4216 STERILE WATER/SALINE, 10 ML: HCPCS | Performed by: INTERNAL MEDICINE

## 2025-01-28 PROCEDURE — 25000003 PHARM REV CODE 250: Performed by: INTERNAL MEDICINE

## 2025-01-28 PROCEDURE — 96523 IRRIG DRUG DELIVERY DEVICE: CPT

## 2025-01-28 RX ORDER — SODIUM CHLORIDE 0.9 % (FLUSH) 0.9 %
10 SYRINGE (ML) INJECTION
OUTPATIENT
Start: 2025-01-28

## 2025-01-28 RX ORDER — SODIUM CHLORIDE 0.9 % (FLUSH) 0.9 %
10 SYRINGE (ML) INJECTION
Status: DISCONTINUED | OUTPATIENT
Start: 2025-01-28 | End: 2025-01-28 | Stop reason: HOSPADM

## 2025-01-28 RX ORDER — HEPARIN 100 UNIT/ML
500 SYRINGE INTRAVENOUS
Status: DISCONTINUED | OUTPATIENT
Start: 2025-01-28 | End: 2025-01-28 | Stop reason: HOSPADM

## 2025-01-28 RX ORDER — HEPARIN 100 UNIT/ML
500 SYRINGE INTRAVENOUS
OUTPATIENT
Start: 2025-01-28

## 2025-01-28 RX ADMIN — HEPARIN 500 UNITS: 100 SYRINGE at 08:01

## 2025-01-28 RX ADMIN — Medication 10 ML: at 08:01

## 2025-01-28 NOTE — NURSING
0804  Pt arrived for MP flush q 3 mon.  Pt accomp by his wife.  Pt denies any pain or complaint.  Pt was scheduled during the snow storm and was r/s today.

## 2025-02-24 ENCOUNTER — LAB VISIT (OUTPATIENT)
Dept: LAB | Facility: HOSPITAL | Age: 80
End: 2025-02-24
Payer: MEDICARE

## 2025-02-24 DIAGNOSIS — E55.9 VITAMIN D DEFICIENCY: ICD-10-CM

## 2025-02-24 DIAGNOSIS — Z00.00 HEALTHCARE MAINTENANCE: ICD-10-CM

## 2025-02-24 DIAGNOSIS — E78.5 HYPERLIPIDEMIA, UNSPECIFIED HYPERLIPIDEMIA TYPE: ICD-10-CM

## 2025-02-24 LAB
25(OH)D3+25(OH)D2 SERPL-MCNC: 43 NG/ML (ref 30–80)
ALBUMIN SERPL-MCNC: 3.5 G/DL (ref 3.4–4.8)
ALBUMIN/GLOB SERPL: 0.8 RATIO (ref 1.1–2)
ALP SERPL-CCNC: 107 UNIT/L (ref 40–150)
ALT SERPL-CCNC: 20 UNIT/L (ref 0–55)
ANION GAP SERPL CALC-SCNC: 6 MEQ/L
AST SERPL-CCNC: 24 UNIT/L (ref 5–34)
BASOPHILS # BLD AUTO: 0.09 X10(3)/MCL
BASOPHILS NFR BLD AUTO: 0.9 %
BILIRUB SERPL-MCNC: 0.3 MG/DL
BUN SERPL-MCNC: 13 MG/DL (ref 8.4–25.7)
CALCIUM SERPL-MCNC: 9.6 MG/DL (ref 8.8–10)
CHLORIDE SERPL-SCNC: 106 MMOL/L (ref 98–107)
CHOLEST SERPL-MCNC: 115 MG/DL
CHOLEST/HDLC SERPL: 3 {RATIO} (ref 0–5)
CO2 SERPL-SCNC: 27 MMOL/L (ref 23–31)
CREAT SERPL-MCNC: 1 MG/DL (ref 0.72–1.25)
CREAT/UREA NIT SERPL: 13
EOSINOPHIL # BLD AUTO: 0.5 X10(3)/MCL (ref 0–0.9)
EOSINOPHIL NFR BLD AUTO: 5.1 %
ERYTHROCYTE [DISTWIDTH] IN BLOOD BY AUTOMATED COUNT: 13.4 % (ref 11.5–17)
GFR SERPLBLD CREATININE-BSD FMLA CKD-EPI: >60 ML/MIN/1.73/M2
GLOBULIN SER-MCNC: 4.2 GM/DL (ref 2.4–3.5)
GLUCOSE SERPL-MCNC: 115 MG/DL (ref 82–115)
HCT VFR BLD AUTO: 39.2 % (ref 42–52)
HDLC SERPL-MCNC: 33 MG/DL (ref 35–60)
HGB BLD-MCNC: 12.8 G/DL (ref 14–18)
IMM GRANULOCYTES # BLD AUTO: 0.02 X10(3)/MCL (ref 0–0.04)
IMM GRANULOCYTES NFR BLD AUTO: 0.2 %
LDLC SERPL CALC-MCNC: 64 MG/DL (ref 50–140)
LYMPHOCYTES # BLD AUTO: 2.41 X10(3)/MCL (ref 0.6–4.6)
LYMPHOCYTES NFR BLD AUTO: 24.8 %
MCH RBC QN AUTO: 31.4 PG (ref 27–31)
MCHC RBC AUTO-ENTMCNC: 32.7 G/DL (ref 33–36)
MCV RBC AUTO: 96.3 FL (ref 80–94)
MONOCYTES # BLD AUTO: 0.62 X10(3)/MCL (ref 0.1–1.3)
MONOCYTES NFR BLD AUTO: 6.4 %
NEUTROPHILS # BLD AUTO: 6.09 X10(3)/MCL (ref 2.1–9.2)
NEUTROPHILS NFR BLD AUTO: 62.6 %
NRBC BLD AUTO-RTO: 0 %
PLATELET # BLD AUTO: 296 X10(3)/MCL (ref 130–400)
PMV BLD AUTO: 9.6 FL (ref 7.4–10.4)
POTASSIUM SERPL-SCNC: 4.1 MMOL/L (ref 3.5–5.1)
PROT SERPL-MCNC: 7.7 GM/DL (ref 5.8–7.6)
RBC # BLD AUTO: 4.07 X10(6)/MCL (ref 4.7–6.1)
SODIUM SERPL-SCNC: 139 MMOL/L (ref 136–145)
TRIGL SERPL-MCNC: 91 MG/DL (ref 34–140)
TSH SERPL-ACNC: 1.28 UIU/ML (ref 0.35–4.94)
VLDLC SERPL CALC-MCNC: 18 MG/DL
WBC # BLD AUTO: 9.73 X10(3)/MCL (ref 4.5–11.5)

## 2025-02-24 PROCEDURE — 82306 VITAMIN D 25 HYDROXY: CPT

## 2025-02-24 PROCEDURE — 85025 COMPLETE CBC W/AUTO DIFF WBC: CPT

## 2025-02-24 PROCEDURE — 84443 ASSAY THYROID STIM HORMONE: CPT

## 2025-02-24 PROCEDURE — 80061 LIPID PANEL: CPT

## 2025-02-24 PROCEDURE — 36415 COLL VENOUS BLD VENIPUNCTURE: CPT

## 2025-02-24 PROCEDURE — 80053 COMPREHEN METABOLIC PANEL: CPT

## 2025-03-03 ENCOUNTER — OFFICE VISIT (OUTPATIENT)
Dept: INTERNAL MEDICINE | Facility: CLINIC | Age: 80
End: 2025-03-03
Payer: MEDICARE

## 2025-03-03 ENCOUNTER — LAB VISIT (OUTPATIENT)
Dept: LAB | Facility: HOSPITAL | Age: 80
End: 2025-03-03
Payer: MEDICARE

## 2025-03-03 VITALS
WEIGHT: 166 LBS | OXYGEN SATURATION: 98 % | DIASTOLIC BLOOD PRESSURE: 68 MMHG | TEMPERATURE: 98 F | RESPIRATION RATE: 18 BRPM | BODY MASS INDEX: 24.59 KG/M2 | HEART RATE: 75 BPM | HEIGHT: 69 IN | SYSTOLIC BLOOD PRESSURE: 134 MMHG

## 2025-03-03 DIAGNOSIS — I10 HYPERTENSION, UNSPECIFIED TYPE: ICD-10-CM

## 2025-03-03 DIAGNOSIS — D53.9 MACROCYTIC ANEMIA: ICD-10-CM

## 2025-03-03 DIAGNOSIS — E78.5 HYPERLIPIDEMIA, UNSPECIFIED HYPERLIPIDEMIA TYPE: ICD-10-CM

## 2025-03-03 DIAGNOSIS — K21.9 GASTROESOPHAGEAL REFLUX DISEASE, UNSPECIFIED WHETHER ESOPHAGITIS PRESENT: ICD-10-CM

## 2025-03-03 DIAGNOSIS — Z87.09 HISTORY OF ASTHMA: ICD-10-CM

## 2025-03-03 DIAGNOSIS — D53.9 MACROCYTIC ANEMIA: Primary | ICD-10-CM

## 2025-03-03 DIAGNOSIS — J45.20 MILD INTERMITTENT ASTHMA WITHOUT COMPLICATION: ICD-10-CM

## 2025-03-03 LAB
FERRITIN SERPL-MCNC: 1380.59 NG/ML (ref 21.81–274.66)
FOLATE SERPL-MCNC: 12 NG/ML (ref 7–31.4)
IRON SATN MFR SERPL: 38 % (ref 20–50)
IRON SERPL-MCNC: 88 UG/DL (ref 65–175)
RET# (OHS): 0.04 X10E6/UL (ref 0.03–0.1)
RETICULOCYTE COUNT AUTOMATED (OLG): 1.02 % (ref 1.1–2.1)
TIBC SERPL-MCNC: 143 UG/DL (ref 60–240)
TIBC SERPL-MCNC: 231 UG/DL (ref 250–450)
TRANSFERRIN SERPL-MCNC: 194 MG/DL
VIT B12 SERPL-MCNC: 441 PG/ML (ref 213–816)

## 2025-03-03 PROCEDURE — 83550 IRON BINDING TEST: CPT

## 2025-03-03 PROCEDURE — 85045 AUTOMATED RETICULOCYTE COUNT: CPT

## 2025-03-03 PROCEDURE — 82728 ASSAY OF FERRITIN: CPT

## 2025-03-03 PROCEDURE — 36415 COLL VENOUS BLD VENIPUNCTURE: CPT

## 2025-03-03 PROCEDURE — 82607 VITAMIN B-12: CPT

## 2025-03-03 PROCEDURE — 82746 ASSAY OF FOLIC ACID SERUM: CPT

## 2025-03-03 PROCEDURE — 99214 OFFICE O/P EST MOD 30 MIN: CPT | Mod: PBBFAC

## 2025-03-03 RX ORDER — ATORVASTATIN CALCIUM 80 MG/1
80 TABLET, FILM COATED ORAL DAILY
Qty: 90 TABLET | Refills: 3 | Status: SHIPPED | OUTPATIENT
Start: 2025-03-03 | End: 2026-03-03

## 2025-03-03 RX ORDER — AMLODIPINE BESYLATE 10 MG/1
10 TABLET ORAL DAILY
Qty: 90 TABLET | Refills: 3 | Status: SHIPPED | OUTPATIENT
Start: 2025-03-03 | End: 2026-03-03

## 2025-03-03 RX ORDER — FLUTICASONE PROPIONATE AND SALMETEROL 250; 50 UG/1; UG/1
1 POWDER RESPIRATORY (INHALATION) 2 TIMES DAILY
Qty: 90 EACH | Refills: 6 | Status: SHIPPED | OUTPATIENT
Start: 2025-03-03 | End: 2026-03-03

## 2025-03-03 RX ORDER — LOSARTAN POTASSIUM 50 MG/1
50 TABLET ORAL DAILY
Qty: 90 TABLET | Refills: 3 | Status: SHIPPED | OUTPATIENT
Start: 2025-03-03 | End: 2026-03-03

## 2025-03-03 RX ORDER — PANTOPRAZOLE SODIUM 40 MG/1
40 TABLET, DELAYED RELEASE ORAL DAILY
Qty: 90 TABLET | Refills: 3 | Status: SHIPPED | OUTPATIENT
Start: 2025-03-03

## 2025-03-03 RX ORDER — ALBUTEROL SULFATE 0.83 MG/ML
2.5 SOLUTION RESPIRATORY (INHALATION)
Qty: 3 ML | Refills: 4 | Status: SHIPPED | OUTPATIENT
Start: 2025-03-03 | End: 2026-03-03

## 2025-03-03 NOTE — PROGRESS NOTES
I saw and evaluated the patient and was present for the key portions of the exam and separately billed procedures, if any.  I have reviewed and agree with the resident's findings, including all diagnostic interpretations and plans as written.    Coreen Zarco MD

## 2025-03-03 NOTE — PROGRESS NOTES
"[facility] INTERNAL MEDICINE  OUTPATIENT OFFICE VISIT NOTE    SUBJECTIVE:      HPI: Riccardo Hamilton is a 79 y.o. yo male w/ PMH of colonic adenocarcinoma (followed by Oncology; s/p hemicolectomy and adjuvant chemo), HTN, HLD, T2DM, Asthma, Retropharyngeal Carotid Bypass (2014), Carotid Endarterectomy (1/2014; 2/2014), and previous RLE DVT w/ subacute small PE of R. Lung (s/p x3 month Eliquis txt) presents to [facility] IM clinic for a 6 month follow up appointment.  Patient has no complaints today.  He reports that he is doing well.  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  He has refused gabapentin in the past because it causes drowsiness.  Patient is requesting medication refills.  No other complaints today.  Patient is with his wife who assists with history.    ROS:  Denies any headaches, changes in vision, chest pain, palpitations, shortness of breath, N/V, or lightheadedness/dizziness, abdominal pain, pain/difficulty with urination/stools, or blood in urine/BMs.       OBJECTIVE:     Vital signs:   /68 (BP Location: Right arm, Patient Position: Sitting)   Pulse 75   Temp 97.5 °F (36.4 °C) (Oral)   Resp 18   Ht 5' 9" (1.753 m)   Wt 75.3 kg (166 lb)   SpO2 98%   BMI 24.51 kg/m²      Physical Examination:  Gen: well-nourished, well-developed gentleman, in no acute distress   HEENT: Normocephalic, atraumatic. PERRL.   Neck: No thyromegaly. No lymphadenopathy.   Heart: RRR, no murmurs, gallops, clicks or rubs.   Lungs: Posterior breath sounds with inspiratory fine crackles w/ possible velcr0-like appreciations   Abd: Midline well-healed abdominal scar. Soft, non-tender, non-distended and without guarding.   Extremities: Radial and pedal pulses 2+ bilaterally, no appreciable LE edema.   MSK:  B/L hand Dupuytren's contractures with improvement to right hand s/p recent surgical procedure & well healing. Pending surgical intervention of left hand   Neuro: Responds well " to commands. CN III-XII grossly intact. No focal neurological deficits noted   Skin: Warm, dry, intact.  No findings.     ASSESSMENT & PLAN:   Hypertension  Recent Orthostatsis  - HTN previously well Controlled  - Continue Losartan 25 q.D.  - continue amlodipine dose to 10 mg , informed patient to keep pressure logs for the next week while on this dose  - Counseled on DASH diet & increasing exercise as tolerated  - blood pressure well-controlled, 134/68 in the clinic today    History of Shingles infection  - treated with 2 week dose of valcyclovir  - healing well  - shingles vaccination is UTD  - will monitor  - tolerating post herpetic neuralgia pain, declining gabapentin    Reported Hx of Asthma; possible reactive airway disease  - Discussed possiblities of additional pulmonary etiologies with patient & possible exposure w/ differential including GERD, post-nasal drip, vocal cord dysfunction, reactive airway disease, and/or ILD  - Prior CT thorax (4/2023) w/ scattered mediastinal lymph nodes & hilar lymph nodes, chronic interstitial markings at inferior lingula & RML, & mildly prominent peripheral distribution of interstitial markings at lung bases  - PFTs normal; referred to Pulm clinic & followed up w/ continuation of below medications & f/u PRN; appreciate assistance in care  - C/w Advair Diskus today for trial therapy  - C/w Albuterol inhaler PRN    B/L Dupuytren's Contracture  - Referred to Ortho for further evaluation; appreciate assistance in care  - Has followed up with Ortho & completed surgical correction of right hand contracture (2/2024) w/ relief & is currently healing well  - Set to f/u w/ Ortho for further evaluation of left hand contracture    B/L Shoulder Pain/discomfort/stiffness - improved  Concern for B/L shoulder arthritic component and/or adhesive capsulitis  - 2-3/10 to 9/10, aching pain, worse with weather changes & movement, improved with topical diclofenac, limited range of motion,  occasionally awakening from sleep  - Denies any trauma, falls, MVCs  - continue supportive care with pain control    Stage IIIB Adenocarcinoma of Cecum  - s/p hemicolectomy and adjuvant chemotherapy  - At this time denies any GI-related symptoms  - patient is followed by oncology; continue to follow as scheduled  - Recent CT Chest/Abdomen/Pelvis (4/2024) unremarkable for any recurrent or metastatic disease  - continue to follow heme, we will be due for colonoscopy 10/2025--recommended every 3 years for monitoring     Hyperlipidemia  - Latest lipid profile WNL  - Continue Lipitor 80 daily    Hx of B/L Carotid Endarterectomy   - Continue Aspirin 81, Lipitor 40 daily    Seasonal Allergies - well controlled  - Intermittent-persistent dry cough and eye irritiation  - Relieved with Zyrtec/Flonase therapy     GERD   - Continue Protonix 40 daily     Hx of DVT/PE  - Occurred in 2021, treated with x3 months Eliquis  - Denies any recurrence  - Continue Aspirin 81 daily      Health Maintenance:  Immunization History   Administered Date(s) Administered    COVID-19, MRNA, LN-S, PF (MODERNA FULL 0.5 ML DOSE) 01/12/2021, 02/26/2021, 10/22/2021, 05/24/2022    COVID-19, MRNA, LN-S, PF (Pfizer) (Purple Cap) 01/12/2021, 02/26/2021    Influenza (FLUAD) - Quadrivalent - Adjuvanted - PF *Preferred* (65+) 09/21/2022, 09/21/2023    Influenza - Quadrivalent - PF *Preferred* (6 months and older) 09/12/2019    Influenza - Trivalent - Fluarix, Flulaval, Fluzone, Afluria - PF 09/22/2015, 10/06/2016, 10/02/2017, 09/24/2018, 09/12/2019    Influenza - Trivalent - Fluzone High Dose - PF (65 years and older) 09/22/2015, 10/06/2016, 10/02/2017, 09/24/2018    Influenza A (H1N1) 2009 Monovalent - IM 01/21/2010    Pneumococcal Conjugate - 13 Valent 09/04/2020    Pneumococcal Polysaccharide - 23 Valent 10/02/2015    Tdap 02/09/2022    Tuberculin 10/13/2020    Zoster 09/22/2015    Zoster Recombinant 05/31/2022, 09/21/2022     Disposition:  Continue current  management.  Medications refilled and sent to pharmacy of choice.  Return to clinic in 6 months.    Nino Vega MD   [facility] Internal Medicine, PGY-III

## 2025-04-14 ENCOUNTER — HOSPITAL ENCOUNTER (OUTPATIENT)
Dept: RADIOLOGY | Facility: HOSPITAL | Age: 80
Discharge: HOME | End: 2025-04-14
Attending: INTERNAL MEDICINE
Payer: MEDICARE

## 2025-04-14 DIAGNOSIS — Z08 ENCOUNTER FOR FOLLOW-UP SURVEILLANCE OF COLON CANCER: ICD-10-CM

## 2025-04-14 DIAGNOSIS — Z85.038 ENCOUNTER FOR FOLLOW-UP SURVEILLANCE OF COLON CANCER: ICD-10-CM

## 2025-04-14 DIAGNOSIS — C18.0 ADENOCARCINOMA OF CECUM: ICD-10-CM

## 2025-04-14 LAB
CREAT SERPL-MCNC: 1.09 MG/DL (ref 0.72–1.25)
GFR SERPLBLD CREATININE-BSD FMLA CKD-EPI: >60 ML/MIN/1.73/M2

## 2025-04-14 PROCEDURE — 25500020 PHARM REV CODE 255

## 2025-04-14 PROCEDURE — 82565 ASSAY OF CREATININE: CPT | Performed by: INTERNAL MEDICINE

## 2025-04-14 PROCEDURE — 74177 CT ABD & PELVIS W/CONTRAST: CPT | Mod: TC

## 2025-04-14 RX ADMIN — IOHEXOL 95 ML: 350 INJECTION, SOLUTION INTRAVENOUS at 07:04

## 2025-05-02 ENCOUNTER — TELEPHONE (OUTPATIENT)
Dept: HEMATOLOGY/ONCOLOGY | Facility: CLINIC | Age: 80
End: 2025-05-02
Payer: MEDICARE

## 2025-05-05 RX ORDER — ALBUTEROL SULFATE 90 UG/1
2 INHALANT RESPIRATORY (INHALATION) EVERY 6 HOURS PRN
Qty: 18 G | Refills: 3 | Status: SHIPPED | OUTPATIENT
Start: 2025-05-05

## 2025-05-12 NOTE — PROGRESS NOTES
History:  Past Medical History:   Diagnosis Date    Cancer     Colon    Carotid stenosis, bilateral     DM (diabetes mellitus)     Dupuytren's contracture of both hands     Encounter for blood transfusion     Hyperlipidemia     Hypertension     Mild intermittent asthma, uncomplicated    Past medical history: Hypertension.  NIDDM.  CKD.  History of retropharyngeal carotid-carotid bypass (2014). Bronchial asthma.  Cataract.  Diabetic neuropathy.  Hypertension.  Dyslipidemia.  Ptosis.  Visual field defect.  Cataract surgery.  Carotid endarterectomy (01/06/2014; 02/12/2014).  Intraocular lens, (05/28/2019).  Social history: .  Lives in [place], [place].  Has 4 children.  Retired.  Used to ride horses.  Small third a pack of cigarettes daily for 10 years; quit 50 years back.  No history of alcohol or illicit drug abuse.  Family history: Sister experienced breast cancer at age 58 years.  No family history of colorectal cancer.  Health maintenance: Screening colonoscopy 10 years back, apparently unremarkable.  Prior to that, history of colon polyps.  Past Surgical History:   Procedure Laterality Date    CAROTID ENDARTERECTOMY Bilateral     cataract surgery Bilateral     COLON RESECTION      COLONOSCOPY N/A 10/19/2022    Procedure: COLONOSCOPY;  Surgeon: Natalya Neely MD;  Location: [facility] ENDOSCOPY;  Service: Gastroenterology;  Laterality: N/A;    colostomy reversal      DUPUYTREN CONTRACTURE RELEASE Right 01/26/2024    Procedure: RELEASE, DUPUYTREN CONTRACTURE;  Surgeon: Bernabe Mensah MD;  Location: [facility] OR;  Service: Orthopedics;  Laterality: Right;    HAND SURGERY Right 01/2024    MEDIPORT INSERTION, SINGLE        Social History     Socioeconomic History    Marital status:      Spouse name: Ya Hamilton    Number of children: 4   Tobacco Use    Smoking status: Former     Types: Cigarettes     Passive exposure: Past    Smokeless tobacco: Never   Substance and Sexual Activity    Alcohol use:  Not Currently    Drug use: Never    Sexual activity: Not Currently     Social Drivers of Health     Financial Resource Strain: Low Risk  (5/1/2024)    Overall Financial Resource Strain (CARDIA)     Difficulty of Paying Living Expenses: Not hard at all   Food Insecurity: No Food Insecurity (5/1/2024)    Hunger Vital Sign     Worried About Running Out of Food in the Last Year: Never true     Ran Out of Food in the Last Year: Never true   Transportation Needs: No Transportation Needs (5/1/2024)    PRAPARE - Transportation     Lack of Transportation (Medical): No     Lack of Transportation (Non-Medical): No   Physical Activity: Inactive (5/1/2024)    Exercise Vital Sign     Days of Exercise per Week: 0 days     Minutes of Exercise per Session: 0 min   Stress: No Stress Concern Present (5/1/2024)    [place] [place] [employer] - Occupational Stress Questionnaire     Feeling of Stress : Not at all   Housing Stability: Unknown (5/1/2024)    Housing Stability Vital Sign     Unable to Pay for Housing in the Last Year: No     Homeless in the Last Year: No      Family History   Problem Relation Name Age of Onset    Stroke Sister      Hypertension Sister      Cancer Sister      Stroke Brother      Hypertension Brother        Reason for Follow-up:  -Adenocarcinoma of cecum, stage IIIB  -Intolerant of adjuvant capecitabine  -Incidental finding of PE  -Right lower extremity DVT  -Iron deficiency anemia      History of Present Illness:   History of DVT (deep vein thrombosis)        Oncologic/Hematologic History:  Oncology History   Adenocarcinoma of cecum   9/30/2020 Cancer Staged    Staging form: Colon and Rectum, AJCC 8th Edition  - Pathologic stage from 9/30/2020: Stage IIIC (pT3, pN2b, cM0)     5/31/2022 Initial Diagnosis    Adenocarcinoma of cecum     Patient is being followed for history of adenocarcinoma of cecum.    Please see assessment and plan section for details.     11/27/2020:   Presents for initial  medical oncology consultation, accompanied by his wife.   Looks and feels healthy.  Endorses no symptoms of concern whatsoever.   No weakness, fatigue, malaise, anorexia, unintentional weight loss, abdominal pain, nausea, vomiting, GI bleeding, dysphagia, odynophagia, fevers, chills, any urinary problems, chest pain, cough, dyspnea, unusual headaches, focal neurological symptoms, etc.  ECOG 0-1.    Interval History:  PORT FLUSH   [No matching plan found]     05/13/2025:   -10/21/2024:  CEA 3.72 is slightly elevated (normal, 2.48, on 04/19/2024):  -04/14/2025: Surveillance CTs C/A/P with contrast (comparison: CT C/A/P 10 14 2024):  No evidence of recurrent or metastatic disease in C/A/P  -05/13/2025:  Hemoglobin 12.4, CBC unremarkable, CMP reviewed; CEA 2.55 normal  Presents for a follow-up visit, accompanied by his wife.  Doing great.  Great appetite.  ECOG 0.  No weakness, fatigue, malaise, anorexia, unintentional weight loss, abdominal pain, nausea, vomiting, change in bowel habits, GI bleeding, chest pain, cough, dyspnea, etc..  Bowels moving normally.  Eating well.  No blood in stool.  Very thankful for the care provided.      Immunization History   Administered Date(s) Administered    COVID-19, MRNA, LN-S, PF (MODERNA FULL 0.5 ML DOSE) 01/12/2021, 02/26/2021, 10/22/2021, 05/24/2022    COVID-19, MRNA, LN-S, PF (Pfizer) (Purple Cap) 01/12/2021, 02/26/2021    Influenza (FLUAD) - Quadrivalent - Adjuvanted - PF *Preferred* (65+) 09/21/2022, 09/21/2023    Influenza - Quadrivalent - PF *Preferred* (6 months and older) 09/12/2019    Influenza - Trivalent - Fluarix, Flulaval, Fluzone, Afluria - PF 09/22/2015, 10/06/2016, 10/02/2017, 09/24/2018, 09/12/2019    Influenza - Trivalent - Fluzone High Dose - PF (65 years and older) 09/22/2015, 10/06/2016, 10/02/2017, 09/24/2018    Influenza A (H1N1) 2009 Monovalent - IM 01/21/2010    Pneumococcal Conjugate - 13 Valent 09/04/2020    Pneumococcal Polysaccharide - 23 Valent  10/02/2015    Tdap 02/09/2022    Tuberculin 10/13/2020    Zoster 09/22/2015    Zoster Recombinant 05/31/2022, 09/21/2022     Review of patient's allergies indicates:  No Known Allergies    Medications:  Current Outpatient Medications on File Prior to Visit   Medication Sig Dispense Refill    acetaminophen (TYLENOL) 500 MG tablet Take 500 mg by mouth daily as needed for Pain. Patient takes one in the AM and one in the PM      albuterol (PROVENTIL) 2.5 mg /3 mL (0.083 %) nebulizer solution Take 3 mLs (2.5 mg total) by nebulization as needed for Wheezing or Shortness of Breath. 3 mL 4    albuterol (VENTOLIN HFA) 90 mcg/actuation inhaler Inhale 2 puffs into the lungs every 6 (six) hours as needed for Wheezing. Rescue 18 g 3    amLODIPine (NORVASC) 10 MG tablet Take 1 tablet (10 mg total) by mouth once daily. 90 tablet 3    aspirin 81 MG Chew Take 1 tablet (81 mg total) by mouth once daily. 90 tablet 3    atorvastatin (LIPITOR) 80 MG tablet Take 1 tablet (80 mg total) by mouth once daily. 90 tablet 3    ferrous sulfate (FEOSOL) 325 mg (65 mg iron) Tab tablet Take 325 mg by mouth once daily.      fluticasone propionate (FLONASE) 50 mcg/actuation nasal spray 1 spray (50 mcg total) by Each Nostril route 2 (two) times daily. 5 mL 4    fluticasone-salmeterol diskus inhaler 250-50 mcg Inhale 1 puff into the lungs 2 (two) times daily. Controller 90 each 6    losartan (COZAAR) 50 MG tablet Take 1 tablet (50 mg total) by mouth once daily. 90 tablet 3    magnesium oxide (MAG-OX) 400 mg (241.3 mg magnesium) tablet Take 800 mg by mouth once daily.      multivitamin (THERAGRAN) per tablet Take 1 tablet by mouth once daily.      pantoprazole (PROTONIX) 40 MG tablet Take 1 tablet (40 mg total) by mouth once daily. 90 tablet 3    cetirizine (ZYRTEC) 5 MG tablet Take 1 tablet (5 mg total) by mouth once daily. 30 tablet 1    gabapentin (NEURONTIN) 100 MG capsule Take 1 capsule (100 mg total) by mouth 3 (three) times daily. (Patient not  taking: Reported on 12/20/2024) 90 capsule 0    hydroCHLOROthiazide (HYDRODIURIL) 25 MG tablet Take 25 mg by mouth. (Patient not taking: Reported on 5/13/2025)      valACYclovir (VALTREX) 1000 MG tablet Take 1 tablet (1,000 mg total) by mouth 3 (three) times daily. for 16 days 48 tablet 0     No current facility-administered medications on file prior to visit.     Review of Systems:   All systems reviewed and found to be negative except for the symptoms detailed above    Physical Examination:   VITAL SIGNS:   Vitals:    05/13/25 0910   BP: (!) 147/70   Pulse: 70   Resp: 18   Temp: 97.8 °F (36.6 °C)       GENERAL:  In no apparent distress.    HEAD:  No signs of head trauma.  EYES:  Pupils are equal.  Extraocular motions intact.    EARS:  Hearing grossly intact.  MOUTH:  Oropharynx is normal.   NECK:  No adenopathy, no JVD.     CHEST:  Chest with clear breath sounds bilaterally.  No wheezes, rales, rhonchi.    CARDIAC:  Regular rate and rhythm.  S1 and S2, without murmurs, gallops, rubs.  VASCULAR:  No Edema.  Peripheral pulses normal and equal in all extremities.  ABDOMEN:  Soft, without detectable tenderness.  No sign of distention.  No   rebound or guarding, and no masses palpated.   Bowel Sounds normal.  MUSCULOSKELETAL:  Good range of motion of all major joints. Extremities without clubbing, cyanosis or edema.    NEUROLOGIC EXAM:  Alert and oriented x 3.  No focal sensory or strength deficits.   Speech normal.  Follows commands.  PSYCHIATRIC:  Mood normal.      Assessment:  Problem List Items Addressed This Visit       Adenocarcinoma of cecum    History of DVT (deep vein thrombosis) - Primary    History of pulmonary embolism    History of iron deficiency anemia    Status post ileostomy    Microsatellite instability-high colorectal cancer     Orders for 05/13/2025:  CBC, CMP, CEA level in 6 months   Surveillance CT scans of C/A/P with contrast in October  Surveillance colonoscopy in October  Follow-up in October  with scans and labs    Above discussed with the patient.  All questions answered.    Discussed labs and scans and gave him copies of relevant results.  Plan of management discussed.    He understands and agrees with this plan.  ================================    # Adenocarcinoma of cecum:  -presented with hematochezia and anemia  -colonoscopy 09/16/2020  -right hemicolectomy 09/30/2020  -pT3 pN2a M0, stage IIIB; 6 lymph nodes positive  -adjuvant capecitabine started 01/11/2021  -capecitabine discontinued after 1 cycle because of side effects requiring hospitalization  -S/p surveillance colonoscopy (02/19/2021) (ileoscopy; indication: High output ileostomy, etc.)  -S/P elective loop ileostomy reversal 06/16/2021  -MARIBEL on surveillance CTs 06/23/2022 except for questionable soft tissue density in the hepatic flexure of colon  -surveillance colonoscopy 10/19/2022: No polyps or cancer (repeat colonoscopy in 3 years)  -04/03/2023: CEA level 2.29, normal  -04/14/2023: Surveillance CTs C/A/P: No recurrence or metastases  -MARIBEL on surveillance CTs C/A/P with contrast 10/16/2023  -10/19/2023:  CEA 1.94, normal  -surveillance CTs C/A/P 0 04/12/2024:  MARIBEL  -04/19/2024: CEA 2.48 normal  -surveillance CTs C/A/P 10/14/2024:  MARIBEL  -10/21/2024:  CEA 3.72 is slightly elevated (was normal, 2.48, on 04/19/2024)  -surveillance CTs C/A/P 04/14/2025: MARIBEL  -05/13/2025: CEA 2.55 normal   >>>  Plan:  -continue surveillance (see below)  CBC, CMP, CEA level in 6 months   -repeat surveillance CT scans of C/A/P with contrast in 6 months (October 2025)  -repeat surveillance colonoscopy in 3 years (10/2025)    Surveillance:  -S/p right hemicolectomy 09/30/2020  -History and physical and CEA every 3-6 months for 2 years (09/2020-09/2022), then every 6 months for total of 5 years (09/2022-09/2025)  -Surveillance CT C/A/P with contrast every 6-12 months for 5 years (09/2020-09/2025)  -colonoscopy in 1 year after surgery except if no preoperative  colonoscopy due to obstructing lesion, then colonoscopy in 3-6 months:   if advanced adenoma then repeat in 1 year,   if no advanced adenoma then repeat in 3 years, and then   every 5 years.       No evidence of Santos syndrome:  -MSI-H by PCR  -dMMR  MLH1: Loss of expression in a subset of tumor cells  MSH2: Preserved (intact) expression in tumor cells  MSH6: Preserved (intact) expression in tumor cells  PMS2: Loss of expression in a subset of tumor cells  -V600 BRAF mutation negative  -IHC: MLH1 preserved (intact) expression in tumor cells (no deficiency of mismatch repair protein MLH1)  >>>No evidence of Santos syndrome     # Pulmonary embolism, incidental diagnosis (02/26/2021):  -CT chest with contrast 02/26/2021: New subacute/chronic small PE segmental right lower lung lobe, new since 11/09/2020     # Right lower extremity DVT, proximal:  -03/03/2021: Bilateral lower extremity venous Doppler: Right femoral DVT, nearly occlusive to occlusive (age indeterminate); right popliteal DVT, partially occlusive (age indeterminate)  -anticoagulated with Eliquis x3 months (started 03/03/2021)    # Chronic normocytic anemia, starting around 06/2017, subsequently worsening:  PRBC x1 (10/04/2020)  PRBC x1 (10/02/2020)  PRBC x1 (09/30/2020)  -08/07/2020: Serum iron 27, low; TIBC 175, low; transferrin saturation 15.4%, borderline; ferritin 295.1, normal (functional iron-deficiency)  -s/p Feraheme 510 mg IV x2 (12/03/2020, 12/11/2020) (for relative iron deficiency)   (01/07/2021: Transferrin saturation 33%, up from 18%; ferritin level 1162.37, up from 594.74; hemoglobin 11.9, up from 10.9)    Follow-up:  No follow-ups on file.

## 2025-05-13 ENCOUNTER — OFFICE VISIT (OUTPATIENT)
Dept: HEMATOLOGY/ONCOLOGY | Facility: CLINIC | Age: 80
End: 2025-05-13
Attending: INTERNAL MEDICINE
Payer: MEDICARE

## 2025-05-13 ENCOUNTER — LAB VISIT (OUTPATIENT)
Dept: HEMATOLOGY/ONCOLOGY | Facility: CLINIC | Age: 80
End: 2025-05-13
Payer: MEDICARE

## 2025-05-13 ENCOUNTER — INFUSION (OUTPATIENT)
Dept: INFUSION THERAPY | Facility: HOSPITAL | Age: 80
End: 2025-05-13
Attending: INTERNAL MEDICINE
Payer: MEDICARE

## 2025-05-13 VITALS
SYSTOLIC BLOOD PRESSURE: 147 MMHG | WEIGHT: 161.81 LBS | HEIGHT: 69 IN | RESPIRATION RATE: 18 BRPM | HEART RATE: 70 BPM | BODY MASS INDEX: 23.97 KG/M2 | TEMPERATURE: 98 F | DIASTOLIC BLOOD PRESSURE: 70 MMHG | OXYGEN SATURATION: 99 %

## 2025-05-13 DIAGNOSIS — C18.0 ADENOCARCINOMA OF CECUM: Primary | ICD-10-CM

## 2025-05-13 DIAGNOSIS — C19 MICROSATELLITE INSTABILITY-HIGH COLORECTAL CANCER: ICD-10-CM

## 2025-05-13 DIAGNOSIS — Z85.038 ENCOUNTER FOR FOLLOW-UP SURVEILLANCE OF COLON CANCER: ICD-10-CM

## 2025-05-13 DIAGNOSIS — Z08 ENCOUNTER FOR FOLLOW-UP SURVEILLANCE OF COLON CANCER: ICD-10-CM

## 2025-05-13 DIAGNOSIS — Z86.2 HISTORY OF IRON DEFICIENCY ANEMIA: ICD-10-CM

## 2025-05-13 DIAGNOSIS — C18.0 ADENOCARCINOMA OF CECUM: ICD-10-CM

## 2025-05-13 DIAGNOSIS — Z86.711 HISTORY OF PULMONARY EMBOLISM: ICD-10-CM

## 2025-05-13 DIAGNOSIS — Z86.711 HISTORY OF PULMONARY EMBOLISM: Primary | ICD-10-CM

## 2025-05-13 DIAGNOSIS — Z86.718 HISTORY OF DVT (DEEP VEIN THROMBOSIS): Primary | ICD-10-CM

## 2025-05-13 DIAGNOSIS — Z93.2 STATUS POST ILEOSTOMY: ICD-10-CM

## 2025-05-13 LAB
ALBUMIN SERPL-MCNC: 3.3 G/DL (ref 3.4–4.8)
ALBUMIN/GLOB SERPL: 0.8 RATIO (ref 1.1–2)
ALP SERPL-CCNC: 109 UNIT/L (ref 40–150)
ALT SERPL-CCNC: 18 UNIT/L (ref 0–55)
ANION GAP SERPL CALC-SCNC: 9 MEQ/L
AST SERPL-CCNC: 24 UNIT/L (ref 11–45)
BASOPHILS # BLD AUTO: 0.07 X10(3)/MCL
BASOPHILS NFR BLD AUTO: 0.7 %
BILIRUB SERPL-MCNC: 0.3 MG/DL
BUN SERPL-MCNC: 13.9 MG/DL (ref 8.4–25.7)
CALCIUM SERPL-MCNC: 9.4 MG/DL (ref 8.8–10)
CEA SERPL-MCNC: 2.55 NG/ML (ref 0–3)
CHLORIDE SERPL-SCNC: 107 MMOL/L (ref 98–107)
CO2 SERPL-SCNC: 23 MMOL/L (ref 23–31)
CREAT SERPL-MCNC: 1.06 MG/DL (ref 0.72–1.25)
CREAT/UREA NIT SERPL: 13
EOSINOPHIL # BLD AUTO: 0.46 X10(3)/MCL (ref 0–0.9)
EOSINOPHIL NFR BLD AUTO: 4.8 %
ERYTHROCYTE [DISTWIDTH] IN BLOOD BY AUTOMATED COUNT: 13.2 % (ref 11.5–17)
GFR SERPLBLD CREATININE-BSD FMLA CKD-EPI: >60 ML/MIN/1.73/M2
GLOBULIN SER-MCNC: 4.2 GM/DL (ref 2.4–3.5)
GLUCOSE SERPL-MCNC: 127 MG/DL (ref 82–115)
HCT VFR BLD AUTO: 36.8 % (ref 42–52)
HGB BLD-MCNC: 12.4 G/DL (ref 14–18)
IMM GRANULOCYTES # BLD AUTO: 0.02 X10(3)/MCL (ref 0–0.04)
IMM GRANULOCYTES NFR BLD AUTO: 0.2 %
LYMPHOCYTES # BLD AUTO: 2.62 X10(3)/MCL (ref 0.6–4.6)
LYMPHOCYTES NFR BLD AUTO: 27.6 %
MCH RBC QN AUTO: 32.1 PG (ref 27–31)
MCHC RBC AUTO-ENTMCNC: 33.7 G/DL (ref 33–36)
MCV RBC AUTO: 95.3 FL (ref 80–94)
MONOCYTES # BLD AUTO: 0.66 X10(3)/MCL (ref 0.1–1.3)
MONOCYTES NFR BLD AUTO: 6.9 %
NEUTROPHILS # BLD AUTO: 5.67 X10(3)/MCL (ref 2.1–9.2)
NEUTROPHILS NFR BLD AUTO: 59.8 %
NRBC BLD AUTO-RTO: 0 %
PLATELET # BLD AUTO: 294 X10(3)/MCL (ref 130–400)
PMV BLD AUTO: 9.9 FL (ref 7.4–10.4)
POTASSIUM SERPL-SCNC: 3.9 MMOL/L (ref 3.5–5.1)
PROT SERPL-MCNC: 7.5 GM/DL (ref 5.8–7.6)
RBC # BLD AUTO: 3.86 X10(6)/MCL (ref 4.7–6.1)
SODIUM SERPL-SCNC: 139 MMOL/L (ref 136–145)
WBC # BLD AUTO: 9.5 X10(3)/MCL (ref 4.5–11.5)

## 2025-05-13 PROCEDURE — 82378 CARCINOEMBRYONIC ANTIGEN: CPT

## 2025-05-13 PROCEDURE — 80053 COMPREHEN METABOLIC PANEL: CPT

## 2025-05-13 PROCEDURE — 63600175 PHARM REV CODE 636 W HCPCS: Performed by: INTERNAL MEDICINE

## 2025-05-13 PROCEDURE — 99214 OFFICE O/P EST MOD 30 MIN: CPT | Mod: PBBFAC | Performed by: INTERNAL MEDICINE

## 2025-05-13 PROCEDURE — 96523 IRRIG DRUG DELIVERY DEVICE: CPT

## 2025-05-13 PROCEDURE — 85025 COMPLETE CBC W/AUTO DIFF WBC: CPT

## 2025-05-13 RX ORDER — HEPARIN 100 UNIT/ML
500 SYRINGE INTRAVENOUS
Status: DISCONTINUED | OUTPATIENT
Start: 2025-05-13 | End: 2025-05-13 | Stop reason: HOSPADM

## 2025-05-13 RX ORDER — SODIUM CHLORIDE 0.9 % (FLUSH) 0.9 %
10 SYRINGE (ML) INJECTION
Status: DISCONTINUED | OUTPATIENT
Start: 2025-05-13 | End: 2025-05-13 | Stop reason: HOSPADM

## 2025-05-13 RX ADMIN — HEPARIN 500 UNITS: 100 SYRINGE at 10:05

## 2025-05-13 NOTE — Clinical Note
Orders for 05/13/2025: CBC, CMP, CEA level in 6 months  Surveillance CT scans of C/A/P with contrast in October Surveillance colonoscopy in October Follow-up in October with scans and labs

## 2025-06-06 NOTE — PROGRESS NOTES
CHIEF COMPLAINT: No chief complaint on file.                                                 HPI:  Riccardo Hamilton 79 y.o. male with HTN, T2DM, HLD, asthma, ADRIÁN s/p CEA 2014, colonic adenocarcinoma with previous RLE DVT and PE s/p 3 months of OAC who presents to clinic today for follow up and ongoing care.  He was last seen for routine follow up.  At that visit he denied any major cardiovascular complaints.    Today the patient states that he feels stable from a cardiac standpoint.  He is denying all cardiac complaints today.  He denies any chest pain, SOB, LOPEZ, palpitations, PND, orthopnea, lightheadedness, dizziness, syncope, lower extremity edema, or claudication symptoms.  He states that he is able to complete his ADLs without any issues or ischemic symptoms.  He is somewhat physically active in his day-to-day life.  He does not do any formal exercise but states that he is active at home.  He reports compliance with all his current medications and he is tolerating them well.  He denies any tobacco or other illicit drug use.                                                                                                                                                                                                                                                                                                                                                                                                                                                                                    CARDIAC TESTING:  Results for orders placed during the hospital encounter of 08/19/24    Echo    Interpretation Summary    Left Ventricle: The left ventricle is normal in size. Normal wall thickness. There is normal systolic function. Biplane (2D) method of discs ejection fraction is 60%. There is diastolic dysfunction.    Right Ventricle: Normal right ventricular cavity size. Systolic function is normal.    Aortic Valve: The aortic  valve is a trileaflet valve. There is mild annular calcification present. There is no stenosis. Aortic valve peak velocity is 1.33 m/s. Mean gradient is 4 mmHg.    Mitral Valve: The mitral valve is structurally normal. There is normal leaflet mobility. There is mild regurgitation.    Pulmonary Artery: The estimated pulmonary artery systolic pressure is 19 mmHg.    IVC/SVC: Normal venous pressure at 3 mmHg.    No results found for this or any previous visit.     No results found for this or any previous visit.       Problem List[1]  Past Surgical History:   Procedure Laterality Date    CAROTID ENDARTERECTOMY Bilateral     cataract surgery Bilateral     COLON RESECTION      COLONOSCOPY N/A 10/19/2022    Procedure: COLONOSCOPY;  Surgeon: Natalya Neely MD;  Location[facility] ENDOSCOPY;  Service: Gastroenterology;  Laterality: N/A;    colostomy reversal      DUPUYTREN CONTRACTURE RELEASE Right 01/26/2024    Procedure: RELEASE, DUPUYTREN CONTRACTURE;  Surgeon: Bernabe Mensah MD;  Location: [facility] OR;  Service: Orthopedics;  Laterality: Right;    HAND SURGERY Right 01/2024    MEDIPORT INSERTION, SINGLE       Social History[2]     Family History   Problem Relation Name Age of Onset    Stroke Sister      Hypertension Sister      Cancer Sister      Stroke Brother      Hypertension Brother       Review of patient's allergies indicates:  No Known Allergies      ROS:  Review of Systems   Constitutional: Negative.  Negative for malaise/fatigue.   HENT: Negative.     Eyes: Negative.    Respiratory: Negative.  Negative for shortness of breath.    Cardiovascular: Negative.  Negative for chest pain, palpitations, orthopnea, claudication, leg swelling and PND.   Gastrointestinal: Negative.    Genitourinary: Negative.    Musculoskeletal: Negative.    Skin: Negative.    Neurological: Negative.    Endo/Heme/Allergies: Negative.    Psychiatric/Behavioral: Negative.     All other systems reviewed and are negative.                                                                                                                                                                                Negative except as stated in the history of present illness. See HPI for details.    PHYSICAL EXAM:  There were no vitals taken for this visit.    Physical Exam  Constitutional:       Appearance: Normal appearance. He is not ill-appearing.      Comments: BMI 24.27   HENT:      Head: Normocephalic.   Eyes:      Extraocular Movements: Extraocular movements intact.      Pupils: Pupils are equal, round, and reactive to light.   Cardiovascular:      Rate and Rhythm: Normal rate and regular rhythm.      Pulses: Normal pulses.      Heart sounds: Normal heart sounds.   Pulmonary:      Effort: Pulmonary effort is normal.   Musculoskeletal:         General: Normal range of motion.      Right lower leg: No edema.      Left lower leg: No edema.   Skin:     General: Skin is warm and dry.   Neurological:      General: No focal deficit present.      Mental Status: He is alert and oriented to person, place, and time.   Psychiatric:         Mood and Affect: Mood normal.         Behavior: Behavior normal.         Current Outpatient Medications   Medication Instructions    acetaminophen (TYLENOL) 500 mg, Daily PRN    albuterol (PROVENTIL) 2.5 mg, Nebulization, As needed (PRN)    albuterol (VENTOLIN HFA) 90 mcg/actuation inhaler 2 puffs, Inhalation, Every 6 hours PRN, Rescue    amLODIPine (NORVASC) 10 mg, Oral, Daily    aspirin 81 mg, Oral, Daily    atorvastatin (LIPITOR) 80 mg, Oral, Daily    cetirizine (ZYRTEC) 5 mg, Oral, Daily    ferrous sulfate (FEOSOL) 325 mg, Daily    fluticasone propionate (FLONASE) 50 mcg, Each Nostril, 2 times daily    fluticasone-salmeterol diskus inhaler 250-50 mcg 1 puff, Inhalation, 2 times daily, Controller    gabapentin (NEURONTIN) 100 mg, Oral, 3 times daily    hydroCHLOROthiazide (HYDRODIURIL) 25 mg    losartan (COZAAR) 50 mg, Oral, Daily    magnesium  oxide (MAG-OX) 800 mg, Daily    multivitamin (THERAGRAN) per tablet 1 tablet, Daily    pantoprazole (PROTONIX) 40 mg, Oral, Daily    valACYclovir (VALTREX) 1,000 mg, Oral, 3 times daily        All medications, laboratory studies, cardiac diagnostic imaging reviewed.     Lab Results   Component Value Date    LDL 64.00 02/24/2025    LDL 80.00 01/30/2023    TRIG 91 02/24/2025    TRIG 168 (H) 01/30/2023    CREATININE 1.06 05/13/2025    MG 1.70 04/03/2023    K 3.9 05/13/2025        ASSESSMENT/PLAN:      Hypertension  -BP well controlled- 127/62 today   -Patient is on Amlodipine 10 mg   -No longer has complaints of orthostasis   -Continue current management     Hyperlipidemia  -Continue Lipitor 80 mg daily.   -Target LDL at least < 70.   -LDL 64 per labs February 2025  -Counseled on the importance of following a low cholesterol heart healthy diet and exercise as tolerated     Type II Diabetes  -A1c is well controlled (6.2).   -Management per PCP     CKD III  -Remain off of NSAIDs. Ok with Diclofenac gel.   -Consider SGLT2i per DAPA-CKD trial.   -ARB.  -Management per PCP/Nephrology     ADRIÁN s/p CEA 2014  -Asymptomatic today, no bruits appreciated on physical exam  -Continue ASA 81 mg daily and Lipitor 80 mg daily.   -Carotid artery US on 8/19/2024 shows < 50% stenosis bilaterally      EKG today   Follow up in cardiology clinic in 6 months or sooner if needed   Follow up with PCP as directed   Please notify clinic if any new concerns or any change in symptoms            [1]   Patient Active Problem List  Diagnosis    Adenocarcinoma of cecum    Hypertension    Hyperlipidemia    GERD (gastroesophageal reflux disease)    Asthma    History of DVT (deep vein thrombosis)    History of bilateral carotid endarterectomy    History of pulmonary embolism    History of iron deficiency anemia    Encounter for follow-up surveillance of colon cancer    Dupuytren's contracture of right hand    Status post ileostomy    Microsatellite  instability-high colorectal cancer   [2]   Social History  Socioeconomic History    Marital status:      Spouse name: Ya Hamilton    Number of children: 4   Tobacco Use    Smoking status: Former     Types: Cigarettes     Passive exposure: Past    Smokeless tobacco: Never   Substance and Sexual Activity    Alcohol use: Not Currently    Drug use: Never    Sexual activity: Not Currently     Social Drivers of Health     Financial Resource Strain: Low Risk  (5/1/2024)    Overall Financial Resource Strain (CARDIA)     Difficulty of Paying Living Expenses: Not hard at all   Food Insecurity: No Food Insecurity (5/1/2024)    Hunger Vital Sign     Worried About Running Out of Food in the Last Year: Never true     Ran Out of Food in the Last Year: Never true   Transportation Needs: No Transportation Needs (5/1/2024)    PRAPARE - Transportation     Lack of Transportation (Medical): No     Lack of Transportation (Non-Medical): No   Physical Activity: Inactive (5/1/2024)    Exercise Vital Sign     Days of Exercise per Week: 0 days     Minutes of Exercise per Session: 0 min   Stress: No Stress Concern Present (5/1/2024)    [place] [employer] - Occupational Stress Questionnaire     Feeling of Stress : Not at all   Housing Stability: Unknown (5/1/2024)    Housing Stability Vital Sign     Unable to Pay for Housing in the Last Year: No     Homeless in the Last Year: No

## 2025-06-10 ENCOUNTER — OFFICE VISIT (OUTPATIENT)
Dept: CARDIOLOGY | Facility: CLINIC | Age: 80
End: 2025-06-10
Payer: MEDICARE

## 2025-06-10 VITALS
TEMPERATURE: 98 F | BODY MASS INDEX: 24.19 KG/M2 | RESPIRATION RATE: 18 BRPM | OXYGEN SATURATION: 97 % | WEIGHT: 159.63 LBS | DIASTOLIC BLOOD PRESSURE: 62 MMHG | SYSTOLIC BLOOD PRESSURE: 127 MMHG | HEART RATE: 78 BPM | HEIGHT: 68 IN

## 2025-06-10 DIAGNOSIS — Z98.890 HISTORY OF BILATERAL CAROTID ENDARTERECTOMY: ICD-10-CM

## 2025-06-10 DIAGNOSIS — I10 PRIMARY HYPERTENSION: Primary | ICD-10-CM

## 2025-06-10 DIAGNOSIS — E78.5 HYPERLIPIDEMIA, UNSPECIFIED HYPERLIPIDEMIA TYPE: ICD-10-CM

## 2025-06-10 PROCEDURE — 1126F AMNT PAIN NOTED NONE PRSNT: CPT | Mod: CPTII,,,

## 2025-06-10 PROCEDURE — 3078F DIAST BP <80 MM HG: CPT | Mod: CPTII,,,

## 2025-06-10 PROCEDURE — 93005 ELECTROCARDIOGRAM TRACING: CPT

## 2025-06-10 PROCEDURE — 99215 OFFICE O/P EST HI 40 MIN: CPT | Mod: PBBFAC

## 2025-06-10 PROCEDURE — 3288F FALL RISK ASSESSMENT DOCD: CPT | Mod: CPTII,,,

## 2025-06-10 PROCEDURE — 99214 OFFICE O/P EST MOD 30 MIN: CPT | Mod: S$PBB,,,

## 2025-06-10 PROCEDURE — 1160F RVW MEDS BY RX/DR IN RCRD: CPT | Mod: CPTII,,,

## 2025-06-10 PROCEDURE — 3074F SYST BP LT 130 MM HG: CPT | Mod: CPTII,,,

## 2025-06-10 PROCEDURE — 1159F MED LIST DOCD IN RCRD: CPT | Mod: CPTII,,,

## 2025-06-10 PROCEDURE — 1101F PT FALLS ASSESS-DOCD LE1/YR: CPT | Mod: CPTII,,,

## 2025-06-11 LAB
OHS QRS DURATION: 90 MS
OHS QTC CALCULATION: 413 MS

## 2025-06-30 ENCOUNTER — HOSPITAL ENCOUNTER (INPATIENT)
Facility: HOSPITAL | Age: 80
LOS: 8 days | Discharge: HOME HEALTH | DRG: 638 | End: 2025-07-08
Attending: EMERGENCY MEDICINE | Admitting: INTERNAL MEDICINE
Payer: MEDICARE

## 2025-06-30 DIAGNOSIS — M86.9 OSTEOMYELITIS: ICD-10-CM

## 2025-06-30 DIAGNOSIS — S91.109A OPEN TOE WOUND, INITIAL ENCOUNTER: ICD-10-CM

## 2025-06-30 DIAGNOSIS — E78.5 HYPERLIPIDEMIA, UNSPECIFIED HYPERLIPIDEMIA TYPE: ICD-10-CM

## 2025-06-30 DIAGNOSIS — I73.9 PAD (PERIPHERAL ARTERY DISEASE): ICD-10-CM

## 2025-06-30 DIAGNOSIS — M86.172 OTHER ACUTE OSTEOMYELITIS OF LEFT FOOT: ICD-10-CM

## 2025-06-30 DIAGNOSIS — R07.9 CHEST PAIN: ICD-10-CM

## 2025-06-30 LAB
ALBUMIN SERPL-MCNC: 3.4 G/DL (ref 3.4–4.8)
ALBUMIN/GLOB SERPL: 0.7 RATIO (ref 1.1–2)
ALP SERPL-CCNC: 121 UNIT/L (ref 40–150)
ALT SERPL-CCNC: 26 UNIT/L (ref 0–55)
ANION GAP SERPL CALC-SCNC: 7 MEQ/L
AST SERPL-CCNC: 28 UNIT/L (ref 11–45)
BASOPHILS # BLD AUTO: 0.06 X10(3)/MCL
BASOPHILS NFR BLD AUTO: 0.7 %
BILIRUB SERPL-MCNC: 0.3 MG/DL
BUN SERPL-MCNC: 13.1 MG/DL (ref 8.4–25.7)
CALCIUM SERPL-MCNC: 9.8 MG/DL (ref 8.8–10)
CHLORIDE SERPL-SCNC: 107 MMOL/L (ref 98–107)
CO2 SERPL-SCNC: 23 MMOL/L (ref 23–31)
CREAT SERPL-MCNC: 1.01 MG/DL (ref 0.72–1.25)
CREAT/UREA NIT SERPL: 13
EOSINOPHIL # BLD AUTO: 0.31 X10(3)/MCL (ref 0–0.9)
EOSINOPHIL NFR BLD AUTO: 3.8 %
ERYTHROCYTE [DISTWIDTH] IN BLOOD BY AUTOMATED COUNT: 13.2 % (ref 11.5–17)
EST. AVERAGE GLUCOSE BLD GHB EST-MCNC: 137 MG/DL
GFR SERPLBLD CREATININE-BSD FMLA CKD-EPI: >60 ML/MIN/1.73/M2
GLOBULIN SER-MCNC: 4.9 GM/DL (ref 2.4–3.5)
GLUCOSE SERPL-MCNC: 118 MG/DL (ref 82–115)
HBA1C MFR BLD: 6.4 %
HCT VFR BLD AUTO: 40.3 % (ref 42–52)
HGB BLD-MCNC: 13.2 G/DL (ref 14–18)
HOLD SPECIMEN: NORMAL
HOLD SPECIMEN: NORMAL
IMM GRANULOCYTES # BLD AUTO: 0.04 X10(3)/MCL (ref 0–0.04)
IMM GRANULOCYTES NFR BLD AUTO: 0.5 %
LYMPHOCYTES # BLD AUTO: 1.78 X10(3)/MCL (ref 0.6–4.6)
LYMPHOCYTES NFR BLD AUTO: 21.8 %
MCH RBC QN AUTO: 31.7 PG (ref 27–31)
MCHC RBC AUTO-ENTMCNC: 32.8 G/DL (ref 33–36)
MCV RBC AUTO: 96.6 FL (ref 80–94)
MONOCYTES # BLD AUTO: 0.59 X10(3)/MCL (ref 0.1–1.3)
MONOCYTES NFR BLD AUTO: 7.2 %
NEUTROPHILS # BLD AUTO: 5.37 X10(3)/MCL (ref 2.1–9.2)
NEUTROPHILS NFR BLD AUTO: 66 %
NRBC BLD AUTO-RTO: 0 %
PLATELET # BLD AUTO: 354 X10(3)/MCL (ref 130–400)
PMV BLD AUTO: 9.7 FL (ref 7.4–10.4)
POCT GLUCOSE: 106 MG/DL (ref 70–110)
POCT GLUCOSE: 111 MG/DL (ref 70–110)
POTASSIUM SERPL-SCNC: 4.2 MMOL/L (ref 3.5–5.1)
PROT SERPL-MCNC: 8.3 GM/DL (ref 5.8–7.6)
RBC # BLD AUTO: 4.17 X10(6)/MCL (ref 4.7–6.1)
SODIUM SERPL-SCNC: 137 MMOL/L (ref 136–145)
WBC # BLD AUTO: 8.15 X10(3)/MCL (ref 4.5–11.5)

## 2025-06-30 PROCEDURE — 21400001 HC TELEMETRY ROOM

## 2025-06-30 PROCEDURE — 80053 COMPREHEN METABOLIC PANEL: CPT

## 2025-06-30 PROCEDURE — 63600175 PHARM REV CODE 636 W HCPCS

## 2025-06-30 PROCEDURE — 25000003 PHARM REV CODE 250

## 2025-06-30 PROCEDURE — 87070 CULTURE OTHR SPECIMN AEROBIC: CPT

## 2025-06-30 PROCEDURE — 36415 COLL VENOUS BLD VENIPUNCTURE: CPT

## 2025-06-30 PROCEDURE — 99900035 HC TECH TIME PER 15 MIN (STAT)

## 2025-06-30 PROCEDURE — 85025 COMPLETE CBC W/AUTO DIFF WBC: CPT

## 2025-06-30 PROCEDURE — A9577 INJ MULTIHANCE: HCPCS

## 2025-06-30 PROCEDURE — 11000001 HC ACUTE MED/SURG PRIVATE ROOM

## 2025-06-30 PROCEDURE — 99285 EMERGENCY DEPT VISIT HI MDM: CPT | Mod: 25

## 2025-06-30 PROCEDURE — 83036 HEMOGLOBIN GLYCOSYLATED A1C: CPT

## 2025-06-30 PROCEDURE — 25500020 PHARM REV CODE 255

## 2025-06-30 RX ORDER — NALOXONE HCL 0.4 MG/ML
0.02 VIAL (ML) INJECTION
Status: DISCONTINUED | OUTPATIENT
Start: 2025-06-30 | End: 2025-07-08 | Stop reason: HOSPADM

## 2025-06-30 RX ORDER — ACETAMINOPHEN 325 MG/1
650 TABLET ORAL EVERY 8 HOURS PRN
Status: DISCONTINUED | OUTPATIENT
Start: 2025-06-30 | End: 2025-06-30

## 2025-06-30 RX ORDER — GLUCAGON 1 MG
1 KIT INJECTION
Status: DISCONTINUED | OUTPATIENT
Start: 2025-06-30 | End: 2025-07-08 | Stop reason: HOSPADM

## 2025-06-30 RX ORDER — IBUPROFEN 200 MG
24 TABLET ORAL
Status: DISCONTINUED | OUTPATIENT
Start: 2025-06-30 | End: 2025-07-08 | Stop reason: HOSPADM

## 2025-06-30 RX ORDER — ATORVASTATIN CALCIUM 40 MG/1
80 TABLET, FILM COATED ORAL DAILY
Status: DISCONTINUED | OUTPATIENT
Start: 2025-06-30 | End: 2025-07-08 | Stop reason: HOSPADM

## 2025-06-30 RX ORDER — ENOXAPARIN SODIUM 100 MG/ML
40 INJECTION SUBCUTANEOUS EVERY 24 HOURS
Status: DISCONTINUED | OUTPATIENT
Start: 2025-06-30 | End: 2025-07-08 | Stop reason: HOSPADM

## 2025-06-30 RX ORDER — IBUPROFEN 200 MG
16 TABLET ORAL
Status: DISCONTINUED | OUTPATIENT
Start: 2025-06-30 | End: 2025-07-08 | Stop reason: HOSPADM

## 2025-06-30 RX ORDER — NAPROXEN SODIUM 220 MG/1
81 TABLET, FILM COATED ORAL DAILY
Status: DISCONTINUED | OUTPATIENT
Start: 2025-06-30 | End: 2025-07-08 | Stop reason: HOSPADM

## 2025-06-30 RX ORDER — HYDRALAZINE HYDROCHLORIDE 20 MG/ML
10 INJECTION INTRAMUSCULAR; INTRAVENOUS EVERY 6 HOURS PRN
Status: DISCONTINUED | OUTPATIENT
Start: 2025-06-30 | End: 2025-07-06

## 2025-06-30 RX ORDER — ACETAMINOPHEN 325 MG/1
650 TABLET ORAL EVERY 8 HOURS PRN
Status: DISCONTINUED | OUTPATIENT
Start: 2025-06-30 | End: 2025-07-08 | Stop reason: HOSPADM

## 2025-06-30 RX ORDER — AMLODIPINE BESYLATE 10 MG/1
10 TABLET ORAL DAILY
Status: DISCONTINUED | OUTPATIENT
Start: 2025-06-30 | End: 2025-07-08 | Stop reason: HOSPADM

## 2025-06-30 RX ORDER — SODIUM CHLORIDE 0.9 % (FLUSH) 0.9 %
10 SYRINGE (ML) INJECTION EVERY 12 HOURS PRN
Status: DISCONTINUED | OUTPATIENT
Start: 2025-06-30 | End: 2025-07-08 | Stop reason: HOSPADM

## 2025-06-30 RX ORDER — IPRATROPIUM BROMIDE AND ALBUTEROL SULFATE 2.5; .5 MG/3ML; MG/3ML
3 SOLUTION RESPIRATORY (INHALATION) EVERY 6 HOURS PRN
Status: DISCONTINUED | OUTPATIENT
Start: 2025-06-30 | End: 2025-07-08 | Stop reason: HOSPADM

## 2025-06-30 RX ADMIN — ACETAMINOPHEN 650 MG: 325 TABLET ORAL at 05:06

## 2025-06-30 RX ADMIN — AMLODIPINE BESYLATE 10 MG: 10 TABLET ORAL at 09:06

## 2025-06-30 RX ADMIN — GADOBENATE DIMEGLUMINE 16 ML: 529 INJECTION, SOLUTION INTRAVENOUS at 02:06

## 2025-06-30 RX ADMIN — ATORVASTATIN CALCIUM 80 MG: 40 TABLET, FILM COATED ORAL at 09:06

## 2025-06-30 RX ADMIN — ASPIRIN 81 MG CHEWABLE TABLET 81 MG: 81 TABLET CHEWABLE at 09:06

## 2025-06-30 RX ADMIN — ENOXAPARIN SODIUM 40 MG: 40 INJECTION SUBCUTANEOUS at 05:06

## 2025-06-30 NOTE — CONSULTS
[facility] and [facility]   Inpatient Wound Care Consultation    Physician requesting consultation: Adebayo Miranda MD  Service requesting consultation: Internal Medicine  Reason for consultation: Right 4th toe wound    Historian: Patient and Electronic Medical Record        HPI:     Riccardo Hamilton is a 80 y.o. male with a history of PMH of colonic adenocarcinoma, HTN, HLD, T2DM, Asthma, Retropharyngeal Carotid Bypass (2014), Carotid Endarterectomy (1/2014; 2/2014), and previous RLE DVT w/ subacute small PE of right lung  who presented to [facility] ED on 6/30/2025  with complaint of 2 weeks of pain between his 4th and 5th toes of his right foot. Per pt he is not sure exactly when the wound started. Pt denies that he is DM and says he doesn't take any meds for DM. A1C is 6.4. Will attempt to clarify with IM team. Wound is noted to probe to bone and appears to be somewhat dislocated or fractured. Osteo noted in Xray. Unable to obtain tissue for culture, swab culture obtained. Surgery team consulted to evaluate. Difficulty obtaining pulses to right DP. Discussed with IM team with plans to order ABIs.         Past Medical History/Past Surgical History/Social History     See HPI for pertinent history.     ALLERGIES: Review of patient's allergies indicates:  No Known Allergies      Review of Systems:     Review of Systems   Constitutional:  Negative for chills and fever.   Respiratory:  Negative for shortness of breath.    Cardiovascular:  Negative for chest pain and leg swelling.   Integumentary:  Positive for wound.   Neurological:  Negative for numbness.          MEDICATIONS: See MAR      Physical exam:     Temp:  [97.9 °F (36.6 °C)-98.7 °F (37.1 °C)] 97.9 °F (36.6 °C)  Pulse:  [77-94] 77  Resp:  [18] 18  SpO2:  [98 %-99 %] 99 %  BP: (155-170)/(72-76) 167/72       GENERAL: A&Ox3, NAD,   HEENT: atraumatic, normocephalic, anicteric, moist oral mucosa without lesions, exudate, or erythema  LUNGS: breathing  unlabored, lungs CTA bilateral  HEART: RRR; no murmur, rub, or gallop  ABDOMEN: abdomen soft, nondistended, BS noted x 4 quadrants, no tympany, no rebound, guarding, or organomegaly  : no CVA tenderness  EXTREMITIES: no edema, clubbing, or cyanosis. Unable to palpate right DP pulse, able to find with doppler   SKIN: see wound assessment   NEURO: speech fluent and intact, facial symmetry preserved, no tremor  PSYCH: cooperative, normal mood and affect        Labs:     I have personally reviewed patient's labs.  Pertinent results noted below.      Recent Labs   Lab 06/30/25  1012   WBC 8.15   HGB 13.2*   HCT 40.3*           Recent Labs     06/30/25  1012      K 4.2      CO2 23   BUN 13.1   CREATININE 1.01          Recent Labs   Lab 06/30/25  1012   HGBA1C 6.4       Microbiology Results (last 7 days)       Procedure Component Value Units Date/Time    Wound Culture [[id number]] Collected: 06/30/25 1023    Order Status: Sent Specimen: Wound from Foot, Right Updated: 06/30/25 1046              Wound Assessment:   Right lateral 4th toe noted with <1cm ulceration with palpable bone. No purulence noted.Toe appears dislocated or fractured when moved. Unable to obtain tissue for culture, swab culture obtained. Wound is cleaned with gauze tucked between toes as surgery to evaluate later today.         Imaging:     I have personally reviewed patient's imaging. Pertinent results noted below.  X-Ray Foot Complete Right   Final Result      Suspect acute osteomyelitis of the distal 5th metatarsal         Electronically signed by: Emery De Los Santos MD   Date:    06/30/2025   Time:    08:10            Impression:     Right lateral 4th toe ulcer- Arterial vs DM. Probes to bone.     Plan/Recommendations:         Daily wound care. Culture obtained. Surgery consult pending. ABIs pending.     Thank you for the consult.     The time spent including preparing to see the patient, obtaining patient history and assessment,  evaluation of the plan of care, patient/caregiver counseling and education, orders, documentation, coordination of care, and other professional medical management activities for today's encounter was 55 minutes.        Tessa SAEZ-BC, WCC, DWC  [facility] Inpatient Woundcare

## 2025-06-30 NOTE — ED PROVIDER NOTES
Encounter Date: 6/30/2025       History     Chief Complaint   Patient presents with    Foot Injury     Patient reports bilateral foot pain x 2 weeks. He states the right foot hurts more and that he believes he has a sore on it. He states the sole of the left foot hurts when he ambulates.      Patient with a history of diabetes hypertension colon cancer presents emergency department for over 2 weeks of pain between his 4th and 5th toes of his left foot.  Patient wears cowboy boots as well as shoes but denies any recent traumas falls fevers illnesses.  He states that the pain between his toes his worsened over last 2 weeks prompted him to finally come the emergency department.      Review of patient's allergies indicates:  No Known Allergies  Past Medical History:   Diagnosis Date    Cancer     Colon    Carotid stenosis, bilateral     DM (diabetes mellitus)     Dupuytren's contracture of both hands     Encounter for blood transfusion     Hyperlipidemia     Hypertension     Mild intermittent asthma, uncomplicated      Past Surgical History:   Procedure Laterality Date    CAROTID ENDARTERECTOMY Bilateral     cataract surgery Bilateral     COLON RESECTION      COLONOSCOPY N/A 10/19/2022    Procedure: COLONOSCOPY;  Surgeon: Natalya Neely MD;  Location: [facility] ENDOSCOPY;  Service: Gastroenterology;  Laterality: N/A;    colostomy reversal      DUPUYTREN CONTRACTURE RELEASE Right 01/26/2024    Procedure: RELEASE, DUPUYTREN CONTRACTURE;  Surgeon: Bernabe Mensah MD;  [facility];  Service: Orthopedics;  Laterality: Right;    HAND SURGERY Right 01/2024    MEDIPORT INSERTION, SINGLE       Family History   Problem Relation Name Age of Onset    Stroke Sister      Hypertension Sister      Cancer Sister      Stroke Brother      Hypertension Brother       Social History[1]  Review of Systems   Musculoskeletal:         Left toe pain between 4th 5th digits   All other systems reviewed and are negative.      Physical Exam      Initial Vitals [06/30/25 0650]   BP Pulse Resp Temp SpO2   (!) 155/74 94 18 97.9 °F (36.6 °C) 98 %      MAP       --         Physical Exam    Nursing note and vitals reviewed.  Constitutional: He appears well-developed and well-nourished.   Well-appearing for stated age nontoxic pleasant smiles during examination   HENT:   Head: Normocephalic and atraumatic.   Eyes: EOM are normal. Pupils are equal, round, and reactive to light.   Neck:   Normal range of motion.  Cardiovascular:  Normal rate and regular rhythm.           Pulmonary/Chest: Breath sounds normal. No respiratory distress. He has no wheezes. He has no rales.   Abdominal: Abdomen is soft. Bowel sounds are normal. He exhibits no distension. There is no abdominal tenderness. There is no rebound.   Musculoskeletal:         General: Normal range of motion.      Cervical back: Normal range of motion.      Comments: Between 4th and 5th toes of left foot patient has a 2 x 1 cm eroding ulceration lateral aspect of the 4th digit.  It is centered around the interphalangeal joint.  It is noted patient has a small sinus a small bone fragment was removed from the sinus with bone exposure visualized by me on examination.  No foul smells no purulence no signs of infection     Neurological: He is alert and oriented to person, place, and time.         ED Course   Procedures  Labs Reviewed   WOUND CULTURE (OLG)          Imaging Results              X-Ray Foot Complete Right (Final result)  Result time 06/30/25 08:10:47      Final result by Emery De Los Santos MD (06/30/25 08:10:47)                   Impression:      Suspect acute osteomyelitis of the distal 5th metatarsal      Electronically signed by: Emery De Los Santos MD  Date:    06/30/2025  Time:    08:10               Narrative:    EXAMINATION:  Three views right foot    CLINICAL HISTORY:  Open wound 5th toe    COMPARISON:  09/20/2021    FINDINGS:  There is mild cortical lucency along the plantar aspect of the distal 5th  metatarsal head.  No acute fracture or dislocation.                                       Medications   sodium chloride 0.9% flush 10 mL (has no administration in time range)   naloxone 0.4 mg/mL injection 0.02 mg (has no administration in time range)   glucose chewable tablet 16 g (has no administration in time range)   glucose chewable tablet 24 g (has no administration in time range)   dextrose 50% injection 12.5 g (has no administration in time range)   dextrose 50% injection 25 g (has no administration in time range)   glucagon (human recombinant) injection 1 mg (has no administration in time range)   enoxaparin injection 40 mg (has no administration in time range)   acetaminophen tablet 650 mg (has no administration in time range)   amLODIPine tablet 10 mg (has no administration in time range)   aspirin chewable tablet 81 mg (has no administration in time range)   atorvastatin tablet 80 mg (has no administration in time range)   albuterol-ipratropium 2.5 mg-0.5 mg/3 mL nebulizer solution 3 mL (has no administration in time range)     Medical Decision Making  Amount and/or Complexity of Data Reviewed  Radiology: ordered.    Risk  Decision regarding hospitalization.                          Medical Decision Making:   Initial Assessment:   Patient appears to have eroding pressure ulcer lateral aspect of 4th digit of left foot.  Patient is nontoxic no fevers stable vitals.  Patient does have history of diabetes likely due to neuropathy this is been ongoing for quite some time based on examination he is just now presenting with this condition.  Obtaining x-ray at this time and consulting Internal Medicine in his patient will need proper wound care and management of this condition  Differential Diagnosis:   Osteomyelitis, pressure ulcer, cellulitis  ED Management:  Time 8:29 a.m.  Patient found to have acute osteomyelitis per x-ray.  Patient will be admitted by internal medicine team.  They are obtaining wound  cultures at this time.  Patient is stable             Clinical Impression:  Final diagnoses:  [S91.109A] Open toe wound, initial encounter  [S91.109A] Open toe wound, initial encounter - eval for osteo  [M86.172] Other acute osteomyelitis of left foot (Primary)          ED Disposition Condition    Admit                       [1]   Social History  Tobacco Use    Smoking status: Former     Types: Cigarettes     Passive exposure: Past    Smokeless tobacco: Never   Substance Use Topics    Alcohol use: Not Currently    Drug use: Never        Hussein Yanes MD  06/30/25 6901

## 2025-06-30 NOTE — H&P
[facility] Wards   History & Physical Note     Resident Team: [facility] Medicine List 1  Attending Physician: Ruben   Resident: Rodrick Stephens DO   Intern:     Date of Admit: 6/30/2025    Chief Complaint:     Foot Injury (Patient reports bilateral foot pain x 2 weeks. He states the right foot hurts more and that he believes he has a sore on it. He states the sole of the left foot hurts when he ambulates. )       Subjective:      History of Present Illness:  Riccardo Hamilton is a 80 y.o. male with a history of PMH of colonic adenocarcinoma (followed by Oncology; s/p hemicolectomy and adjuvant chemo), HTN, HLD, T2DM, Asthma, Retropharyngeal Carotid Bypass (2014), Carotid Endarterectomy (1/2014; 2/2014), and previous RLE DVT w/ subacute small PE of R. Lung (s/p x3 month Eliquis txt)  who presented to [facility] ED on 6/30/2025  with complaint of 2 weeks of pain between his 4th and 5th toes of his right foot. Patient states that he noticed the pain and wound 2 weeks ago and did not see any discharge/drainage from the wound. He states that he did not have any trauma to the toe. He has been wearing tennis shoes for the past few weeks. He does not recall ever seeing the wound prior or having any similar events in the past. He denies ever having osteomyelitis in the past. In the ED, Xray was done and showed suspected acute osteomyelitis of the distal 5th metatarsal. Internal medicine was consulted for management of suspected osteomyelitis.       Past Medical History:  Past Medical History:   Diagnosis Date    Cancer     Colon    Carotid stenosis, bilateral     DM (diabetes mellitus)     Dupuytren's contracture of both hands     Encounter for blood transfusion     Hyperlipidemia     Hypertension     Mild intermittent asthma, uncomplicated         Past Surgical History:  Past Surgical History:   Procedure Laterality Date    CAROTID ENDARTERECTOMY Bilateral     cataract surgery Bilateral     COLON RESECTION      COLONOSCOPY N/A  10/19/2022    Procedure: COLONOSCOPY;  Surgeon: Natalya Neely MD;  Location: [facility] ENDOSCOPY;  Service: Gastroenterology;  Laterality: N/A;    colostomy reversal      DUPUYTREN CONTRACTURE RELEASE Right 01/26/2024    Procedure: RELEASE, DUPUYTREN CONTRACTURE;  Surgeon: Bernabe Mensah MD;  Location: [facility];  Service: Orthopedics;  Laterality: Right;    HAND SURGERY Right 01/2024    MEDIPORT INSERTION, SINGLE          Family History:  Family History   Problem Relation Name Age of Onset    Stroke Sister      Hypertension Sister      Cancer Sister      Stroke Brother      Hypertension Brother          Social History:  Social History     Socioeconomic History    Marital status:      Spouse name: Ya Hamilton    Number of children: 4   Tobacco Use    Smoking status: Former     Types: Cigarettes     Passive exposure: Past    Smokeless tobacco: Never   Substance and Sexual Activity    Alcohol use: Not Currently    Drug use: Never    Sexual activity: Not Currently     Social Drivers of Health     Financial Resource Strain: Low Risk  (5/1/2024)    Overall Financial Resource Strain (CARDIA)     Difficulty of Paying Living Expenses: Not hard at all   Food Insecurity: No Food Insecurity (5/1/2024)    Hunger Vital Sign     Worried About Running Out of Food in the Last Year: Never true     Ran Out of Food in the Last Year: Never true   Transportation Needs: No Transportation Needs (5/1/2024)    PRAPARE - Transportation     Lack of Transportation (Medical): No     Lack of Transportation (Non-Medical): No   Physical Activity: Inactive (5/1/2024)    Exercise Vital Sign     Days of Exercise per Week: 0 days     Minutes of Exercise per Session: 0 min   Stress: No Stress Concern Present (5/1/2024)    [employer] [place] [employer] - Occupational Stress Questionnaire     Feeling of Stress : Not at all   Housing Stability: Unknown (5/1/2024)    Housing Stability Vital Sign     Unable to Pay for Housing in the Last  Year: No     Homeless in the Last Year: No        Allergies:  has no known allergies.     Home Medications:  Prior to Admission medications    Medication Sig Start Date End Date Taking? Authorizing Provider   acetaminophen (TYLENOL) 500 MG tablet Take 500 mg by mouth daily as needed for Pain. Patient takes one in the AM and one in the PM    Provider, Historical   albuterol (PROVENTIL) 2.5 mg /3 mL (0.083 %) nebulizer solution Take 3 mLs (2.5 mg total) by nebulization as needed for Wheezing or Shortness of Breath. 3/3/25 3/3/26  Nino Vega MD   albuterol (VENTOLIN HFA) 90 mcg/actuation inhaler Inhale 2 puffs into the lungs every 6 (six) hours as needed for Wheezing. Rescue 5/5/25   Nino Vega MD   amLODIPine (NORVASC) 10 MG tablet Take 1 tablet (10 mg total) by mouth once daily. 3/3/25 3/3/26  Nino Vega MD   aspirin 81 MG Chew Take 1 tablet (81 mg total) by mouth once daily. 9/18/24   Nino Vega MD   atorvastatin (LIPITOR) 80 MG tablet Take 1 tablet (80 mg total) by mouth once daily. 3/3/25 3/3/26  Nino Vega MD   cetirizine (ZYRTEC) 5 MG tablet Take 1 tablet (5 mg total) by mouth once daily. 9/21/22 10/21/24  Ancelmo Richard MD   ferrous sulfate (FEOSOL) 325 mg (65 mg iron) Tab tablet Take 325 mg by mouth once daily.    Provider, Historical   fluticasone propionate (FLONASE) 50 mcg/actuation nasal spray 1 spray (50 mcg total) by Each Nostril route 2 (two) times daily. 9/18/24 9/18/25  Nino Vega MD   fluticasone-salmeterol diskus inhaler 250-50 mcg Inhale 1 puff into the lungs 2 (two) times daily. Controller 3/3/25 3/3/26  Nino Vega MD   gabapentin (NEURONTIN) 100 MG capsule Take 1 capsule (100 mg total) by mouth 3 (three) times daily.  Patient not taking: Reported on 12/20/2024 8/23/24 10/21/24  Becky Jama, TOSHA   losartan (COZAAR) 50 MG tablet Take 1 tablet (50 mg total) by mouth once daily.  Patient not taking: Reported on 6/10/2025 3/3/25 3/3/26  Nino Vega MD  "  magnesium oxide (MAG-OX) 400 mg (241.3 mg magnesium) tablet Take 800 mg by mouth once daily.    Provider, Historical   multivitamin (THERAGRAN) per tablet Take 1 tablet by mouth once daily.    Provider, Historical   pantoprazole (PROTONIX) 40 MG tablet Take 1 tablet (40 mg total) by mouth once daily. 3/3/25   Nino Vega MD   valACYclovir (VALTREX) 1000 MG tablet Take 1 tablet (1,000 mg total) by mouth 3 (three) times daily. for 16 days 8/30/24 12/20/24  Dudley Noel MD       Review of Systems:  Review of Systems   Constitutional:  Negative for chills and fever.   Respiratory:  Negative for shortness of breath.    Cardiovascular:  Negative for chest pain and leg swelling.   Gastrointestinal:  Negative for blood in stool, constipation, diarrhea, nausea and vomiting.   Genitourinary:  Negative for dysuria and hematuria.          Objective:     Vital Signs (Most Recent):  Temp: 97.9 °F (36.6 °C) (06/30/25 0650)  Pulse: 94 (06/30/25 0650)  Resp: 18 (06/30/25 0650)  BP: (!) 155/74 (06/30/25 0650)  SpO2: 98 % (06/30/25 0650) Vital Signs (24h Range):  Temp:  [97.9 °F (36.6 °C)] 97.9 °F (36.6 °C)  Pulse:  [94] 94  Resp:  [18] 18  SpO2:  [98 %] 98 %  BP: (155)/(74) 155/74       Physical Examination:  GEN: A&Ox4. No acute distress   HEENT: normocephalic, atraumatic. PERRLA. EOMI bilaterally. Sclera, conjunctiva clear.   CARDIAC: S1 S2, no MRG. Radial pulses full, no LE edema   RESPI: Chest wall rise symmetric, atraumatic. Lungs CTAB, no wheezing or rhonchi   ABDOMEN: soft, nontender. No herniation, masses  : deferred   MSK: ROM grossly intact.   NEURO: Motor and sensation grossly intact. CN grossly intact. No cerebellar deficits noted. No gait abnormalities noted.   PSYCH: Memory and thought process appear intact. Appropriate mood and affect.   INTEGUMENTARY: ulceration on the 4th toe on the right foot. No purulent drainage from wound site.       CBC  No results for input(s): "WBC", "RBC", "HGB", "HCT", "MCV", " ""MCH", "MCHC", "RDW", "PLT", "MPV", "GRAN", "LYMPH", "MONO", "BASO", "NRBC" in the last 168 hours.    CMP   No results for input(s): "NA", "K", "CHLORIDE", "CO2", "ANIONGAP", "BUN", "CREATININE", "GLU", "CALCIUM", "PH", "MG", "ALBUMIN", "PROT", "ALKPHOS", "ALT", "AST", "BILITOT", "LABA1C", "LIPIDTOTCHOL", "LIPIDLDLCHOL", "LIPIDHDLCHOL", "LIPIDTRIG" in the last 168 hours.       Microbiology Data:  Microbiology Results (last 7 days)       ** No results found for the last 168 hours. **            Cardiac Data:  No echocardiogram results found for the past 14 days.    Results for orders placed or performed in visit on 06/10/25   IN OFFICE EKG 12-LEAD (to [place])    Collection Time: 06/10/25 11:47 AM   Result Value Ref Range    QRS Duration 90 ms    [facility] QTC Calculation 413 ms    Narrative    Test Reason : I10,    Vent. Rate :  66 BPM     Atrial Rate :  66 BPM     P-R Int : 214 ms          QRS Dur :  90 ms      QT Int : 394 ms       P-R-T Axes :  55 -19  70 degrees    QTcB Int : 413 ms    Sinus rhythm with marked sinus arrythmia with 1st degree A-V block  Septal infarct (cited on or before 11-Jan-2024)  Abnormal ECG  When compared with ECG of 11-Jul-2024 09:34,  No significant change was found  Confirmed by Iris Page ([id number]) on 6/11/2025 5:17:26 PM    Referred By:            Confirmed By: Iris Page        Radiology:  Imaging Results    None            Lines/Drains/Airways       Central Venous Catheter Line  Duration             Port A Cath Single Lumen right subclavian -- days                     Assessment & Plan:   Osteomyelitis   - Xray of the right foot: Suspected acute osteomyelitis of the distal 5th metatarsal  - ED reported probe to bone    - Ordered wound culture  - Will consult surgery and wound care. Will appreciate all recommendations.  - Will not start antibiotics at this time, pending decision of whether surgical intervention is required.    T2DM  - A1c pending. Previous A1c from 2023 was 6.1.   - " Currently not on any home medications     HTN  - Continue amlodipine 10mg     Hx of colonic adenocarcinoma   Hx of DVT and  PE   - Completed 3 month treatment with Eliquis   - follows with Heme/oncology at [facility]      CODE STATUS:  Full  Access:  PIV  Antibiotics:  n/a  Diet:  Regular  DVT Prophylaxis:  Lovenox  GI Prophylaxis:  na  Fluids:  na    Dispo: Patient is admitted for osteomyelitis workup and management. Consulted surgery.     Rodrick Stephens,    Internal Medicine - PGY-2

## 2025-06-30 NOTE — CONSULTS
[facility]   Consult Note  General Surgery    Patient Name: Riccardo Hamilton  YOB: 1945  Date: 06/30/2025 1:13 PM  Date of Admission: 6/30/2025  HD#0  POD#* No surgery found *    PRESENTING HISTORY     Chief Complaint/Reason for Admission: Open toe wound, initial encounter    History of Present Illness:  79 y/o M with a Hx HTN, DM2, ADRIÁN s/p bilateral CEA in 2014, colon adenocarcinoma ; s/p hemicolectomy and adjuvant chemo , RLE DVT and PE p/w a chronic 4th toe wound on the medial asopect. General Surgery consulted for surgical evaluation d/t concern for possible osteomyelitis. Pt reports he has had this wound for several months, which has gradually worsened. He denies any fevers,chills, drainage from the wound, or other wounds / ulcers in either of his legs. He is able to ambulate one block with a cane before having to stop to catch his breath, but denies caludication symptoms. Workup so far remarkable for XR of the R foot w suspected acute osteomyelitis of the distal R 5th metatarsal.      Review of Systems:  12 point ROS negative except as stated in HPI    PAST HISTORY:   Past medical history:  Past Medical History:   Diagnosis Date    Cancer     Colon    Carotid stenosis, bilateral     DM (diabetes mellitus)     Dupuytren's contracture of both hands     Encounter for blood transfusion     Hyperlipidemia     Hypertension     Mild intermittent asthma, uncomplicated      Past Medical History:   Diagnosis Date    Cancer     Colon    Carotid stenosis, bilateral     DM (diabetes mellitus)     Dupuytren's contracture of both hands     Encounter for blood transfusion     Hyperlipidemia     Hypertension     Mild intermittent asthma, uncomplicated        Past surgical history:  Past Surgical History:   Procedure Laterality Date    CAROTID ENDARTERECTOMY Bilateral     cataract surgery Bilateral     COLON RESECTION      COLONOSCOPY N/A 10/19/2022    Procedure: COLONOSCOPY;  Surgeon: Natalya JON  MD Chin;  Location: [facility] ENDOSCOPY;  Service: Gastroenterology;  Laterality: N/A;    colostomy reversal      DUPUYTREN CONTRACTURE RELEASE Right 01/26/2024    Procedure: RELEASE, DUPUYTREN CONTRACTURE;  Surgeon: Bernabe Mensah MD;  Location: [facility] OR;  Service: Orthopedics;  Laterality: Right;    HAND SURGERY Right 01/2024    MEDIPORT INSERTION, SINGLE       Past Surgical History:   Procedure Laterality Date    CAROTID ENDARTERECTOMY Bilateral     cataract surgery Bilateral     COLON RESECTION      COLONOSCOPY N/A 10/19/2022    Procedure: COLONOSCOPY;  Surgeon: Natalya Neely MD;  Location: [facility] ENDOSCOPY;  Service: Gastroenterology;  Laterality: N/A;    colostomy reversal      DUPUYTREN CONTRACTURE RELEASE Right 01/26/2024    Procedure: RELEASE, DUPUYTREN CONTRACTURE;  Surgeon: Bernabe Mensah MD;  Location: [facility] OR;  Service: Orthopedics;  Laterality: Right;    HAND SURGERY Right 01/2024    MEDIPORT INSERTION, SINGLE         Family history:  Family History   Problem Relation Name Age of Onset    Stroke Sister      Hypertension Sister      Cancer Sister      Stroke Brother      Hypertension Brother         Social history:  Social History     Socioeconomic History    Marital status:      Spouse name: Ya Hamilton    Number of children: 4   Tobacco Use    Smoking status: Former     Types: Cigarettes     Passive exposure: Past    Smokeless tobacco: Never   Substance and Sexual Activity    Alcohol use: Not Currently    Drug use: Never    Sexual activity: Not Currently     Social Drivers of Health     Financial Resource Strain: Low Risk  (5/1/2024)    Overall Financial Resource Strain (CARDIA)     Difficulty of Paying Living Expenses: Not hard at all   Food Insecurity: No Food Insecurity (5/1/2024)    Hunger Vital Sign     Worried About Running Out of Food in the Last Year: Never true     Ran Out of Food in the Last Year: Never true   Transportation Needs: No Transportation Needs (5/1/2024)    PRAPARE  - Transportation     Lack of Transportation (Medical): No     Lack of Transportation (Non-Medical): No   Physical Activity: Inactive (5/1/2024)    Exercise Vital Sign     Days of Exercise per Week: 0 days     Minutes of Exercise per Session: 0 min   Stress: No Stress Concern Present (5/1/2024)    [employer] - Occupational Stress Questionnaire     Feeling of Stress : Not at all   Housing Stability: Unknown (5/1/2024)    Housing Stability Vital Sign     Unable to Pay for Housing in the Last Year: No     Homeless in the Last Year: No     Tobacco Use History[1]   Social History     Substance and Sexual Activity   Alcohol Use Not Currently      MEDICATIONS & ALLERGIES:     No current facility-administered medications on file prior to encounter.     Current Outpatient Medications on File Prior to Encounter   Medication Sig    acetaminophen (TYLENOL) 500 MG tablet Take 500 mg by mouth daily as needed for Pain. Patient takes one in the AM and one in the PM    albuterol (PROVENTIL) 2.5 mg /3 mL (0.083 %) nebulizer solution Take 3 mLs (2.5 mg total) by nebulization as needed for Wheezing or Shortness of Breath.    albuterol (VENTOLIN HFA) 90 mcg/actuation inhaler Inhale 2 puffs into the lungs every 6 (six) hours as needed for Wheezing. Rescue    amLODIPine (NORVASC) 10 MG tablet Take 1 tablet (10 mg total) by mouth once daily.    aspirin 81 MG Chew Take 1 tablet (81 mg total) by mouth once daily.    atorvastatin (LIPITOR) 80 MG tablet Take 1 tablet (80 mg total) by mouth once daily.    cetirizine (ZYRTEC) 5 MG tablet Take 1 tablet (5 mg total) by mouth once daily.    ferrous sulfate (FEOSOL) 325 mg (65 mg iron) Tab tablet Take 325 mg by mouth once daily.    fluticasone propionate (FLONASE) 50 mcg/actuation nasal spray 1 spray (50 mcg total) by Each Nostril route 2 (two) times daily.    fluticasone-salmeterol diskus inhaler 250-50 mcg Inhale 1 puff into the lungs 2 (two) times daily. Controller     gabapentin (NEURONTIN) 100 MG capsule Take 1 capsule (100 mg total) by mouth 3 (three) times daily. (Patient not taking: Reported on 12/20/2024)    losartan (COZAAR) 50 MG tablet Take 1 tablet (50 mg total) by mouth once daily. (Patient not taking: Reported on 6/10/2025)    magnesium oxide (MAG-OX) 400 mg (241.3 mg magnesium) tablet Take 800 mg by mouth once daily.    multivitamin (THERAGRAN) per tablet Take 1 tablet by mouth once daily.    pantoprazole (PROTONIX) 40 MG tablet Take 1 tablet (40 mg total) by mouth once daily.    valACYclovir (VALTREX) 1000 MG tablet Take 1 tablet (1,000 mg total) by mouth 3 (three) times daily. for 16 days       Allergies: Review of patient's allergies indicates:  No Known Allergies    Scheduled Meds:   amLODIPine  10 mg Oral Daily    aspirin  81 mg Oral Daily    atorvastatin  80 mg Oral Daily    enoxparin  40 mg Subcutaneous Daily       Continuous Infusions:    PRN Meds:  Current Facility-Administered Medications:     acetaminophen, 650 mg, Oral, Q8H PRN    albuterol-ipratropium, 3 mL, Nebulization, Q6H PRN    dextrose 50%, 12.5 g, Intravenous, PRN    dextrose 50%, 25 g, Intravenous, PRN    glucagon (human recombinant), 1 mg, Intramuscular, PRN    glucose, 16 g, Oral, PRN    glucose, 24 g, Oral, PRN    hydrALAZINE, 10 mg, Intravenous, Q6H PRN    naloxone, 0.02 mg, Intravenous, PRN    sodium chloride 0.9%, 10 mL, Intravenous, Q12H PRN    OBJECTIVE:     Vital Signs:  Temp:  [97.9 °F (36.6 °C)-98.7 °F (37.1 °C)] 97.9 °F (36.6 °C)  Pulse:  [77-94] 81  Resp:  [18] 18  SpO2:  [98 %-99 %] 99 %  BP: (155-170)/(72-76) 157/74  No intake/output data recorded.  I/O this shift:  In: 296 [P.O.:296]  Out: -   Body mass index is 23.64 kg/m².     Physical Exam:  General:  Well developed, well nourished, no acute distress  CVS:  RRR  Resp:  NWOB on RA  GI:  Abdomen soft, non-tender, non-distended, no guarding, no rebound,  MSK:  Right 4th metatarsal with a wound on the medial aspect of the toe. 1+ PT  "pulses bilaterally, non-palpable DP pulses. No Doppler available for exam.  Skin:  No rashes, ulcers, erythema  Neuro:  CNII-XII grossly intact, alert and oriented to person, place, and time    Laboratory:  Recent Labs     06/30/25  1012   WBC 8.15   HGB 13.2*   HCT 40.3*        Recent Labs     06/30/25  1012      K 4.2      CO2 23   BUN 13.1   CREATININE 1.01   *   CALCIUM 9.8   PROT 8.3*   ALBUMIN 3.4   BILITOT 0.3   AST 28   ALKPHOS 121   ALT 26     Troponin:  No results for input(s): "TROPONINI" in the last 72 hours.  CBC:  Recent Labs     06/30/25  1012   WBC 8.15   RBC 4.17*   HGB 13.2*   HCT 40.3*      MCV 96.6*   MCH 31.7*   MCHC 32.8*     CMP:  Recent Labs     06/30/25  1012   *   CALCIUM 9.8   ALBUMIN 3.4   PROT 8.3*      K 4.2   CO2 23      BUN 13.1   CREATININE 1.01   ALKPHOS 121   ALT 26   AST 28   BILITOT 0.3     Lactic Acid:  No results for input(s): "LACTATE" in the last 72 hours.  Etoh:  No results for input(s): "ALCOHOLMEDIC" in the last 72 hours.  Drug Screen:  No results for input(s): "PCDSCOMETHA", "COCAINEMETAB", "OPIATESCREEN", "BARBITURATES", "AMPHETAMINES", "MARIJUANATHC", "PCDSOPHENCYN", "CREATRANDUR", "TOXINFO" in the last 72 hours.    ABG:  No results for input(s): "PH", "PCO2", "PO2", "HCO3", "BE", "POCSATURATED" in the last 72 hours.    Diagnostic Results:  X-Ray Foot Complete Right  Result Date: 6/30/2025  Suspect acute osteomyelitis of the distal 5th metatarsal Electronically signed by: Emery De Los Santos MD Date:    06/30/2025 Time:    08:10    X-Ray Foot Complete Right   Final Result      Suspect acute osteomyelitis of the distal 5th metatarsal         Electronically signed by: Emery De Los Santos MD   Date:    06/30/2025   Time:    08:10          ASSESSMENT & PLAN:     81 y/o M with a Hx HTN, DM2, ADRIÁN s/p bilateral CEA in 2014, colon adenocarcinoma ; s/p hemicolectomy and adjuvant chemo , RLE DVT and PE p/w a chronic 4th toe wound on the " medial aspect of the toe. General Surgery consulted for surgical evaluation d/t concern for possible osteomyelitis. Of note, XR of R foot remarkable for suspected acute osteomyelitis of the R 5th metatarsal head.    - Recommend non-invasive vascular studies to determine if pt has any vascular lesions that would be amenable  to possible endovascular treatment  - Recommend R foot MRI  - Continue IV Abx. Discussed potential need for toe amputation in the near future w patient pending the above.     Joni Jaramillo MD  [facility]  6/30/2025  1:13 PM         [1]   Social History  Tobacco Use   Smoking Status Former    Types: Cigarettes    Passive exposure: Past   Smokeless Tobacco Never

## 2025-07-01 LAB
ALBUMIN SERPL-MCNC: 2.9 G/DL (ref 3.4–4.8)
ALBUMIN/GLOB SERPL: 0.7 RATIO (ref 1.1–2)
ALP SERPL-CCNC: 107 UNIT/L (ref 40–150)
ALT SERPL-CCNC: 22 UNIT/L (ref 0–55)
ANION GAP SERPL CALC-SCNC: 10 MEQ/L
AST SERPL-CCNC: 24 UNIT/L (ref 11–45)
BASOPHILS # BLD AUTO: 0.06 X10(3)/MCL
BASOPHILS NFR BLD AUTO: 0.9 %
BILIRUB SERPL-MCNC: 0.3 MG/DL
BUN SERPL-MCNC: 16.9 MG/DL (ref 8.4–25.7)
C TRACH DNA SPEC QL NAA+PROBE: NOT DETECTED
CALCIUM SERPL-MCNC: 8.9 MG/DL (ref 8.8–10)
CHLORIDE SERPL-SCNC: 106 MMOL/L (ref 98–107)
CK SERPL-CCNC: 81 U/L (ref 30–200)
CO2 SERPL-SCNC: 24 MMOL/L (ref 23–31)
CREAT SERPL-MCNC: 0.97 MG/DL (ref 0.72–1.25)
CREAT/UREA NIT SERPL: 17
CRP SERPL-MCNC: 47.6 MG/L
EOSINOPHIL # BLD AUTO: 0.37 X10(3)/MCL (ref 0–0.9)
EOSINOPHIL NFR BLD AUTO: 5.3 %
ERYTHROCYTE [DISTWIDTH] IN BLOOD BY AUTOMATED COUNT: 13 % (ref 11.5–17)
GFR SERPLBLD CREATININE-BSD FMLA CKD-EPI: >60 ML/MIN/1.73/M2
GLOBULIN SER-MCNC: 4.2 GM/DL (ref 2.4–3.5)
GLUCOSE SERPL-MCNC: 95 MG/DL (ref 82–115)
HAV AB SER QL IA: REACTIVE
HAV IGM SERPL QL IA: NONREACTIVE
HBV CORE IGM SERPL QL IA: NONREACTIVE
HBV SURFACE AB SER-ACNC: 0.69 MIU/ML
HBV SURFACE AB SERPL IA-ACNC: NONREACTIVE M[IU]/ML
HBV SURFACE AG SERPL QL IA: NONREACTIVE
HCT VFR BLD AUTO: 35 % (ref 42–52)
HCV AB SERPL QL IA: NONREACTIVE
HGB BLD-MCNC: 11.6 G/DL (ref 14–18)
HIV 1+2 AB+HIV1 P24 AG SERPL QL IA: NONREACTIVE
IMM GRANULOCYTES # BLD AUTO: 0.04 X10(3)/MCL (ref 0–0.04)
IMM GRANULOCYTES NFR BLD AUTO: 0.6 %
LYMPHOCYTES # BLD AUTO: 1.9 X10(3)/MCL (ref 0.6–4.6)
LYMPHOCYTES NFR BLD AUTO: 27 %
MAGNESIUM SERPL-MCNC: 1.8 MG/DL (ref 1.6–2.6)
MCH RBC QN AUTO: 30.9 PG (ref 27–31)
MCHC RBC AUTO-ENTMCNC: 33.1 G/DL (ref 33–36)
MCV RBC AUTO: 93.3 FL (ref 80–94)
MONOCYTES # BLD AUTO: 0.79 X10(3)/MCL (ref 0.1–1.3)
MONOCYTES NFR BLD AUTO: 11.2 %
N GONORRHOEA DNA SPEC QL NAA+PROBE: NOT DETECTED
NEUTROPHILS # BLD AUTO: 3.88 X10(3)/MCL (ref 2.1–9.2)
NEUTROPHILS NFR BLD AUTO: 55 %
NRBC BLD AUTO-RTO: 0 %
PHOSPHATE SERPL-MCNC: 2.9 MG/DL (ref 2.3–4.7)
PLATELET # BLD AUTO: 316 X10(3)/MCL (ref 130–400)
PMV BLD AUTO: 10.5 FL (ref 7.4–10.4)
POCT GLUCOSE: 100 MG/DL (ref 70–110)
POCT GLUCOSE: 111 MG/DL (ref 70–110)
POCT GLUCOSE: 119 MG/DL (ref 70–110)
POCT GLUCOSE: 99 MG/DL (ref 70–110)
POTASSIUM SERPL-SCNC: 4.3 MMOL/L (ref 3.5–5.1)
PROT SERPL-MCNC: 7.1 GM/DL (ref 5.8–7.6)
RBC # BLD AUTO: 3.75 X10(6)/MCL (ref 4.7–6.1)
SODIUM SERPL-SCNC: 140 MMOL/L (ref 136–145)
SPECIMEN SOURCE: NORMAL
T PALLIDUM AB SER QL: NONREACTIVE
WBC # BLD AUTO: 7.04 X10(3)/MCL (ref 4.5–11.5)

## 2025-07-01 PROCEDURE — 63600175 PHARM REV CODE 636 W HCPCS: Performed by: INTERNAL MEDICINE

## 2025-07-01 PROCEDURE — 25000003 PHARM REV CODE 250

## 2025-07-01 PROCEDURE — 87491 CHLMYD TRACH DNA AMP PROBE: CPT | Performed by: NURSE PRACTITIONER

## 2025-07-01 PROCEDURE — 25000003 PHARM REV CODE 250: Performed by: INTERNAL MEDICINE

## 2025-07-01 PROCEDURE — 80053 COMPREHEN METABOLIC PANEL: CPT

## 2025-07-01 PROCEDURE — 36415 COLL VENOUS BLD VENIPUNCTURE: CPT

## 2025-07-01 PROCEDURE — 87040 BLOOD CULTURE FOR BACTERIA: CPT | Performed by: NURSE PRACTITIONER

## 2025-07-01 PROCEDURE — 80074 ACUTE HEPATITIS PANEL: CPT | Performed by: NURSE PRACTITIONER

## 2025-07-01 PROCEDURE — 63600175 PHARM REV CODE 636 W HCPCS

## 2025-07-01 PROCEDURE — 21400001 HC TELEMETRY ROOM

## 2025-07-01 PROCEDURE — 36415 COLL VENOUS BLD VENIPUNCTURE: CPT | Performed by: NURSE PRACTITIONER

## 2025-07-01 PROCEDURE — 86706 HEP B SURFACE ANTIBODY: CPT | Performed by: NURSE PRACTITIONER

## 2025-07-01 PROCEDURE — 86708 HEPATITIS A ANTIBODY: CPT | Performed by: NURSE PRACTITIONER

## 2025-07-01 PROCEDURE — 82550 ASSAY OF CK (CPK): CPT

## 2025-07-01 PROCEDURE — 87389 HIV-1 AG W/HIV-1&-2 AB AG IA: CPT | Performed by: NURSE PRACTITIONER

## 2025-07-01 PROCEDURE — 85025 COMPLETE CBC W/AUTO DIFF WBC: CPT

## 2025-07-01 PROCEDURE — 99900035 HC TECH TIME PER 15 MIN (STAT)

## 2025-07-01 PROCEDURE — 94761 N-INVAS EAR/PLS OXIMETRY MLT: CPT

## 2025-07-01 PROCEDURE — 86140 C-REACTIVE PROTEIN: CPT | Performed by: NURSE PRACTITIONER

## 2025-07-01 PROCEDURE — 84100 ASSAY OF PHOSPHORUS: CPT

## 2025-07-01 PROCEDURE — 83735 ASSAY OF MAGNESIUM: CPT

## 2025-07-01 PROCEDURE — 11000001 HC ACUTE MED/SURG PRIVATE ROOM

## 2025-07-01 PROCEDURE — 86780 TREPONEMA PALLIDUM: CPT | Performed by: NURSE PRACTITIONER

## 2025-07-01 RX ORDER — MUPIROCIN 20 MG/G
OINTMENT TOPICAL 2 TIMES DAILY
Status: COMPLETED | OUTPATIENT
Start: 2025-07-01 | End: 2025-07-06

## 2025-07-01 RX ORDER — DOCUSATE SODIUM 100 MG/1
100 CAPSULE, LIQUID FILLED ORAL DAILY
Status: COMPLETED | OUTPATIENT
Start: 2025-07-01 | End: 2025-07-01

## 2025-07-01 RX ORDER — LOSARTAN POTASSIUM 25 MG/1
25 TABLET ORAL DAILY
Status: DISCONTINUED | OUTPATIENT
Start: 2025-07-01 | End: 2025-07-05

## 2025-07-01 RX ORDER — CEFTRIAXONE 2 G/1
2 INJECTION, POWDER, FOR SOLUTION INTRAMUSCULAR; INTRAVENOUS
Status: DISCONTINUED | OUTPATIENT
Start: 2025-07-01 | End: 2025-07-08 | Stop reason: HOSPADM

## 2025-07-01 RX ADMIN — DOCUSATE SODIUM 100 MG: 100 CAPSULE, LIQUID FILLED ORAL at 05:07

## 2025-07-01 RX ADMIN — ENOXAPARIN SODIUM 40 MG: 40 INJECTION SUBCUTANEOUS at 05:07

## 2025-07-01 RX ADMIN — VANCOMYCIN HYDROCHLORIDE 1750 MG: 1.75 INJECTION, POWDER, LYOPHILIZED, FOR SOLUTION INTRAVENOUS at 05:07

## 2025-07-01 RX ADMIN — MUPIROCIN: 20 OINTMENT TOPICAL at 08:07

## 2025-07-01 RX ADMIN — CEFTRIAXONE SODIUM 2 G: 2 INJECTION, POWDER, FOR SOLUTION INTRAMUSCULAR; INTRAVENOUS at 05:07

## 2025-07-01 RX ADMIN — ATORVASTATIN CALCIUM 80 MG: 40 TABLET, FILM COATED ORAL at 08:07

## 2025-07-01 RX ADMIN — LOSARTAN POTASSIUM 25 MG: 25 TABLET, FILM COATED ORAL at 09:07

## 2025-07-01 RX ADMIN — AMLODIPINE BESYLATE 10 MG: 10 TABLET ORAL at 08:07

## 2025-07-01 RX ADMIN — ASPIRIN 81 MG CHEWABLE TABLET 81 MG: 81 TABLET CHEWABLE at 08:07

## 2025-07-01 NOTE — PROGRESS NOTES
[facility]   Clinic Note       CC: right 4th toe wound        HPI: 79 y/o M with a Hx HTN, DM2, ADRIÁN s/p bilateral CEA in 2014, colon adenocarcinoma ; s/p hemicolectomy and adjuvant chemo , RLE DVT and PE p/w a chronic 4th toe wound on the medial asopect. General Surgery consulted for surgical evaluation d/t concern for possible osteomyelitis. Pt reports he has had this wound for several months, which has gradually worsened. He denies any fevers,chills, drainage from the wound, or other wounds / ulcers in either of his legs. He is able to ambulate one block with a cane before having to stop to catch his breath, but denies caludication symptoms. Workup so far remarkable for XR of the R foot w suspected acute osteomyelitis of the distal R 5th metatarsal.       Physical Exam:   Vitals:    07/01/25 0410   BP: (!) 163/69   Pulse: 79   Resp: 19   Temp: 98.4 °F (36.9 °C)      Gen: NAD, AAOx3   Eye: EOMI   CV: RR  Pulm: NWOB on RA  Abd: soft, non-tender, non-distended  Ext:  Move all 4 extremities. R 4th toe lateral wound to PIP      Interval Labs:    Lab Results   Component Value Date/Time    WBC 7.04 07/01/2025 04:26 AM    HGB 11.6 (L) 07/01/2025 04:26 AM    HCT 35.0 (L) 07/01/2025 04:26 AM     07/01/2025 04:26 AM    INR 1.19 07/21/2018 03:00 PM      Lab Results   Component Value Date/Time     07/01/2025 04:26 AM    K 4.3 07/01/2025 04:26 AM     07/01/2025 04:26 AM    CO2 24 07/01/2025 04:26 AM    BUN 16.9 07/01/2025 04:26 AM    CREATININE 0.97 07/01/2025 04:26 AM    MG 1.80 07/01/2025 04:26 AM    PHOS 2.9 07/01/2025 04:26 AM             Interval Imaging:    None new       Interval Pathology:    Wound culture pending        A/P:   79 y/o M with a Hx HTN, DM2, ADRIÁN s/p bilateral CEA in 2014, colon adenocarcinoma ; s/p hemicolectomy and adjuvant chemo , RLE DVT and PE p/w a chronic 4th toe wound on the medial aspect of the toe. General Surgery consulted for surgical evaluation d/t concern for  possible osteomyelitis. Of note, XR of R foot remarkable for suspected acute osteomyelitis of the R 5th metatarsal head. ABIs and MRI reviewed.      - Recommend IV antibiotics and ID consult for osteomyelitis   - Agree with wound care consult   - Patient amenable to amputation. Will follow up ID recs and be available for amputation if patient does not want to pursue non operative management.     Katia Estbean MD   [facility], PGY3

## 2025-07-01 NOTE — CONSULTS
[facility]   Inpatient Infectious Diseases Consultation    Physician requesting consultation: Adebayo Miranda MD  Service requesting consultation: Internal Medicine  Reason for consultation: osteomyelitis right toe    Historian: Patient and Electronic Medical Record    Isolation Status: No active isolations     HPI:     Mr Hamilton is a 80 yr old male with past medical history that is significant for hypertension, hyperlipidemia, diabetes mellitus type 2 (hemoglobin A1c 6.4 from 06/30/2025), asthma, prior carotid endarterectomy, history of colon cancer, and retained MediPort who presented to the emergency room on 06/30/2025 for complaints of pain to his right foot especially between his 4th and 5th toes.  Patient reports pain had started about 2 weeks prior to presentation.  No known trauma.  Patient reports his foot was swelling, so he was soaking it in warm water and Epsom salt for about a week.  He reports it was so swollen he could not open his toes.  When he was able, he noticed the ulceration to the 4th toe and decided to go to the emergency room.  Upon presentation to the emergency room, 4th toe was noted to probe to bone and a bone fragment was removed.  Unfortunately, it was not sent for cultures.  X-ray of right foot was done and confirmed osteomyelitis to the 5th digit.  MRI of the right foot with and without contrast was also done 06/30/2025 and showed concerns for early osteomyelitis to the 5th toe and cellulitis to the lateral forefoot.  No mention was made of the 4th toe on either aspect of imaging.  Clinically 4th toe probes to bone, which is indicative of osteomyelitis.  Patient has been afebrile with no leukocytosis since presentation.  He is currently on no antibiotics.  ID has been consulted for recommendations regarding osteomyelitis.  Patient is being evaluated by Surgical Services.  Patient reports he will do whatever is in his best interest.  Wound culture was  obtained from the right foot and so far has no growth.  Spoke with wound care NP and she was concerned about dislocation or fracture to the 4th toe.  She confirms 4th toe does probe to bone.  Arterial vascular studies were done and showed patent arterial flow bilateral.  Patient reports at home he wears his slippers, or tennis shoes.  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  Patient reports he has a history of colon cancer which required colon resection and a couple of weeks of chemotherapy.  Patient states he has been cancer free for the past 5 years.  Patient still has a MediPort retained to the right upper chest wall which he reports is flushed every 3 months.  States last was flushed about 1 month ago.  MediPort without any overt signs of infection.  Patient lives in [place], [place] with his wife.  Denies recent travel/known sick contacts/odd hobbies. No pets.  Denies tobacco, alcohol, or illicit drug use to include IVDU.  Tdap up to date; 02/09/2022.      Antibiotic / Antiviral / Antifungal history:  none      Past Medical History/Past Surgical History/Social History     Past Medical History:   Diagnosis Date    Cancer     Colon    Carotid stenosis, bilateral     DM (diabetes mellitus)     Dupuytren's contracture of both hands     Encounter for blood transfusion     Hyperlipidemia     Hypertension     Mild intermittent asthma, uncomplicated        Past Surgical History:   Procedure Laterality Date    CAROTID ENDARTERECTOMY Bilateral     cataract surgery Bilateral     COLON RESECTION      COLONOSCOPY N/A 10/19/2022    Procedure: COLONOSCOPY;  Surgeon: Natalya Neely MD;  Location: [facility] ENDOSCOPY;  Service: Gastroenterology;  Laterality: N/A;    colostomy reversal      DUPUYTREN CONTRACTURE RELEASE Right 01/26/2024    Procedure: RELEASE, DUPUYTREN CONTRACTURE;  Surgeon: Bernabe Mensah MD;  Location: [facility] OR;  Service: Orthopedics;  Laterality: Right;    HAND SURGERY Right  01/2024    MEDIPORT INSERTION, SINGLE          FAMILY HISTORY:  family history includes Cancer in his sister; Hypertension in his brother and sister; Stroke in his brother and sister.     SOCIAL HISTORY:   Social History     Socioeconomic History    Marital status:      Spouse name: Ya Hamilton    Number of children: 4   Tobacco Use    Smoking status: Former     Types: Cigarettes     Passive exposure: Past    Smokeless tobacco: Never   Substance and Sexual Activity    Alcohol use: Not Currently    Drug use: Never    Sexual activity: Not Currently     Social Drivers of Health     Financial Resource Strain: Low Risk  (6/30/2025)    Overall Financial Resource Strain (CARDIA)     Difficulty of Paying Living Expenses: Not hard at all   Food Insecurity: No Food Insecurity (6/30/2025)    Hunger Vital Sign     Worried About Running Out of Food in the Last Year: Never true     Ran Out of Food in the Last Year: Never true   Transportation Needs: No Transportation Needs (6/30/2025)    PRAPARE - Transportation     Lack of Transportation (Medical): No     Lack of Transportation (Non-Medical): No   Physical Activity: Inactive (5/1/2024)    Exercise Vital Sign     Days of Exercise per Week: 0 days     Minutes of Exercise per Session: 0 min   Stress: No Stress Concern Present (6/30/2025)    [employer] - Occupational Stress Questionnaire     Feeling of Stress : Not at all   Housing Stability: Low Risk  (6/30/2025)    Housing Stability Vital Sign     Unable to Pay for Housing in the Last Year: No     Homeless in the Last Year: No        ALLERGIES: Review of patient's allergies indicates:  No Known Allergies      Review of Systems     Review of Systems   Constitutional:  Negative for chills, diaphoresis, fever and malaise/fatigue.   HENT:  Negative for congestion, ear pain, hearing loss and sore throat.    Eyes:  Negative for blurred vision, photophobia and pain.   Respiratory:  Negative for  cough, hemoptysis, sputum production and shortness of breath.    Cardiovascular:  Negative for chest pain, palpitations and leg swelling.   Gastrointestinal:  Negative for abdominal pain, constipation, diarrhea, nausea and vomiting.   Genitourinary:  Negative for dysuria, flank pain, frequency, hematuria and urgency.   Musculoskeletal:  Negative for back pain, joint pain, myalgias and neck pain.        Right foot pain and swelling     Skin:  Negative for itching and rash.   Neurological:  Negative for dizziness, weakness and headaches.   Endo/Heme/Allergies:  Does not bruise/bleed easily.   Psychiatric/Behavioral:  Negative for depression, hallucinations and substance abuse.          MEDICATIONS:   Scheduled Meds:   amLODIPine  10 mg Oral Daily    aspirin  81 mg Oral Daily    atorvastatin  80 mg Oral Daily    enoxparin  40 mg Subcutaneous Daily    losartan  25 mg Oral Daily     Continuous Infusions:  PRN Meds:.  Current Facility-Administered Medications:     acetaminophen, 650 mg, Oral, Q8H PRN    albuterol-ipratropium, 3 mL, Nebulization, Q6H PRN    dextrose 50%, 12.5 g, Intravenous, PRN    dextrose 50%, 25 g, Intravenous, PRN    glucagon (human recombinant), 1 mg, Intramuscular, PRN    glucose, 16 g, Oral, PRN    glucose, 24 g, Oral, PRN    hydrALAZINE, 10 mg, Intravenous, Q6H PRN    naloxone, 0.02 mg, Intravenous, PRN    sodium chloride 0.9%, 10 mL, Intravenous, Q12H PRN    Physical exam:     Temp:  [97.9 °F (36.6 °C)-98.4 °F (36.9 °C)] 98.1 °F (36.7 °C)  Pulse:  [64-85] 85  Resp:  [16-19] 18  SpO2:  [95 %-99 %] 95 %  BP: (150-164)/(65-80) 162/80     GENERAL: Elderly male who is A&Ox3, NAD, does not appear overtly ill  HEENT: atraumatic, normocephalic, anicteric, moist oral mucosa without lesions, exudate, or erythema; no thrush  LUNGS: breathing unlabored, lungs CTA bilateral  HEART: RRR; no murmur, rub, or gallop  ABDOMEN: abdomen soft, nondistended, nontender to palpation   : no CVA tenderness  EXTREMITIES:  no edema, dressing to right foot (see wound care note for pictures)  SKIN: no obvious rashes or lesions  NEURO: speech fluent and intact, facial symmetry preserved, no tremor  PSYCH: cooperative, normal mood and affect  LINES: PIV; Mediport to RT CW with no s/s infection       LABS:     I have personally reviewed patient's labs.  Pertinent results noted below.    CBC  Recent Labs     06/30/25  1012 07/01/25  0426   WBC 8.15 7.04   HGB 13.2* 11.6*   HCT 40.3* 35.0*    316       Differential  Recent Labs   Lab 07/01/25  0426   WBC 7.04   HGB 11.6*   HCT 35.0*           Basic Metabolic Panel  Recent Labs     06/30/25  1012 07/01/25  0426    140   K 4.2 4.3    106   CO2 23 24   BUN 13.1 16.9   CREATININE 1.01 0.97        Hepatic Panel  Lab Results   Component Value Date    ALT 22 07/01/2025    AST 24 07/01/2025    ALKPHOS 107 07/01/2025    BILITOT 0.3 07/01/2025        Urinalysis:  No results found for this or any previous visit.    Estimated Creatinine Clearance: 58.7 mL/min (based on SCr of 0.97 mg/dL).     MICRO AND PATHOLOGY:   Colonization:  06/30/2025 right foot wound culture NGTD         IMAGING:     I have personally reviewed patient's imaging. Pertinent results noted below.  MRI Foot (Forefoot) Right W W/O Contrast   Final Result      1. Possibly reactive edema in the 5th proximal phalanx and distal 5th metatarsal head.  There is no enhancement or abnormal T1 signal associated and no cortical destruction is present.  Early osteomyelitis is in the differential.   2. Mild cellulitis over the lateral forefoot.         Electronically signed by: Emery De Los Santos MD   Date:    06/30/2025   Time:    15:20      X-Ray Foot Complete Right   Final Result      Suspect acute osteomyelitis of the distal 5th metatarsal         Electronically signed by: Emery De Los Santos MD   Date:    06/30/2025   Time:    08:10          Arterial vascular studies 06/30/2025:   Interpretation Summary  The right lower  extremity arterial system is patent with no evidence of focal stenosis or occlusion.  The left lower extremity arterial system is patent with no evidence of focal stenosis or occlusion.        IMPRESSION     Osteomyelitis right foot (4th toe - probes to bone; 5th toe - confirmed per MRI)  Right foot cellulitis  Diabetic ulceration 4th toe   Diabetes mellitus type 2   History of colon cancer  Retained MediPort to right chest wall      Osteomyelitis right foot (4th and 5th toes) in the setting of diabetic ulceration.  Osteomyelitis confirmed by bone probe, Xray, and MRI.  Arterial vascular studies patent.    Wound culture right foot NGTD        RECOMMENDATIONS:      Follow up culture results; awaiting results  Patient with obvious osteomyelitis to right 4th and 5th toes.  Patient was evaluated by Surgical Services and feel that conservative management is warranted at this time.  Agree with this.  There is no benefit to patient in having surgical intervention to remove only one digit as patient would still have to complete a 6 week course of antimicrobial therapy for osteomyelitis.  If surgical intervention would be pursued then amputation of 4th and 5th digits would be recommended.    Discussion was had with wound care NP and feel conservative therapy with IV antibiotics is feasible at this time.  Patient has diabetes that is controlled and no current blood flow issues.  Discussed with patient  and they agree to conservative management with 6 weeks of IV antibiotics.  Will monitor closely.  Patient is aware that if symptoms worsen or progress, that amputation can be done at that time.  Recommend 6 weeks of IV antibiotics for treatment of osteomyelitis with empiric Vancomycin IV (trough 15-20) + Rocephin 2 grams IV daily.    Final plan to come after culture finalization.    Patient will discuss LTAC vs home OPAT with wife (she was no present on either of my exams today)  Anaerobic wound culture added per ID to culture  sample from yesterday   ID has also added blood cultures x 2 to be done prior to starting antibiotics   Once blood cultures are negative at 48 hours, patient can have PICC placed  Patient needs continued wound care and strict diabetic control  Thank you for the consult. We will follow along with you. Please do not hesitate to call with any questions or concerns.         EFRAIN Singh  [facility] Infectious Diseases     *Portions of this note dictated using EMR integrated voice recognition software, and may be subject to voice recognition errors not corrected at proofreading. Please contact writer for clarification if needed. *

## 2025-07-01 NOTE — PROGRESS NOTES
"Pharmacokinetic Initial Assessment: IV Vancomycin    Assessment/Plan:    Initiate intravenous vancomycin with loading dose of 1750 mg once followed by a maintenance dose of vancomycin 1500 mg IV every 24 hours  Desired empiric serum trough concentration is 15 to 20 mcg/mL  Draw vancomycin trough level 60 min prior to third dose on 7/3 at approximately 1600  Pharmacy will continue to follow and monitor vancomycin.      Please contact pharmacy at extension [phone] with any questions regarding this assessment.     Thank you for the consult,   Carisa Jacques       Patient brief summary:  Riccardo Hamilton is a 80 y.o. male initiated on antimicrobial therapy with IV Vancomycin for treatment of suspected bone/joint infection    Drug Allergies:   Review of patient's allergies indicates:  No Known Allergies    Actual Body Weight:   72.6 kg    Renal Function:   Estimated Creatinine Clearance: 58.7 mL/min (based on SCr of 0.97 mg/dL).,     Dialysis Method (if applicable):  N/A    CBC (last 72 hours):  Recent Labs   Lab Result Units 06/30/25  1012 07/01/25  0426   WBC x10(3)/mcL 8.15 7.04   Hgb g/dL 13.2* 11.6*   Hemoglobin A1c % 6.4  --    Hct % 40.3* 35.0*   Platelet x10(3)/mcL 354 316   Mono % % 7.2 11.2   Eos % % 3.8 5.3   Basophil % % 0.7 0.9       Metabolic Panel (last 72 hours):  Recent Labs   Lab Result Units 06/30/25  1012 07/01/25  0426   Sodium mmol/L 137 140   Potassium mmol/L 4.2 4.3   Chloride mmol/L 107 106   CO2 mmol/L 23 24   Glucose mg/dL 118* 95   Blood Urea Nitrogen mg/dL 13.1 16.9   Creatinine mg/dL 1.01 0.97   Albumin g/dL 3.4 2.9*   Bilirubin Total mg/dL 0.3 0.3   ALP unit/L 121 107   AST unit/L 28 24   ALT unit/L 26 22   Magnesium Level mg/dL  --  1.80   Phosphorus Level mg/dL  --  2.9       Drug levels (last 3 results):  No results for input(s): "VANCOMYCINRA", "VANCORANDOM", "VANCOMYCINPE", "VANCOPEAK", "VANCOMYCINTR", "VANCOTROUGH" in the last 72 hours.    Microbiologic Results:  Microbiology Results (last 7 " days)       Procedure Component Value Units Date/Time    Blood Culture [[id number]] Collected: 07/01/25 1456    Order Status: Sent Specimen: Blood from Antecubital, Right Updated: 07/01/25 1522    Blood Culture [[id number]] Collected: 07/01/25 1456    Order Status: Sent Specimen: Blood from Hand, Left Updated: 07/01/25 1522    Anaerobic Culture [[id number]] Collected: 06/30/25 1023    Order Status: Sent Specimen: Wound from Foot, Right Updated: 07/01/25 1133    Chlamydia/GC, PCR [[id number]]     Order Status: Sent Specimen: Urine     Wound Culture [[id number]] Collected: 06/30/25 1023    Order Status: Completed Specimen: Wound from Foot, Right Updated: 07/01/25 0711     Wound Culture No Growth At 24 Hours

## 2025-07-01 NOTE — PROGRESS NOTES
Inpatient Nutrition Assessment    Admit Date: 6/30/2025   Total duration of encounter: 1 day   Patient Age: 80 y.o.    Nutrition Recommendation/Prescription     Carb consistent diet  Boost Max (provides 160 kcal, 30 g protein per serving) once daily to supplement protein  Monitor Weight Weekly     Communication of Recommendations: reviewed with nurse, reviewed with patient, and reviewed with family    Nutrition Assessment     Malnutrition Assessment/Nutrition-Focused Physical Exam    Malnutrition Context: acute illness or injury (07/01/25 1125)  Malnutrition Level: other (see comments) (Does not meet criteria) (07/01/25 1125)     Weight Loss (Malnutrition): other (see comments) (Does not meet criteria) (07/01/25 1125)     Orbital Region (Subcutaneous Fat Loss): well nourished           [place] (Muscle Loss): well nourished                       Fluid Accumulation (Malnutrition): other (see comments) (Not present) (07/01/25 1125)        A minimum of two characteristics is recommended for diagnosis of either severe or non-severe malnutrition.    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     Scheduled Medications:  amLODIPine, 10 mg, Daily  aspirin, 81 mg, Daily  atorvastatin, 80 mg, Daily  enoxparin, 40 mg, Daily  losartan, 25 mg, Daily    Continuous Infusions:   PRN Medications:  acetaminophen, 650 mg, Q8H PRN  albuterol-ipratropium, 3 mL, Q6H PRN  dextrose 50%, 12.5 g, PRN  dextrose 50%, 25 g, PRN  glucagon (human recombinant), 1 mg, PRN  glucose, 16 g, PRN  glucose, 24  "g, PRN  hydrALAZINE, 10 mg, Q6H PRN  naloxone, 0.02 mg, PRN  sodium chloride 0.9%, 10 mL, Q12H PRN    Calorie Containing IV Medications: no significant kcals from medications at this time    Recent Labs   Lab 25  1012 25  0426    140   K 4.2 4.3   CALCIUM 9.8 8.9   PHOS  --  2.9   MG  --  1.80    106   CO2 23 24   BUN 13.1 16.9   CREATININE 1.01 0.97   EGFRNORACEVR >60 >60   * 95   BILITOT 0.3 0.3   ALKPHOS 121 107   ALT 26 22   AST 28 24   ALBUMIN 3.4 2.9*   HGBA1C 6.4  --    WBC 8.15 7.04   HGB 13.2* 11.6*   HCT 40.3* 35.0*     Nutrition Orders:  Diet Consistent Carbohydrate 2000 Calories (up to 75 gm per meal)  Dietary nutrition supplements Daily; Boost Glucose Control Max 30G Protein Nutritional Drink - Vanilla    Appetite/Oral Intake: good/50-75% of meals  Factors Affecting Nutritional Intake: none identified  Social Needs Impacting Access to Food: none identified  Food/[facility]/Cultural Preferences: none reported  Food Allergies: no known food allergies  Last Bowel Movement: 25  Wound(s):  Right 4th toe ulcer    Comments    25 -- Pt reports good appetite & eating well, 50% meal intake documented -- will monitor; pt denies difficulty eating; no GI symptoms; pt with right 4th toe ulcer, agreeable to protein supplement; Continue Carb consistent diet for management of DM    Anthropometrics    Height: 5' 9" (175.3 cm), Height Method: Measured  Last Weight: 72.6 kg (160 lb 1.6 oz) (25 0650), Weight Method: Standard Scale  BMI (Calculated): 23.6  BMI Classification: normal (BMI 18.5-24.9)        Ideal Body Weight (IBW), Male: 160 lb     % Ideal Body Weight, Male (lb): 100.06 %                 Usual Body Weight (UBW), k kg (160-170 lb)  % Usual Body Weight: 97.03     Usual Weight Provided By: patient and EMR weight history    Wt Readings from Last 5 Encounters:   25 72.6 kg (160 lb 1.6 oz)   06/10/25 72.4 kg (159 lb 9.6 oz)   25 73.4 kg (161 lb 12.8 oz) "   03/03/25 75.3 kg (166 lb)   01/28/25 75.3 kg (166 lb 0.1 oz)     Weight Change(s) Since Admission: new admit  Wt Readings from Last 1 Encounters:   06/30/25 0650 72.6 kg (160 lb 1.6 oz)   Admit Weight: 72.6 kg (160 lb 1.6 oz) (06/30/25 0650), Weight Method: Standard Scale    Estimated Needs    Weight Used For Calorie Calculations: 72.7 kg (160 lb 4.4 oz)  Energy Calorie Requirements (kcal): 1382-5235 kcal (28 - 30 kcal/kg)  Energy Need Method: Kcal/kg  Weight Used For Protein Calculations: 72.7 kg (160 lb 4.4 oz)  Protein Requirements:  gm (1.2 - 1.4 gm/kg)  Fluid Requirements (mL): 5282-5807 ml (1ml/kcal)  CHO Requirement: 230-245 gm/d (45% EER)     Enteral Nutrition     Patient not receiving enteral nutrition at this time.    Parenteral Nutrition     Patient not receiving parenteral nutrition support at this time.    Evaluation of Received Nutrient Intake    Calories: meeting estimated needs  Protein: not meeting estimated needs    Patient Education     Not applicable.    Nutrition Diagnosis     PES: Increased nutrient needs (protein) related to increased protein energy demand for wound healing as evidenced by right 4th toe ulcer. (new)     PES:            Nutrition Interventions     Intervention(s): modified composition of meals/snacks, commercial beverage, and collaboration with other providers  Intervention(s):      Goal: Meet greater than 80% of nutritional needs by follow-up. (new)  Goal: Consume % of oral supplements by follow-up. (new)    Nutrition Goals & Monitoring     Dietitian will monitor: food and beverage intake, weight, and glucose/endocrine profile  Discharge planning: continue carb consistent diet with protein oral supplements  Nutrition Risk/Follow-Up: dietitian will follow-up one time per week   Please consult if re-assessment needed sooner.

## 2025-07-01 NOTE — PLAN OF CARE
07/01/25 1152   Discharge Assessment   Assessment Type Discharge Planning Assessment   Confirmed/corrected address, phone number and insurance Yes   Source of Information patient;family   People in Home spouse   Name(s) of People in Home Ya Hamilton (Wife)   Do you expect to return to your current living situation? Yes   Do you have help at home or someone to help you manage your care at home? Yes   Who are your caregiver(s) and their phone number(s)? Ya Hamilton (Wife), daughter Yolie Pedroza [phone]   Prior to hospitilization cognitive status: Alert/Oriented   Current cognitive status: Alert/Oriented   Walking or Climbing Stairs Difficulty no   Dressing/Bathing Difficulty no   Equipment Currently Used at Home none   Readmission within 30 days? No   Patient currently being followed by outpatient case management? No   Do you currently have service(s) that help you manage your care at home? No   Do you take prescription medications? Yes   Do you have prescription coverage? Yes   Do you have any problems affording any of your prescribed medications? No   Is the patient taking medications as prescribed? yes   Who is going to help you get home at discharge? family   How do you get to doctors appointments? car, drives self   Are you on dialysis? No   Discharge Plan A Home with family   DME Needed Upon Discharge  none   Transition of Care Barriers None   Physical Activity   On average, how many days per week do you engage in moderate to strenuous exercise (like a brisk walk)? 0 days   On average, how many minutes do you engage in exercise at this level? 0 min   Financial Resource Strain   How hard is it for you to pay for the very basics like food, housing, medical care, and heating? Not hard   Housing Stability   In the last 12 months, was there a time when you were not able to pay the mortgage or rent on time? N   At any time in the past 12 months, were you homeless or living in a shelter (including now)? N    Transportation Needs   In the past 12 months, has lack of transportation kept you from medical appointments or from getting medications? no   Food Insecurity   Within the past 12 months, you worried that your food would run out before you got the money to buy more. Never true   Within the past 12 months, the food you bought just didn't last and you didn't have money to get more. Never true   Stress   Do you feel stress - tense, restless, nervous, or anxious, or unable to sleep at night because your mind is troubled all the time - these days? Not at all   Social Isolation   How often do you feel lonely or isolated from those around you?  Never   Alcohol Use   Q1: How often do you have a drink containing alcohol? Never   Q2: How many drinks containing alcohol do you have on a typical day when you are drinking? None   Q3: How often do you have six or more drinks on one occasion? Never   Utilities   In the past 12 months has the electric, gas, oil, or water company threatened to shut off services in your home? No   Health Literacy   How often do you need to have someone help you when you read instructions, pamphlets, or other written material from your doctor or pharmacy? Never

## 2025-07-01 NOTE — PROGRESS NOTES
[facility] and [facility]   Inpatient Wound Care Progress Note            HPI:     Riccardo Hamilton is a 80 y.o. male with a history of PMH of colonic adenocarcinoma, HTN, HLD, T2DM, Asthma, Retropharyngeal Carotid Bypass (2014), Carotid Endarterectomy (1/2014; 2/2014), and previous RLE DVT w/ subacute small PE of right lung  who presented to [facility] ED on 6/30/2025  with complaint of 2 weeks of pain between his 4th and 5th toes of his right foot. Per pt he is not sure exactly when the wound started. Pt denies that he is DM and says he doesn't take any meds for DM. A1C is 6.4. Will attempt to clarify with IM team. Wound is noted to probe to bone and appears to be somewhat dislocated or fractured. Osteo noted in Xray. Unable to obtain tissue for culture, swab culture obtained. Surgery team consulted to evaluate. Difficulty obtaining pulses to right DP. Discussed with IM team with plans to order ABIs.       Interval History:    MRI showing osteo to right 5th toe. IM team clarified with radiology noting osteo to 4th toe as well. 4th toe remains with positive probe to bone. Remains without purulence or erythema. 5th toes is noted as intact with no skin changes. ABIs showing non compressible vessels and arterial US with noevidence of focal stenosis or occlusion. Case discussed with IM, ID and surgery teams. Plans to attempt management with antibiotics and wound care. Will monitor outpatient and should worsening occur, will reconsider surgical options. Pt will need HH upon DC. Teaching initiated with wife today to provide supplemental wound care at home, competence noted. Will continue to reinforce teaching. Will provide Darco shoe for WBAT.         Past Medical History/Past Surgical History/Social History     See HPI for pertinent history.    ALLERGIES: Review of patient's allergies indicates:  No Known Allergies      Review of Systems:     Review of Systems   Constitutional:  Negative for chills and fever.    Respiratory:  Negative for shortness of breath.    Cardiovascular:  Negative for chest pain and leg swelling.   Integumentary:  Positive for wound.   Neurological:  Negative for numbness    ROS negative except for above.      MEDICATIONS: See MAR      Physical exam:     Temp:  [98 °F (36.7 °C)-98.4 °F (36.9 °C)] 98.2 °F (36.8 °C)  Pulse:  [64-85] 78  Resp:  [16-19] 18  SpO2:  [95 %-98 %] 98 %  BP: (137-164)/(65-80) 137/72     GENERAL: A&Ox3, NAD,   HEENT: atraumatic, normocephalic, anicteric, moist oral mucosa without lesions, exudate, or erythema  LUNGS: breathing unlabored, lungs CTA bilateral  HEART: RRR; no murmur, rub, or gallop  ABDOMEN: abdomen soft, nondistended, BS noted x 4 quadrants, no tympany, no rebound, guarding, or organomegaly  : no CVA tenderness  EXTREMITIES: no edema, clubbing, or cyanosis. Unable to palpate right DP pulse, able to find with doppler   SKIN: see wound assessment   NEURO: speech fluent and intact, facial symmetry preserved, no tremor  PSYCH: cooperative, normal mood and affect        Labs:     I have personally reviewed patient's labs.  Pertinent results noted below.      Recent Labs   Lab 07/01/25  0426   WBC 7.04   HGB 11.6*   HCT 35.0*           Recent Labs     06/30/25  1012 07/01/25  0426    140   K 4.2 4.3    106   CO2 23 24   BUN 13.1 16.9   CREATININE 1.01 0.97        Recent Labs   Lab 06/30/25  1012   HGBA1C 6.4       Microbiology Results (last 7 days)       Procedure Component Value Units Date/Time    Anaerobic Culture [[id number]] Collected: 06/30/25 1023    Order Status: Sent Specimen: Wound from Foot, Right Updated: 07/01/25 1133    Chlamydia/GC, PCR [[id number]]     Order Status: Sent Specimen: Urine     Wound Culture [[id number]] Collected: 06/30/25 1023    Order Status: Completed Specimen: Wound from Foot, Right Updated: 07/01/25 0711     Wound Culture No Growth At 24 Hours              Wound Assessment:   4th toe remains with positive probe  to bone. Remains without purulence or erythema. 5th toes is noted as intact with no skin changes. Area is cleaned and dressed. New orders placed.       Imaging:     I have personally reviewed patient's imaging. Pertinent results noted below.  MRI Foot (Forefoot) Right W W/O Contrast   Final Result      1. Possibly reactive edema in the 5th proximal phalanx and distal 5th metatarsal head.  There is no enhancement or abnormal T1 signal associated and no cortical destruction is present.  Early osteomyelitis is in the differential.   2. Mild cellulitis over the lateral forefoot.         Electronically signed by: Emery De Los Santos MD   Date:    06/30/2025   Time:    15:20      X-Ray Foot Complete Right   Final Result      Suspect acute osteomyelitis of the distal 5th metatarsal         Electronically signed by: Emery De Los Santos MD   Date:    06/30/2025   Time:    08:10            Impression:     Right lateral 4th toe ulcer- Arterial vs DM. Probes to bone.     Plan/Recommendations:         Daily wound care. Culture pending. Surgery and ID consult following. Likely to manage with IV antibiotics and wound care at this time.        The time spent including preparing to see the patient, obtaining patient history and assessment, evaluation of the plan of care, patient/caregiver counseling and education, orders, documentation, coordination of care, and other professional medical management activities for today's encounter was 35 minutes.        Tessa SAEZ-BC, WCC, DWC  [facility] Inpatient Woundcare

## 2025-07-01 NOTE — PROGRESS NOTES
[facility] Medicine Wards   Progress Note     Resident Team: [facility] Medicine List 1  Attending Physician: Adebayo Miranda MD  Resident: Allyn Berg  Intern: David Ortiz  Date of Admit: 6/30/2025    Subjective:      Brief HPI:  Riccardo Hamilton is a 80 y.o. male with a history of PMH of colonic adenocarcinoma (followed by Oncology; s/p hemicolectomy and adjuvant chemo), HTN, HLD, T2DM, Asthma, Retropharyngeal Carotid Bypass (2014), Carotid Endarterectomy (1/2014; 2/2014), and previous RLE DVT w/ subacute small PE of R. Lung (s/p x3 month Eliquis txt)  who presented to [facility] ED on 6/30/2025  with complaint of 2 weeks of pain between his 4th and 5th toes of his right foot. Patient states that he noticed the pain and wound 2 weeks ago and did not see any discharge/drainage from the wound. He states that he did not have any trauma to the toe. He has been wearing tennis shoes for the past few weeks. He does not recall ever seeing the wound prior or having any similar events in the past. He denies ever having osteomyelitis in the past. In the ED, Xray was done and showed suspected acute osteomyelitis of the distal 5th metatarsal. Internal medicine was consulted for management of suspected osteomyelitis     Interval History:   No acute events overnight. BP has been slightly elevated with systolic in the 160s. Patient has no complaints today. He reports that he would be amenable to surgery if required. Wound care, surgery, and ID is on board. Discussed with surgery, who recommends trial of antibiotics before proceeding to surgery. Discussed with radiology who states that osteomyelitis was seen on MRI of both the 4th and 5th metatarsals.     Review of Systems:  12 point review of symptoms negative unless otherwise stated above       Objective:     Vital Signs (Most Recent):  Temp: 98.2 °F (36.8 °C) (07/01/25 1159)  Pulse: 78 (07/01/25 1159)  Resp: 18 (07/01/25 1159)  BP: 137/72 (07/01/25 1159)  SpO2: 98 % (07/01/25 1159) Vital Signs  (24h Range):  Temp:  [98 °F (36.7 °C)-98.4 °F (36.9 °C)] 98.2 °F (36.8 °C)  Pulse:  [64-85] 78  Resp:  [16-19] 18  SpO2:  [95 %-98 %] 98 %  BP: (137-164)/(65-80) 137/72      Physical Exam:  GEN: A&Ox4. No acute distress   HEENT: normocephalic, atraumatic. PERRLA. EOMI bilaterally. Sclera, conjunctiva clear.   CARDIAC: S1 S2, no MRG. Radial pulses full, no LE edema   RESPI: Chest wall rise symmetric, atraumatic. Lungs CTAB, no wheezing or rhonchi   ABDOMEN: soft, nontender. No herniation, masses  : deferred   MSK: ROM grossly intact.   NEURO: Motor and sensation grossly intact. CN grossly intact. No cerebellar deficits noted. No gait abnormalities noted.   PSYCH: Memory and thought process appear intact. Appropriate mood and affect.   INTEGUMENTARY: ulceration on the 4th toe on the right foot. No purulent drainage from wound site.       Laboratory    CBC  Recent Labs   Lab 06/30/25  1012 07/01/25  0426   WBC 8.15 7.04   RBC 4.17* 3.75*   HGB 13.2* 11.6*   HCT 40.3* 35.0*   MCV 96.6* 93.3   MCH 31.7* 30.9   MCHC 32.8* 33.1   RDW 13.2 13.0    316   MPV 9.7 10.5*       CMP   Recent Labs   Lab 06/30/25  1012 07/01/25  0426    140   K 4.2 4.3   CO2 23 24   BUN 13.1 16.9   CREATININE 1.01 0.97   * 95   CALCIUM 9.8 8.9   MG  --  1.80   ALBUMIN 3.4 2.9*   PROT 8.3* 7.1   ALKPHOS 121 107   ALT 26 22   AST 28 24   BILITOT 0.3 0.3        Microbiology:  Microbiology Results (last 7 days)       Procedure Component Value Units Date/Time    Anaerobic Culture [[id number]] Collected: 06/30/25 1023    Order Status: Sent Specimen: Wound from Foot, Right Updated: 07/01/25 1133    Chlamydia/GC, PCR [[id number]]     Order Status: Sent Specimen: Urine     Wound Culture [[id number]] Collected: 06/30/25 1023    Order Status: Completed Specimen: Wound from Foot, Right Updated: 07/01/25 0711     Wound Culture No Growth At 24 Hours            Cardiac Data:  No echocardiogram results found for the past 14 days.    Results  for orders placed or performed in visit on 06/10/25   IN OFFICE EKG 12-LEAD (to [place])    Collection Time: 06/10/25 11:47 AM   Result Value Ref Range    QRS Duration 90 ms    [facility] QTC Calculation 413 ms    Narrative    Test Reason : I10,    Vent. Rate :  66 BPM     Atrial Rate :  66 BPM     P-R Int : 214 ms          QRS Dur :  90 ms      QT Int : 394 ms       P-R-T Axes :  55 -19  70 degrees    QTcB Int : 413 ms    Sinus rhythm with marked sinus arrythmia with 1st degree A-V block  Septal infarct (cited on or before 11-Jan-2024)  Abnormal ECG  When compared with ECG of 11-Jul-2024 09:34,  No significant change was found  Confirmed by Iris Page ([id number]) on 6/11/2025 5:17:26 PM    Referred By:            Confirmed By: Iris Page        Radiology:  Imaging Results              X-Ray Foot Complete Right (Final result)  Result time 06/30/25 08:10:47      Final result by Emery De Los Santos MD (06/30/25 08:10:47)                   Impression:      Suspect acute osteomyelitis of the distal 5th metatarsal      Electronically signed by: Emery De Los Santos MD  Date:    06/30/2025  Time:    08:10               Narrative:    EXAMINATION:  Three views right foot    CLINICAL HISTORY:  Open wound 5th toe    COMPARISON:  09/20/2021    FINDINGS:  There is mild cortical lucency along the plantar aspect of the distal 5th metatarsal head.  No acute fracture or dislocation.                                      Current Medications:     Infusions:       Scheduled:   amLODIPine  10 mg Oral Daily    aspirin  81 mg Oral Daily    atorvastatin  80 mg Oral Daily    enoxparin  40 mg Subcutaneous Daily    losartan  25 mg Oral Daily        PRN:    Current Facility-Administered Medications:     acetaminophen, 650 mg, Oral, Q8H PRN    albuterol-ipratropium, 3 mL, Nebulization, Q6H PRN    dextrose 50%, 12.5 g, Intravenous, PRN    dextrose 50%, 25 g, Intravenous, PRN    glucagon (human recombinant), 1 mg, Intramuscular, PRN    glucose, 16 g, Oral, PRN     glucose, 24 g, Oral, PRN    hydrALAZINE, 10 mg, Intravenous, Q6H PRN    naloxone, 0.02 mg, Intravenous, PRN    sodium chloride 0.9%, 10 mL, Intravenous, Q12H PRN    Antibiotics and Day Number of Therapy:        Intake/Output Summary (Last 24 hours) at 7/1/2025 1208  Last data filed at 7/1/2025 0959  Gross per 24 hour   Intake 476 ml   Output 1000 ml   Net -524 ml       Lines/Drains/Airways       Central Venous Catheter Line  Duration             Port A Cath Single Lumen right subclavian -- days              Peripheral Intravenous Line  Duration             Peripheral IV Single Lumen 06/30/25 0850 20 G Left Antecubital 1 day                      Assessment & Plan:   Osteomyelitis   - Xray of the right foot: Suspected acute osteomyelitis of the distal 5th metatarsal  - ED and wound care reported probe to bone    - Ordered wound culture which showed NG@24h  - Consulted surgery and wound care. Will appreciate all recommendations.  - Discussed with surgery, states recommendation is to start trial of antibiotics first and to re-evaluate for surgery if no improvement in wound  - Discussed with radiology who re examined the MRI and states that both the 5th and 4th metatarsals had osteomyelitis.   - Consulted Infectious Disease. Will appreciate all recommendations.      Previously documented T2DM  - A1c 6.4.    - Currently not on any home medications   - had 1 previous A1c in the past in 2018 being 6.6 with all others being less than 6.5    HTN  - Continue amlodipine 10mg   - Will start losartan 25mg daily      Hx of colonic adenocarcinoma   Hx of DVT and  PE   - Completed 3 month treatment with Eliquis   - follows with Heme/oncology at [facility]        CODE STATUS:  Full  Access:  PIV  Antibiotics:  n/a  Diet:  Regular  DVT Prophylaxis:  Lovenox  GI Prophylaxis:  na  Fluids:  na     Dispo: Patient is admitted for osteomyelitis workup and management. Surgery, infectious disease, and wound care following.       Rodrick Stephens,  DO  Internal Medicine - PGY-2

## 2025-07-02 LAB
ABS NEUT CALC (OHS): 7.1 X10(3)/MCL (ref 2.1–9.2)
ALBUMIN SERPL-MCNC: 2.7 G/DL (ref 3.4–4.8)
ALBUMIN/GLOB SERPL: 0.7 RATIO (ref 1.1–2)
ALP SERPL-CCNC: 110 UNIT/L (ref 40–150)
ALT SERPL-CCNC: 20 UNIT/L (ref 0–55)
ANION GAP SERPL CALC-SCNC: 6 MEQ/L
AST SERPL-CCNC: 24 UNIT/L (ref 11–45)
BILIRUB SERPL-MCNC: 0.2 MG/DL
BUN SERPL-MCNC: 21.6 MG/DL (ref 8.4–25.7)
CALCIUM SERPL-MCNC: 8.6 MG/DL (ref 8.8–10)
CHLORIDE SERPL-SCNC: 108 MMOL/L (ref 98–107)
CO2 SERPL-SCNC: 24 MMOL/L (ref 23–31)
CREAT SERPL-MCNC: 0.94 MG/DL (ref 0.72–1.25)
CREAT/UREA NIT SERPL: 23
ERYTHROCYTE [DISTWIDTH] IN BLOOD BY AUTOMATED COUNT: 13.1 % (ref 11.5–17)
ERYTHROCYTE [SEDIMENTATION RATE] IN BLOOD: 72 MM/HR (ref 0–15)
GFR SERPLBLD CREATININE-BSD FMLA CKD-EPI: >60 ML/MIN/1.73/M2
GLOBULIN SER-MCNC: 4 GM/DL (ref 2.4–3.5)
GLUCOSE SERPL-MCNC: 91 MG/DL (ref 82–115)
HCT VFR BLD AUTO: 32.8 % (ref 42–52)
HGB BLD-MCNC: 11.1 G/DL (ref 14–18)
LYMPHOCYTES NFR BLD MANUAL: 0.23 X10(3)/MCL (ref 0.6–4.6)
LYMPHOCYTES NFR BLD MANUAL: 3 % (ref 13–40)
MAGNESIUM SERPL-MCNC: 1.7 MG/DL (ref 1.6–2.6)
MCH RBC QN AUTO: 31.4 PG (ref 27–31)
MCHC RBC AUTO-ENTMCNC: 33.8 G/DL (ref 33–36)
MCV RBC AUTO: 92.9 FL (ref 80–94)
MONOCYTES NFR BLD MANUAL: 0.39 X10(3)/MCL (ref 0.1–1.3)
MONOCYTES NFR BLD MANUAL: 5 % (ref 2–11)
NEUTROPHILS NFR BLD MANUAL: 92 % (ref 47–80)
NRBC BLD AUTO-RTO: 0 %
PHOSPHATE SERPL-MCNC: 2.7 MG/DL (ref 2.3–4.7)
PLATELET # BLD AUTO: 335 X10(3)/MCL (ref 130–400)
PLATELET # BLD EST: NORMAL 10*3/UL
PMV BLD AUTO: 10.1 FL (ref 7.4–10.4)
POCT GLUCOSE: 117 MG/DL (ref 70–110)
POCT GLUCOSE: 121 MG/DL (ref 70–110)
POCT GLUCOSE: 123 MG/DL (ref 70–110)
POCT GLUCOSE: 91 MG/DL (ref 70–110)
POTASSIUM SERPL-SCNC: 3.9 MMOL/L (ref 3.5–5.1)
PROT SERPL-MCNC: 6.7 GM/DL (ref 5.8–7.6)
RBC # BLD AUTO: 3.53 X10(6)/MCL (ref 4.7–6.1)
RBC MORPH BLD: NORMAL
SODIUM SERPL-SCNC: 138 MMOL/L (ref 136–145)
WBC # BLD AUTO: 7.72 X10(3)/MCL (ref 4.5–11.5)

## 2025-07-02 PROCEDURE — 85027 COMPLETE CBC AUTOMATED: CPT

## 2025-07-02 PROCEDURE — 63600175 PHARM REV CODE 636 W HCPCS

## 2025-07-02 PROCEDURE — 84100 ASSAY OF PHOSPHORUS: CPT

## 2025-07-02 PROCEDURE — 63600175 PHARM REV CODE 636 W HCPCS: Performed by: INTERNAL MEDICINE

## 2025-07-02 PROCEDURE — 83735 ASSAY OF MAGNESIUM: CPT

## 2025-07-02 PROCEDURE — 85652 RBC SED RATE AUTOMATED: CPT | Performed by: NURSE PRACTITIONER

## 2025-07-02 PROCEDURE — 11000001 HC ACUTE MED/SURG PRIVATE ROOM

## 2025-07-02 PROCEDURE — 99900035 HC TECH TIME PER 15 MIN (STAT)

## 2025-07-02 PROCEDURE — 25000003 PHARM REV CODE 250

## 2025-07-02 PROCEDURE — 36415 COLL VENOUS BLD VENIPUNCTURE: CPT

## 2025-07-02 PROCEDURE — 80053 COMPREHEN METABOLIC PANEL: CPT

## 2025-07-02 PROCEDURE — 21400001 HC TELEMETRY ROOM

## 2025-07-02 PROCEDURE — 25000003 PHARM REV CODE 250: Performed by: INTERNAL MEDICINE

## 2025-07-02 RX ADMIN — ATORVASTATIN CALCIUM 80 MG: 40 TABLET, FILM COATED ORAL at 08:07

## 2025-07-02 RX ADMIN — MUPIROCIN: 20 OINTMENT TOPICAL at 09:07

## 2025-07-02 RX ADMIN — ASPIRIN 81 MG CHEWABLE TABLET 81 MG: 81 TABLET CHEWABLE at 08:07

## 2025-07-02 RX ADMIN — VANCOMYCIN HYDROCHLORIDE 1500 MG: 1.5 INJECTION, POWDER, LYOPHILIZED, FOR SOLUTION INTRAVENOUS at 05:07

## 2025-07-02 RX ADMIN — ENOXAPARIN SODIUM 40 MG: 40 INJECTION SUBCUTANEOUS at 05:07

## 2025-07-02 RX ADMIN — MUPIROCIN: 20 OINTMENT TOPICAL at 08:07

## 2025-07-02 RX ADMIN — AMLODIPINE BESYLATE 10 MG: 10 TABLET ORAL at 08:07

## 2025-07-02 RX ADMIN — CEFTRIAXONE SODIUM 2 G: 2 INJECTION, POWDER, FOR SOLUTION INTRAMUSCULAR; INTRAVENOUS at 05:07

## 2025-07-02 RX ADMIN — LOSARTAN POTASSIUM 25 MG: 25 TABLET, FILM COATED ORAL at 08:07

## 2025-07-02 NOTE — PROGRESS NOTES
[facility] and [facility]   Inpatient Wound Care Progress Note        HPI:     Riccardo Hamilton is a 80 y.o. male with a history of PMH of colonic adenocarcinoma, HTN, HLD, T2DM, Asthma, Retropharyngeal Carotid Bypass (2014), Carotid Endarterectomy (1/2014; 2/2014), and previous RLE DVT w/ subacute small PE of right lung  who presented to [facility] ED on 6/30/2025  with complaint of 2 weeks of pain between his 4th and 5th toes of his right foot. Per pt he is not sure exactly when the wound started. Pt denies that he is DM and says he doesn't take any meds for DM. A1C is 6.4. Will attempt to clarify with IM team. Wound is noted to probe to bone and appears to be somewhat dislocated or fractured. Osteo noted in Xray. Unable to obtain tissue for culture, swab culture obtained. Surgery team consulted to evaluate. Difficulty obtaining pulses to right DP. Discussed with IM team with plans to order ABIs.       Interval History:    MRI showing osteo to right 5th toe. IM team clarified with radiology noting osteo to 4th toe as well. Wound to 4th toe remains unchanged without purulence or erythema. ABIs showing non compressible vessels and arterial US with noevidence of focal stenosis or occlusion. Case discussed with IM, ID and surgery teams. Plans to attempt management with antibiotics and wound care. Will monitor outpatient and should worsening occur, will reconsider surgical options. Pt will need HH upon DC. Teaching reinforced with wife today. Will provide supplies for home. Darco shoe provided. Pt has a follow up in our wound care clinic for next week. Wife has questions about home antibiotics. Nursing asked to message CM.       Past Medical History/Past Surgical History/Social History     See HPI for pertinent history.    ALLERGIES: Review of patient's allergies indicates:  No Known Allergies      Review of Systems:     Review of Systems   Constitutional:  Negative for chills and fever.   Respiratory:  Negative  for shortness of breath.    Cardiovascular:  Negative for chest pain and leg swelling.   Integumentary:  Positive for wound.   Neurological:  Negative for numbness    ROS negative except for above.      MEDICATIONS: See MAR      Physical exam:     Temp:  [97.7 °F (36.5 °C)-98.3 °F (36.8 °C)] 98.3 °F (36.8 °C)  Pulse:  [65-78] 69  Resp:  [17-18] 18  SpO2:  [94 %-99 %] 97 %  BP: (128-162)/(65-80) 139/68     GENERAL: A&Ox3, NAD,   HEENT: atraumatic, normocephalic, anicteric, moist oral mucosa without lesions, exudate, or erythema  LUNGS: breathing unlabored, lungs CTA bilateral  HEART: RRR; no murmur, rub, or gallop  ABDOMEN: abdomen soft, nondistended, BS noted x 4 quadrants, no tympany, no rebound, guarding, or organomegaly  : no CVA tenderness  EXTREMITIES: no edema, clubbing, or cyanosis. Unable to palpate right DP pulse, able to find with doppler   SKIN: see wound assessment   NEURO: speech fluent and intact, facial symmetry preserved, no tremor  PSYCH: cooperative, normal mood and affect        Labs:     I have personally reviewed patient's labs.  Pertinent results noted below.      Recent Labs   Lab 07/02/25  0338   WBC 7.72   HGB 11.1*   HCT 32.8*           Recent Labs     06/30/25  1012 07/01/25  0426 07/02/25  0338    140 138   K 4.2 4.3 3.9    106 108*   CO2 23 24 24   BUN 13.1 16.9 21.6   CREATININE 1.01 0.97 0.94        Recent Labs   Lab 06/30/25  1012   HGBA1C 6.4       Microbiology Results (last 7 days)       Procedure Component Value Units Date/Time    Chlamydia/GC, PCR [[id number]] Collected: 07/01/25 1746    Order Status: Completed Specimen: Urine Updated: 07/01/25 1942     Chlamydia trachomatis PCR Not Detected     N. gonorrhea PCR Not Detected     Source Urine    Narrative:      The Xpert CT/NG test, performed on the GeneXpert system is a qualitative in vitro real-time polymerase chain reaction (PCR) test for the automated detected and differentiation for genomic DNA from  Chlamydia trachomatis (CT) and/or Neisseria gonorrhoeae (NG).    Blood Culture [[id number]] Collected: 07/01/25 1456    Order Status: Resulted Specimen: Blood from Antecubital, Right Updated: 07/01/25 1522    Blood Culture [[id number]] Collected: 07/01/25 1456    Order Status: Resulted Specimen: Blood from Hand, Left Updated: 07/01/25 1522    Anaerobic Culture [[id number]] Collected: 06/30/25 1023    Order Status: Sent Specimen: Wound from Foot, Right Updated: 07/01/25 1133    Wound Culture [[id number]] Collected: 06/30/25 1023    Order Status: Completed Specimen: Wound from Foot, Right Updated: 07/01/25 0711     Wound Culture No Growth At 24 Hours              Wound Assessment:   4th toe remains with shallow red wound base and positive probe to bone to central aspect. Remains without purulence or erythema. 5th toes is noted as intact with no skin changes. Area is cleaned and dressed.         Imaging:     I have personally reviewed patient's imaging. Pertinent results noted below.  MRI Foot (Forefoot) Right W W/O Contrast   Final Result      1. Possibly reactive edema in the 5th proximal phalanx and distal 5th metatarsal head.  There is no enhancement or abnormal T1 signal associated and no cortical destruction is present.  Early osteomyelitis is in the differential.   2. Mild cellulitis over the lateral forefoot.         Electronically signed by: Emery De Los Santos MD   Date:    06/30/2025   Time:    15:20      X-Ray Foot Complete Right   Final Result      Suspect acute osteomyelitis of the distal 5th metatarsal         Electronically signed by: Emery De Los Santos MD   Date:    06/30/2025   Time:    08:10            Impression:     Right lateral 4th toe ulcer- Arterial vs DM. Probes to bone.     Plan/Recommendations:         Daily wound care. Culture pending. Surgery and ID consult following. Plans to manage with IV antibiotics and wound care at this time. PT will need HH upon DC. WBAT with [facility]. Wound care clinic follow up  next week.        The time spent including preparing to see the patient, obtaining patient history and assessment, evaluation of the plan of care, patient/caregiver counseling and education, orders, documentation, coordination of care, and other professional medical management activities for today's encounter was 35 minutes.        Tessa SAEZ-BC, WCC, C  [facility] Inpatient Woundcare

## 2025-07-02 NOTE — PLAN OF CARE
Problem: Adult Inpatient Plan of Care  Goal: Plan of Care Review  Outcome: Progressing  Goal: Patient-Specific Goal (Individualized)  Outcome: Progressing  Goal: Absence of Hospital-Acquired Illness or Injury  Outcome: Progressing  Goal: Optimal Comfort and Wellbeing  Outcome: Progressing  Goal: Readiness for Transition of Care  Outcome: Progressing     Problem: Infection  Goal: Absence of Infection Signs and Symptoms  Outcome: Progressing     Problem: Wound  Goal: Optimal Coping  Outcome: Progressing  Goal: Optimal Functional Ability  Outcome: Progressing  Goal: Absence of Infection Signs and Symptoms  Outcome: Progressing  Goal: Improved Oral Intake  Outcome: Progressing  Goal: Optimal Pain Control and Function  Outcome: Progressing  Goal: Skin Health and Integrity  Outcome: Progressing  Goal: Optimal Wound Healing  Outcome: Progressing      NEGATIVE

## 2025-07-02 NOTE — PLAN OF CARE
Problem: Adult Inpatient Plan of Care  Goal: Plan of Care Review  7/2/2025 0559 by Raisa Felder LPN  Outcome: Progressing  7/2/2025 0405 by Raisa Felder LPN  Outcome: Progressing  Goal: Patient-Specific Goal (Individualized)  7/2/2025 0559 by Raisa Felder LPN  Outcome: Progressing  7/2/2025 0405 by Raisa Felder LPN  Outcome: Progressing  Goal: Absence of Hospital-Acquired Illness or Injury  7/2/2025 0559 by Raisa Felder LPN  Outcome: Progressing  7/2/2025 0405 by Raisa Felder LPN  Outcome: Progressing  Goal: Optimal Comfort and Wellbeing  7/2/2025 0559 by Raisa Felder LPN  Outcome: Progressing  7/2/2025 0405 by Raisa Felder LPN  Outcome: Progressing  Goal: Readiness for Transition of Care  7/2/2025 0559 by Raisa Felder LPN  Outcome: Progressing  7/2/2025 0405 by Raisa Felder LPN  Outcome: Progressing     Problem: Infection  Goal: Absence of Infection Signs and Symptoms  7/2/2025 0559 by Raisa Felder LPN  Outcome: Progressing  7/2/2025 0405 by Raisa Felder LPN  Outcome: Progressing

## 2025-07-02 NOTE — PROGRESS NOTES
[facility] Medicine Wards   Progress Note     Resident Team: [facility] Medicine List 1  Attending Physician: Adebayo Miranda MD  Resident: Allyn Berg  Intern: David Ortiz  Date of Admit: 6/30/2025    Subjective:      Brief HPI:  Riccardo Hamilton is a 80 y.o. male with a history of PMH of colonic adenocarcinoma (followed by Oncology; s/p hemicolectomy and adjuvant chemo), HTN, HLD, T2DM, Asthma, Retropharyngeal Carotid Bypass (2014), Carotid Endarterectomy (1/2014; 2/2014), and previous RLE DVT w/ subacute small PE of R. Lung (s/p x3 month Eliquis txt)  who presented to [facility] ED on 6/30/2025  with complaint of 2 weeks of pain between his 4th and 5th toes of his right foot. Patient states that he noticed the pain and wound 2 weeks ago and did not see any discharge/drainage from the wound. He states that he did not have any trauma to the toe. He has been wearing tennis shoes for the past few weeks. He does not recall ever seeing the wound prior or having any similar events in the past. He denies ever having osteomyelitis in the past. In the ED, Xray was done and showed suspected acute osteomyelitis of the distal 5th metatarsal. Internal medicine was consulted for management of suspected osteomyelitis     Interval History:   No acute events overnight. BP has been slightly elevated with systolic in the 160s. Patient has no complaints today. He reports that he would be amenable to surgery if required. Wound care, surgery, and ID is on board. Discussed with surgery, who recommends trial of antibiotics before proceeding to surgery. Discussed with radiology who states that osteomyelitis was seen on MRI of both the 4th and 5th metatarsals.     Review of Systems:  12 point review of symptoms negative unless otherwise stated above       Objective:     Vital Signs (Most Recent):  Temp: 98.3 °F (36.8 °C) (07/02/25 0737)  Pulse: 69 (07/02/25 0737)  Resp: 18 (07/02/25 0737)  BP: 139/68 (07/02/25 0737)  SpO2: 97 % (07/02/25 0737) Vital Signs  (24h Range):  Temp:  [97.7 °F (36.5 °C)-98.3 °F (36.8 °C)] 98.3 °F (36.8 °C)  Pulse:  [65-85] 69  Resp:  [17-18] 18  SpO2:  [94 %-99 %] 97 %  BP: (128-162)/(65-80) 139/68      Physical Exam:  GEN: A&Ox4. No acute distress   HEENT: normocephalic, atraumatic. PERRLA. EOMI bilaterally. Sclera, conjunctiva clear.   CARDIAC: S1 S2, no MRG. Radial pulses full, no LE edema   RESPI: Chest wall rise symmetric, atraumatic. Lungs CTAB, no wheezing or rhonchi   ABDOMEN: soft, nontender. No herniation, masses  : deferred   MSK: ROM grossly intact.   NEURO: Motor and sensation grossly intact. CN grossly intact. No cerebellar deficits noted. No gait abnormalities noted.   PSYCH: Memory and thought process appear intact. Appropriate mood and affect.   INTEGUMENTARY: ulceration on the 4th toe on the right foot. No purulent drainage from wound site.       Laboratory    CBC  Recent Labs   Lab 06/30/25  1012 07/01/25  0426 07/02/25  0338   WBC 8.15 7.04 7.72   RBC 4.17* 3.75* 3.53*   HGB 13.2* 11.6* 11.1*   HCT 40.3* 35.0* 32.8*   MCV 96.6* 93.3 92.9   MCH 31.7* 30.9 31.4*   MCHC 32.8* 33.1 33.8   RDW 13.2 13.0 13.1    316 335   MPV 9.7 10.5* 10.1       CMP   Recent Labs   Lab 06/30/25  1012 07/01/25  0426 07/02/25  0338    140 138   K 4.2 4.3 3.9   CO2 23 24 24   BUN 13.1 16.9 21.6   CREATININE 1.01 0.97 0.94   * 95 91   CALCIUM 9.8 8.9 8.6*   MG  --  1.80 1.70   ALBUMIN 3.4 2.9* 2.7*   PROT 8.3* 7.1 6.7   ALKPHOS 121 107 110   ALT 26 22 20   AST 28 24 24   BILITOT 0.3 0.3 0.2        Microbiology:  Microbiology Results (last 7 days)       Procedure Component Value Units Date/Time    Chlamydia/GC, PCR [[id number]] Collected: 07/01/25 1746    Order Status: Completed Specimen: Urine Updated: 07/01/25 1942     Chlamydia trachomatis PCR Not Detected     N. gonorrhea PCR Not Detected     Source Urine    Narrative:      The Xpert CT/NG test, performed on the GeneXpert system is a qualitative in vitro real-time  polymerase chain reaction (PCR) test for the automated detected and differentiation for genomic DNA from Chlamydia trachomatis (CT) and/or Neisseria gonorrhoeae (NG).    Blood Culture [[id number]] Collected: 07/01/25 1456    Order Status: Resulted Specimen: Blood from Antecubital, Right Updated: 07/01/25 1522    Blood Culture [[id number]] Collected: 07/01/25 1456    Order Status: Resulted Specimen: Blood from Hand, Left Updated: 07/01/25 1522    Anaerobic Culture [[id number]] Collected: 06/30/25 1023    Order Status: Sent Specimen: Wound from Foot, Right Updated: 07/01/25 1133    Wound Culture [[id number]] Collected: 06/30/25 1023    Order Status: Completed Specimen: Wound from Foot, Right Updated: 07/01/25 0711     Wound Culture No Growth At 24 Hours            Cardiac Data:  No echocardiogram results found for the past 14 days.    Results for orders placed or performed in visit on 06/10/25   IN OFFICE EKG 12-LEAD (to [place])    Collection Time: 06/10/25 11:47 AM   Result Value Ref Range    QRS Duration 90 ms    [facility] QTC Calculation 413 ms    Narrative    Test Reason : I10,    Vent. Rate :  66 BPM     Atrial Rate :  66 BPM     P-R Int : 214 ms          QRS Dur :  90 ms      QT Int : 394 ms       P-R-T Axes :  55 -19  70 degrees    QTcB Int : 413 ms    Sinus rhythm with marked sinus arrythmia with 1st degree A-V block  Septal infarct (cited on or before 11-Jan-2024)  Abnormal ECG  When compared with ECG of 11-Jul-2024 09:34,  No significant change was found  Confirmed by Iris Page ([id number]) on 6/11/2025 5:17:26 PM    Referred By:            Confirmed By: Iris Page        Radiology:  Imaging Results              X-Ray Foot Complete Right (Final result)  Result time 06/30/25 08:10:47      Final result by Emery De Los Santos MD (06/30/25 08:10:47)                   Impression:      Suspect acute osteomyelitis of the distal 5th metatarsal      Electronically signed by: Emery De Los Santos  MD  Date:    06/30/2025  Time:    08:10               Narrative:    EXAMINATION:  Three views right foot    CLINICAL HISTORY:  Open wound 5th toe    COMPARISON:  09/20/2021    FINDINGS:  There is mild cortical lucency along the plantar aspect of the distal 5th metatarsal head.  No acute fracture or dislocation.                                      Current Medications:     Infusions:       Scheduled:   amLODIPine  10 mg Oral Daily    aspirin  81 mg Oral Daily    atorvastatin  80 mg Oral Daily    cefTRIAXone (Rocephin) IV (PEDS and ADULTS)  2 g Intravenous Q24H    enoxparin  40 mg Subcutaneous Daily    losartan  25 mg Oral Daily    mupirocin   Nasal BID    vancomycin (VANCOCIN) IV (PEDS and ADULTS)  1,500 mg Intravenous Q24H        PRN:    Current Facility-Administered Medications:     acetaminophen, 650 mg, Oral, Q8H PRN    albuterol-ipratropium, 3 mL, Nebulization, Q6H PRN    dextrose 50%, 12.5 g, Intravenous, PRN    dextrose 50%, 25 g, Intravenous, PRN    glucagon (human recombinant), 1 mg, Intramuscular, PRN    glucose, 16 g, Oral, PRN    glucose, 24 g, Oral, PRN    hydrALAZINE, 10 mg, Intravenous, Q6H PRN    naloxone, 0.02 mg, Intravenous, PRN    sodium chloride 0.9%, 10 mL, Intravenous, Q12H PRN    Pharmacy to dose Vancomycin consult, , , Once **AND** vancomycin - pharmacy to dose, , Intravenous, pharmacy to manage frequency    Antibiotics and Day Number of Therapy:        Intake/Output Summary (Last 24 hours) at 7/2/2025 0750  Last data filed at 7/2/2025 0541  Gross per 24 hour   Intake 784.36 ml   Output 100 ml   Net 684.36 ml       Lines/Drains/Airways       Central Venous Catheter Line  Duration             Port A Cath Single Lumen right subclavian -- days              Peripheral Intravenous Line  Duration             Peripheral IV Single Lumen 06/30/25 0850 20 G Left Antecubital 1 day                      Assessment & Plan:   Osteomyelitis   - Xray of the right foot: Suspected acute osteomyelitis of the  distal 5th metatarsal  - ED and wound care reported probe to bone    - Ordered wound culture which showed NG@24h  - Consulted surgery and wound care. Will appreciate all recommendations.  - Discussed with surgery, states recommendation is to start trial of antibiotics first and to re-evaluate for surgery if no improvement in wound  - Discussed with radiology who re examined the MRI and states that both the 5th and 4th metatarsals had osteomyelitis.   - Consulted Infectious Disease. Will appreciate all recommendations.  - will place a PICC line once blood cultures are negative at 48 hours.      Previously documented T2DM  - A1c 6.4.    - Currently not on any home medications   - had 1 previous A1c in the past in 2018 being 6.6 with all others being less than 6.5    HTN  - Continue amlodipine 10mg   - Increase Losartan to 50 mg daily     Hx of colonic adenocarcinoma   Hx of DVT and  PE   - Completed 3 month treatment with Eliquis   - follows with Heme/oncology at [facility]        CODE STATUS:  Full  Access:  PIV  Antibiotics:  n/a  Diet:  Regular  DVT Prophylaxis:  Lovenox  GI Prophylaxis:  na  Fluids:  na     Dispo: Patient is admitted for osteomyelitis workup and management. Surgery, infectious disease, and wound care following, pending cultures to discharge home with OPAT.      Jovi Gruber MD  Internal Medicine - PGY-3

## 2025-07-02 NOTE — PROGRESS NOTES
"[facility] INFECTIOUS DISEASES CONSULT PROGRESS NOTE      Reason for consultation     R foot 4th and 5th digit osteomyelitis      Interval history     - Remains afebrile  - WBC stable at 7.72  - Vancomycin and ceftriaxone started yesterday. Patient reports no adverse SE  - Cultures obtained on 6/30/25 remain NGTD  - Blood cx obtained on 7/1 are currently pending  - Today patients feels well and voices no complaints. Denies fever, chills, N/V/D, chest pain, SOB.    Antibiotic history:    Vancomycin 7/1- P  Ceftriaxone 7/1- P    Medications     Scheduled Meds:   amLODIPine  10 mg Oral Daily    aspirin  81 mg Oral Daily    atorvastatin  80 mg Oral Daily    cefTRIAXone (Rocephin) IV (PEDS and ADULTS)  2 g Intravenous Q24H    enoxparin  40 mg Subcutaneous Daily    losartan  25 mg Oral Daily    mupirocin   Nasal BID    vancomycin (VANCOCIN) IV (PEDS and ADULTS)  1,500 mg Intravenous Q24H     Continuous Infusions:  PRN Meds:.  Current Facility-Administered Medications:     acetaminophen, 650 mg, Oral, Q8H PRN    albuterol-ipratropium, 3 mL, Nebulization, Q6H PRN    dextrose 50%, 12.5 g, Intravenous, PRN    dextrose 50%, 25 g, Intravenous, PRN    glucagon (human recombinant), 1 mg, Intramuscular, PRN    glucose, 16 g, Oral, PRN    glucose, 24 g, Oral, PRN    hydrALAZINE, 10 mg, Intravenous, Q6H PRN    naloxone, 0.02 mg, Intravenous, PRN    sodium chloride 0.9%, 10 mL, Intravenous, Q12H PRN    Pharmacy to dose Vancomycin consult, , , Once **AND** vancomycin - pharmacy to dose, , Intravenous, pharmacy to manage frequency    Allergies: Review of patient's allergies indicates:  No Known Allergies    Physical exam:     /68   Pulse 69   Temp 98.3 °F (36.8 °C) (Oral)   Resp 18   Ht 5' 9" (1.753 m)   Wt 72.6 kg (160 lb 1.6 oz)   SpO2 97%   BMI 23.64 kg/m²   Wt Readings from Last 1 Encounters:   06/30/25 72.6 kg (160 lb 1.6 oz)     Body mass index is 23.64 kg/m².    GENERAL: A&Ox3, NAD, pleasant  HEENT: NC/AT, anicteric, moist " "oral mucosa without lesions, exudate, or erythema  LUNGS: CTA b/l, no labored breathing  HEART: RRR; no murmur, rub, or gallop  ABDOMEN: +BS, no tympany, soft, NT/ND, no rebound, guarding, or organomegaly  EXTREMITIES: no edema, clubbing, or cyanosis   SKIN: R foot in wrapping. Images reviewed in EMR  NEURO: speech fluent and intact, facial symmetry preserved, no tremor, CN II-XII grossly intact   PSYCH: cooperative, normal mood and affect  LINES: PIV       LABS:     I have personally reviewed patient's labs.  Pertinent results noted below.    CBC  Recent Labs     06/30/25  1012 07/01/25  0426 07/02/25  0338   WBC 8.15 7.04 7.72   HGB 13.2* 11.6* 11.1*   HCT 40.3* 35.0* 32.8*    316 335       Differential  No results for input(s): "NEUTOPHILPCT", "LYMPHOPCT", "MONOPCT", "EOSPCT", "BASOPCT", "NEUTROABS", "LYMPHSABS", "MONOSABS", "EOSABS" in the last 72 hours.    Basic Metabolic Panel  Recent Labs     06/30/25  1012 07/01/25  0426 07/02/25  0338    140 138   K 4.2 4.3 3.9    106 108*   CO2 23 24 24   BUN 13.1 16.9 21.6   CREATININE 1.01 0.97 0.94        Hepatic Panel  Recent Labs     06/30/25  1012 07/01/25  0426 07/02/25  0338   ALT 26 22 20   AST 28 24 24   ALKPHOS 121 107 110   BILITOT 0.3 0.3 0.2   ALBUMIN 3.4 2.9* 2.7*       Urinalysis:  No results for input(s): "GLUCOSEU", "PROTEINUA", "LABPH", "KETONESU", "NITRITE", "LEUKOCYTESUR", "LABSPEC", "COLORU", "WBCU", "RBCUA", "BACTERIA", "HYST", "SQUAMOUSEPIT", "MUCUS", "GRANULARCAST", "CRYST" in the last 72 hours.      Imaging     MRI Foot (Forefoot) Right W W/O Contrast   Final Result      1. Possibly reactive edema in the 5th proximal phalanx and distal 5th metatarsal head.  There is no enhancement or abnormal T1 signal associated and no cortical destruction is present.  Early osteomyelitis is in the differential.   2. Mild cellulitis over the lateral forefoot.         Electronically signed by: Emery De Los Santos MD   Date:    06/30/2025 " "  Time:    15:20      X-Ray Foot Complete Right   Final Result      Suspect acute osteomyelitis of the distal 5th metatarsal         Electronically signed by: Emery De Los Santos MD   Date:    06/30/2025   Time:    08:10            Micro/Path   Colonization:  No components found for: "MRSMSSSCRFLD", "MRSSCRPCRSWB", "SCRCULT"    6/30/25 R 4th toe swab cultures:  - Aerobic: NGTD  - Anaerobic: pending  7/1/25 pBCx 2/2: NGTD      IMPRESSION     1) R foot 4th digit OM  - Probes to bone  - Swab cultures obtained, pending results  2) R foot 5th digit OM  - Diagnosed by MRI  3) Diabetic foot ulcer  4) DM type II  5) h/o retained mediport to R chest wall  6) h/o colon cancer    CrCl: Estimated Creatinine Clearance: 60.6 mL/min (based on SCr of 0.94 mg/dL).    Osteomyelitis right foot (4th and 5th toes) in the setting of diabetic ulceration.  Osteomyelitis confirmed by bone probe, Xray, and MRI.  Arterial vascular studies patent.    Wound culture right foot NGTD. Planning for 6 weeks of IV antibiotics as there are no plans for surgical intervention as patient wishes for conservative management.    RECOMMENDATIONS:    - Continue empiric vancomycin, trough goal 15-20  - Continue empiric ceftriaxone 2g IV q24h  - Follow up wound cx, will tailor antibiotics as appropriate  - Planning for 6 weeks of IV antibiotics. Likely OPAT but this is pending discussions with family  - Follow up blood cx x2, Once NG >48h PICC can be placed  - Continue aggressive wound care which should be continued in the outpatient setting  - Continue strict diabetic control to aid in wound healing      ID will follow along with you.     Rocky Peoples MD  Infectious Diseases Faculty    "

## 2025-07-03 ENCOUNTER — TELEPHONE (OUTPATIENT)
Dept: INFECTIOUS DISEASES | Facility: HOSPITAL | Age: 80
End: 2025-07-03
Payer: MEDICARE

## 2025-07-03 LAB
ALBUMIN SERPL-MCNC: 2.7 G/DL (ref 3.4–4.8)
ALBUMIN/GLOB SERPL: 0.7 RATIO (ref 1.1–2)
ALP SERPL-CCNC: 103 UNIT/L (ref 40–150)
ALT SERPL-CCNC: 19 UNIT/L (ref 0–55)
ANION GAP SERPL CALC-SCNC: 8 MEQ/L
AST SERPL-CCNC: 27 UNIT/L (ref 11–45)
BACTERIA WND CULT: NORMAL
BASOPHILS # BLD AUTO: 0.11 X10(3)/MCL
BASOPHILS NFR BLD AUTO: 1.3 %
BILIRUB SERPL-MCNC: 0.2 MG/DL
BUN SERPL-MCNC: 21.4 MG/DL (ref 8.4–25.7)
CALCIUM SERPL-MCNC: 9 MG/DL (ref 8.8–10)
CHLORIDE SERPL-SCNC: 106 MMOL/L (ref 98–107)
CO2 SERPL-SCNC: 23 MMOL/L (ref 23–31)
CREAT SERPL-MCNC: 0.96 MG/DL (ref 0.72–1.25)
CREAT/UREA NIT SERPL: 22
EOSINOPHIL # BLD AUTO: 0.42 X10(3)/MCL (ref 0–0.9)
EOSINOPHIL NFR BLD AUTO: 4.8 %
ERYTHROCYTE [DISTWIDTH] IN BLOOD BY AUTOMATED COUNT: 12.9 % (ref 11.5–17)
GFR SERPLBLD CREATININE-BSD FMLA CKD-EPI: >60 ML/MIN/1.73/M2
GLOBULIN SER-MCNC: 4 GM/DL (ref 2.4–3.5)
GLUCOSE SERPL-MCNC: 93 MG/DL (ref 82–115)
HCT VFR BLD AUTO: 32.5 % (ref 42–52)
HGB BLD-MCNC: 11 G/DL (ref 14–18)
IMM GRANULOCYTES # BLD AUTO: 0.03 X10(3)/MCL (ref 0–0.04)
IMM GRANULOCYTES NFR BLD AUTO: 0.3 %
LYMPHOCYTES # BLD AUTO: 3.95 X10(3)/MCL (ref 0.6–4.6)
LYMPHOCYTES NFR BLD AUTO: 45.1 %
MAGNESIUM SERPL-MCNC: 1.7 MG/DL (ref 1.6–2.6)
MCH RBC QN AUTO: 31.3 PG (ref 27–31)
MCHC RBC AUTO-ENTMCNC: 33.8 G/DL (ref 33–36)
MCV RBC AUTO: 92.3 FL (ref 80–94)
MONOCYTES # BLD AUTO: 0.76 X10(3)/MCL (ref 0.1–1.3)
MONOCYTES NFR BLD AUTO: 8.7 %
NEUTROPHILS # BLD AUTO: 3.48 X10(3)/MCL (ref 2.1–9.2)
NEUTROPHILS NFR BLD AUTO: 39.8 %
NRBC BLD AUTO-RTO: 0 %
PHOSPHATE SERPL-MCNC: 3.1 MG/DL (ref 2.3–4.7)
PLATELET # BLD AUTO: 334 X10(3)/MCL (ref 130–400)
PMV BLD AUTO: 9.4 FL (ref 7.4–10.4)
POCT GLUCOSE: 112 MG/DL (ref 70–110)
POCT GLUCOSE: 116 MG/DL (ref 70–110)
POCT GLUCOSE: 97 MG/DL (ref 70–110)
POTASSIUM SERPL-SCNC: 3.8 MMOL/L (ref 3.5–5.1)
PROT SERPL-MCNC: 6.7 GM/DL (ref 5.8–7.6)
RBC # BLD AUTO: 3.52 X10(6)/MCL (ref 4.7–6.1)
SODIUM SERPL-SCNC: 137 MMOL/L (ref 136–145)
VANCOMYCIN TROUGH SERPL-MCNC: 10.2 UG/ML (ref 15–20)
WBC # BLD AUTO: 8.75 X10(3)/MCL (ref 4.5–11.5)

## 2025-07-03 PROCEDURE — 25000003 PHARM REV CODE 250

## 2025-07-03 PROCEDURE — 21400001 HC TELEMETRY ROOM

## 2025-07-03 PROCEDURE — 36415 COLL VENOUS BLD VENIPUNCTURE: CPT | Performed by: INTERNAL MEDICINE

## 2025-07-03 PROCEDURE — 25000003 PHARM REV CODE 250: Performed by: INTERNAL MEDICINE

## 2025-07-03 PROCEDURE — 84100 ASSAY OF PHOSPHORUS: CPT

## 2025-07-03 PROCEDURE — 80202 ASSAY OF VANCOMYCIN: CPT | Performed by: INTERNAL MEDICINE

## 2025-07-03 PROCEDURE — 63600175 PHARM REV CODE 636 W HCPCS: Performed by: INTERNAL MEDICINE

## 2025-07-03 PROCEDURE — 11000001 HC ACUTE MED/SURG PRIVATE ROOM

## 2025-07-03 PROCEDURE — 85025 COMPLETE CBC W/AUTO DIFF WBC: CPT

## 2025-07-03 PROCEDURE — 63600175 PHARM REV CODE 636 W HCPCS

## 2025-07-03 PROCEDURE — 99900035 HC TECH TIME PER 15 MIN (STAT)

## 2025-07-03 PROCEDURE — 80053 COMPREHEN METABOLIC PANEL: CPT

## 2025-07-03 PROCEDURE — 94761 N-INVAS EAR/PLS OXIMETRY MLT: CPT

## 2025-07-03 PROCEDURE — 83735 ASSAY OF MAGNESIUM: CPT

## 2025-07-03 PROCEDURE — 36415 COLL VENOUS BLD VENIPUNCTURE: CPT

## 2025-07-03 RX ORDER — DAPTOMYCIN 50 MG/ML
6 INJECTION, POWDER, LYOPHILIZED, FOR SOLUTION INTRAVENOUS ONCE
Status: DISCONTINUED | OUTPATIENT
Start: 2025-07-03 | End: 2025-07-03

## 2025-07-03 RX ORDER — ATORVASTATIN CALCIUM 80 MG/1
80 TABLET, FILM COATED ORAL DAILY
Qty: 90 TABLET | Refills: 3 | Status: ON HOLD | OUTPATIENT
Start: 2025-08-12 | End: 2026-08-12

## 2025-07-03 RX ORDER — DAPTOMYCIN 50 MG/ML
500 INJECTION, POWDER, LYOPHILIZED, FOR SOLUTION INTRAVENOUS ONCE
Status: COMPLETED | OUTPATIENT
Start: 2025-07-03 | End: 2025-07-03

## 2025-07-03 RX ORDER — CEFTRIAXONE 2 G/1
2 INJECTION, POWDER, FOR SOLUTION INTRAMUSCULAR; INTRAVENOUS DAILY
Qty: 78 G | Refills: 0 | Status: ON HOLD | OUTPATIENT
Start: 2025-07-03 | End: 2025-08-11

## 2025-07-03 RX ADMIN — AMLODIPINE BESYLATE 10 MG: 10 TABLET ORAL at 08:07

## 2025-07-03 RX ADMIN — MUPIROCIN: 20 OINTMENT TOPICAL at 08:07

## 2025-07-03 RX ADMIN — LOSARTAN POTASSIUM 25 MG: 25 TABLET, FILM COATED ORAL at 08:07

## 2025-07-03 RX ADMIN — CEFTRIAXONE SODIUM 2 G: 2 INJECTION, POWDER, FOR SOLUTION INTRAMUSCULAR; INTRAVENOUS at 04:07

## 2025-07-03 RX ADMIN — VANCOMYCIN HYDROCHLORIDE 1750 MG: 1.75 INJECTION, POWDER, LYOPHILIZED, FOR SOLUTION INTRAVENOUS at 06:07

## 2025-07-03 RX ADMIN — ASPIRIN 81 MG CHEWABLE TABLET 81 MG: 81 TABLET CHEWABLE at 08:07

## 2025-07-03 RX ADMIN — ENOXAPARIN SODIUM 40 MG: 40 INJECTION SUBCUTANEOUS at 04:07

## 2025-07-03 RX ADMIN — DAPTOMYCIN 500 MG: 500 INJECTION, POWDER, LYOPHILIZED, FOR SOLUTION INTRAVENOUS at 01:07

## 2025-07-03 RX ADMIN — ATORVASTATIN CALCIUM 80 MG: 40 TABLET, FILM COATED ORAL at 08:07

## 2025-07-03 NOTE — PROGRESS NOTES
AVS virtually reviewed with patient, wife, and daughter in its entirety with emphasis on diet, medications, follow-up appointments and reasons to return to the ED. Patient also encouraged to utilize their patient portal. Ease and convenience of use reiterated. Education complete and patient voiced understanding. All questions answered. Discharge teaching complete.

## 2025-07-03 NOTE — DISCHARGE INSTRUCTIONS
Our goal at [facility] is to always give you outstanding care and exceptional service. You may receive a survey from [employer] by mail, text or e-mail in the next 24-48 hours asking about the care you received with us. The survey should only take 5-10 minutes to complete and is very important to us.     Your feedback provides us with a way to recognize our staff who work tirelessly to provide the best care! Also, your responses help us learn how to improve when your experience was below our aspiration of excellence. We are always looking for ways to improve your stay. We WILL use your feedback to continue making improvements to help us provide the highest quality care. We keep your personal information and feedback confidential. We appreciate your time completing this survey and can't wait to hear from you!!!    We look forward to your continued care with us! Thanks so much for choosing [facility] for your healthcare needs!

## 2025-07-03 NOTE — PROGRESS NOTES
[facility]  Infectious Diseases Progress Note        SUBJECTIVE:   HPI: Mr Hamilton is a 80 yr old male with past medical history that is significant for hypertension, hyperlipidemia, diabetes mellitus type 2 (hemoglobin A1c 6.4 from 06/30/2025), asthma, prior carotid endarterectomy, history of colon cancer, and retained MediPort who presented to the emergency room on 06/30/2025 for complaints of pain to his right foot especially between his 4th and 5th toes.  Patient reports pain had started about 2 weeks prior to presentation.  No known trauma.  Patient reports his foot was swelling, so he was soaking it in warm water and Epsom salt for about a week.  He reports it was so swollen he could not open his toes.  When he was able, he noticed the ulceration to the 4th toe and decided to go to the emergency room.  Upon presentation to the emergency room, 4th toe was noted to probe to bone and a bone fragment was removed.  Unfortunately, it was not sent for cultures.  X-ray of right foot was done and confirmed osteomyelitis to the 5th digit.  MRI of the right foot with and without contrast was also done 06/30/2025 and showed concerns for early osteomyelitis to the 5th toe and cellulitis to the lateral forefoot.  No mention was made of the 4th toe on either aspect of imaging.  Clinically 4th toe probes to bone, which is indicative of osteomyelitis.  Patient has been afebrile with no leukocytosis since presentation.  He is currently on no antibiotics.  ID has been consulted for recommendations regarding osteomyelitis.  Patient is being evaluated by Surgical Services.  Patient reports he will do whatever is in his best interest.  Wound culture was obtained from the right foot and so far has no growth.  Spoke with wound care NP and she was concerned about dislocation or fracture to the 4th toe.  She confirms 4th toe does probe to bone.  Arterial vascular studies were done and showed patent arterial  flow bilateral.  Patient reports at home he wears his slippers, or tennis shoes.  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  Patient reports he has a history of colon cancer which required colon resection and a couple of weeks of chemotherapy.  Patient states he has been cancer free for the past 5 years.  Patient still has a MediPort retained to the right upper chest wall which he reports is flushed every 3 months.  States last was flushed about 1 month ago.  MediPort without any overt signs of infection.  Patient lives in [place], [place] with his wife.  Denies recent travel/known sick contacts/odd hobbies. No pets.  Denies tobacco, alcohol, or illicit drug use to include IVDU.  Tdap up to date; 02/09/2022.       Interval History:  Patient sitting in bed.  Wife at bedside.  No current complaints.  Denies fever, chills, night sweats, rash, HA, vision changes, dizziness, CP/SOB, cough, N/V/D, abdominal pain, or urinary complaints.  Patient is afebrile with no leukocytosis.  CK level 81.  Screening for HIV, syphilis, hepatitis, gonorrhea, chlamydia are all negative.  Blood cultures NGTD x2.  Wound culture with normal skin aylin, but noted culture was a difficult to obtain due to shallow nature of the wound.  Patient is currently on empiric broad coverage with Vancomycin and Ceftriaxone with no reported issues.      Antibiotic / Antiviral / Antifungal History:  Vancomycin IV trough 15-20 - 07/01/2025 to present   Rocephin 2 g IV daily - 07/01/2025 to present       MEDICATIONS:   Reviewed in EMR    REVIEW OF SYSTEMS:   Except as documented, all other systems reviewed and negative     PHYSICAL EXAM:   T 98.1 °F (36.7 °C)   BP (!) 146/70   P 70   RR 18   O2 98 %  GENERAL: Elderly male who is A&Ox3, NAD, does not appear overtly ill  HEENT: atraumatic, normocephalic, anicteric, moist oral mucosa without lesions, exudate, or erythema; no thrush  LUNGS: breathing unlabored, lungs CTA  "bilateral  HEART: RRR; no murmur, rub, or gallop  ABDOMEN: abdomen soft, nondistended, nontender to palpation   : no CVA tenderness  EXTREMITIES: no edema, dressing to right foot (see wound care note for pictures)  SKIN: no obvious rashes or lesions  NEURO: speech fluent and intact, facial symmetry preserved, no tremor  PSYCH: cooperative, normal mood and affect  LINES: PIV; Mediport to RT CW with no s/s infection       LABS AND IMAGING:     Recent Labs     07/02/25  0338 07/03/25 0429   WBC 7.72 8.75   RBC 3.53* 3.52*   HGB 11.1* 11.0*   HCT 32.8* 32.5*   MCV 92.9 92.3   MCH 31.4* 31.3*   MCHC 33.8 33.8   RDW 13.1 12.9    334     No results for input(s): "LACTIC" in the last 72 hours.  No results for input(s): "INR", "APTT", "D-DIMER" in the last 72 hours.  Recent Labs     06/30/25  1012   HGBA1C 6.4      Recent Labs     07/01/25  1456 07/02/25  0338 07/03/25 0429   NA  --  138 137   K  --  3.9 3.8   CO2  --  24 23   BUN  --  21.6 21.4   CREATININE  --  0.94 0.96   CALCIUM  --  8.6* 9.0   MG  --  1.70 1.70   PHOS  --  2.7 3.1   ALBUMIN  --  2.7* 2.7*   GLOBULIN  --  4.0* 4.0*   ALKPHOS  --  110 103   ALT  --  20 19   AST  --  24 27   BILITOT  --  0.2 0.2   CRP 47.60*  --   --      Recent Labs     07/01/25 0426   CPK 81          Estimated Creatinine Clearance: 59.3 mL/min (based on SCr of 0.96 mg/dL).   Lab Results   Component Value Date    SEDRATE 72 (H) 07/02/2025      Lab Results   Component Value Date    CRP 47.60 (H) 07/01/2025        Micro:   Colonization:  06/30/2025 right foot wound culture NGTD/normal skin aylin  07/01/2025 blood cultures x2 NGTD           MRI Foot (Forefoot) Right W W/O Contrast  Narrative: EXAMINATION:  MRI FOOT (FOREFOOT) RIGHT W W/O CONTRAST    CLINICAL HISTORY:  Osteomyelitis, foot;   5th metatarsal pain for 2 weeks    TECHNIQUE:  Multiplanar MRI performed through the right forefoot both before and after IV administration of 16 cc gadolinium    COMPARISON:  Radiographs " 06/30/2025    FINDINGS:  Minimal subcutaneous edema is noted over the lateral mid and forefoot.  No focal fluid collections.  There is subtle increased ir signal in the 5th proximal phalanx in the distal tip of the 5th metatarsal head.  No cortical destruction.  No abnormal enhancement is present and no abnormal T1 signal is noted.  Impression: 1. Possibly reactive edema in the 5th proximal phalanx and distal 5th metatarsal head.  There is no enhancement or abnormal T1 signal associated and no cortical destruction is present.  Early osteomyelitis is in the differential.  2. Mild cellulitis over the lateral forefoot.    Electronically signed by: Emery De Los Santos MD  Date:    06/30/2025  Time:    15:20  X-Ray Foot Complete Right  Narrative: EXAMINATION:  Three views right foot    CLINICAL HISTORY:  Open wound 5th toe    COMPARISON:  09/20/2021    FINDINGS:  There is mild cortical lucency along the plantar aspect of the distal 5th metatarsal head.  No acute fracture or dislocation.  Impression: Suspect acute osteomyelitis of the distal 5th metatarsal    Electronically signed by: Emery De Los Santos MD  Date:    06/30/2025  Time:    08:10          ASSESSMENT & PLAN:     Osteomyelitis right foot (4th toe - probes to bone; 5th toe - confirmed per MRI)  Right foot cellulitis  Diabetic ulceration 4th toe   Diabetes mellitus type 2   History of colon cancer  Retained MediPort to right chest wall        Osteomyelitis right foot (4th and 5th toes) in the setting of diabetic ulceration.  Osteomyelitis confirmed by bone probe, Xray, and MRI.  Arterial vascular studies patent.    Wound culture right foot NGTD/normal skin aylin  Patient was evaluated by Surgical Services and feel that conservative management is warranted at this time.    Screening for HIV, syphilis, hepatitis, gonorrhea, chlamydia are all negative.          RECOMMENDATIONS:        Follow up culture results; awaiting results  Patient with obvious osteomyelitis to right 4th  and 5th toes.  Will proceed with conservative management of osteomyelitis with 6 weeks of IV antibiotics.  So far, there is no growth on wound culture, but according to wound care NP culture was difficult to obtain as the wound is shallow.  Recommend to continue empiric broad coverage antimicrobials with Daptomycin 6 mg / kg IV daily + Rocephin 2 grams IV daily to complete a 42 day course.  Today is day 3 of 42.  End date 08/11/2025.  Patient will need to stay hydrated and avoid statin use while on treatment with Daptomycin  Weekly CK levels to monitor  Will monitor patient closely on current therapy.  If worsens, may need to adjust / expand coverage to include Pseudomonas.   Patient will be a home OPAT candidate.  Wife will assist with antibiotic administration.  OPAT orders have been written with labs to be monitored.    Patient has a Mediport, so PICC will not be required.  Permission to use Mediport obtained from Oncologist.  Mediport can be used for antibiotic administration   Patient needs continued wound care and strict diabetic control  Will schedule patient ID post gold appointment with Dr Peoples on 07/24/2025  ID will sign off at this time.  Thank you for the consult.  Please call for any additional questions.         ROSENDO Singh  [facility] Infectious Diseases     *Portions of this note dictated using EMR integrated voice recognition software, and may be subject to voice recognition errors not corrected at proofreading. Please contact writer for clarification if needed. *

## 2025-07-03 NOTE — PROGRESS NOTES
Pharmacokinetic Assessment Follow Up: IV Vancomycin    Vancomycin serum concentration assessment(s):    The random level was drawn correctly and can be used to guide therapy at this time. The measurement is below the desired definitive target range of 15 to 20 mcg/mL.    Vancomycin Regimen Plan:    Continue regimen to Vancomycin 1750 mg one time at 1800 and then start 1500 mg q 24 hours with next trough concentration measured at 1700 prior to 1800 dose on 7/5    Drug levels (last 3 results):  Recent Labs   Lab Result Units 07/03/25  1720   Vancomycin Trough ug/ml 10.2*       Pharmacy will continue to follow and monitor vancomycin.    Please contact pharmacy at extension [phone] for questions regarding this assessment.    Thank you for the consult,   Carisa Jacques       Patient brief summary:  Riccardo Hamilton is a 80 y.o. male initiated on antimicrobial therapy with IV Vancomycin for treatment of bone/joint infection    The patient's current regimen is vancomycin 1500 mg q 24 hours    Drug Allergies:   Review of patient's allergies indicates:  No Known Allergies    Actual Body Weight:   72.6 kg    Renal Function:   Estimated Creatinine Clearance: 59.3 mL/min (based on SCr of 0.96 mg/dL).,     Dialysis Method (if applicable):  N/A    CBC (last 72 hours):  Recent Labs   Lab Result Units 07/01/25  0426 07/02/25  0338 07/03/25  0429   WBC x10(3)/mcL 7.04 7.72 8.75   Hgb g/dL 11.6* 11.1* 11.0*   Hct % 35.0* 32.8* 32.5*   Platelet x10(3)/mcL 316 335 334   Mono % % 11.2  --  8.7   Monocytes % %  --  5  --    Eos % % 5.3  --  4.8   Basophil % % 0.9  --  1.3       Metabolic Panel (last 72 hours):  Recent Labs   Lab Result Units 07/01/25  0426 07/02/25  0338 07/03/25  0429   Sodium mmol/L 140 138 137   Potassium mmol/L 4.3 3.9 3.8   Chloride mmol/L 106 108* 106   CO2 mmol/L 24 24 23   Glucose mg/dL 95 91 93   Blood Urea Nitrogen mg/dL 16.9 21.6 21.4   Creatinine mg/dL 0.97 0.94 0.96   Albumin g/dL 2.9* 2.7* 2.7*   Bilirubin Total  mg/dL 0.3 0.2 0.2   ALP unit/L 107 110 103   AST unit/L 24 24 27   ALT unit/L 22 20 19   Magnesium Level mg/dL 1.80 1.70 1.70   Phosphorus Level mg/dL 2.9 2.7 3.1       Vancomycin Administrations:  vancomycin given in the last 96 hours                     vancomycin 1,500 mg in 0.9% NaCl 250 mL IVPB (admixture device) ()  Restarted 07/02/25 1834     1,500 mg New Bag  1725    vancomycin (VANCOCIN) 1,750 mg in 0.9% NaCl 500 mL IVPB ()  Restarted 07/01/25 1801     1,750 mg New Bag  1745                    Microbiologic Results:  Microbiology Results (last 7 days)       Procedure Component Value Units Date/Time    Wound Culture [[id number]] Collected: 06/30/25 1023    Order Status: Completed Specimen: Wound from Foot, Right Updated: 07/03/25 0820     Wound Culture Rare normal skin aylin, no further workup.    Anaerobic Culture [[id number]] Collected: 06/30/25 1023    Order Status: Completed Specimen: Wound from Foot, Right Updated: 07/03/25 0721     Anaerobe Culture No Anaerobes Isolated    Blood Culture [[id number]]  (Normal) Collected: 07/01/25 1456    Order Status: Completed Specimen: Blood from Antecubital, Right Updated: 07/02/25 2300     Blood Culture No Growth At 24 Hours    Blood Culture [[id number]]  (Normal) Collected: 07/01/25 1456    Order Status: Completed Specimen: Blood from Hand, Left Updated: 07/02/25 2300     Blood Culture No Growth At 24 Hours    Chlamydia/GC, PCR [[id number]] Collected: 07/01/25 1746    Order Status: Completed Specimen: Urine Updated: 07/01/25 1942     Chlamydia trachomatis PCR Not Detected     N. gonorrhea PCR Not Detected     Source Urine    Narrative:      The Xpert CT/NG test, performed on the GeneXpert system is a qualitative in vitro real-time polymerase chain reaction (PCR) test for the automated detected and differentiation for genomic DNA from Chlamydia trachomatis (CT) and/or Neisseria gonorrhoeae (NG).

## 2025-07-03 NOTE — CARE UPDATE
Call placed to [facility], spoke with Giovani, they can not locate referral in Epic. Referral submitted to [facility] via Epic. Will follow.  
[facility]  Infectious Diseases OPAT Orders  7/3/2025     PATIENT: Riccardo Hamilton  :          1945   MRN:         [id number]     DIAGNOSIS:  Osteomyelitis right 4th and 5th toes    Review of patient's allergies indicates:  No Known Allergies     WEIGHT:  72.6 kg  CURRENT: Estimated Creatinine Clearance: 59.3 mL/min (based on SCr of 0.96 mg/dL).     MEDICATIONS:   1) Daptomycin 6 mg / kg IV daily to complete a 42 day course   - End date: 2025    2) Rocephin 2 grams IV daily to complete a 42 day course   - End date: 2025          **PLEASE FAX LAB RESULTS TO [phone]**  LABS:     Please check the following labs weekly x 6 weeks: CBC, CMP, CK, CRP, and ESR    ** LABS are NOT to be drawn from PICC site**      NURSING / OTHER:     [] Please place PICC line  [x] Routine Mediport care with weekly Mediport dressing changes  [x] Patient to receive first dose of IV antimicrobials in a supervised setting  [x] Please provide home IV antimicrobial teaching  [x] OK to de-access Mediport after completion of IV antimicrobial therapy    ADDITIONAL NOTES:       Delores Singh FNP / Dr Rocky Peoples  [facility] Infectious Diseases     
Referral submitted to [facility] via [facility]. Acceptance pending, will follow.  
Spoke with Denise with [facility], pt has a copay of $2166.    Spoke with the pt & his wife Ya, they can not afford the copay & would like LTAC in [place]. Dr. Gruber notified.   
7

## 2025-07-03 NOTE — TELEPHONE ENCOUNTER
Please schedule patient with post gold ID follow up on 07/24/2025 with Dr Peoples for osteomyelitis foot / OPAT.      Patient will be home OPAT and need a blue chart.    Thank you

## 2025-07-03 NOTE — PROGRESS NOTES
VANCOMYCIN DOSING BY PHARMACY DISCONTINUATION NOTE    Riccardo Hamilton is a 80 y.o. male who had been consulted for vancomycin dosing.    The pharmacy consult for vancomycin dosing has been discontinued.     Vancomycin Dosing by Pharmacy Consult will sign-off. Please reconsult if necessary. Thank you for allowing us to participate in this patient's care.     @SIG@

## 2025-07-03 NOTE — PROGRESS NOTES
[facility] Medicine Wards   Progress Note     Resident Team: [facility] Medicine List 1  Attending Physician: Adebayo Miranda MD  Resident: Allyn Berg  Intern: David Ortiz  Date of Admit: 6/30/2025    Subjective:      Brief HPI:  Riccardo Hamilton is a 80 y.o. male with a history of PMH of colonic adenocarcinoma (followed by Oncology; s/p hemicolectomy and adjuvant chemo), HTN, HLD, T2DM, Asthma, Retropharyngeal Carotid Bypass (2014), Carotid Endarterectomy (1/2014; 2/2014), and previous RLE DVT w/ subacute small PE of R. Lung (s/p x3 month Eliquis txt)  who presented to [facility] ED on 6/30/2025  with complaint of 2 weeks of pain between his 4th and 5th toes of his right foot. Patient states that he noticed the pain and wound 2 weeks ago and did not see any discharge/drainage from the wound. He states that he did not have any trauma to the toe. He has been wearing tennis shoes for the past few weeks. He does not recall ever seeing the wound prior or having any similar events in the past. He denies ever having osteomyelitis in the past. In the ED, Xray was done and showed suspected acute osteomyelitis of the distal 5th metatarsal. Internal medicine was consulted for management of suspected osteomyelitis     Interval History:   No acute events overnight. Wound and blood cultures remain negative. The patient was approved for OPAT but he could not afford the copay. We will work on LTAC placement.    Review of Systems:  12 point review of symptoms negative unless otherwise stated above       Objective:     Vital Signs (Most Recent):  Temp: 98.2 °F (36.8 °C) (07/03/25 1132)  Pulse: 68 (07/03/25 1132)  Resp: 18 (07/03/25 1132)  BP: (!) 141/72 (07/03/25 1132)  SpO2: 97 % (07/03/25 1132) Vital Signs (24h Range):  Temp:  [98.1 °F (36.7 °C)-98.3 °F (36.8 °C)] 98.2 °F (36.8 °C)  Pulse:  [] 68  Resp:  [18] 18  SpO2:  [95 %-98 %] 97 %  BP: (135-151)/(66-72) 141/72      Physical Exam:  GEN: A&Ox4. No acute distress   HEENT: normocephalic,  atraumatic. PERRLA. EOMI bilaterally. Sclera, conjunctiva clear.   CARDIAC: S1 S2, no MRG. Radial pulses full, no LE edema   RESPI: Chest wall rise symmetric, atraumatic. Lungs CTAB, no wheezing or rhonchi   ABDOMEN: soft, nontender. No herniation, masses  : deferred   MSK: ROM grossly intact.   NEURO: Motor and sensation grossly intact. CN grossly intact. No cerebellar deficits noted. No gait abnormalities noted.   PSYCH: Memory and thought process appear intact. Appropriate mood and affect.   INTEGUMENTARY: ulceration on the 4th toe on the right foot. No purulent drainage from wound site.       Laboratory    CBC  Recent Labs   Lab 07/01/25  0426 07/02/25  0338 07/03/25  0429   WBC 7.04 7.72 8.75   RBC 3.75* 3.53* 3.52*   HGB 11.6* 11.1* 11.0*   HCT 35.0* 32.8* 32.5*   MCV 93.3 92.9 92.3   MCH 30.9 31.4* 31.3*   MCHC 33.1 33.8 33.8   RDW 13.0 13.1 12.9    335 334   MPV 10.5* 10.1 9.4       CMP   Recent Labs   Lab 07/01/25  0426 07/02/25  0338 07/03/25  0429    138 137   K 4.3 3.9 3.8   CO2 24 24 23   BUN 16.9 21.6 21.4   CREATININE 0.97 0.94 0.96   GLU 95 91 93   CALCIUM 8.9 8.6* 9.0   MG 1.80 1.70 1.70   ALBUMIN 2.9* 2.7* 2.7*   PROT 7.1 6.7 6.7   ALKPHOS 107 110 103   ALT 22 20 19   AST 24 24 27   BILITOT 0.3 0.2 0.2        Microbiology:  Microbiology Results (last 7 days)       Procedure Component Value Units Date/Time    Wound Culture [[id number]] Collected: 06/30/25 1023    Order Status: Completed Specimen: Wound from Foot, Right Updated: 07/03/25 0820     Wound Culture Rare normal skin aylin, no further workup.    Anaerobic Culture [[id number]] Collected: 06/30/25 1023    Order Status: Completed Specimen: Wound from Foot, Right Updated: 07/03/25 0721     Anaerobe Culture No Anaerobes Isolated    Blood Culture [[id number]]  (Normal) Collected: 07/01/25 1456    Order Status: Completed Specimen: Blood from Antecubital, Right Updated: 07/02/25 2300     Blood Culture No Growth At 24 Hours    Blood  Culture [[id number]]  (Normal) Collected: 07/01/25 1456    Order Status: Completed Specimen: Blood from Hand, Left Updated: 07/02/25 2300     Blood Culture No Growth At 24 Hours    Chlamydia/GC, PCR [[id number]] Collected: 07/01/25 1746    Order Status: Completed Specimen: Urine Updated: 07/01/25 1942     Chlamydia trachomatis PCR Not Detected     N. gonorrhea PCR Not Detected     Source Urine    Narrative:      The Xpert CT/NG test, performed on the GeneXpert system is a qualitative in vitro real-time polymerase chain reaction (PCR) test for the automated detected and differentiation for genomic DNA from Chlamydia trachomatis (CT) and/or Neisseria gonorrhoeae (NG).            Cardiac Data:  No echocardiogram results found for the past 14 days.    Results for orders placed or performed in visit on 06/10/25   IN OFFICE EKG 12-LEAD (to [place])    Collection Time: 06/10/25 11:47 AM   Result Value Ref Range    QRS Duration 90 ms    OHS QTC Calculation 413 ms    Narrative    Test Reason : I10,    Vent. Rate :  66 BPM     Atrial Rate :  66 BPM     P-R Int : 214 ms          QRS Dur :  90 ms      QT Int : 394 ms       P-R-T Axes :  55 -19  70 degrees    QTcB Int : 413 ms    Sinus rhythm with marked sinus arrythmia with 1st degree A-V block  Septal infarct (cited on or before 11-Jan-2024)  Abnormal ECG  When compared with ECG of 11-Jul-2024 09:34,  No significant change was found  Confirmed by Iris Page ([id number]) on 6/11/2025 5:17:26 PM    Referred By:            Confirmed By: Iris Page        Radiology:  Imaging Results              X-Ray Foot Complete Right (Final result)  Result time 06/30/25 08:10:47      Final result by Emery De Los Santos MD (06/30/25 08:10:47)                   Impression:      Suspect acute osteomyelitis of the distal 5th metatarsal      Electronically signed by: Emery De Los Santos MD  Date:    06/30/2025  Time:    08:10               Narrative:    EXAMINATION:  Three views right foot    CLINICAL  HISTORY:  Open wound 5th toe    COMPARISON:  09/20/2021    FINDINGS:  There is mild cortical lucency along the plantar aspect of the distal 5th metatarsal head.  No acute fracture or dislocation.                                      Current Medications:     Infusions:       Scheduled:   amLODIPine  10 mg Oral Daily    aspirin  81 mg Oral Daily    atorvastatin  80 mg Oral Daily    cefTRIAXone (Rocephin) IV (PEDS and ADULTS)  2 g Intravenous Q24H    enoxparin  40 mg Subcutaneous Daily    losartan  25 mg Oral Daily    mupirocin   Nasal BID        PRN:    Current Facility-Administered Medications:     acetaminophen, 650 mg, Oral, Q8H PRN    albuterol-ipratropium, 3 mL, Nebulization, Q6H PRN    dextrose 50%, 12.5 g, Intravenous, PRN    dextrose 50%, 25 g, Intravenous, PRN    glucagon (human recombinant), 1 mg, Intramuscular, PRN    glucose, 16 g, Oral, PRN    glucose, 24 g, Oral, PRN    hydrALAZINE, 10 mg, Intravenous, Q6H PRN    naloxone, 0.02 mg, Intravenous, PRN    sodium chloride 0.9%, 10 mL, Intravenous, Q12H PRN    Antibiotics and Day Number of Therapy:        Intake/Output Summary (Last 24 hours) at 7/3/2025 1511  Last data filed at 7/3/2025 1230  Gross per 24 hour   Intake 1600.86 ml   Output 1050 ml   Net 550.86 ml       Lines/Drains/Airways       Central Venous Catheter Line  Duration             Port A Cath Single Lumen right subclavian -- days              Peripheral Intravenous Line  Duration             Peripheral IV Single Lumen 07/03/25 1346 22 G Left;Posterior Hand <1 day                      Assessment & Plan:   Osteomyelitis   - Xray of the right foot: Suspected acute osteomyelitis of the distal 5th metatarsal  - ED and wound care reported probe to bone    - Ordered wound culture which showed NG@24h  - Consulted surgery and wound care. Will appreciate all recommendations.  - Discussed with surgery, states recommendation is to start trial of antibiotics first and to re-evaluate for surgery if no  improvement in wound  - Discussed with radiology who re examined the MRI and states that both the 5th and 4th metatarsals had osteomyelitis.   - Consulted Infectious Disease, they recommended 6 weeks on Rocephin and Daptomycin  - unfortunately the patient cannot afford OPAT, we will work on LTAC placement. Since he is still inpatient, will continue on vancomycin due to safer profile than Daptomycin.     Previously documented T2DM  - A1c 6.4.    - Currently not on any home medications   - had 1 previous A1c in the past in 2018 being 6.6 with all others being less than 6.5    HTN  - Continue amlodipine 10mg   - Increase Losartan to 50 mg daily     Hx of colonic adenocarcinoma   Hx of DVT and  PE   - Completed 3 month treatment with Eliquis   - follows with Heme[facility]oncology at [facility]        CODE STATUS:  Full  Access:  PIV  Antibiotics:  n/a  Diet:  Regular  DVT Prophylaxis:  Lovenox  GI Prophylaxis:  na  Fluids:  na     Dispo: Patient is admitted for osteomyelitis workup and management. Surgery, infectious disease, and wound care following, pending LTAC placement      Jovi Gruber MD  Internal Medicine - PGY-3

## 2025-07-03 NOTE — PROGRESS NOTES
[facility]   Inpatient Wound Care Progress Note        HPI:     Riccardo Hamilton is a 80 y.o. male with a history of PMH of colonic adenocarcinoma, HTN, HLD, T2DM, Asthma, Retropharyngeal Carotid Bypass (2014), Carotid Endarterectomy (1/2014; 2/2014), and previous RLE DVT w/ subacute small PE of right lung  who presented to [facility] ED on 6/30/2025  with complaint of 2 weeks of pain between his 4th and 5th toes of his right foot. Per pt he is not sure exactly when the wound started. Pt denies that he is DM and says he doesn't take any meds for DM. A1C is 6.4. Will attempt to clarify with IM team. Wound is noted to probe to bone and appears to be somewhat dislocated or fractured. Osteo noted in Xray. Unable to obtain tissue for culture, swab culture obtained. Surgery team consulted to evaluate. Difficulty obtaining pulses to right DP. Discussed with IM team with plans to order ABIs.       Interval History:    MRI showing osteo to right 5th toe. IM team clarified with radiology noting osteo to 4th toe as well. Wound to 4th toe remains unchanged without purulence or erythema. ABIs showing non compressible vessels and arterial US with noevidence of focal stenosis or occlusion. Case discussed with IM, ID and surgery teams. Plans to attempt management with antibiotics and wound care. Will monitor outpatient and should worsening occur, will reconsider surgical options. Wound cultures without growth however cultures were difficult to obtain due to small shallow nature of the wound. Pt will therefore need to remain on broad coverage.  Pt will need HH upon DC. Wife performed dressing change independently today with competence noted. Supplies provided. CM to set up home health and he will follow in our clinic next week. Wife still has concerns about managing IV antibiotics at home. This was relayed to nursing, CM and IM team.            Past Medical History/Past Surgical History/Social History     See  HPI for pertinent history.    ALLERGIES: Review of patient's allergies indicates:  No Known Allergies      Review of Systems:     Review of Systems   Constitutional:  Negative for chills and fever.   Respiratory:  Negative for shortness of breath.    Cardiovascular:  Negative for chest pain and leg swelling.   Integumentary:  Positive for wound.   Neurological:  Negative for numbness    ROS negative except for above.      MEDICATIONS: See MAR      Physical exam:     Temp:  [98.1 °F (36.7 °C)-98.3 °F (36.8 °C)] 98.1 °F (36.7 °C)  Pulse:  [] 70  Resp:  [18] 18  SpO2:  [95 %-98 %] 98 %  BP: (135-151)/(66-70) 146/70     GENERAL: A&Ox3, NAD,   HEENT: atraumatic, normocephalic, anicteric, moist oral mucosa without lesions, exudate, or erythema  LUNGS: breathing unlabored, lungs CTA bilateral  HEART: RRR; no murmur, rub, or gallop  ABDOMEN: abdomen soft, nondistended, BS noted x 4 quadrants, no tympany, no rebound, guarding, or organomegaly  : no CVA tenderness  EXTREMITIES: no edema, clubbing, or cyanosis.   SKIN: see wound assessment   NEURO: speech fluent and intact, facial symmetry preserved, no tremor  PSYCH: cooperative, normal mood and affect        Labs:     I have personally reviewed patient's labs.  Pertinent results noted below.      Recent Labs   Lab 07/03/25  0429   WBC 8.75   HGB 11.0*   HCT 32.5*           Recent Labs     06/30/25  1012 07/01/25  0426 07/02/25  0338 07/03/25  0429    140 138 137   K 4.2 4.3 3.9 3.8    106 108* 106   CO2 23 24 24 23   BUN 13.1 16.9 21.6 21.4   CREATININE 1.01 0.97 0.94 0.96        Recent Labs   Lab 06/30/25  1012   HGBA1C 6.4       Microbiology Results (last 7 days)       Procedure Component Value Units Date/Time    Anaerobic Culture [[id number]] Collected: 06/30/25 1023    Order Status: Completed Specimen: Wound from Foot, Right Updated: 07/03/25 0721     Anaerobe Culture No Anaerobes Isolated    Blood Culture [[id number]]  (Normal) Collected:  07/01/25 1456    Order Status: Completed Specimen: Blood from Antecubital, Right Updated: 07/02/25 2300     Blood Culture No Growth At 24 Hours    Blood Culture [[id number]]  (Normal) Collected: 07/01/25 1456    Order Status: Completed Specimen: Blood from Hand, Left Updated: 07/02/25 2300     Blood Culture No Growth At 24 Hours    Wound Culture [[id number]] Collected: 06/30/25 1023    Order Status: Completed Specimen: Wound from Foot, Right Updated: 07/02/25 1146     Wound Culture Rare normal skin aylin, no further workup.    Chlamydia/GC, PCR [[id number]] Collected: 07/01/25 1746    Order Status: Completed Specimen: Urine Updated: 07/01/25 1942     Chlamydia trachomatis PCR Not Detected     N. gonorrhea PCR Not Detected     Source Urine    Narrative:      The Xpert CT/NG test, performed on the [employer] system is a qualitative in vitro real-time polymerase chain reaction (PCR) test for the automated detected and differentiation for genomic DNA from Chlamydia trachomatis (CT) and/or Neisseria gonorrhoeae (NG).              Wound Assessment:   4th toe remains with shallow red wound base and positive probe to bone to central aspect. Slight increase in red granulation tissue today. Remains without purulence or erythema. 5th toes is noted as intact with no skin changes. Dressing change performed by wife today with competence noted. Pt reporting mild pain with wound cleaning.           Imaging:     I have personally reviewed patient's imaging. Pertinent results noted below.  MRI Foot (Forefoot) Right W W/O Contrast   Final Result      1. Possibly reactive edema in the 5th proximal phalanx and distal 5th metatarsal head.  There is no enhancement or abnormal T1 signal associated and no cortical destruction is present.  Early osteomyelitis is in the differential.   2. Mild cellulitis over the lateral forefoot.         Electronically signed by: Emery De Los Santos MD   Date:    06/30/2025   Time:    15:20      X-Ray Foot Complete  Right   Final Result      Suspect acute osteomyelitis of the distal 5th metatarsal         Electronically signed by: Emery De Los Santos MD   Date:    06/30/2025   Time:    08:10            Impression:     Right lateral 4th toe ulcer- Arterial vs DM. Probes to bone.     Plan/Recommendations:         Daily wound care. ID following. Plans to manage with IV antibiotics and wound care at this time. PT will need HH upon DC. WBAT with [facility]. Wound care clinic follow up next week. Pt is ready for DC from a wound care standpoint once long term antibiotic plans finalized.        The time spent including preparing to see the patient, obtaining patient history and assessment, evaluation of the plan of care, patient/caregiver counseling and education, orders, documentation, coordination of care, and other professional medical management activities for today's encounter was 35 minutes.        Tessa SAEZ-BC, WCC, DWC  [facility] Inpatient Woundcare

## 2025-07-03 NOTE — PLAN OF CARE
Problem: Fall Injury Risk  Goal: Absence of Fall and Fall-Related Injury  Outcome: Progressing     Problem: Infection  Goal: Absence of Infection Signs and Symptoms  Outcome: Progressing     Problem: Pain Acute  Goal: Optimal Pain Control and Function  Outcome: Progressing     Problem: Adult Inpatient Plan of Care  Goal: Plan of Care Review  Outcome: Progressing  Goal: Patient-Specific Goal (Individualized)  Outcome: Progressing  Goal: Absence of Hospital-Acquired Illness or Injury  Outcome: Progressing  Goal: Optimal Comfort and Wellbeing  Outcome: Progressing  Goal: Readiness for Transition of Care  Outcome: Progressing

## 2025-07-03 NOTE — DISCHARGE SUMMARY
U Internal Medicine Discharge Summary  [facility]    Admitting Physician: Adebayo Miranda MD  Attending Physician: Adebayo Miranda MD  Date of admit: 6/30/2025  Date of discharge: No discharge date for patient encounter.    Condition: Good  Outcome: Condition has improved and patient is now ready for discharge.  Disposition: Home-Health Care [facility]    Hospital Course Summary and Problem List     Riccardo Hamilton is a 80 y.o. male with a history of PMH of colonic adenocarcinoma (followed by Oncology; s/p hemicolectomy and adjuvant chemo), HTN, HLD, T2DM, Asthma, Retropharyngeal Carotid Bypass (2014), Carotid Endarterectomy (1/2014; 2/2014), and previous RLE DVT w/ subacute small PE of R. Lung (s/p x3 month Eliquis txt) who presented to [facility] ED on 6/30/2025 with complaint of 2 weeks of pain between his 4th and 5th toes of his right foot. He was found to have osteomyelitis of both toes, surgery was consulted who discussed options with the patient, decision was made to go with medical management due to his risk for complications from a surgical intervention with his age and comorbities. Wound cultures before antibiotics did not grow any organisms, ID was consulted who recommended 6 week of antibiotics with rocephin and daptomycin. The patient was a candidate for OPAT, he already had a mediport from his chemotherapy, oncology gave us approval to use it for antibiotic administration. Blood cultures were negative. His wife was educated on wound care and medication administration and is willing to take care of him at home with the help of home health.    Home-Health Care [facility]  Discharge Procedure Orders   CK   Standing Status: Standing Number of Occurrences: 6 Standing Exp. Date: 10/01/26     Ambulatory referral/consult to Internal Medicine   Standing Status: Future   Referral Priority: Routine Referral Type: Consultation   Referral Reason: Specialty Services Required   Requested  "Specialty: Internal Medicine   Number of Visits Requested: 1       To address at [facility] Visit:  Significant medication changes: take Rocephin and Daptomycin daily through mediport. Hold Lipitor while on antibiotics.  Results not resulted during admission: follow up on cultures.  Recommended Labs: weekly CPK  Recommended Imaging: none    Problem List  Osteomyelitis of 4th and 5th right toes  HTN  Hx of colon cancer    Objective     Vital Signs:  Vitals  BP: (!) 146/70  Temp: 98.1 °F (36.7 °C)  Temp Source: Oral  Pulse: 70  Resp: 18  SpO2: 98 %  Height: 5' 9" (175.3 cm)  Weight: 72.6 kg (160 lb 1.6 oz)    Physical Exam  Physical Exam  Constitutional:       Appearance: Normal appearance.   HENT:      Head: Normocephalic and atraumatic.   Cardiovascular:      Rate and Rhythm: Normal rate and regular rhythm.      Pulses: Normal pulses.      Heart sounds: Normal heart sounds.   Pulmonary:      Effort: Pulmonary effort is normal.      Breath sounds: Normal breath sounds.   Abdominal:      General: Bowel sounds are normal.      Palpations: Abdomen is soft.   Musculoskeletal:      Cervical back: Normal range of motion and neck supple.      Comments: Right foot draped and wrapped.   Skin:     General: Skin is warm and dry.      Capillary Refill: Capillary refill takes less than 2 seconds.   Neurological:      General: No focal deficit present.      Mental Status: He is alert and oriented to person, place, and time.         Discharge Medications        Medication List        START taking these medications      0.9% NaCl SolP 50 mL with DAPTOmycin 500 mg SolR 435.5 mg  Inject 435.5 mg into the vein once daily.     cefTRIAXone 2 gram injection  Commonly known as: ROCEPHIN  Inject 2 g into the vein once daily.     losartan 50 MG tablet  Commonly known as: COZAAR  Take 1 tablet (50 mg total) by mouth once daily.            CHANGE how you take these medications      atorvastatin 80 MG tablet  Commonly known as: LIPITOR  Take 1 " tablet (80 mg total) by mouth once daily.  Start taking on: August 12, 2025  What changed: These instructions start on August 12, 2025. If you are unsure what to do until then, ask your doctor or other care provider.            CONTINUE taking these medications      acetaminophen 500 MG tablet  Commonly known as: TYLENOL     * albuterol 2.5 mg /3 mL (0.083 %) nebulizer solution  Commonly known as: PROVENTIL  Take 3 mLs (2.5 mg total) by nebulization as needed for Wheezing or Shortness of Breath.     * albuterol 90 mcg/actuation inhaler  Commonly known as: VENTOLIN HFA  Inhale 2 puffs into the lungs every 6 (six) hours as needed for Wheezing. Rescue     amLODIPine 10 MG tablet  Commonly known as: NORVASC  Take 1 tablet (10 mg total) by mouth once daily.     aspirin 81 MG Chew  Take 1 tablet (81 mg total) by mouth once daily.     cetirizine 5 MG tablet  Commonly known as: ZYRTEC  Take 1 tablet (5 mg total) by mouth once daily.     ferrous sulfate 325 mg (65 mg iron) Tab tablet  Commonly known as: FEOSOL     fluticasone propionate 50 mcg/actuation nasal spray  Commonly known as: FLONASE  1 spray (50 mcg total) by Each Nostril route 2 (two) times daily.     fluticasone-salmeterol 250-50 mcg/dose 250-50 mcg/dose diskus inhaler  Commonly known as: ADVAIR DISKUS  Inhale 1 puff into the lungs 2 (two) times daily. Controller     gabapentin 100 MG capsule  Commonly known as: NEURONTIN  Take 1 capsule (100 mg total) by mouth 3 (three) times daily.     magnesium oxide 400 mg (241.3 mg magnesium) tablet  Commonly known as: MAG-OX     multivitamin per tablet  Commonly known as: THERAGRAN     pantoprazole 40 MG tablet  Commonly known as: PROTONIX  Take 1 tablet (40 mg total) by mouth once daily.     valACYclovir 1000 MG tablet  Commonly known as: VALTREX  Take 1 tablet (1,000 mg total) by mouth 3 (three) times daily. for 16 days           * This list has 2 medication(s) that are the same as other medications prescribed for you.  Read the directions carefully, and ask your doctor or other care provider to review them with you.                   Where to Get Your Medications        These medications were sent to [facility] [place], [place] - [address]  [address] [address]      Phone: [phone]   atorvastatin 80 MG tablet  cefTRIAXone 2 gram injection       You can get these medications from any pharmacy    Bring a paper prescription for each of these medications  0.9% NaCl SolP 50 mL with DAPTOmycin 500 mg SolR 435.5 mg         Outpatient Follow Up      Follow-up Information       [facility] Follow up on 7/11/2025.    Specialty: Wound Care  Why: Friday 7-11-25 @ 9 am  3rd floor  [phone]  Contact information:  [address] [place] [id number] [phone]                           At this time, Riccardo Hamilton is determined to have maximized benefits of IP hospitalization. he is discharged in stable condition with outpatient f/u recommendations and instructions. All questions were answered, and patient verbalized agreement with the POC. They were given return precautions prior to discharge including symptoms that should prompt return to ED or to call PCP.     Total time spent at discharge: >30 minutes    Jovi Gruber  [facility] Internal Medicine

## 2025-07-03 NOTE — CONSULTS
Spoke with pt's wife about home health options, she selected [facility]. Referral submitted via Epic.Referral also submitted to [facility] for outpt IV infusions.

## 2025-07-04 LAB
ALBUMIN SERPL-MCNC: 2.7 G/DL (ref 3.4–4.8)
ALBUMIN/GLOB SERPL: 0.7 RATIO (ref 1.1–2)
ALP SERPL-CCNC: 100 UNIT/L (ref 40–150)
ALT SERPL-CCNC: 23 UNIT/L (ref 0–55)
ANION GAP SERPL CALC-SCNC: 7 MEQ/L
AST SERPL-CCNC: 32 UNIT/L (ref 11–45)
BASOPHILS # BLD AUTO: 0.11 X10(3)/MCL
BASOPHILS NFR BLD AUTO: 1.2 %
BILIRUB SERPL-MCNC: 0.2 MG/DL
BUN SERPL-MCNC: 24.5 MG/DL (ref 8.4–25.7)
CALCIUM SERPL-MCNC: 8.6 MG/DL (ref 8.8–10)
CHLORIDE SERPL-SCNC: 110 MMOL/L (ref 98–107)
CO2 SERPL-SCNC: 23 MMOL/L (ref 23–31)
CREAT SERPL-MCNC: 0.88 MG/DL (ref 0.72–1.25)
CREAT/UREA NIT SERPL: 28
CRP SERPL-MCNC: 25 MG/L
EOSINOPHIL # BLD AUTO: 0.38 X10(3)/MCL (ref 0–0.9)
EOSINOPHIL NFR BLD AUTO: 4.2 %
ERYTHROCYTE [DISTWIDTH] IN BLOOD BY AUTOMATED COUNT: 13.2 % (ref 11.5–17)
ERYTHROCYTE [SEDIMENTATION RATE] IN BLOOD: 65 MM/HR (ref 0–15)
GFR SERPLBLD CREATININE-BSD FMLA CKD-EPI: >60 ML/MIN/1.73/M2
GLOBULIN SER-MCNC: 3.9 GM/DL (ref 2.4–3.5)
GLUCOSE SERPL-MCNC: 96 MG/DL (ref 82–115)
HCT VFR BLD AUTO: 32.6 % (ref 42–52)
HGB BLD-MCNC: 11.1 G/DL (ref 14–18)
IMM GRANULOCYTES # BLD AUTO: 0.02 X10(3)/MCL (ref 0–0.04)
IMM GRANULOCYTES NFR BLD AUTO: 0.2 %
LEFT ABI: 1.47
LEFT ARM BP: 174 MMHG
LEFT CFA PSV: 138 CM/S
LEFT DORSALIS PEDIS PSV: 84 CM/S
LEFT DORSALIS PEDIS: 259 MMHG
LEFT PERONEAL SYS PSV: 86 CM/S
LEFT POPLITEAL PSV: 137 CM/S
LEFT POST TIBIAL SYS PSV: 60 CM/S
LEFT POSTERIOR TIBIAL: 257 MMHG
LEFT PROFUNDA SYS PSV: 140 CM/S
LEFT SUPER FEMORAL DIST SYS PSV: 88 CM/S
LEFT SUPER FEMORAL MID SYS PSV: 98 CM/S
LEFT SUPER FEMORAL PROX SYS PSV: 124 CM/S
LYMPHOCYTES # BLD AUTO: 3.74 X10(3)/MCL (ref 0.6–4.6)
LYMPHOCYTES NFR BLD AUTO: 41.5 %
MAGNESIUM SERPL-MCNC: 1.7 MG/DL (ref 1.6–2.6)
MCH RBC QN AUTO: 31.9 PG (ref 27–31)
MCHC RBC AUTO-ENTMCNC: 34 G/DL (ref 33–36)
MCV RBC AUTO: 93.7 FL (ref 80–94)
MONOCYTES # BLD AUTO: 0.8 X10(3)/MCL (ref 0.1–1.3)
MONOCYTES NFR BLD AUTO: 8.9 %
NEUTROPHILS # BLD AUTO: 3.97 X10(3)/MCL (ref 2.1–9.2)
NEUTROPHILS NFR BLD AUTO: 44 %
NRBC BLD AUTO-RTO: 0 %
OHS CV LEFT LOWER EXTREMITY ABI (NO CALC): 1.47
OHS CV RIGHT ABI LOWER EXTREMITY (NO CALC): 1.49
PHOSPHATE SERPL-MCNC: 2.7 MG/DL (ref 2.3–4.7)
PLATELET # BLD AUTO: 294 X10(3)/MCL (ref 130–400)
PLATELETS.RETICULATED NFR BLD AUTO: 2.9 % (ref 0.9–11.2)
PMV BLD AUTO: 10.3 FL (ref 7.4–10.4)
POCT GLUCOSE: 113 MG/DL (ref 70–110)
POCT GLUCOSE: 98 MG/DL (ref 70–110)
POTASSIUM SERPL-SCNC: 3.5 MMOL/L (ref 3.5–5.1)
PROT SERPL-MCNC: 6.6 GM/DL (ref 5.8–7.6)
RBC # BLD AUTO: 3.48 X10(6)/MCL (ref 4.7–6.1)
RIGHT ABI: 1.49
RIGHT ARM BP: 176 MMHG
RIGHT CFA PSV: 142 CM/S
RIGHT DORSALIS PEDIS PSV: 51 CM/S
RIGHT DORSALIS PEDIS: 262 MMHG
RIGHT PERONEAL SYS PSV: 29 CM/S
RIGHT POPLITEAL PSV: 65 CM/S
RIGHT POST TIBIAL SYS PSV: 64 CM/S
RIGHT POSTERIOR TIBIAL: 262 MMHG
RIGHT PROFUNDA SYS PSV: 158 CM/S
RIGHT SUPER FEMORAL DIST SYS PSV: 80 CM/S
RIGHT SUPER FEMORAL MID SYS PSV: 75 CM/S
RIGHT SUPER FEMORAL PROX SYS PSV: 114 CM/S
SODIUM SERPL-SCNC: 140 MMOL/L (ref 136–145)
WBC # BLD AUTO: 9.02 X10(3)/MCL (ref 4.5–11.5)

## 2025-07-04 PROCEDURE — 94761 N-INVAS EAR/PLS OXIMETRY MLT: CPT

## 2025-07-04 PROCEDURE — 80053 COMPREHEN METABOLIC PANEL: CPT

## 2025-07-04 PROCEDURE — 86140 C-REACTIVE PROTEIN: CPT | Performed by: NURSE PRACTITIONER

## 2025-07-04 PROCEDURE — 83735 ASSAY OF MAGNESIUM: CPT

## 2025-07-04 PROCEDURE — 36415 COLL VENOUS BLD VENIPUNCTURE: CPT

## 2025-07-04 PROCEDURE — 25000003 PHARM REV CODE 250: Performed by: INTERNAL MEDICINE

## 2025-07-04 PROCEDURE — 85652 RBC SED RATE AUTOMATED: CPT | Performed by: NURSE PRACTITIONER

## 2025-07-04 PROCEDURE — 84100 ASSAY OF PHOSPHORUS: CPT

## 2025-07-04 PROCEDURE — 63600175 PHARM REV CODE 636 W HCPCS

## 2025-07-04 PROCEDURE — 25000003 PHARM REV CODE 250

## 2025-07-04 PROCEDURE — 85025 COMPLETE CBC W/AUTO DIFF WBC: CPT

## 2025-07-04 PROCEDURE — 99900035 HC TECH TIME PER 15 MIN (STAT)

## 2025-07-04 PROCEDURE — 63600175 PHARM REV CODE 636 W HCPCS: Performed by: INTERNAL MEDICINE

## 2025-07-04 PROCEDURE — 21400001 HC TELEMETRY ROOM

## 2025-07-04 PROCEDURE — 94760 N-INVAS EAR/PLS OXIMETRY 1: CPT

## 2025-07-04 RX ORDER — POLYETHYLENE GLYCOL 3350 17 G/17G
17 POWDER, FOR SOLUTION ORAL 2 TIMES DAILY PRN
Status: DISCONTINUED | OUTPATIENT
Start: 2025-07-04 | End: 2025-07-08 | Stop reason: HOSPADM

## 2025-07-04 RX ADMIN — ATORVASTATIN CALCIUM 80 MG: 40 TABLET, FILM COATED ORAL at 08:07

## 2025-07-04 RX ADMIN — ASPIRIN 81 MG CHEWABLE TABLET 81 MG: 81 TABLET CHEWABLE at 08:07

## 2025-07-04 RX ADMIN — LOSARTAN POTASSIUM 25 MG: 25 TABLET, FILM COATED ORAL at 08:07

## 2025-07-04 RX ADMIN — MUPIROCIN: 20 OINTMENT TOPICAL at 08:07

## 2025-07-04 RX ADMIN — CEFTRIAXONE SODIUM 2 G: 2 INJECTION, POWDER, FOR SOLUTION INTRAMUSCULAR; INTRAVENOUS at 05:07

## 2025-07-04 RX ADMIN — ENOXAPARIN SODIUM 40 MG: 40 INJECTION SUBCUTANEOUS at 05:07

## 2025-07-04 RX ADMIN — VANCOMYCIN HYDROCHLORIDE 1500 MG: 1.5 INJECTION, POWDER, LYOPHILIZED, FOR SOLUTION INTRAVENOUS at 08:07

## 2025-07-04 RX ADMIN — AMLODIPINE BESYLATE 10 MG: 10 TABLET ORAL at 08:07

## 2025-07-04 NOTE — PROGRESS NOTES
[facility] Medicine Wards   Progress Note     Resident Team: [facility] Medicine List 1  Attending Physician: Adebayo Miranda MD  Resident: Allyn Berg  Intern: David Ortiz  Date of Admit: 6/30/2025    Subjective:      Brief HPI:  Riccardo Hamilton is a 80 y.o. male with a history of PMH of colonic adenocarcinoma (followed by Oncology; s/p hemicolectomy and adjuvant chemo), HTN, HLD, T2DM, Asthma, Retropharyngeal Carotid Bypass (2014), Carotid Endarterectomy (1/2014; 2/2014), and previous RLE DVT w/ subacute small PE of R. Lung (s/p x3 month Eliquis txt)  who presented to [facility] ED on 6/30/2025  with complaint of 2 weeks of pain between his 4th and 5th toes of his right foot. Patient states that he noticed the pain and wound 2 weeks ago and did not see any discharge/drainage from the wound. He states that he did not have any trauma to the toe. He has been wearing tennis shoes for the past few weeks. He does not recall ever seeing the wound prior or having any similar events in the past. He denies ever having osteomyelitis in the past. In the ED, Xray was done and showed suspected acute osteomyelitis of the distal 5th metatarsal. Internal medicine was consulted for management of suspected osteomyelitis     Interval History:   No acute events overnight. Wound and blood cultures remain negative. The patient was approved for OPAT but he could not afford the copay. We will work on LTAC placement. On vancomycin and rocephin while inpatient.    Review of Systems:  12 point review of symptoms negative unless otherwise stated above       Objective:     Vital Signs (Most Recent):  Temp: 99.3 °F (37.4 °C) (07/04/25 0727)  Pulse: 74 (07/04/25 0727)  Resp: 17 (07/04/25 0727)  BP: (!) 148/68 (07/04/25 0808)  SpO2: 98 % (07/04/25 0731) Vital Signs (24h Range):  Temp:  [97.8 °F (36.6 °C)-99.3 °F (37.4 °C)] 99.3 °F (37.4 °C)  Pulse:  [63-74] 74  Resp:  [16-18] 17  SpO2:  [95 %-98 %] 98 %  BP: (134-157)/(60-73) 148/68      Physical Exam:  GEN:  A&Ox4. No acute distress   HEENT: normocephalic, atraumatic. PERRLA. EOMI bilaterally. Sclera, conjunctiva clear.   CARDIAC: S1 S2, no MRG. Radial pulses full, no LE edema   RESPI: Chest wall rise symmetric, atraumatic. Lungs CTAB, no wheezing or rhonchi   ABDOMEN: soft, nontender. No herniation, masses  : deferred   MSK: ROM grossly intact.   NEURO: Motor and sensation grossly intact. CN grossly intact. No cerebellar deficits noted. No gait abnormalities noted.   PSYCH: Memory and thought process appear intact. Appropriate mood and affect.   INTEGUMENTARY: ulceration on the 4th toe on the right foot. No purulent drainage from wound site.       Laboratory    CBC  Recent Labs   Lab 07/02/25 0338 07/03/25  0429 07/04/25  0410   WBC 7.72 8.75 9.02   RBC 3.53* 3.52* 3.48*   HGB 11.1* 11.0* 11.1*   HCT 32.8* 32.5* 32.6*   MCV 92.9 92.3 93.7   MCH 31.4* 31.3* 31.9*   MCHC 33.8 33.8 34.0   RDW 13.1 12.9 13.2    334 294   MPV 10.1 9.4 10.3       CMP   Recent Labs   Lab 07/02/25 0338 07/03/25  0429 07/04/25  0410    137 140   K 3.9 3.8 3.5   CO2 24 23 23   BUN 21.6 21.4 24.5   CREATININE 0.94 0.96 0.88   GLU 91 93 96   CALCIUM 8.6* 9.0 8.6*   MG 1.70 1.70 1.70   ALBUMIN 2.7* 2.7* 2.7*   PROT 6.7 6.7 6.6   ALKPHOS 110 103 100   ALT 20 19 23   AST 24 27 32   BILITOT 0.2 0.2 0.2        Microbiology:  Microbiology Results (last 7 days)       Procedure Component Value Units Date/Time    Anaerobic Culture [[id number]] Collected: 06/30/25 1023    Order Status: Completed Specimen: Wound from Foot, Right Updated: 07/04/25 0735     Anaerobe Culture No Anaerobes Isolated    Blood Culture [[id number]]  (Normal) Collected: 07/01/25 1456    Order Status: Completed Specimen: Blood from Antecubital, Right Updated: 07/03/25 2300     Blood Culture No Growth At 48 Hours    Blood Culture [[id number]]  (Normal) Collected: 07/01/25 1456    Order Status: Completed Specimen: Blood from Hand, Left Updated: 07/03/25 2300     Blood  Culture No Growth At 48 Hours    Wound Culture [[id number]] Collected: 06/30/25 1023    Order Status: Completed Specimen: Wound from Foot, Right Updated: 07/03/25 0820     Wound Culture Rare normal skin aylin, no further workup.    Chlamydia/GC, PCR [[id number]] Collected: 07/01/25 1746    Order Status: Completed Specimen: Urine Updated: 07/01/25 1942     Chlamydia trachomatis PCR Not Detected     N. gonorrhea PCR Not Detected     Source Urine    Narrative:      The Xpert CT/NG test, performed on the Gene[employer]pert system is a qualitative in vitro real-time polymerase chain reaction (PCR) test for the automated detected and differentiation for genomic DNA from Chlamydia trachomatis (CT) and/or Neisseria gonorrhoeae (NG).            Cardiac Data:  No echocardiogram results found for the past 14 days.    Results for orders placed or performed in visit on 06/10/25   IN OFFICE EKG 12-LEAD (to [place])    Collection Time: 06/10/25 11:47 AM   Result Value Ref Range    QRS Duration 90 ms    OHS QTC Calculation 413 ms    Narrative    Test Reason : I10,    Vent. Rate :  66 BPM     Atrial Rate :  66 BPM     P-R Int : 214 ms          QRS Dur :  90 ms      QT Int : 394 ms       P-R-T Axes :  55 -19  70 degrees    QTcB Int : 413 ms    Sinus rhythm with marked sinus arrythmia with 1st degree A-V block  Septal infarct (cited on or before 11-Jan-2024)  Abnormal ECG  When compared with ECG of 11-Jul-2024 09:34,  No significant change was found  Confirmed by Iris Page ([id number]) on 6/11/2025 5:17:26 PM    Referred By:            Confirmed By: Iris Page        Radiology:  Imaging Results              X-Ray Foot Complete Right (Final result)  Result time 06/30/25 08:10:47      Final result by Emery De Los Santos MD (06/30/25 08:10:47)                   Impression:      Suspect acute osteomyelitis of the distal 5th metatarsal      Electronically signed by: Emery De Los Santos MD  Date:    06/30/2025  Time:    08:10               Narrative:     EXAMINATION:  Three views right foot    CLINICAL HISTORY:  Open wound 5th toe    COMPARISON:  09/20/2021    FINDINGS:  There is mild cortical lucency along the plantar aspect of the distal 5th metatarsal head.  No acute fracture or dislocation.                                      Current Medications:     Infusions:       Scheduled:   amLODIPine  10 mg Oral Daily    aspirin  81 mg Oral Daily    atorvastatin  80 mg Oral Daily    cefTRIAXone (Rocephin) IV (PEDS and ADULTS)  2 g Intravenous Q24H    enoxparin  40 mg Subcutaneous Daily    losartan  25 mg Oral Daily    mupirocin   Nasal BID    vancomycin (VANCOCIN) IV (PEDS and ADULTS)  1,500 mg Intravenous Q24H        PRN:    Current Facility-Administered Medications:     acetaminophen, 650 mg, Oral, Q8H PRN    albuterol-ipratropium, 3 mL, Nebulization, Q6H PRN    dextrose 50%, 12.5 g, Intravenous, PRN    dextrose 50%, 25 g, Intravenous, PRN    glucagon (human recombinant), 1 mg, Intramuscular, PRN    glucose, 16 g, Oral, PRN    glucose, 24 g, Oral, PRN    hydrALAZINE, 10 mg, Intravenous, Q6H PRN    naloxone, 0.02 mg, Intravenous, PRN    polyethylene glycol, 17 g, Oral, BID PRN    sodium chloride 0.9%, 10 mL, Intravenous, Q12H PRN    Pharmacy to dose Vancomycin consult, , , Once **AND** vancomycin - pharmacy to dose, , Intravenous, pharmacy to manage frequency    Antibiotics and Day Number of Therapy:        Intake/Output Summary (Last 24 hours) at 7/4/2025 0834  Last data filed at 7/3/2025 2301  Gross per 24 hour   Intake 1021.67 ml   Output 600 ml   Net 421.67 ml       Lines/Drains/Airways       Central Venous Catheter Line  Duration             Port A Cath Single Lumen right subclavian -- days              Peripheral Intravenous Line  Duration             Peripheral IV Single Lumen 07/03/25 1346 22 G Left;Posterior Hand <1 day                      Assessment & Plan:   Osteomyelitis   - Xray of the right foot: Suspected acute osteomyelitis of the distal 5th  metatarsal  - ED and wound care reported probe to bone    - Ordered wound culture which showed NG@24h  - Consulted surgery and wound care. Will appreciate all recommendations.  - Discussed with surgery, states recommendation is to start trial of antibiotics first and to re-evaluate for surgery if no improvement in wound  - Discussed with radiology who re examined the MRI and states that both the 5th and 4th metatarsals had osteomyelitis.   - Consulted Infectious Disease, they recommended 6 weeks on Rocephin and Daptomycin  - unfortunately the patient cannot afford OPAT, we will work on LTAC placement. Since he is still inpatient, will continue on vancomycin due to safer profile than Daptomycin.     Previously documented T2DM  - A1c 6.4.    - Currently not on any home medications   - had 1 previous A1c in the past in 2018 being 6.6 with all others being less than 6.5    HTN  - Continue amlodipine 10mg   - Increase Losartan to 50 mg daily     Hx of colonic adenocarcinoma   Hx of DVT and  PE   - Completed 3 month treatment with Eliquis   - [facility] with Heme/oncology at [facility]        CODE STATUS:  Full  Access:  PIV  Antibiotics:  n/a  Diet:  Regular  DVT Prophylaxis:  Lovenox  GI Prophylaxis:  na  Fluids:  na     Dispo: Patient is admitted for osteomyelitis workup and management. Surgery, infectious disease, and wound care following, pending LTAC placement      Jovi Gruber MD  Internal Medicine - PGY-3

## 2025-07-05 LAB
ALBUMIN SERPL-MCNC: 2.6 G/DL (ref 3.4–4.8)
ALBUMIN/GLOB SERPL: 0.7 RATIO (ref 1.1–2)
ALP SERPL-CCNC: 94 UNIT/L (ref 40–150)
ALT SERPL-CCNC: 24 UNIT/L (ref 0–55)
ANION GAP SERPL CALC-SCNC: 6 MEQ/L
AST SERPL-CCNC: 31 UNIT/L (ref 11–45)
BACTERIA SPEC ANAEROBE CULT: NORMAL
BASOPHILS # BLD AUTO: 0.08 X10(3)/MCL
BASOPHILS NFR BLD AUTO: 1 %
BILIRUB SERPL-MCNC: 0.2 MG/DL
BUN SERPL-MCNC: 22.7 MG/DL (ref 8.4–25.7)
CALCIUM SERPL-MCNC: 8.6 MG/DL (ref 8.8–10)
CHLORIDE SERPL-SCNC: 110 MMOL/L (ref 98–107)
CO2 SERPL-SCNC: 23 MMOL/L (ref 23–31)
CREAT SERPL-MCNC: 0.78 MG/DL (ref 0.72–1.25)
CREAT/UREA NIT SERPL: 29
EOSINOPHIL # BLD AUTO: 0.45 X10(3)/MCL (ref 0–0.9)
EOSINOPHIL NFR BLD AUTO: 5.9 %
ERYTHROCYTE [DISTWIDTH] IN BLOOD BY AUTOMATED COUNT: 13 % (ref 11.5–17)
GFR SERPLBLD CREATININE-BSD FMLA CKD-EPI: >60 ML/MIN/1.73/M2
GLOBULIN SER-MCNC: 3.8 GM/DL (ref 2.4–3.5)
GLUCOSE SERPL-MCNC: 94 MG/DL (ref 82–115)
HCT VFR BLD AUTO: 31.4 % (ref 42–52)
HGB BLD-MCNC: 10.8 G/DL (ref 14–18)
IMM GRANULOCYTES # BLD AUTO: 0.01 X10(3)/MCL (ref 0–0.04)
IMM GRANULOCYTES NFR BLD AUTO: 0.1 %
LYMPHOCYTES # BLD AUTO: 3.17 X10(3)/MCL (ref 0.6–4.6)
LYMPHOCYTES NFR BLD AUTO: 41.5 %
MAGNESIUM SERPL-MCNC: 1.7 MG/DL (ref 1.6–2.6)
MCH RBC QN AUTO: 31.8 PG (ref 27–31)
MCHC RBC AUTO-ENTMCNC: 34.4 G/DL (ref 33–36)
MCV RBC AUTO: 92.4 FL (ref 80–94)
MONOCYTES # BLD AUTO: 0.64 X10(3)/MCL (ref 0.1–1.3)
MONOCYTES NFR BLD AUTO: 8.4 %
NEUTROPHILS # BLD AUTO: 3.28 X10(3)/MCL (ref 2.1–9.2)
NEUTROPHILS NFR BLD AUTO: 43.1 %
NRBC BLD AUTO-RTO: 0 %
PHOSPHATE SERPL-MCNC: 2.7 MG/DL (ref 2.3–4.7)
PLATELET # BLD AUTO: 281 X10(3)/MCL (ref 130–400)
PMV BLD AUTO: 10 FL (ref 7.4–10.4)
POTASSIUM SERPL-SCNC: 3.5 MMOL/L (ref 3.5–5.1)
PROT SERPL-MCNC: 6.4 GM/DL (ref 5.8–7.6)
RBC # BLD AUTO: 3.4 X10(6)/MCL (ref 4.7–6.1)
SODIUM SERPL-SCNC: 139 MMOL/L (ref 136–145)
VANCOMYCIN TROUGH SERPL-MCNC: 13.3 UG/ML (ref 15–20)
WBC # BLD AUTO: 7.63 X10(3)/MCL (ref 4.5–11.5)

## 2025-07-05 PROCEDURE — 84100 ASSAY OF PHOSPHORUS: CPT

## 2025-07-05 PROCEDURE — 94761 N-INVAS EAR/PLS OXIMETRY MLT: CPT

## 2025-07-05 PROCEDURE — 80202 ASSAY OF VANCOMYCIN: CPT | Performed by: INTERNAL MEDICINE

## 2025-07-05 PROCEDURE — 36415 COLL VENOUS BLD VENIPUNCTURE: CPT

## 2025-07-05 PROCEDURE — 25000003 PHARM REV CODE 250

## 2025-07-05 PROCEDURE — 99900035 HC TECH TIME PER 15 MIN (STAT)

## 2025-07-05 PROCEDURE — 94760 N-INVAS EAR/PLS OXIMETRY 1: CPT

## 2025-07-05 PROCEDURE — 21400001 HC TELEMETRY ROOM

## 2025-07-05 PROCEDURE — 83735 ASSAY OF MAGNESIUM: CPT

## 2025-07-05 PROCEDURE — 25000003 PHARM REV CODE 250: Performed by: INTERNAL MEDICINE

## 2025-07-05 PROCEDURE — 36415 COLL VENOUS BLD VENIPUNCTURE: CPT | Performed by: INTERNAL MEDICINE

## 2025-07-05 PROCEDURE — 85025 COMPLETE CBC W/AUTO DIFF WBC: CPT

## 2025-07-05 PROCEDURE — 63600175 PHARM REV CODE 636 W HCPCS

## 2025-07-05 PROCEDURE — 63600175 PHARM REV CODE 636 W HCPCS: Performed by: INTERNAL MEDICINE

## 2025-07-05 PROCEDURE — 80053 COMPREHEN METABOLIC PANEL: CPT

## 2025-07-05 RX ORDER — DICLOFENAC SODIUM 10 MG/G
2 GEL TOPICAL DAILY
Status: DISCONTINUED | OUTPATIENT
Start: 2025-07-06 | End: 2025-07-08 | Stop reason: HOSPADM

## 2025-07-05 RX ORDER — LOSARTAN POTASSIUM 25 MG/1
50 TABLET ORAL DAILY
Status: DISCONTINUED | OUTPATIENT
Start: 2025-07-05 | End: 2025-07-06

## 2025-07-05 RX ADMIN — MUPIROCIN: 20 OINTMENT TOPICAL at 08:07

## 2025-07-05 RX ADMIN — ASPIRIN 81 MG CHEWABLE TABLET 81 MG: 81 TABLET CHEWABLE at 08:07

## 2025-07-05 RX ADMIN — VANCOMYCIN HYDROCHLORIDE 1500 MG: 1.5 INJECTION, POWDER, LYOPHILIZED, FOR SOLUTION INTRAVENOUS at 06:07

## 2025-07-05 RX ADMIN — AMLODIPINE BESYLATE 10 MG: 10 TABLET ORAL at 08:07

## 2025-07-05 RX ADMIN — CEFTRIAXONE SODIUM 2 G: 2 INJECTION, POWDER, FOR SOLUTION INTRAMUSCULAR; INTRAVENOUS at 05:07

## 2025-07-05 RX ADMIN — ENOXAPARIN SODIUM 40 MG: 40 INJECTION SUBCUTANEOUS at 05:07

## 2025-07-05 RX ADMIN — LOSARTAN POTASSIUM 50 MG: 25 TABLET, FILM COATED ORAL at 08:07

## 2025-07-05 RX ADMIN — ACETAMINOPHEN 650 MG: 325 TABLET ORAL at 08:07

## 2025-07-05 RX ADMIN — ATORVASTATIN CALCIUM 80 MG: 40 TABLET, FILM COATED ORAL at 08:07

## 2025-07-05 NOTE — PROGRESS NOTES
[facility] Medicine Wards   Progress Note     Resident Team: [facility] Medicine List 1  Attending Physician: Adebayo Miranda MD  Resident: Allyn Berg  Intern: David Ortiz  Date of Admit: 6/30/2025    Subjective:      Brief HPI:  Riccardo Hamilton is a 80 y.o. male with a history of PMH of colonic adenocarcinoma (followed by Oncology; s/p hemicolectomy and adjuvant chemo), HTN, HLD, T2DM, Asthma, Retropharyngeal Carotid Bypass (2014), Carotid Endarterectomy (1/2014; 2/2014), and previous RLE DVT w/ subacute small PE of R. Lung (s/p x3 month Eliquis txt)  who presented to [facility] ED on 6/30/2025  with complaint of 2 weeks of pain between his 4th and 5th toes of his right foot. Patient states that he noticed the pain and wound 2 weeks ago and did not see any discharge/drainage from the wound. He states that he did not have any trauma to the toe. He has been wearing tennis shoes for the past few weeks. He does not recall ever seeing the wound prior or having any similar events in the past. He denies ever having osteomyelitis in the past. In the ED, Xray was done and showed suspected acute osteomyelitis of the distal 5th metatarsal. Internal medicine was consulted for management of suspected osteomyelitis     Interval History:   No acute events overnight. Wound and blood cultures remain negative. The patient was approved for OPAT but he could not afford the copay. On vancomycin and rocephin while inpatient. Pending LTAC placement. Remains slightly hypertensive, increasing Losartan.    Review of Systems:  12 point review of symptoms negative unless otherwise stated above       Objective:     Vital Signs (Most Recent):  Temp: 98.1 °F (36.7 °C) (07/05/25 0314)  Pulse: 73 (07/05/25 0314)  Resp: 18 (07/05/25 0314)  BP: (!) 152/69 (07/05/25 0314)  SpO2: 98 % (07/05/25 0314) Vital Signs (24h Range):  Temp:  [97.7 °F (36.5 °C)-99.3 °F (37.4 °C)] 98.1 °F (36.7 °C)  Pulse:  [67-74] 73  Resp:  [17-18] 18  SpO2:  [97 %-98 %] 98 %  BP:  (134-152)/(65-74) 152/69      Physical Exam:  GEN: A&Ox4. No acute distress   HEENT: normocephalic, atraumatic. PERRLA. EOMI bilaterally. Sclera, conjunctiva clear.   CARDIAC: S1 S2, no MRG. Radial pulses full, no LE edema   RESPI: Chest wall rise symmetric, atraumatic. Lungs CTAB, no wheezing or rhonchi   ABDOMEN: soft, nontender. No herniation, masses  : deferred   MSK: ROM grossly intact.   NEURO: Motor and sensation grossly intact. CN grossly intact. No cerebellar deficits noted. No gait abnormalities noted.   PSYCH: Memory and thought process appear intact. Appropriate mood and affect.   INTEGUMENTARY: ulceration on the 4th toe on the right foot. No purulent drainage from wound site.       Laboratory    CBC  Recent Labs   Lab 07/03/25 0429 07/04/25  0410 07/05/25  0434   WBC 8.75 9.02 7.63   RBC 3.52* 3.48* 3.40*   HGB 11.0* 11.1* 10.8*   HCT 32.5* 32.6* 31.4*   MCV 92.3 93.7 92.4   MCH 31.3* 31.9* 31.8*   MCHC 33.8 34.0 34.4   RDW 12.9 13.2 13.0    294 281   MPV 9.4 10.3 10.0       CMP   Recent Labs   Lab 07/03/25 0429 07/04/25  0410 07/05/25  0434    140 139   K 3.8 3.5 3.5   CO2 23 23 23   BUN 21.4 24.5 22.7   CREATININE 0.96 0.88 0.78   GLU 93 96 94   CALCIUM 9.0 8.6* 8.6*   MG 1.70 1.70 1.70   ALBUMIN 2.7* 2.7* 2.6*   PROT 6.7 6.6 6.4   ALKPHOS 103 100 94   ALT 19 23 24   AST 27 32 31   BILITOT 0.2 0.2 0.2        Microbiology:  Microbiology Results (last 7 days)       Procedure Component Value Units Date/Time    Blood Culture [[id number]]  (Normal) Collected: 07/01/25 1456    Order Status: Completed Specimen: Blood from Antecubital, Right Updated: 07/04/25 2300     Blood Culture No Growth At 72 Hours    Blood Culture [[id number]]  (Normal) Collected: 07/01/25 1456    Order Status: Completed Specimen: Blood from Hand, Left Updated: 07/04/25 2300     Blood Culture No Growth At 72 Hours    Anaerobic Culture [[id number]] Collected: 06/30/25 1023    Order Status: Completed Specimen: Wound from  Foot, Right Updated: 07/04/25 0735     Anaerobe Culture No Anaerobes Isolated    Wound Culture [[id number]] Collected: 06/30/25 1023    Order Status: Completed Specimen: Wound from Foot, Right Updated: 07/03/25 0820     Wound Culture Rare normal skin aylin, no further workup.    Chlamydia/GC, PCR [[id number]] Collected: 07/01/25 1746    Order Status: Completed Specimen: Urine Updated: 07/01/25 1942     Chlamydia trachomatis PCR Not Detected     N. gonorrhea PCR Not Detected     Source Urine    Narrative:      The Xpert CT/NG test, performed on the [employer] system is a qualitative in vitro real-time polymerase chain reaction (PCR) test for the automated detected and differentiation for genomic DNA from Chlamydia trachomatis (CT) and/or Neisseria gonorrhoeae (NG).            Cardiac Data:  No echocardiogram results found for the past 14 days.    Results for orders placed or performed in visit on 06/10/25   IN OFFICE EKG 12-LEAD (to [place])    Collection Time: 06/10/25 11:47 AM   Result Value Ref Range    QRS Duration 90 ms    OHS QTC Calculation 413 ms    Narrative    Test Reason : I10,    Vent. Rate :  66 BPM     Atrial Rate :  66 BPM     P-R Int : 214 ms          QRS Dur :  90 ms      QT Int : 394 ms       P-R-T Axes :  55 -19  70 degrees    QTcB Int : 413 ms    Sinus rhythm with marked sinus arrythmia with 1st degree A-V block  Septal infarct (cited on or before 11-Jan-2024)  Abnormal ECG  When compared with ECG of 11-Jul-2024 09:34,  No significant change was found  Confirmed by Iris Page ([id number]) on 6/11/2025 5:17:26 PM    Referred By:            Confirmed By: Iris Page        Radiology:  Imaging Results              X-Ray Foot Complete Right (Final result)  Result time 06/30/25 08:10:47      Final result by Emery De Los Santos MD (06/30/25 08:10:47)                   Impression:      Suspect acute osteomyelitis of the distal 5th metatarsal      Electronically signed by: Emery De Los Santos  MD  Date:    06/30/2025  Time:    08:10               Narrative:    EXAMINATION:  Three views right foot    CLINICAL HISTORY:  Open wound 5th toe    COMPARISON:  09/20/2021    FINDINGS:  There is mild cortical lucency along the plantar aspect of the distal 5th metatarsal head.  No acute fracture or dislocation.                                      Current Medications:     Infusions:       Scheduled:   amLODIPine  10 mg Oral Daily    aspirin  81 mg Oral Daily    atorvastatin  80 mg Oral Daily    cefTRIAXone (Rocephin) IV (PEDS and ADULTS)  2 g Intravenous Q24H    enoxparin  40 mg Subcutaneous Daily    losartan  25 mg Oral Daily    mupirocin   Nasal BID    vancomycin (VANCOCIN) IV (PEDS and ADULTS)  1,500 mg Intravenous Q24H        PRN:    Current Facility-Administered Medications:     acetaminophen, 650 mg, Oral, Q8H PRN    albuterol-ipratropium, 3 mL, Nebulization, Q6H PRN    dextrose 50%, 12.5 g, Intravenous, PRN    dextrose 50%, 25 g, Intravenous, PRN    glucagon (human recombinant), 1 mg, Intramuscular, PRN    glucose, 16 g, Oral, PRN    glucose, 24 g, Oral, PRN    hydrALAZINE, 10 mg, Intravenous, Q6H PRN    naloxone, 0.02 mg, Intravenous, PRN    polyethylene glycol, 17 g, Oral, BID PRN    sodium chloride 0.9%, 10 mL, Intravenous, Q12H PRN    Pharmacy to dose Vancomycin consult, , , Once **AND** vancomycin - pharmacy to dose, , Intravenous, pharmacy to manage frequency    Antibiotics and Day Number of Therapy:        Intake/Output Summary (Last 24 hours) at 7/5/2025 0637  Last data filed at 7/5/2025 0339  Gross per 24 hour   Intake 180 ml   Output 500 ml   Net -320 ml       Lines/Drains/Airways       Central Venous Catheter Line  Duration             Port A Cath Single Lumen right subclavian -- days              Peripheral Intravenous Line  Duration             Peripheral IV Single Lumen 07/03/25 1346 22 G Left;Posterior Hand 1 day                      Assessment & Plan:   Osteomyelitis   - Xray of the right  foot: Suspected acute osteomyelitis of the distal 5th metatarsal  - ED and wound care reported probe to bone    - Ordered wound culture which showed NG@24h  - Consulted surgery and wound care. Will appreciate all recommendations.  - Discussed with surgery, states recommendation is to start trial of antibiotics first and to re-evaluate for surgery if no improvement in wound  - Discussed with radiology who re examined the MRI and states that both the 5th and 4th metatarsals had osteomyelitis.   - Consulted Infectious Disease, they recommended 6 weeks on Rocephin and Daptomycin  - unfortunately the patient cannot afford OPAT, we will work on LTAC placement. Since he is still inpatient, will continue on vancomycin due to safer profile than Daptomycin.     Previously documented T2DM  - A1c 6.4.    - Currently not on any home medications   - had 1 previous A1c in the past in 2018 being 6.6 with all others being less than 6.5    HTN  - Continue amlodipine 10mg   - Increase Losartan to 50 mg daily     Hx of colonic adenocarcinoma   Hx of DVT and  PE   - Completed 3 month treatment with Eliquis   - [facility] Heme[facility]        CODE STATUS:  Full  Access:  PIV  Antibiotics:  n/a  Diet:  Regular  DVT Prophylaxis:  Lovenox  GI Prophylaxis:  na  Fluids:  na     Dispo: Patient is admitted for osteomyelitis workup and management. Surgery, infectious disease, and wound care following, pending LTAC placement      Jovi Gruber MD  Internal Medicine - PGY-3

## 2025-07-06 LAB
ALBUMIN SERPL-MCNC: 2.7 G/DL (ref 3.4–4.8)
ALBUMIN/GLOB SERPL: 0.7 RATIO (ref 1.1–2)
ALP SERPL-CCNC: 94 UNIT/L (ref 40–150)
ALT SERPL-CCNC: 26 UNIT/L (ref 0–55)
ANION GAP SERPL CALC-SCNC: 6 MEQ/L
AST SERPL-CCNC: 31 UNIT/L (ref 11–45)
BACTERIA BLD CULT: NORMAL
BACTERIA BLD CULT: NORMAL
BASOPHILS # BLD AUTO: 0.09 X10(3)/MCL
BASOPHILS NFR BLD AUTO: 1.2 %
BILIRUB SERPL-MCNC: 0.2 MG/DL
BUN SERPL-MCNC: 20.4 MG/DL (ref 8.4–25.7)
CALCIUM SERPL-MCNC: 8.4 MG/DL (ref 8.8–10)
CHLORIDE SERPL-SCNC: 109 MMOL/L (ref 98–107)
CO2 SERPL-SCNC: 23 MMOL/L (ref 23–31)
CREAT SERPL-MCNC: 0.8 MG/DL (ref 0.72–1.25)
CREAT/UREA NIT SERPL: 26
CRP SERPL-MCNC: 18.5 MG/L
EOSINOPHIL # BLD AUTO: 0.43 X10(3)/MCL (ref 0–0.9)
EOSINOPHIL NFR BLD AUTO: 5.7 %
ERYTHROCYTE [DISTWIDTH] IN BLOOD BY AUTOMATED COUNT: 13 % (ref 11.5–17)
ERYTHROCYTE [SEDIMENTATION RATE] IN BLOOD: 34 MM/HR (ref 0–15)
GFR SERPLBLD CREATININE-BSD FMLA CKD-EPI: >60 ML/MIN/1.73/M2
GLOBULIN SER-MCNC: 3.7 GM/DL (ref 2.4–3.5)
GLUCOSE SERPL-MCNC: 93 MG/DL (ref 82–115)
HCT VFR BLD AUTO: 30.7 % (ref 42–52)
HGB BLD-MCNC: 10.4 G/DL (ref 14–18)
IMM GRANULOCYTES # BLD AUTO: 0.01 X10(3)/MCL (ref 0–0.04)
IMM GRANULOCYTES NFR BLD AUTO: 0.1 %
LYMPHOCYTES # BLD AUTO: 2.57 X10(3)/MCL (ref 0.6–4.6)
LYMPHOCYTES NFR BLD AUTO: 33.9 %
MAGNESIUM SERPL-MCNC: 1.8 MG/DL (ref 1.6–2.6)
MCH RBC QN AUTO: 31.4 PG (ref 27–31)
MCHC RBC AUTO-ENTMCNC: 33.9 G/DL (ref 33–36)
MCV RBC AUTO: 92.7 FL (ref 80–94)
MONOCYTES # BLD AUTO: 0.82 X10(3)/MCL (ref 0.1–1.3)
MONOCYTES NFR BLD AUTO: 10.8 %
NEUTROPHILS # BLD AUTO: 3.67 X10(3)/MCL (ref 2.1–9.2)
NEUTROPHILS NFR BLD AUTO: 48.3 %
NRBC BLD AUTO-RTO: 0 %
PHOSPHATE SERPL-MCNC: 2.8 MG/DL (ref 2.3–4.7)
PLATELET # BLD AUTO: 273 X10(3)/MCL (ref 130–400)
PMV BLD AUTO: 10.1 FL (ref 7.4–10.4)
POTASSIUM SERPL-SCNC: 3.5 MMOL/L (ref 3.5–5.1)
PROT SERPL-MCNC: 6.4 GM/DL (ref 5.8–7.6)
RBC # BLD AUTO: 3.31 X10(6)/MCL (ref 4.7–6.1)
SODIUM SERPL-SCNC: 138 MMOL/L (ref 136–145)
VANCOMYCIN TROUGH SERPL-MCNC: 11.8 UG/ML (ref 15–20)
WBC # BLD AUTO: 7.59 X10(3)/MCL (ref 4.5–11.5)

## 2025-07-06 PROCEDURE — 85025 COMPLETE CBC W/AUTO DIFF WBC: CPT

## 2025-07-06 PROCEDURE — 94760 N-INVAS EAR/PLS OXIMETRY 1: CPT

## 2025-07-06 PROCEDURE — 36415 COLL VENOUS BLD VENIPUNCTURE: CPT

## 2025-07-06 PROCEDURE — 83735 ASSAY OF MAGNESIUM: CPT

## 2025-07-06 PROCEDURE — 25000003 PHARM REV CODE 250: Performed by: INTERNAL MEDICINE

## 2025-07-06 PROCEDURE — 86140 C-REACTIVE PROTEIN: CPT | Performed by: NURSE PRACTITIONER

## 2025-07-06 PROCEDURE — 63600175 PHARM REV CODE 636 W HCPCS: Performed by: INTERNAL MEDICINE

## 2025-07-06 PROCEDURE — 36415 COLL VENOUS BLD VENIPUNCTURE: CPT | Performed by: INTERNAL MEDICINE

## 2025-07-06 PROCEDURE — 99900035 HC TECH TIME PER 15 MIN (STAT)

## 2025-07-06 PROCEDURE — 85652 RBC SED RATE AUTOMATED: CPT | Performed by: NURSE PRACTITIONER

## 2025-07-06 PROCEDURE — 80053 COMPREHEN METABOLIC PANEL: CPT

## 2025-07-06 PROCEDURE — 94761 N-INVAS EAR/PLS OXIMETRY MLT: CPT

## 2025-07-06 PROCEDURE — 63600175 PHARM REV CODE 636 W HCPCS

## 2025-07-06 PROCEDURE — 21400001 HC TELEMETRY ROOM

## 2025-07-06 PROCEDURE — 84100 ASSAY OF PHOSPHORUS: CPT

## 2025-07-06 PROCEDURE — 80202 ASSAY OF VANCOMYCIN: CPT | Performed by: INTERNAL MEDICINE

## 2025-07-06 PROCEDURE — 25000003 PHARM REV CODE 250

## 2025-07-06 RX ORDER — HYDRALAZINE HYDROCHLORIDE 20 MG/ML
10 INJECTION INTRAMUSCULAR; INTRAVENOUS EVERY 6 HOURS PRN
Status: DISCONTINUED | OUTPATIENT
Start: 2025-07-06 | End: 2025-07-08 | Stop reason: HOSPADM

## 2025-07-06 RX ORDER — LOSARTAN POTASSIUM 25 MG/1
100 TABLET ORAL DAILY
Status: DISCONTINUED | OUTPATIENT
Start: 2025-07-06 | End: 2025-07-08 | Stop reason: HOSPADM

## 2025-07-06 RX ADMIN — HYDRALAZINE HYDROCHLORIDE 10 MG: 20 INJECTION INTRAMUSCULAR; INTRAVENOUS at 04:07

## 2025-07-06 RX ADMIN — ACETAMINOPHEN 650 MG: 325 TABLET ORAL at 05:07

## 2025-07-06 RX ADMIN — VANCOMYCIN HYDROCHLORIDE 1750 MG: 1 INJECTION, POWDER, LYOPHILIZED, FOR SOLUTION INTRAVENOUS at 06:07

## 2025-07-06 RX ADMIN — LOSARTAN POTASSIUM 100 MG: 25 TABLET, FILM COATED ORAL at 09:07

## 2025-07-06 RX ADMIN — AMLODIPINE BESYLATE 10 MG: 10 TABLET ORAL at 09:07

## 2025-07-06 RX ADMIN — ASPIRIN 81 MG CHEWABLE TABLET 81 MG: 81 TABLET CHEWABLE at 09:07

## 2025-07-06 RX ADMIN — ATORVASTATIN CALCIUM 80 MG: 40 TABLET, FILM COATED ORAL at 09:07

## 2025-07-06 RX ADMIN — DICLOFENAC SODIUM TOPICAL GEL, 1% 2 G: 10 GEL TOPICAL at 09:07

## 2025-07-06 RX ADMIN — MUPIROCIN: 20 OINTMENT TOPICAL at 09:07

## 2025-07-06 RX ADMIN — CEFTRIAXONE SODIUM 2 G: 2 INJECTION, POWDER, FOR SOLUTION INTRAMUSCULAR; INTRAVENOUS at 06:07

## 2025-07-06 RX ADMIN — ENOXAPARIN SODIUM 40 MG: 40 INJECTION SUBCUTANEOUS at 05:07

## 2025-07-06 NOTE — PROGRESS NOTES
[facility] Medicine Wards   Progress Note     Resident Team: [facility] Medicine List 1  Attending Physician: Adebayo Miranda MD  Resident: Allyn Berg  Intern: David Ortiz  Date of Admit: 6/30/2025    Subjective:      Brief HPI:  Riccardo Hamilton is a 80 y.o. male with a history of PMH of colonic adenocarcinoma (followed by Oncology; s/p hemicolectomy and adjuvant chemo), HTN, HLD, T2DM, Asthma, Retropharyngeal Carotid Bypass (2014), Carotid Endarterectomy (1/2014; 2/2014), and previous RLE DVT w/ subacute small PE of R. Lung (s/p x3 month Eliquis txt)  who presented to [facility] ED on 6/30/2025  with complaint of 2 weeks of pain between his 4th and 5th toes of his right foot. Patient states that he noticed the pain and wound 2 weeks ago and did not see any discharge/drainage from the wound. He states that he did not have any trauma to the toe. He has been wearing tennis shoes for the past few weeks. He does not recall ever seeing the wound prior or having any similar events in the past. He denies ever having osteomyelitis in the past. In the ED, Xray was done and showed suspected acute osteomyelitis of the distal 5th metatarsal. Internal medicine was consulted for management of suspected osteomyelitis     Interval History:   No acute events overnight. Wound and blood cultures remain negative. The patient was approved for OPAT but he could not afford the copay. On vancomycin and rocephin while inpatient. Pending LTAC placement. Remains slightly hypertensive, increasing Losartan again.    Review of Systems:  12 point review of symptoms negative unless otherwise stated above       Objective:     Vital Signs (Most Recent):  Temp: 97.9 °F (36.6 °C) (07/06/25 0336)  Pulse: 75 (07/06/25 0446)  Resp: 18 (07/06/25 0336)  BP: (!) 143/60 (07/06/25 0446)  SpO2: 98 % (07/06/25 0446) Vital Signs (24h Range):  Temp:  [97.5 °F (36.4 °C)-98.6 °F (37 °C)] 97.9 °F (36.6 °C)  Pulse:  [62-75] 75  Resp:  [18-20] 18  SpO2:  [95 %-100 %] 98 %  BP:  (127-170)/(60-79) 143/60      Physical Exam:  GEN: A&Ox4. No acute distress   HEENT: normocephalic, atraumatic. PERRLA. EOMI bilaterally. Sclera, conjunctiva clear.   CARDIAC: S1 S2, no MRG. Radial pulses full, no LE edema   RESPI: Chest wall rise symmetric, atraumatic. Lungs CTAB, no wheezing or rhonchi   ABDOMEN: soft, nontender. No herniation, masses  : deferred   MSK: ROM grossly intact.   NEURO: Motor and sensation grossly intact. CN grossly intact. No cerebellar deficits noted. No gait abnormalities noted.   PSYCH: Memory and thought process appear intact. Appropriate mood and affect.   INTEGUMENTARY: ulceration on the 4th toe on the right foot. No purulent drainage from wound site.       Laboratory    CBC  Recent Labs   Lab 07/04/25 0410 07/05/25  0434 07/06/25  0358   WBC 9.02 7.63 7.59   RBC 3.48* 3.40* 3.31*   HGB 11.1* 10.8* 10.4*   HCT 32.6* 31.4* 30.7*   MCV 93.7 92.4 92.7   MCH 31.9* 31.8* 31.4*   MCHC 34.0 34.4 33.9   RDW 13.2 13.0 13.0    281 273   MPV 10.3 10.0 10.1       CMP   Recent Labs   Lab 07/04/25 0410 07/05/25  0434 07/06/25  0358    139 138   K 3.5 3.5 3.5   CO2 23 23 23   BUN 24.5 22.7 20.4   CREATININE 0.88 0.78 0.80   GLU 96 94 93   CALCIUM 8.6* 8.6* 8.4*   MG 1.70 1.70 1.80   ALBUMIN 2.7* 2.6* 2.7*   PROT 6.6 6.4 6.4   ALKPHOS 100 94 94   ALT 23 24 26   AST 32 31 31   BILITOT 0.2 0.2 0.2        Microbiology:  Microbiology Results (last 7 days)       Procedure Component Value Units Date/Time    Blood Culture [[id number]]  (Normal) Collected: 07/01/25 1456    Order Status: Completed Specimen: Blood from Antecubital, Right Updated: 07/05/25 2300     Blood Culture No Growth At 96 Hours    Blood Culture [[id number]]  (Normal) Collected: 07/01/25 1456    Order Status: Completed Specimen: Blood from Hand, Left Updated: 07/05/25 2300     Blood Culture No Growth At 96 Hours    Anaerobic Culture [[id number]] Collected: 06/30/25 1023    Order Status: Completed Specimen: Wound from  Foot, Right Updated: 07/05/25 0842     Anaerobe Culture No Anaerobes Isolated    Wound Culture [[id number]] Collected: 06/30/25 1023    Order Status: Completed Specimen: Wound from Foot, Right Updated: 07/03/25 0820     Wound Culture Rare normal skin aylin, no further workup.    Chlamydia/GC, PCR [[id number]] Collected: 07/01/25 1746    Order Status: Completed Specimen: Urine Updated: 07/01/25 1942     Chlamydia trachomatis PCR Not Detected     N. gonorrhea PCR Not Detected     Source Urine    Narrative:      The Xpert CT/NG test, performed on the [employer] system is a qualitative in vitro real-time polymerase chain reaction (PCR) test for the automated detected and differentiation for genomic DNA from Chlamydia trachomatis (CT) and/or Neisseria gonorrhoeae (NG).            Cardiac Data:  No echocardiogram results found for the past 14 days.    Results for orders placed or performed in visit on 06/10/25   IN OFFICE EKG 12-LEAD (to [place])    Collection Time: 06/10/25 11:47 AM   Result Value Ref Range    QRS Duration 90 ms    OHS QTC Calculation 413 ms    Narrative    Test Reason : I10,    Vent. Rate :  66 BPM     Atrial Rate :  66 BPM     P-R Int : 214 ms          QRS Dur :  90 ms      QT Int : 394 ms       P-R-T Axes :  55 -19  70 degrees    QTcB Int : 413 ms    Sinus rhythm with marked sinus arrythmia with 1st degree A-V block  Septal infarct (cited on or before 11-Jan-2024)  Abnormal ECG  When compared with ECG of 11-Jul-2024 09:34,  No significant change was found  Confirmed by Iris Page ([id number]) on 6/11/2025 5:17:26 PM    Referred By:            Confirmed By: Iris Page        Radiology:  Imaging Results              X-Ray Foot Complete Right (Final result)  Result time 06/30/25 08:10:47      Final result by Emery De Los Santos MD (06/30/25 08:10:47)                   Impression:      Suspect acute osteomyelitis of the distal 5th metatarsal      Electronically signed by: Emery De Los Santos  MD  Date:    06/30/2025  Time:    08:10               Narrative:    EXAMINATION:  Three views right foot    CLINICAL HISTORY:  Open wound 5th toe    COMPARISON:  09/20/2021    FINDINGS:  There is mild cortical lucency along the plantar aspect of the distal 5th metatarsal head.  No acute fracture or dislocation.                                      Current Medications:     Infusions:       Scheduled:   amLODIPine  10 mg Oral Daily    aspirin  81 mg Oral Daily    atorvastatin  80 mg Oral Daily    cefTRIAXone (Rocephin) IV (PEDS and ADULTS)  2 g Intravenous Q24H    diclofenac sodium  2 g Topical (Top) Daily    enoxparin  40 mg Subcutaneous Daily    losartan  50 mg Oral Daily    mupirocin   Nasal BID    vancomycin (VANCOCIN) IV (PEDS and ADULTS)  1,500 mg Intravenous Q24H        PRN:    Current Facility-Administered Medications:     acetaminophen, 650 mg, Oral, Q8H PRN    albuterol-ipratropium, 3 mL, Nebulization, Q6H PRN    dextrose 50%, 12.5 g, Intravenous, PRN    dextrose 50%, 25 g, Intravenous, PRN    glucagon (human recombinant), 1 mg, Intramuscular, PRN    glucose, 16 g, Oral, PRN    glucose, 24 g, Oral, PRN    hydrALAZINE, 10 mg, Intravenous, Q6H PRN    naloxone, 0.02 mg, Intravenous, PRN    polyethylene glycol, 17 g, Oral, BID PRN    sodium chloride 0.9%, 10 mL, Intravenous, Q12H PRN    Pharmacy to dose Vancomycin consult, , , Once **AND** vancomycin - pharmacy to dose, , Intravenous, pharmacy to manage frequency    Antibiotics and Day Number of Therapy:        Intake/Output Summary (Last 24 hours) at 7/6/2025 0720  Last data filed at 7/5/2025 2208  Gross per 24 hour   Intake --   Output 400 ml   Net -400 ml       Lines/Drains/Airways       Central Venous Catheter Line  Duration             Port A Cath Single Lumen right subclavian -- days              Peripheral Intravenous Line  Duration             Peripheral IV Single Lumen 07/06/25 0423 20 G Left;Posterior Forearm <1 day                      Assessment &  Plan:   Osteomyelitis   - Xray of the right foot: Suspected acute osteomyelitis of the distal 5th metatarsal  - ED and wound care reported probe to bone    - Ordered wound culture which showed NG@24h  - Consulted surgery and wound care. Will appreciate all recommendations.  - Discussed with surgery, states recommendation is to start trial of antibiotics first and to re-evaluate for surgery if no improvement in wound  - Discussed with radiology who re examined the MRI and states that both the 5th and 4th metatarsals had osteomyelitis.   - Consulted Infectious Disease, they recommended 6 weeks on Rocephin and Daptomycin  - unfortunately the patient cannot afford OPAT, we will work on LTAC placement. Since he is still inpatient, will continue on vancomycin due to safer profile than Daptomycin.     Previously documented T2DM  - A1c 6.4.    - Currently not on any home medications   - had 1 previous A1c in the past in 2018 being 6.6 with all others being less than 6.5    HTN  - Continue amlodipine 10mg   - Increase Losartan to 100 mg daily     Hx of colonic adenocarcinoma   Hx of DVT and  PE   - Completed 3 month treatment with Eliquis   - follows with Heme/oncology at [facility]        CODE STATUS:  Full  Access:  PIV  Antibiotics:  n/a  Diet:  Regular  DVT Prophylaxis:  Lovenox  GI Prophylaxis:  na  Fluids:  na     Dispo: Patient is admitted for osteomyelitis, pending LTAC placement      Jovi Gruber MD  Internal Medicine - PGY-3

## 2025-07-07 LAB
ALBUMIN SERPL-MCNC: 2.8 G/DL (ref 3.4–4.8)
ALBUMIN/GLOB SERPL: 0.8 RATIO (ref 1.1–2)
ALP SERPL-CCNC: 99 UNIT/L (ref 40–150)
ALT SERPL-CCNC: 24 UNIT/L (ref 0–55)
ANION GAP SERPL CALC-SCNC: 5 MEQ/L
AST SERPL-CCNC: 39 UNIT/L (ref 11–45)
BASOPHILS # BLD AUTO: 0.07 X10(3)/MCL
BASOPHILS NFR BLD AUTO: 1 %
BILIRUB SERPL-MCNC: 0.3 MG/DL
BUN SERPL-MCNC: 17.9 MG/DL (ref 8.4–25.7)
CALCIUM SERPL-MCNC: 8.5 MG/DL (ref 8.8–10)
CHLORIDE SERPL-SCNC: 111 MMOL/L (ref 98–107)
CO2 SERPL-SCNC: 24 MMOL/L (ref 23–31)
CREAT SERPL-MCNC: 0.83 MG/DL (ref 0.72–1.25)
CREAT/UREA NIT SERPL: 22
EOSINOPHIL # BLD AUTO: 0.39 X10(3)/MCL (ref 0–0.9)
EOSINOPHIL NFR BLD AUTO: 5.6 %
ERYTHROCYTE [DISTWIDTH] IN BLOOD BY AUTOMATED COUNT: 12.9 % (ref 11.5–17)
GFR SERPLBLD CREATININE-BSD FMLA CKD-EPI: >60 ML/MIN/1.73/M2
GLOBULIN SER-MCNC: 3.7 GM/DL (ref 2.4–3.5)
GLUCOSE SERPL-MCNC: 95 MG/DL (ref 82–115)
HCT VFR BLD AUTO: 29.6 % (ref 42–52)
HGB BLD-MCNC: 10.2 G/DL (ref 14–18)
IMM GRANULOCYTES # BLD AUTO: 0.02 X10(3)/MCL (ref 0–0.04)
IMM GRANULOCYTES NFR BLD AUTO: 0.3 %
LYMPHOCYTES # BLD AUTO: 2.22 X10(3)/MCL (ref 0.6–4.6)
LYMPHOCYTES NFR BLD AUTO: 31.9 %
MAGNESIUM SERPL-MCNC: 1.8 MG/DL (ref 1.6–2.6)
MCH RBC QN AUTO: 32 PG (ref 27–31)
MCHC RBC AUTO-ENTMCNC: 34.5 G/DL (ref 33–36)
MCV RBC AUTO: 92.8 FL (ref 80–94)
MONOCYTES # BLD AUTO: 0.75 X10(3)/MCL (ref 0.1–1.3)
MONOCYTES NFR BLD AUTO: 10.8 %
NEUTROPHILS # BLD AUTO: 3.52 X10(3)/MCL (ref 2.1–9.2)
NEUTROPHILS NFR BLD AUTO: 50.4 %
NRBC BLD AUTO-RTO: 0 %
PHOSPHATE SERPL-MCNC: 2.6 MG/DL (ref 2.3–4.7)
PLATELET # BLD AUTO: 262 X10(3)/MCL (ref 130–400)
PMV BLD AUTO: 10.5 FL (ref 7.4–10.4)
POCT GLUCOSE: 105 MG/DL (ref 70–110)
POCT GLUCOSE: 111 MG/DL (ref 70–110)
POCT GLUCOSE: 98 MG/DL (ref 70–110)
POTASSIUM SERPL-SCNC: 3.4 MMOL/L (ref 3.5–5.1)
PROT SERPL-MCNC: 6.5 GM/DL (ref 5.8–7.6)
RBC # BLD AUTO: 3.19 X10(6)/MCL (ref 4.7–6.1)
SODIUM SERPL-SCNC: 140 MMOL/L (ref 136–145)
VANCOMYCIN TROUGH SERPL-MCNC: 13.3 UG/ML (ref 15–20)
WBC # BLD AUTO: 6.97 X10(3)/MCL (ref 4.5–11.5)

## 2025-07-07 PROCEDURE — 63600175 PHARM REV CODE 636 W HCPCS

## 2025-07-07 PROCEDURE — 36415 COLL VENOUS BLD VENIPUNCTURE: CPT

## 2025-07-07 PROCEDURE — 85025 COMPLETE CBC W/AUTO DIFF WBC: CPT

## 2025-07-07 PROCEDURE — 80053 COMPREHEN METABOLIC PANEL: CPT

## 2025-07-07 PROCEDURE — 25000003 PHARM REV CODE 250

## 2025-07-07 PROCEDURE — 36415 COLL VENOUS BLD VENIPUNCTURE: CPT | Performed by: INTERNAL MEDICINE

## 2025-07-07 PROCEDURE — 83735 ASSAY OF MAGNESIUM: CPT

## 2025-07-07 PROCEDURE — 84100 ASSAY OF PHOSPHORUS: CPT

## 2025-07-07 PROCEDURE — 99900035 HC TECH TIME PER 15 MIN (STAT)

## 2025-07-07 PROCEDURE — 94761 N-INVAS EAR/PLS OXIMETRY MLT: CPT

## 2025-07-07 PROCEDURE — 63600175 PHARM REV CODE 636 W HCPCS: Performed by: INTERNAL MEDICINE

## 2025-07-07 PROCEDURE — 80202 ASSAY OF VANCOMYCIN: CPT | Performed by: INTERNAL MEDICINE

## 2025-07-07 PROCEDURE — 21400001 HC TELEMETRY ROOM

## 2025-07-07 PROCEDURE — 25000003 PHARM REV CODE 250: Performed by: INTERNAL MEDICINE

## 2025-07-07 RX ORDER — POTASSIUM CHLORIDE 20 MEQ/1
40 TABLET, EXTENDED RELEASE ORAL ONCE
Status: COMPLETED | OUTPATIENT
Start: 2025-07-07 | End: 2025-07-07

## 2025-07-07 RX ORDER — HYDROCHLOROTHIAZIDE 12.5 MG/1
12.5 TABLET ORAL DAILY
Status: DISCONTINUED | OUTPATIENT
Start: 2025-07-07 | End: 2025-07-08

## 2025-07-07 RX ORDER — TALC
6 POWDER (GRAM) TOPICAL NIGHTLY PRN
Status: DISCONTINUED | OUTPATIENT
Start: 2025-07-07 | End: 2025-07-08 | Stop reason: HOSPADM

## 2025-07-07 RX ADMIN — AMLODIPINE BESYLATE 10 MG: 10 TABLET ORAL at 08:07

## 2025-07-07 RX ADMIN — CEFTRIAXONE SODIUM 2 G: 2 INJECTION, POWDER, FOR SOLUTION INTRAMUSCULAR; INTRAVENOUS at 06:07

## 2025-07-07 RX ADMIN — LOSARTAN POTASSIUM 100 MG: 25 TABLET, FILM COATED ORAL at 08:07

## 2025-07-07 RX ADMIN — ASPIRIN 81 MG CHEWABLE TABLET 81 MG: 81 TABLET CHEWABLE at 08:07

## 2025-07-07 RX ADMIN — POTASSIUM CHLORIDE 40 MEQ: 1500 TABLET, EXTENDED RELEASE ORAL at 08:07

## 2025-07-07 RX ADMIN — VANCOMYCIN HYDROCHLORIDE 1000 MG: 1 INJECTION, POWDER, LYOPHILIZED, FOR SOLUTION INTRAVENOUS at 05:07

## 2025-07-07 RX ADMIN — HYDROCHLOROTHIAZIDE 12.5 MG: 12.5 TABLET ORAL at 01:07

## 2025-07-07 RX ADMIN — ATORVASTATIN CALCIUM 80 MG: 40 TABLET, FILM COATED ORAL at 08:07

## 2025-07-07 RX ADMIN — ENOXAPARIN SODIUM 40 MG: 40 INJECTION SUBCUTANEOUS at 05:07

## 2025-07-07 RX ADMIN — DICLOFENAC SODIUM TOPICAL GEL, 1% 2 G: 10 GEL TOPICAL at 08:07

## 2025-07-07 NOTE — PROGRESS NOTES
Pharmacokinetic Assessment Follow Up: IV Vancomycin    Vancomycin serum concentration assessment(s):    The trough level was drawn correctly and can be used to guide therapy at this time. The measurement is below the desired definitive target range of 15 to 20 mcg/mL.    Vancomycin Regimen Plan:    Change regimen to Vancomycin 1000 mg IV every 12 hours with next serum trough concentration measured at 1700 prior to 1800 dose on 7/8    Drug levels (last 3 results):  Recent Labs   Lab Result Units 07/05/25  1708 07/06/25  1652 07/07/25  1658   Vancomycin Trough ug/ml 13.3* 11.8* 13.3*       Pharmacy will continue to follow and monitor vancomycin.    Please contact pharmacy at extension [phone] for questions regarding this assessment.    Thank you for the consult,   Carisa Jacques       Patient brief summary:  Riccardo Hamilton is a 80 y.o. male initiated on antimicrobial therapy with IV Vancomycin for treatment of bone/joint infection    The patient's current regimen is vancomycin 1750 mg IV q 24 hours    Drug Allergies:   Review of patient's allergies indicates:  No Known Allergies    Actual Body Weight:   72.6 kg    Renal Function:   Estimated Creatinine Clearance: 68.6 mL/min (based on SCr of 0.83 mg/dL).,     Dialysis Method (if applicable):  N/A    CBC (last 72 hours):  Recent Labs   Lab Result Units 07/05/25  0434 07/06/25  0358 07/07/25  0400   WBC x10(3)/mcL 7.63 7.59 6.97   Hgb g/dL 10.8* 10.4* 10.2*   Hct % 31.4* 30.7* 29.6*   Platelet x10(3)/mcL 281 273 262   Mono % % 8.4 10.8 10.8   Eos % % 5.9 5.7 5.6   Basophil % % 1.0 1.2 1.0       Metabolic Panel (last 72 hours):  Recent Labs   Lab Result Units 07/05/25  0434 07/06/25  0358 07/07/25  0400   Sodium mmol/L 139 138 140   Potassium mmol/L 3.5 3.5 3.4*   Chloride mmol/L 110* 109* 111*   CO2 mmol/L 23 23 24   Glucose mg/dL 94 93 95   Blood Urea Nitrogen mg/dL 22.7 20.4 17.9   Creatinine mg/dL 0.78 0.80 0.83   Albumin g/dL 2.6* 2.7* 2.8*   Bilirubin Total mg/dL 0.2 0.2  0.3   ALP unit/L 94 94 99   AST unit/L 31 31 39   ALT unit/L 24 26 24   Magnesium Level mg/dL 1.70 1.80 1.80   Phosphorus Level mg/dL 2.7 2.8 2.6       Vancomycin Administrations:  vancomycin given in the last 96 hours                     vancomycin (VANCOCIN) 1,750 mg in 0.9% NaCl 500 mL IVPB (mg) 1,750 mg New Bag 07/06/25 1806    vancomycin 1,500 mg in 0.9% NaCl 250 mL IVPB (admixture device) (mg) 1,500 mg New Bag 07/05/25 1854     1,500 mg New Bag 07/04/25 2005    vancomycin (VANCOCIN) 1,750 mg in 0.9% NaCl 500 mL IVPB (mg) 1,750 mg New Bag 07/03/25 1806                    Microbiologic Results:  Microbiology Results (last 7 days)       Procedure Component Value Units Date/Time    Blood Culture [[id number]]  (Normal) Collected: 07/01/25 1456    Order Status: Completed Specimen: Blood from Antecubital, Right Updated: 07/06/25 2300     Blood Culture No Growth at 5 days    Blood Culture [[id number]]  (Normal) Collected: 07/01/25 1456    Order Status: Completed Specimen: Blood from Hand, Left Updated: 07/06/25 2300     Blood Culture No Growth at 5 days    Anaerobic Culture [[id number]] Collected: 06/30/25 1023    Order Status: Completed Specimen: Wound from Foot, Right Updated: 07/05/25 0842     Anaerobe Culture No Anaerobes Isolated    Wound Culture [[id number]] Collected: 06/30/25 1023    Order Status: Completed Specimen: Wound from Foot, Right Updated: 07/03/25 0820     Wound Culture Rare normal skin aylin, no further workup.    Chlamydia/GC, PCR [[id number]] Collected: 07/01/25 1746    Order Status: Completed Specimen: Urine Updated: 07/01/25 1942     Chlamydia trachomatis PCR Not Detected     N. gonorrhea PCR Not Detected     Source Urine    Narrative:      The Xpert CT/NG test, performed on the [employer] system is a qualitative in vitro real-time polymerase chain reaction (PCR) test for the automated detected and differentiation for genomic DNA from Chlamydia trachomatis (CT) and/or Neisseria gonorrhoeae (NG).

## 2025-07-07 NOTE — PROGRESS NOTES
Spouse stated she is not comfortable with OPAT at home. Shyam Rawls with [facility] submitted request for insurance auth today. Referral with [facility] cancelled.

## 2025-07-07 NOTE — PROGRESS NOTES
[facility] Medicine Wards   Progress Note     Resident Team: [facility] Medicine List 1  Attending Physician: Adebayo Miranda MD  Resident: Allyn Berg  Intern: David Ortiz  Date of Admit: 6/30/2025    Subjective:      Brief HPI:  Riccardo Hamilton is a 80 y.o. male with a history of PMH of colonic adenocarcinoma (followed by Oncology; s/p hemicolectomy and adjuvant chemo), HTN, HLD, T2DM, Asthma, Retropharyngeal Carotid Bypass (2014), Carotid Endarterectomy (1/2014; 2/2014), and previous RLE DVT w/ subacute small PE of R. Lung (s/p x3 month Eliquis txt)  who presented to [facility] ED on 6/30/2025  with complaint of 2 weeks of pain between his 4th and 5th toes of his right foot. Patient states that he noticed the pain and wound 2 weeks ago and did not see any discharge/drainage from the wound. He states that he did not have any trauma to the toe. He has been wearing tennis shoes for the past few weeks. He does not recall ever seeing the wound prior or having any similar events in the past. He denies ever having osteomyelitis in the past. In the ED, Xray was done and showed suspected acute osteomyelitis of the distal 5th metatarsal. Internal medicine was consulted for management of suspected osteomyelitis     Interval History:   No acute events overnight, remains hypertensive, starting HCTZ. CM trying to get financial assistance for OPAT while awaiting LTAC.    Review of Systems:  12 point review of symptoms negative unless otherwise stated above       Objective:     Vital Signs (Most Recent):  Temp: 98.3 °F (36.8 °C) (07/07/25 0753)  Pulse: 62 (07/07/25 0753)  Resp: 16 (07/07/25 0753)  BP: (!) 158/72 (07/07/25 0835)  SpO2: 97 % (07/07/25 0753) Vital Signs (24h Range):  Temp:  [97.9 °F (36.6 °C)-98.4 °F (36.9 °C)] 98.3 °F (36.8 °C)  Pulse:  [62-81] 62  Resp:  [16-20] 16  SpO2:  [97 %-99 %] 97 %  BP: (137-158)/(60-72) 158/72      Physical Exam:  GEN: A&Ox4. No acute distress   HEENT: normocephalic, atraumatic. PERRLA. EOMI  bilaterally. Sclera, conjunctiva clear.   CARDIAC: S1 S2, no MRG. Radial pulses full, no LE edema   RESPI: Chest wall rise symmetric, atraumatic. Lungs CTAB, no wheezing or rhonchi   ABDOMEN: soft, nontender. No herniation, masses  : deferred   MSK: ROM grossly intact.   NEURO: Motor and sensation grossly intact. CN grossly intact. No cerebellar deficits noted. No gait abnormalities noted.   PSYCH: Memory and thought process appear intact. Appropriate mood and affect.   INTEGUMENTARY: ulceration on the 4th toe on the right foot. No purulent drainage from wound site.       Laboratory    CBC  Recent Labs   Lab 07/05/25  0434 07/06/25  0358 07/07/25  0400   WBC 7.63 7.59 6.97   RBC 3.40* 3.31* 3.19*   HGB 10.8* 10.4* 10.2*   HCT 31.4* 30.7* 29.6*   MCV 92.4 92.7 92.8   MCH 31.8* 31.4* 32.0*   MCHC 34.4 33.9 34.5   RDW 13.0 13.0 12.9    273 262   MPV 10.0 10.1 10.5*       CMP   Recent Labs   Lab 07/05/25  0434 07/06/25  0358 07/07/25  0400    138 140   K 3.5 3.5 3.4*   CO2 23 23 24   BUN 22.7 20.4 17.9   CREATININE 0.78 0.80 0.83   GLU 94 93 95   CALCIUM 8.6* 8.4* 8.5*   MG 1.70 1.80 1.80   ALBUMIN 2.6* 2.7* 2.8*   PROT 6.4 6.4 6.5   ALKPHOS 94 94 99   ALT 24 26 24   AST 31 31 39   BILITOT 0.2 0.2 0.3        Microbiology:  Microbiology Results (last 7 days)       Procedure Component Value Units Date/Time    Blood Culture [[id number]]  (Normal) Collected: 07/01/25 1456    Order Status: Completed Specimen: Blood from Antecubital, Right Updated: 07/06/25 2300     Blood Culture No Growth at 5 days    Blood Culture [[id number]]  (Normal) Collected: 07/01/25 1456    Order Status: Completed Specimen: Blood from Hand, Left Updated: 07/06/25 2300     Blood Culture No Growth at 5 days    Anaerobic Culture [[id number]] Collected: 06/30/25 1023    Order Status: Completed Specimen: Wound from Foot, Right Updated: 07/05/25 0842     Anaerobe Culture No Anaerobes Isolated    Wound Culture [[id number]] Collected: 06/30/25  1023    Order Status: Completed Specimen: Wound from Foot, Right Updated: 07/03/25 0820     Wound Culture Rare normal skin aylin, no further workup.    Chlamydia/GC, PCR [[id number]] Collected: 07/01/25 1746    Order Status: Completed Specimen: Urine Updated: 07/01/25 1942     Chlamydia trachomatis PCR Not Detected     N. gonorrhea PCR Not Detected     Source Urine    Narrative:      The Xpert CT/NG test, performed on the Gene[employer] system is a qualitative in vitro real-time polymerase chain reaction (PCR) test for the automated detected and differentiation for genomic DNA from Chlamydia trachomatis (CT) and/or Neisseria gonorrhoeae (NG).            Cardiac Data:  No echocardiogram results found for the past 14 days.    Results for orders placed or performed in visit on 06/10/25   IN OFFICE EKG 12-LEAD (to [place])    Collection Time: 06/10/25 11:47 AM   Result Value Ref Range    QRS Duration 90 ms    OHS QTC Calculation 413 ms    Narrative    Test Reason : I10,    Vent. Rate :  66 BPM     Atrial Rate :  66 BPM     P-R Int : 214 ms          QRS Dur :  90 ms      QT Int : 394 ms       P-R-T Axes :  55 -19  70 degrees    QTcB Int : 413 ms    Sinus rhythm with marked sinus arrythmia with 1st degree A-V block  Septal infarct (cited on or before 11-Jan-2024)  Abnormal ECG  When compared with ECG of 11-Jul-2024 09:34,  No significant change was found  Confirmed by Iris Page ([id number]) on 6/11/2025 5:17:26 PM    Referred By:            Confirmed By: Iris Page        Radiology:  Imaging Results              X-Ray Foot Complete Right (Final result)  Result time 06/30/25 08:10:47      Final result by Emery De Los Santos MD (06/30/25 08:10:47)                   Impression:      Suspect acute osteomyelitis of the distal 5th metatarsal      Electronically signed by: Emery De Los Santos MD  Date:    06/30/2025  Time:    08:10               Narrative:    EXAMINATION:  Three views right foot    CLINICAL HISTORY:  Open wound 5th  toe    COMPARISON:  09/20/2021    FINDINGS:  There is mild cortical lucency along the plantar aspect of the distal 5th metatarsal head.  No acute fracture or dislocation.                                      Current Medications:     Infusions:       Scheduled:   amLODIPine  10 mg Oral Daily    aspirin  81 mg Oral Daily    atorvastatin  80 mg Oral Daily    cefTRIAXone (Rocephin) IV (PEDS and ADULTS)  2 g Intravenous Q24H    diclofenac sodium  2 g Topical (Top) Daily    enoxparin  40 mg Subcutaneous Daily    hydroCHLOROthiazide  12.5 mg Oral Daily    losartan  100 mg Oral Daily    vancomycin (VANCOCIN) IV (PEDS and ADULTS)  1,750 mg Intravenous Q24H        PRN:    Current Facility-Administered Medications:     acetaminophen, 650 mg, Oral, Q8H PRN    albuterol-ipratropium, 3 mL, Nebulization, Q6H PRN    dextrose 50%, 12.5 g, Intravenous, PRN    dextrose 50%, 25 g, Intravenous, PRN    glucagon (human recombinant), 1 mg, Intramuscular, PRN    glucose, 16 g, Oral, PRN    glucose, 24 g, Oral, PRN    hydrALAZINE, 10 mg, Intravenous, Q6H PRN    naloxone, 0.02 mg, Intravenous, PRN    polyethylene glycol, 17 g, Oral, BID PRN    sodium chloride 0.9%, 10 mL, Intravenous, Q12H PRN    Pharmacy to dose Vancomycin consult, , , Once **AND** vancomycin - pharmacy to dose, , Intravenous, pharmacy to manage frequency    Antibiotics and Day Number of Therapy:        Intake/Output Summary (Last 24 hours) at 7/7/2025 1030  Last data filed at 7/7/2025 0646  Gross per 24 hour   Intake 547.09 ml   Output 1000 ml   Net -452.91 ml       Lines/Drains/Airways       Central Venous Catheter Line  Duration             Port A Cath Single Lumen right subclavian -- days              Peripheral Intravenous Line  Duration             Peripheral IV Single Lumen 07/06/25 0423 20 G Left;Posterior Forearm 1 day                      Assessment & Plan:   Osteomyelitis   - Xray of the right foot: Suspected acute osteomyelitis of the distal 5th metatarsal  - ED  and wound care reported probe to bone    - Ordered wound culture which showed NG@24h  - Consulted surgery and wound care. Will appreciate all recommendations.  - Discussed with surgery, states recommendation is to start trial of antibiotics first and to re-evaluate for surgery if no improvement in wound  - Discussed with radiology who re examined the MRI and states that both the 5th and 4th metatarsals had osteomyelitis.   - Consulted Infectious Disease, they recommended 6 weeks on Rocephin and Daptomycin  - unfortunately the patient cannot afford OPAT, we will work on LTAC placement. Since he is still inpatient, will continue on vancomycin due to safer profile than Daptomycin.     Previously documented T2DM  - A1c 6.4.    - Currently not on any home medications   - had 1 previous A1c in the past in 2018 being 6.6 with all others being less than 6.5    HTN  - Continue amlodipine 10mg and Losartan 100 mg daily  - Start HCTZ 12.5 mg daily.     Hx of colonic adenocarcinoma   Hx of DVT and  PE   - Completed 3 month treatment with Eliquis   - follows with Heme/oncology at [facility]        CODE STATUS:  Full  Access:  PIV  Antibiotics:  n/a  Diet:  Regular  DVT Prophylaxis:  Lovenox  GI Prophylaxis:  na  Fluids:  na     Dispo: Patient is admitted for osteomyelitis, pending LTAC placement while working on financial assistance for OPAT.      Jovi Gruber MD  Internal Medicine - PGY-3

## 2025-07-07 NOTE — PRE ADMISSION SCREENING
[facility]  Pre-Admission Patient Screening                  Reason of Admission:    RIGHT FOOT OSTEOMYELITIS.   TYPE 2 DM.   HTN.   HX OF COLONIC ADENOCARCINOMA.   HX OF DVT AND PE.     PATIENT REQUIRES LONG TERM ACUTE CARE FOR THE ABOVE MENTIONED REASONS AS WELL AS FOR IV VANCOMYCIN AND ROCEPHIN UNTIL 8/11, CONTINUOUS TELEMETRY MONITORING, DAILY PHYSICIAN ROUNDING WITH CLOSE ATTENTION TO LABS AND MEDICATION ADJUSTMENTS AS NEEDED, AND MANAGEMENT OF COMORBIDITIES.     LTACH Admission Criteria:    Complex wound care for infected/necrotic skin conditions, Stage III or IV pressure injury, surgical wounds, or burns requiring at least one of the following:  Wound I&D  Wound Management requiring 24 hour observation and positioning at least every 2 hours by licensed personnel    Must meet at least one of the following concomitant treatments/intervention daily unless notes: (excludes PO meds unless notes)  IV medication per therapeutic regimen, Cardiac Monitoring, and Laboratory assessment and medication adjustment(s)      LTACH more appropriate than other levels of care (eg, skilled nursing facility, home health care), as indicated by:    Clinical management of patient deemed too frequent and needed beyond the capabilities of alternative levels of care as evidence by: Frequency of IV medications greater than or equal to 2 times daily  Frequent diagnostic services needed on an inpatient basis, including clinical assessments, laboratory, and imaging as evidence by: Frequent monitoring and clinical assessments performed by a licensed RN to identify current and future patient needs by incorporating the recognition of normal vs abnormal body physiology, and to prompt recognition of pertinent changes to identify and prioritize appropriate interventions that can be performed within the acute inpatient setting.  and Frequent laboratory testing and/or imaging to aide in the improvement and effectiveness of  patient's individualized treatment plan.   More intensive services, such as speciality nursing care, and/or onsite physician assessments needed that are not available at a lower level of care as evidence by: Daily physician intervention , Collaboration between consulting and attending providers still deemed a necessity to aide in the improvement and effectiveness of patient's individualized treatment plan, which can be provided at an [facility] level of care. , and Therapy Services to be included in patient's treatment plan in an effort to restore/improve patient's modality status to a safe level of functioning prior to acute illness.      Patient is stable of transfer to [facility], as indicated by ALL of the following:      Hypotension Absent     Cardiovascular status stable     Stable chest findings     Renal function accepctable   Pain adequately managed    Intake acceptable       No acute significant hepatic dysfunction (eg, new encephalopathy)   No acute severe unstable neurologic abnormalities (eg, Altered mental status that is severe or persistent, or evidence of ongoing CNS embolization or ischemia, worsening hydrocephalus)   No active bleeding or unstable disorders of hemostasis (eg, no recent need for transfusion, severe thrombocytopenia with bleeding)   No need for respiratory or other isolation, OR manageable at LTACH level of care    Long-term enteral feeding (eg, PEG) and intravenous access established, not needed, OR to be placed at [facility] level of care      Anticipated Discharge Disposition:    Home with caregiver support or Home with Home Health      Referring Physician  DR ADEBAYO MIRANDA    Facility Status: Accept     Admitting Physician:  Adebayo Miranda MD    Primary Care Physician:  Nino Vega MD    History         Patient Active Problem List    Diagnosis Date Noted    Open toe wound, initial encounter 06/30/2025    Status post ileostomy 10/20/2024    Microsatellite instability-high colorectal  cancer 10/20/2024    Dupuytren's contracture of right hand 01/26/2024    Encounter for follow-up surveillance of colon cancer 05/02/2023    History of iron deficiency anemia 08/07/2022    History of pulmonary embolism 06/22/2022    Adenocarcinoma of cecum 05/31/2022    Hypertension 05/31/2022    Hyperlipidemia 05/31/2022    GERD (gastroesophageal reflux disease) 05/31/2022    Asthma 05/31/2022    History of DVT (deep vein thrombosis) 05/31/2022    History of bilateral carotid endarterectomy 05/31/2022         Previous Specialties/Consulted physicians:      General Surgery , Infectious Diseases , and Wound Care       Past and Current Medical History    Past Medical History:   Diagnosis Date    Cancer     Colon    Carotid stenosis, bilateral     DM (diabetes mellitus)     Dupuytren's contracture of both hands     Encounter for blood transfusion     Hyperlipidemia     Hypertension     Mild intermittent asthma, uncomplicated            History of Present Illness     Riccardo Hamilton is a 80 y.o. male with a history of PMH of colonic adenocarcinoma (followed by Oncology; s/p hemicolectomy and adjuvant chemo), HTN, HLD, T2DM, Asthma, Retropharyngeal Carotid Bypass (2014), Carotid Endarterectomy (1/2014; 2/2014), and previous RLE DVT w/ subacute small PE of R. Lung (s/p x3 month Eliquis txt)  who presented to [facility] ED on 6/30/2025  with complaint of 2 weeks of pain between his 4th and 5th toes of his right foot. Patient states that he noticed the pain and wound 2 weeks ago and did not see any discharge/drainage from the wound. He states that he did not have any trauma to the toe. He has been wearing tennis shoes for the past few weeks. He does not recall ever seeing the wound prior or having any similar events in the past. He denies ever having osteomyelitis in the past. In the ED, Xray was done and showed suspected acute osteomyelitis of the distal 5th metatarsal. Internal medicine was consulted for management of suspected  osteomyelitis      Interval History:   No acute events overnight. Wound and blood cultures remain negative. The patient was approved for OPAT but he could not afford the copay. On vancomycin and rocephin while inpatient. Pending LTAC placement. Remains slightly hypertensive, increasing Losartan again.     Review of Systems:  12 point review of symptoms negative unless otherwise stated above      Patient Traveled outside of the [place] in the last 3 months? no     Patient discharged from this LTAC to SNF within the last 45 days? no    Patient discharged from this LTAC to Rehab within the last 27 days? no    Prior residence: home    Prior Post-Acute Services: home health    Allergies: Review of patient's allergies indicates:  No Known Allergies    Has patient received the current influenza vaccine (Oct 1 - March 31)? Unknown     Has patient received PPD skin test prior to admit? No    Code Status: Full Code    Orientation: Time, Place, and Person    Speech: normal     Vital Signs:     Date 7/7    Blood Pressure 158/72    Pulse 62    Respiratory Rate 16    O2 Saturation 97    Temperature 98.3        Bowel/Bladder: continent of bladder and continent of bowel    Dialysis: N/A    Port A Cath Single Lumen right subclavian (Active)   Number of days:        Peripheral IV Single Lumen 07/06/25 0423 20 G Left;Posterior Forearm (Active)   Site Assessment Clean;Dry;No swelling;Intact;No redness 07/07/25 0800   Line Securement Device Secured with sutureless device 07/07/25 0800   Extremity Assessment Distal to IV No abnormal discoloration;No redness;No swelling;No warmth 07/07/25 0800   Line Status Saline locked 07/07/25 0800   Line 2 Status Saline locked 07/07/25 0800   Dressing Status Clean;Dry;Intact 07/07/25 0800   Dressing Intervention Integrity maintained 07/07/25 0800   Number of days: 1       CBGs/Accuchecks: No     Precautions: Fall    Restraints: No     Isolation Precautions: N/A       Facility-Administered Medications as  of 7/7/2025   Medication Dose Route Frequency Provider Last Rate Last Admin    acetaminophen tablet 650 mg  650 mg Oral Q8H PRN Rodrick Stephens, DO   650 mg at 07/06/25 1711    albuterol-ipratropium 2.5 mg-0.5 mg/3 mL nebulizer solution 3 mL  3 mL Nebulization Q6H PRN Pipo Stephensa, DO        amLODIPine tablet 10 mg  10 mg Oral Daily Rodrick Stephens, DO   10 mg at 07/07/25 0835    aspirin chewable tablet 81 mg  81 mg Oral Daily Angelo, Rodrick, DO   81 mg at 07/07/25 0835    atorvastatin tablet 80 mg  80 mg Oral Daily Angelo, Archa, DO   80 mg at 07/07/25 0834    cefTRIAXone injection 2 g  2 g Intravenous Q24H Rodrick Stephens, DO   2 g at 07/06/25 1802    [COMPLETED] DAPTOmycin injection 500 mg  500 mg Intravenous Once Adebayo Miranda MD   500 mg at 07/03/25 1302    dextrose 50% injection 12.5 g  12.5 g Intravenous PRN Rodrick Stephens, DO        dextrose 50% injection 25 g  25 g Intravenous PRN Rodrick Stephens, DO        diclofenac sodium 1 % gel 2 g  2 g Topical (Top) Daily Jason Berger MD   2 g at 07/07/25 0835    [COMPLETED] docusate sodium capsule 100 mg  100 mg Oral Daily Frederick Kenney MD   100 mg at 07/01/25 1743    enoxaparin injection 40 mg  40 mg Subcutaneous Daily Rodrick Stephens, DO   40 mg at 07/06/25 1707    [COMPLETED] gadobenate dimeglumine (MULTIHANCE) 529 mg/mL (0.1mmol/0.2mL) injection        16 mL at 06/30/25 1415    glucagon (human recombinant) injection 1 mg  1 mg Intramuscular PRN Rodrick Stephens, DO        glucose chewable tablet 16 g  16 g Oral PRN Rodrick Stephens, DO        glucose chewable tablet 24 g  24 g Oral PRN Rodrick Stephens, DO        hydrALAZINE injection 10 mg  10 mg Intravenous Q6H PRN Adebayo Miranda MD   10 mg at 07/06/25 0409    hydroCHLOROthiazide tablet 12.5 mg  12.5 mg Oral Daily Jovi Gruber MD        losartan tablet 100 mg  100 mg Oral Daily Jovi Gruber MD   100 mg at 07/07/25 0835    [COMPLETED] mupirocin 2 % ointment   Nasal BID Adebayo Miranda MD   Given at 07/06/25  0901    naloxone 0.4 mg/mL injection 0.02 mg  0.02 mg Intravenous PRN Rodrick Stephens DO        polyethylene glycol packet 17 g  17 g Oral BID PRN Jovi Gruber MD        [COMPLETED] potassium chloride SA CR tablet 40 mEq  40 mEq Oral Once Jovi Gruber MD   40 mEq at 07/07/25 0835    sodium chloride 0.9% flush 10 mL  10 mL Intravenous Q12H PRN Rodrick Stephens DO        [COMPLETED] vancomycin (VANCOCIN) 1,750 mg in 0.9% NaCl 500 mL IVPB  1,750 mg Intravenous Once Adebayo Miranda MD   Stopped at 07/01/25 1945    [COMPLETED] vancomycin (VANCOCIN) 1,750 mg in 0.9% NaCl 500 mL IVPB  1,750 mg Intravenous Once Adebayo Miranda MD   Stopped at 07/03/25 2006    vancomycin (VANCOCIN) 1,750 mg in 0.9% NaCl 500 mL IVPB  1,750 mg Intravenous Q24H Adebayo Miranda MD   Stopped at 07/06/25 2006    vancomycin - pharmacy to dose   Intravenous pharmacy to manage frequency Jovi Gruber MD         Outpatient Medications as of 7/7/2025   Medication Sig Dispense Refill    losartan (COZAAR) 50 MG tablet Take 1 tablet (50 mg total) by mouth once daily. 90 tablet 3    0.9% NaCl SolP 50 mL with DAPTOmycin 500 mg SolR 435.5 mg Inject 435.5 mg into the vein once daily. 17 g 0    acetaminophen (TYLENOL) 500 MG tablet Take 500 mg by mouth daily as needed for Pain. Patient takes one in the AM and one in the PM      albuterol (PROVENTIL) 2.5 mg /3 mL (0.083 %) nebulizer solution Take 3 mLs (2.5 mg total) by nebulization as needed for Wheezing or Shortness of Breath. 3 mL 4    albuterol (VENTOLIN HFA) 90 mcg/actuation inhaler Inhale 2 puffs into the lungs every 6 (six) hours as needed for Wheezing. Rescue 18 g 3    amLODIPine (NORVASC) 10 MG tablet Take 1 tablet (10 mg total) by mouth once daily. 90 tablet 3    aspirin 81 MG Chew Take 1 tablet (81 mg total) by mouth once daily. 90 tablet 3    [START ON 8/12/2025] atorvastatin (LIPITOR) 80 MG tablet Take 1 tablet (80 mg total) by mouth once daily. 90 tablet 3    cefTRIAXone (ROCEPHIN) 2  "gram injection Inject 2 g into the vein once daily. 78 g 0    ferrous sulfate (FEOSOL) 325 mg (65 mg iron) Tab tablet Take 325 mg by mouth once daily.      fluticasone propionate (FLONASE) 50 mcg/actuation nasal spray 1 spray (50 mcg total) by Each Nostril route 2 (two) times daily. 5 mL 4    fluticasone-salmeterol diskus inhaler 250-50 mcg Inhale 1 puff into the lungs 2 (two) times daily. Controller 90 each 6    magnesium oxide (MAG-OX) 400 mg (241.3 mg magnesium) tablet Take 800 mg by mouth once daily.      multivitamin (THERAGRAN) per tablet Take 1 tablet by mouth once daily.      pantoprazole (PROTONIX) 40 MG tablet Take 1 tablet (40 mg total) by mouth once daily. 90 tablet 3        Cardiovascular:    Cardiovascular Review: Within definable limits (WDL)    Telemetry: Yes    Rhythm: NSR    AICD: No      Respiratory:    Oxygen: N/A    CPT/Frequency: N/A    Incentive Spirometer/Frequency: N/A    CPAP/Settings: N/A    BiPAP/Settings: N/A    Oxygen Saturation: 98    Suction Frequency: N/A      Nutrition:      Ht Readings from Last 3 Encounters:   06/30/25 5' 9" (1.753 m)   06/10/25 5' 8" (1.727 m)   05/13/25 5' 9.02" (1.753 m)     Wt Readings from Last 1 Encounters:   06/30/25 0650 72.6 kg (160 lb 1.6 oz)       Feeding Status:   Current Diet: CONSISTENT CARBOHYDRATE 2000 CHRISTIANO   Supplements:BOOST GLUCOSE CONTROL DAILY   Tube Feeding: N/A   Flushes: N/A      Integumentary:    Integumentary: Other: SEE WOUND CARE NOTES BELOW               Wound 06/30/25 0715 Blister(s) Right dorsal Toe, fifth (Active)   06/30/25 0715 Toe, fifth   Present on Original Admission: Y   Primary Wound Type: Blister(s)   Side: Right   Orientation: dorsal   Wound Approximate Age at First Assessment (Weeks):    Wound Number:    Is this injury device related?:    Incision Type:    Closure Method:    Wound Description (Comments):    Type:    Additional Comments:    Ankle-Brachial Index:    Pulses:    Removal Indication and Assessment:    Wound " Outcome:    Dressing Appearance Intact;Clean;Dry 07/07/25 0800   Drainage Amount None 07/06/25 0800   Drainage Characteristics/Odor Yellow 06/30/25 0714   Appearance Dressing in place, unable to visualize 07/06/25 2000   Periwound Area Intact 07/06/25 1737   Care Cleansed with: 07/06/25 1737   Dressing Applied;Gauze;Rolled gauze;Tape 07/06/25 1737   Number of days: 7            Incision/Site 01/26/24 0939 Right Hand (Active)   01/26/24 0939   Present Prior to Hospital Arrival?:    Side: Right   Location: Hand   Orientation:    Incision Type:    Closure Method:    Additional Comments:    Removal Indication and Assessment:    Wound Outcome:    Removal Indications:    Number of days: 528         Musculoskeletal:    Transfer assist: Activity did not occur    Weight Bearing Status: Full    Comments: N/A    ADL Assist: Activity did not occur    Special Equipment: N/A    Radiology:    Radiology (Last 168 hours)               07/07 0654 Cardiac monitoring strips     07/07 0322 Cardiac monitoring strips     07/06 2315 Cardiac monitoring strips     07/06 1829 Cardiac monitoring strips     07/06 1433 Cardiac monitoring strips     07/06 1042 Cardiac monitoring strips     07/06 0635 Cardiac monitoring strips     07/06 0252 Cardiac monitoring strips     07/05 2255 Cardiac monitoring strips     07/05 2251 Cardiac monitoring strips     07/05 1900 Cardiac monitoring strips     07/05 1433 Cardiac monitoring strips     07/05 1038 Cardiac monitoring strips     07/05 0642 Cardiac monitoring strips     07/05 0307 Cardiac monitoring strips     07/04 2213 Cardiac monitoring strips     07/04 1757 Cardiac monitoring strips     06/30 1526 Ankle Brachial Indices (MARINA)       06/30 1526 CV Ultrasound doppler arterial legs bilat       06/30 1439 MRI Foot (Forefoot) Right W W/O Contrast              ..  Ankle Brachial Indices (MARINA)  The right lower extremity ankle brachial index is 1.49 suggesting non   compressible tibial vessels.  The left lower  "extremity ankle brachial index is 1.47 suggesting non   compressible tibial vessels.      CV Ultrasound doppler arterial legs bilat  The right lower extremity arterial system is patent with no evidence of   focal stenosis or occlusion.  The left lower extremity arterial system is patent with no evidence of   focal stenosis or occlusion.      Lab/Cultures:    Blood Urea Nitrogen   Date Value Ref Range Status   07/07/2025 17.9 8.4 - 25.7 mg/dL Final   07/06/2025 20.4 8.4 - 25.7 mg/dL Final     Creatinine   Date Value Ref Range Status   07/07/2025 0.83 0.72 - 1.25 mg/dL Final   07/06/2025 0.80 0.72 - 1.25 mg/dL Final     WBC   Date Value Ref Range Status   07/07/2025 6.97 4.50 - 11.50 x10(3)/mcL Final   07/06/2025 7.59 4.50 - 11.50 x10(3)/mcL Final      Blood Culture   Date Value Ref Range Status   07/01/2025 No Growth at 5 days  Final   07/01/2025 No Growth at 5 days  Final     Wound Culture   Date Value Ref Range Status   06/30/2025 Rare normal skin aylin, no further workup.  Final     No results for input(s): "PH", "PCO2", "PO2", "HCO3", "POCSATURATED", "BE" in the last 72 hours.       "

## 2025-07-07 NOTE — PROGRESS NOTES
Patient seen at bedside to follow up on 4th toe wound. Minimal changes noted to wound today. Wound remains without purulence or erythema. 5th toe continues to be intact with no skin changes. Mild pain reported by patient with wound cleaning. Wound care to continue to follow.

## 2025-07-07 NOTE — PROGRESS NOTES
Pt pending review for LTAC placement with [facility] for IV antibiotics. New referral submitted to [employer] requesting financial assistance with co-pay.

## 2025-07-08 VITALS
SYSTOLIC BLOOD PRESSURE: 148 MMHG | TEMPERATURE: 98 F | RESPIRATION RATE: 18 BRPM | WEIGHT: 160.13 LBS | BODY MASS INDEX: 23.72 KG/M2 | HEIGHT: 69 IN | OXYGEN SATURATION: 98 % | DIASTOLIC BLOOD PRESSURE: 76 MMHG | HEART RATE: 80 BPM

## 2025-07-08 LAB
ALBUMIN SERPL-MCNC: 2.8 G/DL (ref 3.4–4.8)
ALBUMIN/GLOB SERPL: 0.8 RATIO (ref 1.1–2)
ALP SERPL-CCNC: 99 UNIT/L (ref 40–150)
ALT SERPL-CCNC: 27 UNIT/L (ref 0–55)
ANION GAP SERPL CALC-SCNC: 7 MEQ/L
AST SERPL-CCNC: 37 UNIT/L (ref 11–45)
BASOPHILS # BLD AUTO: 0.1 X10(3)/MCL
BASOPHILS NFR BLD AUTO: 1.3 %
BILIRUB SERPL-MCNC: 0.2 MG/DL
BUN SERPL-MCNC: 20.3 MG/DL (ref 8.4–25.7)
CALCIUM SERPL-MCNC: 8.4 MG/DL (ref 8.8–10)
CHLORIDE SERPL-SCNC: 110 MMOL/L (ref 98–107)
CO2 SERPL-SCNC: 22 MMOL/L (ref 23–31)
CREAT SERPL-MCNC: 0.91 MG/DL (ref 0.72–1.25)
CREAT/UREA NIT SERPL: 22
CRP SERPL-MCNC: 14.6 MG/L
EOSINOPHIL # BLD AUTO: 0.43 X10(3)/MCL (ref 0–0.9)
EOSINOPHIL NFR BLD AUTO: 5.4 %
ERYTHROCYTE [DISTWIDTH] IN BLOOD BY AUTOMATED COUNT: 13.1 % (ref 11.5–17)
ERYTHROCYTE [SEDIMENTATION RATE] IN BLOOD: 56 MM/HR (ref 0–15)
GFR SERPLBLD CREATININE-BSD FMLA CKD-EPI: >60 ML/MIN/1.73/M2
GLOBULIN SER-MCNC: 3.7 GM/DL (ref 2.4–3.5)
GLUCOSE SERPL-MCNC: 97 MG/DL (ref 82–115)
HCT VFR BLD AUTO: 30 % (ref 42–52)
HGB BLD-MCNC: 10 G/DL (ref 14–18)
IMM GRANULOCYTES # BLD AUTO: 0.01 X10(3)/MCL (ref 0–0.04)
IMM GRANULOCYTES NFR BLD AUTO: 0.1 %
LYMPHOCYTES # BLD AUTO: 2.84 X10(3)/MCL (ref 0.6–4.6)
LYMPHOCYTES NFR BLD AUTO: 35.6 %
MAGNESIUM SERPL-MCNC: 1.8 MG/DL (ref 1.6–2.6)
MCH RBC QN AUTO: 31.3 PG (ref 27–31)
MCHC RBC AUTO-ENTMCNC: 33.3 G/DL (ref 33–36)
MCV RBC AUTO: 93.8 FL (ref 80–94)
MONOCYTES # BLD AUTO: 0.84 X10(3)/MCL (ref 0.1–1.3)
MONOCYTES NFR BLD AUTO: 10.5 %
NEUTROPHILS # BLD AUTO: 3.75 X10(3)/MCL (ref 2.1–9.2)
NEUTROPHILS NFR BLD AUTO: 47.1 %
NRBC BLD AUTO-RTO: 0 %
PHOSPHATE SERPL-MCNC: 2.7 MG/DL (ref 2.3–4.7)
PLATELET # BLD AUTO: 277 X10(3)/MCL (ref 130–400)
PMV BLD AUTO: 9.8 FL (ref 7.4–10.4)
POCT GLUCOSE: 102 MG/DL (ref 70–110)
POCT GLUCOSE: 105 MG/DL (ref 70–110)
POTASSIUM SERPL-SCNC: 3.7 MMOL/L (ref 3.5–5.1)
PROT SERPL-MCNC: 6.5 GM/DL (ref 5.8–7.6)
RBC # BLD AUTO: 3.2 X10(6)/MCL (ref 4.7–6.1)
SODIUM SERPL-SCNC: 139 MMOL/L (ref 136–145)
WBC # BLD AUTO: 7.97 X10(3)/MCL (ref 4.5–11.5)

## 2025-07-08 PROCEDURE — 63600175 PHARM REV CODE 636 W HCPCS: Performed by: INTERNAL MEDICINE

## 2025-07-08 PROCEDURE — 99900035 HC TECH TIME PER 15 MIN (STAT)

## 2025-07-08 PROCEDURE — 85652 RBC SED RATE AUTOMATED: CPT | Performed by: NURSE PRACTITIONER

## 2025-07-08 PROCEDURE — 80053 COMPREHEN METABOLIC PANEL: CPT

## 2025-07-08 PROCEDURE — 84100 ASSAY OF PHOSPHORUS: CPT

## 2025-07-08 PROCEDURE — 36415 COLL VENOUS BLD VENIPUNCTURE: CPT | Performed by: NURSE PRACTITIONER

## 2025-07-08 PROCEDURE — 85025 COMPLETE CBC W/AUTO DIFF WBC: CPT

## 2025-07-08 PROCEDURE — 25000003 PHARM REV CODE 250

## 2025-07-08 PROCEDURE — 86140 C-REACTIVE PROTEIN: CPT | Performed by: NURSE PRACTITIONER

## 2025-07-08 PROCEDURE — 25000003 PHARM REV CODE 250: Performed by: INTERNAL MEDICINE

## 2025-07-08 PROCEDURE — 83735 ASSAY OF MAGNESIUM: CPT

## 2025-07-08 RX ORDER — HYDROCHLOROTHIAZIDE 25 MG/1
25 TABLET ORAL DAILY
Qty: 30 TABLET | Refills: 11 | Status: ON HOLD | OUTPATIENT
Start: 2025-07-09 | End: 2026-07-09

## 2025-07-08 RX ORDER — LOSARTAN POTASSIUM 100 MG/1
100 TABLET ORAL DAILY
Qty: 90 TABLET | Refills: 3 | Status: ON HOLD | OUTPATIENT
Start: 2025-07-09 | End: 2026-07-09

## 2025-07-08 RX ORDER — HYDROCHLOROTHIAZIDE 25 MG/1
25 TABLET ORAL DAILY
Status: DISCONTINUED | OUTPATIENT
Start: 2025-07-08 | End: 2025-07-08 | Stop reason: HOSPADM

## 2025-07-08 RX ADMIN — VANCOMYCIN HYDROCHLORIDE 1000 MG: 1 INJECTION, POWDER, LYOPHILIZED, FOR SOLUTION INTRAVENOUS at 06:07

## 2025-07-08 RX ADMIN — DICLOFENAC SODIUM TOPICAL GEL, 1% 2 G: 10 GEL TOPICAL at 08:07

## 2025-07-08 RX ADMIN — LOSARTAN POTASSIUM 100 MG: 25 TABLET, FILM COATED ORAL at 08:07

## 2025-07-08 RX ADMIN — ASPIRIN 81 MG CHEWABLE TABLET 81 MG: 81 TABLET CHEWABLE at 08:07

## 2025-07-08 RX ADMIN — ATORVASTATIN CALCIUM 80 MG: 40 TABLET, FILM COATED ORAL at 08:07

## 2025-07-08 RX ADMIN — AMLODIPINE BESYLATE 10 MG: 10 TABLET ORAL at 08:07

## 2025-07-08 RX ADMIN — HYDROCHLOROTHIAZIDE 25 MG: 25 TABLET ORAL at 08:07

## 2025-07-08 NOTE — DISCHARGE SUMMARY
U Internal Medicine Discharge Summary  [facility] [place]    Admitting Physician: Adebayo Miranda MD  Attending Physician: Adebayo Miranda MD  Date of admit: 6/30/2025  Date of discharge: No discharge date for patient encounter.    Condition: Good  Outcome: Condition has improved and patient is now ready for discharge.  Disposition: Long Term Acute care    Hospital Course Summary and Problem List     Riccardo Hamilton is a 80 y.o. male with a history of PMH of colonic adenocarcinoma (followed by Oncology; s/p hemicolectomy and adjuvant chemo), HTN, HLD, T2DM, Asthma, Retropharyngeal Carotid Bypass (2014), Carotid Endarterectomy (1/2014; 2/2014), and previous RLE DVT w/ subacute small PE of R. Lung (s/p x3 month Eliquis txt) who presented to [facility] ED on 6/30/2025 with complaint of 2 weeks of pain between his 4th and 5th toes of his right foot. He was found to have osteomyelitis of both toes, surgery was consulted who discussed options with the patient, decision was made to go with medical management due to his risk for complications from a surgical intervention with his age and comorbities. Wound cultures before antibiotics did not grow any organisms, ID was consulted who recommended 6 week of antibiotics with rocephin and daptomycin. The patient was a candidate for OPAT, he already had a mediport from his chemotherapy, oncology gave us approval to use it for antibiotic administration, though his wife did not feel comfortable with giving him the medication at home, he will be going to an LTAC to complete his course of antibiotics.     Long Term Acute Care  Discharge Procedure Orders   CK   Standing Status: Standing Number of Occurrences: 6 Standing Exp. Date: 10/01/26     Ambulatory referral/consult to Internal Medicine   Standing Status: Future   Referral Priority: Routine Referral Type: Consultation   Referral Reason: Specialty Services Required   Requested Specialty: Internal  "Medicine   Number of Visits Requested: 1       To address at [facility] Visit:  Significant medication changes:  take Rocephin and Daptomycin daily through mediport. Hold Lipitor while on antibiotics .  Results not resulted during admission: none  Recommended Labs: weekly CRP  Recommended Imaging: none    Problem List  Osteomyelitis of 4th and 5th right toes  HTN  Hx of colon cancer    Objective     Vital Signs:  Vitals  BP: (!) 169/74  Temp: 98.1 °F (36.7 °C)  Temp Source: Oral  Pulse: 73  Resp: 18  SpO2: 96 %  Height: 5' 9" (175.3 cm)  Weight: 72.6 kg (160 lb 1.6 oz)    Physical Exam  Physical Exam    Discharge Medications        Medication List        START taking these medications      0.9% NaCl SolP 50 mL with DAPTOmycin 500 mg SolR 435.5 mg  Inject 435.5 mg into the vein once daily.  Notes to patient: ANTIBIOTIC     cefTRIAXone 2 gram injection  Commonly known as: ROCEPHIN  Inject 2 g into the vein once daily.  Notes to patient: ANTIBIOTIC     hydroCHLOROthiazide 25 MG tablet  Commonly known as: HYDRODIURIL  Take 1 tablet (25 mg total) by mouth once daily.  Start taking on: July 9, 2025            CHANGE how you take these medications      atorvastatin 80 MG tablet  Commonly known as: LIPITOR  Take 1 tablet (80 mg total) by mouth once daily.  Start taking on: August 12, 2025  What changed: These instructions start on August 12, 2025. If you are unsure what to do until then, ask your doctor or other care provider.     losartan 100 MG tablet  Commonly known as: COZAAR  Take 1 tablet (100 mg total) by mouth once daily.  Start taking on: July 9, 2025  What changed:   medication strength  how much to take            CONTINUE taking these medications      acetaminophen 500 MG tablet  Commonly known as: TYLENOL     * albuterol 2.5 mg /3 mL (0.083 %) nebulizer solution  Commonly known as: PROVENTIL  Take 3 mLs (2.5 mg total) by nebulization as needed for Wheezing or Shortness of Breath.     * albuterol 90 mcg/actuation " inhaler  Commonly known as: VENTOLIN HFA  Inhale 2 puffs into the lungs every 6 (six) hours as needed for Wheezing. Rescue     amLODIPine 10 MG tablet  Commonly known as: NORVASC  Take 1 tablet (10 mg total) by mouth once daily.     aspirin 81 MG Chew  Take 1 tablet (81 mg total) by mouth once daily.     cetirizine 5 MG tablet  Commonly known as: ZYRTEC  Take 1 tablet (5 mg total) by mouth once daily.     ferrous sulfate 325 mg (65 mg iron) Tab tablet  Commonly known as: FEOSOL     fluticasone propionate 50 mcg/actuation nasal spray  Commonly known as: FLONASE  1 spray (50 mcg total) by Each Nostril route 2 (two) times daily.     fluticasone-salmeterol 250-50 mcg/dose 250-50 mcg/dose diskus inhaler  Commonly known as: ADVAIR DISKUS  Inhale 1 puff into the lungs 2 (two) times daily. Controller     gabapentin 100 MG capsule  Commonly known as: NEURONTIN  Take 1 capsule (100 mg total) by mouth 3 (three) times daily.     magnesium oxide 400 mg (241.3 mg magnesium) tablet  Commonly known as: MAG-OX     multivitamin per tablet  Commonly known as: THERAGRAN     pantoprazole 40 MG tablet  Commonly known as: PROTONIX  Take 1 tablet (40 mg total) by mouth once daily.     valACYclovir 1000 MG tablet  Commonly known as: VALTREX  Take 1 tablet (1,000 mg total) by mouth 3 (three) times daily. for 16 days           * This list has 2 medication(s) that are the same as other medications prescribed for you. Read the directions carefully, and ask your doctor or other care provider to review them with you.                   Where to Get Your Medications        These medications were sent to [address] [address]      Phone: [phone]   atorvastatin 80 MG tablet  cefTRIAXone 2 gram injection  hydroCHLOROthiazide 25 MG tablet  losartan 100 MG tablet       You can get these medications from any pharmacy    Bring a paper prescription for each  of these medications  0.9% NaCl SolP 50 mL with DAPTOmycin 500 mg SolR 435.5 mg         Outpatient Follow Up      Follow-up Information       [facility] Follow up on 7/11/2025.    Specialty: Wound Care  Why: Friday 7-11-25 @ 9 am  3rd floor  [phone]  Contact information:  [address] [facility] [address] [phone]             Rocky Peoples MD. Go on 7/24/2025.    Specialty: Infectious Diseases  Contact information:  [address] [facility] [address]  [phone]               [facility]- Follow up.    Contact information:  [address] [facility] [address]  [phone]                             At this time, Riccardo Hamilton is determined to have maximized benefits of IP hospitalization. he is discharged in stable condition with outpatient f/u recommendations and instructions. All questions were answered, and patient verbalized agreement with the POC. They were given return precautions prior to discharge including symptoms that should prompt return to ED or to call PCP.     Total time spent at discharge: >30 minutes    Jovi Gruber  [facility] Internal Medicine

## 2025-07-08 NOTE — PROGRESS NOTES
Pt approved for LTAC placement at [facility] today. D/C order requested. Will arrange transport via [facility]. V/M left for dghtr, Yolie, re plan.

## 2025-07-08 NOTE — PROGRESS NOTES
Inpatient Nutrition Assessment    Admit Date: 6/30/2025   Total duration of encounter: 8 days   Patient Age: 80 y.o.    Nutrition Recommendation/Prescription     Continue Carb consistent diet  Continue Boost Max (provides 160 kcal, 30 g protein per serving) once daily to supplement protein  Monitor Weight Weekly     Communication of Recommendations: reviewed with nurse, reviewed with patient, and reviewed with family    Nutrition Assessment     Malnutrition Assessment/Nutrition-Focused Physical Exam    Malnutrition Context: acute illness or injury (07/01/25 1125)  Malnutrition Level: other (see comments) (Does not meet criteria) (07/01/25 1125)     Weight Loss (Malnutrition): other (see comments) (Does not meet criteria) (07/01/25 1125)     Orbital Region (Subcutaneous Fat Loss): well nourished           Hoahaoism Region (Muscle Loss): well nourished            Fluid Accumulation (Malnutrition): other (see comments) (Not present) (07/01/25 1125)        A minimum of two characteristics is recommended for diagnosis of either severe or non-severe malnutrition.    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     Scheduled Medications:  amLODIPine, 10 mg, Daily  aspirin, 81 mg, Daily  atorvastatin, 80 mg, Daily  cefTRIAXone (Rocephin) IV (PEDS and ADULTS), 2 g, Q24H  diclofenac sodium, 2 g, Daily  enoxparin, 40 mg, Daily  hydroCHLOROthiazide, 25 mg, Daily  losartan, 100 mg, Daily  vancomycin (VANCOCIN) IV (PEDS and ADULTS), 1,000 mg, Q12H    Continuous Infusions:   PRN  Medications:  acetaminophen, 650 mg, Q8H PRN  albuterol-ipratropium, 3 mL, Q6H PRN  dextrose 50%, 12.5 g, PRN  dextrose 50%, 25 g, PRN  glucagon (human recombinant), 1 mg, PRN  glucose, 16 g, PRN  glucose, 24 g, PRN  hydrALAZINE, 10 mg, Q6H PRN  melatonin, 6 mg, Nightly PRN  naloxone, 0.02 mg, PRN  polyethylene glycol, 17 g, BID PRN  sodium chloride 0.9%, 10 mL, Q12H PRN  vancomycin - pharmacy to dose, , pharmacy to manage frequency    Calorie Containing IV Medications: no significant kcals from medications at this time    Recent Labs   Lab 07/01/25  1456 07/02/25  0338 07/03/25  0429 07/04/25  0410 07/05/25  0434 07/06/25  0358 07/07/25  0400 07/08/25  0345   NA  --  138 137 140 139 138 140 139   K  --  3.9 3.8 3.5 3.5 3.5 3.4* 3.7   CALCIUM  --  8.6* 9.0 8.6* 8.6* 8.4* 8.5* 8.4*   PHOS  --  2.7 3.1 2.7 2.7 2.8 2.6 2.7   MG  --  1.70 1.70 1.70 1.70 1.80 1.80 1.80   CL  --  108* 106 110* 110* 109* 111* 110*   CO2  --  24 23 23 23 23 24 22*   BUN  --  21.6 21.4 24.5 22.7 20.4 17.9 20.3   CREATININE  --  0.94 0.96 0.88 0.78 0.80 0.83 0.91   EGFRNORACEVR  --  >60 >60 >60 >60 >60 >60 >60   GLU  --  91 93 96 94 93 95 97   BILITOT  --  0.2 0.2 0.2 0.2 0.2 0.3 0.2   ALKPHOS  --  110 103 100 94 94 99 99   ALT  --  20 19 23 24 26 24 27   AST  --  24 27 32 31 31 39 37   ALBUMIN  --  2.7* 2.7* 2.7* 2.6* 2.7* 2.8* 2.8*   CRP 47.60*  --   --  25.00*  --  18.50*  --  14.60*   WBC  --  7.72 8.75 9.02 7.63 7.59 6.97 7.97   HGB  --  11.1* 11.0* 11.1* 10.8* 10.4* 10.2* 10.0*   HCT  --  32.8* 32.5* 32.6* 31.4* 30.7* 29.6* 30.0*     Nutrition Orders:  Diet Consistent Carbohydrate 2000 Calories (up to 75 gm per meal); Standard Tray  Dietary nutrition supplements Daily; Boost Glucose Control Max 30G Protein Nutritional Drink - Vanilla    Appetite/Oral Intake: good/50-75% of meals  Factors Affecting Nutritional Intake: none identified  Social Needs Impacting Access to Food: none identified  Food/[place]/Cultural Preferences: none  "reported  Food Allergies: no known food allergies  Last Bowel Movement: 25  Wound(s):  Right 4th toe ulcer    Comments  () Pt tolerating oral diet; good appetite; eating % meals; drinking ONS. Labs acknowledged: H/H (L)--consider fe. Pt to be dishcarged to LTAC today. Current diet tx appropriate.     25 -- Pt reports good appetite & eating well, 50% meal intake documented -- will monitor; pt denies difficulty eating; no GI symptoms; pt with right 4th toe ulcer, agreeable to protein supplement; Continue Carb consistent diet for management of DM    Anthropometrics    Height: 5' 9" (175.3 cm), Height Method: Measured  Last Weight: 72.6 kg (160 lb 1.6 oz) (25 0650), Weight Method: Standard Scale  BMI (Calculated): 23.6  BMI Classification: normal (BMI 18.5-24.9)        Ideal Body Weight (IBW), Male: 160 lb     % Ideal Body Weight, Male (lb): 100.06 %                 Usual Body Weight (UBW), k kg (160-170 lb)  % Usual Body Weight: 97.03     Usual Weight Provided By: patient and EMR weight history    Wt Readings from Last 5 Encounters:   25 72.6 kg (160 lb 1.6 oz)   06/10/25 72.4 kg (159 lb 9.6 oz)   25 73.4 kg (161 lb 12.8 oz)   25 75.3 kg (166 lb)   25 75.3 kg (166 lb 0.1 oz)     Weight Change(s) Since Admission: new admit  Wt Readings from Last 1 Encounters:   25 0650 72.6 kg (160 lb 1.6 oz)   Admit Weight: 72.6 kg (160 lb 1.6 oz) (25 0650), Weight Method: Standard Scale    Estimated Needs    Weight Used For Calorie Calculations: 72.7 kg (160 lb 4.4 oz)  Energy Calorie Requirements (kcal): 9917-9068 kcal (28 - 30 kcal/kg)  Energy Need Method: Kcal/kg  Weight Used For Protein Calculations: 72.7 kg (160 lb 4.4 oz)  Protein Requirements:  gm (1.2 - 1.4 gm/kg)  Fluid Requirements (mL): 2287-1523 ml (1ml/kcal)  CHO Requirement: 230-245 gm/d (45% EER)     Enteral Nutrition     Patient not receiving enteral nutrition at this time.    Parenteral Nutrition "     Patient not receiving parenteral nutrition support at this time.    Evaluation of Received Nutrient Intake    Calories: meeting estimated needs  Protein: meeting estimated needs    Patient Education     Not applicable.    Nutrition Diagnosis     PES: Increased nutrient needs (protein) related to increased protein energy demand for wound healing as evidenced by right 4th toe ulcer. (active)     PES:            Nutrition Interventions     Intervention(s): modified composition of meals/snacks, commercial beverage, and collaboration with other providers  Intervention(s):      Goal: Meet greater than 80% of nutritional needs by follow-up. (goal progressing)  Goal: Consume % of oral supplements by follow-up. (goal progressing)    Nutrition Goals & Monitoring     Dietitian will monitor: food and beverage intake, weight, and glucose/endocrine profile  Discharge planning: continue carb consistent diet with protein oral supplements  Nutrition Risk/Follow-Up: dietitian will follow-up one time per week   Please consult if re-assessment needed sooner.

## 2025-07-08 NOTE — PLAN OF CARE
07/08/25 1040   Medicare Message   Important Message from Medicare regarding Discharge Appeal Rights Given to patient/caregiver;Explained to patient/caregiver;Signed/date by patient/caregiver   Date IMM was signed 07/08/25   Time IMM was signed 1040

## 2025-07-11 PROBLEM — M86.9 OSTEOMYELITIS: Status: ACTIVE | Noted: 2025-07-11

## 2025-08-13 ENCOUNTER — OFFICE VISIT (OUTPATIENT)
Dept: INFECTIOUS DISEASES | Facility: CLINIC | Age: 80
End: 2025-08-13
Payer: MEDICARE

## 2025-08-13 ENCOUNTER — LAB VISIT (OUTPATIENT)
Dept: LAB | Facility: HOSPITAL | Age: 80
End: 2025-08-13
Attending: NURSE PRACTITIONER
Payer: MEDICARE

## 2025-08-13 ENCOUNTER — HOSPITAL ENCOUNTER (OUTPATIENT)
Dept: WOUND CARE | Facility: HOSPITAL | Age: 80
Discharge: HOME | End: 2025-08-13
Attending: NURSE PRACTITIONER
Payer: MEDICARE

## 2025-08-13 VITALS
HEART RATE: 79 BPM | RESPIRATION RATE: 18 BRPM | DIASTOLIC BLOOD PRESSURE: 69 MMHG | TEMPERATURE: 98 F | OXYGEN SATURATION: 97 % | SYSTOLIC BLOOD PRESSURE: 128 MMHG

## 2025-08-13 VITALS
RESPIRATION RATE: 16 BRPM | SYSTOLIC BLOOD PRESSURE: 115 MMHG | HEART RATE: 80 BPM | DIASTOLIC BLOOD PRESSURE: 57 MMHG | TEMPERATURE: 98 F | BODY MASS INDEX: 22.98 KG/M2 | HEIGHT: 69 IN | WEIGHT: 155.19 LBS

## 2025-08-13 DIAGNOSIS — L97.519 DIABETIC ULCER OF RIGHT FOOT ASSOCIATED WITH OTHER SPECIFIED DIABETES MELLITUS, UNSPECIFIED PART OF FOOT, UNSPECIFIED ULCER STAGE: ICD-10-CM

## 2025-08-13 DIAGNOSIS — E13.621 DIABETIC ULCER OF RIGHT FOOT ASSOCIATED WITH OTHER SPECIFIED DIABETES MELLITUS, UNSPECIFIED PART OF FOOT, UNSPECIFIED ULCER STAGE: ICD-10-CM

## 2025-08-13 DIAGNOSIS — Z79.2 CHRONIC ANTIBIOTIC SUPPRESSION: ICD-10-CM

## 2025-08-13 DIAGNOSIS — M86.9 OSTEOMYELITIS, UNSPECIFIED SITE, UNSPECIFIED TYPE: ICD-10-CM

## 2025-08-13 DIAGNOSIS — R74.01 TRANSAMINITIS: ICD-10-CM

## 2025-08-13 DIAGNOSIS — M86.9 OSTEOMYELITIS OF RIGHT FOOT, UNSPECIFIED TYPE: Primary | ICD-10-CM

## 2025-08-13 DIAGNOSIS — S91.109A NON-HEALING OPEN WOUND OF TOE, INITIAL ENCOUNTER: Primary | ICD-10-CM

## 2025-08-13 PROBLEM — Z95.828 PORT-A-CATH IN PLACE: Status: ACTIVE | Noted: 2025-08-13

## 2025-08-13 PROBLEM — E11.621 DIABETIC ULCER OF RIGHT FOOT: Status: ACTIVE | Noted: 2025-08-13

## 2025-08-13 PROCEDURE — 99215 OFFICE O/P EST HI 40 MIN: CPT | Mod: S$PBB,,, | Performed by: NURSE PRACTITIONER

## 2025-08-13 PROCEDURE — 99211 OFF/OP EST MAY X REQ PHY/QHP: CPT

## 2025-08-13 PROCEDURE — 99214 OFFICE O/P EST MOD 30 MIN: CPT | Mod: ,,, | Performed by: NURSE PRACTITIONER

## 2025-08-13 PROCEDURE — 27000999 HC MEDICAL RECORD PHOTO DOCUMENTATION

## 2025-08-13 PROCEDURE — 3074F SYST BP LT 130 MM HG: CPT | Mod: CPTII,,, | Performed by: NURSE PRACTITIONER

## 2025-08-13 PROCEDURE — 3078F DIAST BP <80 MM HG: CPT | Mod: CPTII,,, | Performed by: NURSE PRACTITIONER

## 2025-08-13 PROCEDURE — 99214 OFFICE O/P EST MOD 30 MIN: CPT | Mod: PBBFAC,27 | Performed by: NURSE PRACTITIONER

## 2025-08-13 PROCEDURE — 1160F RVW MEDS BY RX/DR IN RCRD: CPT | Mod: CPTII,,, | Performed by: NURSE PRACTITIONER

## 2025-08-13 PROCEDURE — 1111F DSCHRG MED/CURRENT MED MERGE: CPT | Mod: CPTII,,, | Performed by: NURSE PRACTITIONER

## 2025-08-13 PROCEDURE — 1126F AMNT PAIN NOTED NONE PRSNT: CPT | Mod: CPTII,,, | Performed by: NURSE PRACTITIONER

## 2025-08-13 PROCEDURE — 1159F MED LIST DOCD IN RCRD: CPT | Mod: CPTII,,, | Performed by: NURSE PRACTITIONER

## 2025-08-13 PROCEDURE — 3288F FALL RISK ASSESSMENT DOCD: CPT | Mod: CPTII,,, | Performed by: NURSE PRACTITIONER

## 2025-08-13 PROCEDURE — 1101F PT FALLS ASSESS-DOCD LE1/YR: CPT | Mod: CPTII,,, | Performed by: NURSE PRACTITIONER

## 2025-08-13 RX ORDER — DOXYCYCLINE HYCLATE 100 MG
100 TABLET ORAL EVERY 12 HOURS
Qty: 60 TABLET | Refills: 2 | Status: SHIPPED | OUTPATIENT
Start: 2025-08-13 | End: 2025-08-14 | Stop reason: SDUPTHER

## 2025-08-13 RX ORDER — AMOXICILLIN AND CLAVULANATE POTASSIUM 875; 125 MG/1; MG/1
1 TABLET, FILM COATED ORAL 2 TIMES DAILY
Qty: 60 TABLET | Refills: 2 | Status: SHIPPED | OUTPATIENT
Start: 2025-08-13 | End: 2025-08-14 | Stop reason: SDUPTHER

## 2025-08-14 ENCOUNTER — INFUSION (OUTPATIENT)
Dept: INFUSION THERAPY | Facility: HOSPITAL | Age: 80
End: 2025-08-14
Attending: INTERNAL MEDICINE
Payer: MEDICARE

## 2025-08-14 VITALS
HEART RATE: 88 BPM | DIASTOLIC BLOOD PRESSURE: 73 MMHG | SYSTOLIC BLOOD PRESSURE: 153 MMHG | RESPIRATION RATE: 20 BRPM | OXYGEN SATURATION: 99 % | TEMPERATURE: 98 F

## 2025-08-14 DIAGNOSIS — Z79.2 CHRONIC ANTIBIOTIC SUPPRESSION: ICD-10-CM

## 2025-08-14 DIAGNOSIS — C18.0 ADENOCARCINOMA OF CECUM: Primary | ICD-10-CM

## 2025-08-14 DIAGNOSIS — M86.9 OSTEOMYELITIS OF RIGHT FOOT, UNSPECIFIED TYPE: ICD-10-CM

## 2025-08-14 PROCEDURE — 96523 IRRIG DRUG DELIVERY DEVICE: CPT

## 2025-08-14 PROCEDURE — A4216 STERILE WATER/SALINE, 10 ML: HCPCS | Performed by: INTERNAL MEDICINE

## 2025-08-14 PROCEDURE — 25000003 PHARM REV CODE 250: Performed by: INTERNAL MEDICINE

## 2025-08-14 PROCEDURE — 63600175 PHARM REV CODE 636 W HCPCS: Performed by: INTERNAL MEDICINE

## 2025-08-14 RX ORDER — SODIUM CHLORIDE 0.9 % (FLUSH) 0.9 %
10 SYRINGE (ML) INJECTION
Status: DISCONTINUED | OUTPATIENT
Start: 2025-08-14 | End: 2025-08-14 | Stop reason: HOSPADM

## 2025-08-14 RX ORDER — HEPARIN 100 UNIT/ML
500 SYRINGE INTRAVENOUS
Status: DISCONTINUED | OUTPATIENT
Start: 2025-08-14 | End: 2025-08-14 | Stop reason: HOSPADM

## 2025-08-14 RX ORDER — AMOXICILLIN AND CLAVULANATE POTASSIUM 875; 125 MG/1; MG/1
1 TABLET, FILM COATED ORAL 2 TIMES DAILY
Qty: 60 TABLET | Refills: 2 | Status: SHIPPED | OUTPATIENT
Start: 2025-08-14

## 2025-08-14 RX ORDER — DOXYCYCLINE HYCLATE 100 MG
100 TABLET ORAL EVERY 12 HOURS
Qty: 60 TABLET | Refills: 2 | Status: SHIPPED | OUTPATIENT
Start: 2025-08-14

## 2025-08-14 RX ADMIN — HEPARIN 500 UNITS: 100 SYRINGE at 01:08

## 2025-08-14 RX ADMIN — Medication 10 ML: at 01:08

## 2025-08-18 ENCOUNTER — OFFICE VISIT (OUTPATIENT)
Dept: INTERNAL MEDICINE | Facility: CLINIC | Age: 80
End: 2025-08-18
Payer: MEDICARE

## 2025-08-18 ENCOUNTER — HOSPITAL ENCOUNTER (OUTPATIENT)
Dept: WOUND CARE | Facility: HOSPITAL | Age: 80
Discharge: HOME | End: 2025-08-18
Attending: NURSE PRACTITIONER
Payer: MEDICARE

## 2025-08-18 VITALS
DIASTOLIC BLOOD PRESSURE: 66 MMHG | WEIGHT: 161.19 LBS | BODY MASS INDEX: 23.87 KG/M2 | HEART RATE: 85 BPM | RESPIRATION RATE: 17 BRPM | TEMPERATURE: 99 F | HEIGHT: 69 IN | OXYGEN SATURATION: 99 % | SYSTOLIC BLOOD PRESSURE: 106 MMHG

## 2025-08-18 VITALS
RESPIRATION RATE: 18 BRPM | OXYGEN SATURATION: 99 % | DIASTOLIC BLOOD PRESSURE: 68 MMHG | TEMPERATURE: 98 F | HEART RATE: 75 BPM | SYSTOLIC BLOOD PRESSURE: 136 MMHG

## 2025-08-18 DIAGNOSIS — S91.109A NON-HEALING OPEN WOUND OF TOE, INITIAL ENCOUNTER: Primary | ICD-10-CM

## 2025-08-18 DIAGNOSIS — M86.671 OTHER CHRONIC OSTEOMYELITIS OF RIGHT FOOT: Primary | ICD-10-CM

## 2025-08-18 DIAGNOSIS — M86.9 OSTEOMYELITIS OF FIFTH TOE OF RIGHT FOOT: ICD-10-CM

## 2025-08-18 DIAGNOSIS — M86.9 OSTEOMYELITIS OF FOURTH TOE OF RIGHT FOOT: ICD-10-CM

## 2025-08-18 PROCEDURE — 99213 OFFICE O/P EST LOW 20 MIN: CPT | Mod: ,,, | Performed by: NURSE PRACTITIONER

## 2025-08-18 PROCEDURE — 99214 OFFICE O/P EST MOD 30 MIN: CPT | Mod: PBBFAC

## 2025-08-18 PROCEDURE — 99211 OFF/OP EST MAY X REQ PHY/QHP: CPT | Mod: 27

## 2025-08-18 PROCEDURE — 27000999 HC MEDICAL RECORD PHOTO DOCUMENTATION

## 2025-08-18 RX ORDER — DOXYCYCLINE 100 MG/1
100 CAPSULE ORAL EVERY 12 HOURS
COMMUNITY
Start: 2025-08-14

## 2025-08-25 ENCOUNTER — HOSPITAL ENCOUNTER (OUTPATIENT)
Dept: WOUND CARE | Facility: HOSPITAL | Age: 80
Discharge: HOME | End: 2025-08-25
Attending: NURSE PRACTITIONER
Payer: MEDICARE

## 2025-08-25 VITALS
DIASTOLIC BLOOD PRESSURE: 67 MMHG | OXYGEN SATURATION: 99 % | RESPIRATION RATE: 18 BRPM | TEMPERATURE: 98 F | HEART RATE: 72 BPM | SYSTOLIC BLOOD PRESSURE: 150 MMHG

## 2025-08-25 DIAGNOSIS — M86.9 OSTEOMYELITIS OF FIFTH TOE OF RIGHT FOOT: ICD-10-CM

## 2025-08-25 DIAGNOSIS — M86.9 OSTEOMYELITIS OF FOURTH TOE OF RIGHT FOOT: ICD-10-CM

## 2025-08-25 DIAGNOSIS — S91.109D NON-HEALING OPEN WOUND OF TOE, SUBSEQUENT ENCOUNTER: Primary | ICD-10-CM

## 2025-08-25 PROCEDURE — 27000999 HC MEDICAL RECORD PHOTO DOCUMENTATION

## 2025-08-25 PROCEDURE — 99211 OFF/OP EST MAY X REQ PHY/QHP: CPT

## 2025-08-25 PROCEDURE — 99213 OFFICE O/P EST LOW 20 MIN: CPT | Mod: ,,, | Performed by: NURSE PRACTITIONER

## 2025-08-27 RX ORDER — SODIUM, POTASSIUM,MAG SULFATES 17.5-3.13G
SOLUTION, RECONSTITUTED, ORAL ORAL
Qty: 1 KIT | Refills: 0 | Status: SHIPPED | OUTPATIENT
Start: 2025-08-27

## 2025-09-02 ENCOUNTER — HOSPITAL ENCOUNTER (OUTPATIENT)
Dept: WOUND CARE | Facility: HOSPITAL | Age: 80
Discharge: HOME | End: 2025-09-02
Attending: NURSE PRACTITIONER
Payer: MEDICARE

## 2025-09-02 VITALS
OXYGEN SATURATION: 99 % | TEMPERATURE: 98 F | RESPIRATION RATE: 18 BRPM | DIASTOLIC BLOOD PRESSURE: 68 MMHG | HEART RATE: 73 BPM | SYSTOLIC BLOOD PRESSURE: 134 MMHG

## 2025-09-02 DIAGNOSIS — S91.109D NON-HEALING OPEN WOUND OF TOE, SUBSEQUENT ENCOUNTER: Primary | ICD-10-CM

## 2025-09-02 DIAGNOSIS — M86.9 OSTEOMYELITIS OF FOURTH TOE OF RIGHT FOOT: ICD-10-CM

## 2025-09-02 DIAGNOSIS — M86.9 OSTEOMYELITIS OF FIFTH TOE OF RIGHT FOOT: ICD-10-CM

## 2025-09-02 PROCEDURE — 99213 OFFICE O/P EST LOW 20 MIN: CPT | Mod: ,,, | Performed by: NURSE PRACTITIONER

## 2025-09-02 PROCEDURE — 27000999 HC MEDICAL RECORD PHOTO DOCUMENTATION

## 2025-09-02 PROCEDURE — 99211 OFF/OP EST MAY X REQ PHY/QHP: CPT

## (undated) DEVICE — SPLINT PLASTER F.S 4INX15IN

## (undated) DEVICE — SOL NACL IRR 1000ML BTL

## (undated) DEVICE — SUT 4-0 ETHILON 18 PS-2

## (undated) DEVICE — BANDAGE ESMARK LATEX FREE 4INX

## (undated) DEVICE — GLOVE SENSICARE PI GRN 6.5

## (undated) DEVICE — CUFF ATS 2 PORT SNGL BLDR 18IN

## (undated) DEVICE — GLOVE SENSICARE PI SURG 6.5

## (undated) DEVICE — BANDAGE VELCLOSE ELAS 3INX5YD

## (undated) DEVICE — ELECTRODE PATIENT RETURN DISP

## (undated) DEVICE — KIT SURGICAL COLON .25 1.1OZ

## (undated) DEVICE — SPONGE KERLIX SUPER 6X6.75IN

## (undated) DEVICE — CORD BIPOLAR 12 FOOT

## (undated) DEVICE — MANIFOLD 4 PORT

## (undated) DEVICE — GLOVE PROTEXIS PI CRM 7

## (undated) DEVICE — SUT 5/0 18IN PLAIN GUT D/A

## (undated) DEVICE — GOWN NONREINF SET-IN SLV XL

## (undated) DEVICE — APPLICATOR CHLORAPREP ORN 26ML

## (undated) DEVICE — BANDAGE VELCLOSE ELAS 2INX5YD

## (undated) DEVICE — DRESSING XEROFORM FOIL PK 1X8

## (undated) DEVICE — BLADE SURG STAINLESS STEEL #15

## (undated) DEVICE — Device

## (undated) DEVICE — DRAPE HAND STERILE